# Patient Record
Sex: FEMALE | Race: WHITE | NOT HISPANIC OR LATINO | Employment: FULL TIME | URBAN - METROPOLITAN AREA
[De-identification: names, ages, dates, MRNs, and addresses within clinical notes are randomized per-mention and may not be internally consistent; named-entity substitution may affect disease eponyms.]

---

## 2017-01-12 ENCOUNTER — TELEPHONE (OUTPATIENT)
Dept: HEMATOLOGY/ONCOLOGY | Facility: CLINIC | Age: 67
End: 2017-01-12

## 2017-01-12 NOTE — TELEPHONE ENCOUNTER
----- Message from Juliette Song sent at 1/12/2017  9:43 AM CST -----  Contact: self  Pt is calling to let us know she will be having her colonoscopy done this month and would like Dr Hawkins to discuss her results with Dr. Rose when they are in.

## 2017-01-13 RX ORDER — LISINOPRIL AND HYDROCHLOROTHIAZIDE 12.5; 2 MG/1; MG/1
TABLET ORAL
Qty: 30 TABLET | Refills: 6 | Status: SHIPPED | OUTPATIENT
Start: 2017-01-13 | End: 2018-12-20

## 2017-01-13 NOTE — TELEPHONE ENCOUNTER
----- Message from MirandaHeather Troy sent at 1/13/2017  1:17 PM CST -----  Contact: Pt at [phone]  RX request - refill or new RX.  Is this a refill or new RX:  New rx  RX name and strength: lisinopril-hydrochlorothiazide (PRINZIDE,ZESTORETIC) 20-12.5 mg per tablet  Directions: TAKE 1 TABLET BY MOUTH EVERY DAY  Is this a 30 day or 90 day RX:  30 day  Pharmacy name and phone #: [facility]  [phone] (Phone)  [phone] (Fax)  Comments:  Pharmacy needs doctor's authorization to fill pt's script. Pt only has 3 left. Pt requesting this to be called in today.

## 2017-01-16 ENCOUNTER — ANESTHESIA (OUTPATIENT)
Dept: ENDOSCOPY | Facility: HOSPITAL | Age: 67
End: 2017-01-16
Payer: COMMERCIAL

## 2017-01-16 ENCOUNTER — ANESTHESIA EVENT (OUTPATIENT)
Dept: ENDOSCOPY | Facility: HOSPITAL | Age: 67
End: 2017-01-16
Payer: COMMERCIAL

## 2017-01-16 PROCEDURE — D9220A PRA ANESTHESIA: Mod: 33,CRNA,, | Performed by: NURSE ANESTHETIST, CERTIFIED REGISTERED

## 2017-01-16 PROCEDURE — 25000003 PHARM REV CODE 250: Performed by: NURSE ANESTHETIST, CERTIFIED REGISTERED

## 2017-01-16 PROCEDURE — D9220A PRA ANESTHESIA: Mod: 33,ANES,, | Performed by: ANESTHESIOLOGY

## 2017-01-16 PROCEDURE — 63600175 PHARM REV CODE 636 W HCPCS: Performed by: NURSE ANESTHETIST, CERTIFIED REGISTERED

## 2017-01-16 PROCEDURE — 25000003 PHARM REV CODE 250: Performed by: COLON & RECTAL SURGERY

## 2017-01-16 RX ORDER — PROPOFOL 10 MG/ML
VIAL (ML) INTRAVENOUS CONTINUOUS PRN
Status: DISCONTINUED | OUTPATIENT
Start: 2017-01-16 | End: 2017-01-16

## 2017-01-16 RX ORDER — GLYCOPYRROLATE 0.2 MG/ML
INJECTION INTRAMUSCULAR; INTRAVENOUS
Status: DISCONTINUED | OUTPATIENT
Start: 2017-01-16 | End: 2017-01-16

## 2017-01-16 RX ORDER — PHENYLEPHRINE HYDROCHLORIDE 10 MG/ML
INJECTION INTRAVENOUS
Status: DISCONTINUED | OUTPATIENT
Start: 2017-01-16 | End: 2017-01-16

## 2017-01-16 RX ORDER — LIDOCAINE HCL/PF 100 MG/5ML
SYRINGE (ML) INTRAVENOUS
Status: DISCONTINUED | OUTPATIENT
Start: 2017-01-16 | End: 2017-01-16

## 2017-01-16 RX ORDER — PROPOFOL 10 MG/ML
VIAL (ML) INTRAVENOUS
Status: DISCONTINUED | OUTPATIENT
Start: 2017-01-16 | End: 2017-01-16

## 2017-01-16 RX ADMIN — GLYCOPYRROLATE 0.2 MG: 0.2 INJECTION, SOLUTION INTRAMUSCULAR; INTRAVENOUS at 12:01

## 2017-01-16 RX ADMIN — PHENYLEPHRINE HYDROCHLORIDE 100 MCG: 10 INJECTION INTRAVENOUS at 12:01

## 2017-01-16 RX ADMIN — LIDOCAINE HYDROCHLORIDE 40 MG: 20 INJECTION, SOLUTION INTRAVENOUS at 12:01

## 2017-01-16 RX ADMIN — PROPOFOL 70 MG: 10 INJECTION, EMULSION INTRAVENOUS at 12:01

## 2017-01-16 RX ADMIN — DEXTROSE MONOHYDRATE AND SODIUM CHLORIDE: 5; .45 INJECTION, SOLUTION INTRAVENOUS at 12:01

## 2017-01-16 RX ADMIN — PROPOFOL 150 MCG/KG/MIN: 10 INJECTION, EMULSION INTRAVENOUS at 12:01

## 2017-01-16 NOTE — ANESTHESIA PREPROCEDURE EVALUATION
01/16/2017  Rachel Murray is a 66 y.o., female with anal cancer s/p chemo here for colonoscopy.    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     I have reviewed the Nursing Notes.      Review of Systems  Anesthesia Hx:  No problems with previous Anesthesia    Cardiovascular:   Exercise tolerance: good Hypertension, well controlled Denies CAD.     Denies Angina.    Pulmonary:   Denies Shortness of breath.    Hepatic/GI:   Anal cancer       Physical Exam  General:  Well nourished    Airway/Jaw/Neck:  Airway Findings: Mouth Opening: Normal Tongue: Normal  General Airway Assessment: Adult  Mallampati: II  Jaw/Neck Findings:  Neck ROM: Normal ROM      Dental:  Dental Findings: In tact    Chest/Lungs:  Chest/Lungs Findings: Clear to auscultation, Normal Respiratory Rate     Heart/Vascular:  Heart Findings: Rate: Normal  Rhythm: Regular Rhythm  Sounds: Normal        Mental Status:  Mental Status Findings:  Cooperative, Alert and Oriented         Anesthesia Plan  Type of Anesthesia, risks & benefits discussed:  Anesthesia Type:  MAC  Patient's Preference:   Intra-op Monitoring Plan:   Intra-op Monitoring Plan Comments:   Post Op Pain Control Plan:   Post Op Pain Control Plan Comments:   Induction:    Beta Blocker:         Informed Consent: Patient understands risks and agrees with Anesthesia plan.  Questions answered. Anesthesia consent signed with patient.  ASA Score: 2     Day of Surgery Review of History & Physical:    H&P update referred to the surgeon.         Ready For Surgery From Anesthesia Perspective.

## 2017-01-16 NOTE — TRANSFER OF CARE
"Anesthesia Transfer of Care Note    Patient: Rachel Murray    Procedure(s) Performed: Procedure(s) (LRB):  COLONOSCOPY (N/A)    Patient location: OPS    Anesthesia Type: general    Transport from OR: Transported from OR on room air with adequate spontaneous ventilation    Post pain: adequate analgesia    Post assessment: no apparent anesthetic complications    Post vital signs: stable    Level of consciousness: awake    Nausea/Vomiting: no nausea/vomiting    Complications: none          Last vitals:   Visit Vitals    BP (!) 83/50    Pulse 77    Temp 36.7 °C (98 °F)    Resp 16    Ht 5' 2" (1.575 m)    Wt 74.4 kg (164 lb)    SpO2 (!) 92%    Breastfeeding No    BMI 30 kg/m2     "

## 2017-01-17 NOTE — ANESTHESIA RELEASE NOTE
"Anesthesia Release from PACU Note    Patient: Rachel Murray    Procedure(s) Performed: Procedure(s) (LRB):  COLONOSCOPY (N/A)    Anesthesia type: general    Post pain: Adequate analgesia    Post assessment: no apparent anesthetic complications, tolerated procedure well and no evidence of recall    Last Vitals:   Visit Vitals    /64    Pulse 65    Temp 36.7 °C (98 °F)    Resp 16    Ht 5' 2" (1.575 m)    Wt 74.4 kg (164 lb)    SpO2 98%    Breastfeeding No    BMI 30 kg/m2       Post vital signs: stable    Level of consciousness: awake, alert  and oriented    Nausea/Vomiting: no nausea/no vomiting    Complications: none    Airway Patency: patent    Respiratory: unassisted, spontaneous ventilation    Cardiovascular: stable and blood pressure at baseline    Hydration: euvolemic  "

## 2017-01-17 NOTE — ANESTHESIA POSTPROCEDURE EVALUATION
"Anesthesia Post Evaluation    Patient: Rachel Murray    Procedure(s) Performed: Procedure(s) (LRB):  COLONOSCOPY (N/A)    Final Anesthesia Type: general  Patient location during evaluation: PACU  Patient participation: Yes- Able to Participate  Level of consciousness: awake and alert and awake  Post-procedure vital signs: reviewed and stable  Pain management: adequate  Airway patency: patent  PONV status at discharge: No PONV  Anesthetic complications: no      Cardiovascular status: blood pressure returned to baseline  Respiratory status: spontaneous ventilation and unassisted  Hydration status: euvolemic  Follow-up not needed.        Visit Vitals    /64    Pulse 65    Temp 36.7 °C (98 °F)    Resp 16    Ht 5' 2" (1.575 m)    Wt 74.4 kg (164 lb)    SpO2 98%    Breastfeeding No    BMI 30 kg/m2       Pain/Montez Score: Pain Assessment Performed: Yes (1/16/2017  1:10 PM)  Presence of Pain: denies (1/16/2017  1:10 PM)  Pain Rating Prior to Med Admin: 0 (1/16/2017  1:10 PM)  Pain Rating Post Med Admin: 0 (1/16/2017  1:10 PM)  Montez Score: 10 (1/16/2017  1:10 PM)      "

## 2017-01-23 ENCOUNTER — TELEPHONE (OUTPATIENT)
Dept: ENDOSCOPY | Facility: HOSPITAL | Age: 67
End: 2017-01-23

## 2017-02-07 DIAGNOSIS — C21.0 ANAL CANCER: Primary | ICD-10-CM

## 2017-03-31 ENCOUNTER — LAB VISIT (OUTPATIENT)
Dept: LAB | Facility: HOSPITAL | Age: 67
End: 2017-03-31
Attending: INTERNAL MEDICINE
Payer: COMMERCIAL

## 2017-03-31 ENCOUNTER — OFFICE VISIT (OUTPATIENT)
Dept: HEMATOLOGY/ONCOLOGY | Facility: CLINIC | Age: 67
End: 2017-03-31
Payer: COMMERCIAL

## 2017-03-31 VITALS
BODY MASS INDEX: 30.22 KG/M2 | RESPIRATION RATE: 17 BRPM | TEMPERATURE: 99 F | DIASTOLIC BLOOD PRESSURE: 68 MMHG | SYSTOLIC BLOOD PRESSURE: 129 MMHG | WEIGHT: 164.25 LBS | HEART RATE: 72 BPM | OXYGEN SATURATION: 96 % | HEIGHT: 62 IN

## 2017-03-31 DIAGNOSIS — R53.83 FATIGUE, UNSPECIFIED TYPE: ICD-10-CM

## 2017-03-31 DIAGNOSIS — C21.0 ANAL CANCER: ICD-10-CM

## 2017-03-31 DIAGNOSIS — Z85.048 HISTORY OF RECTAL OR ANAL CANCER: Primary | Chronic | ICD-10-CM

## 2017-03-31 DIAGNOSIS — I10 ESSENTIAL HYPERTENSION: ICD-10-CM

## 2017-03-31 LAB
ALBUMIN SERPL BCP-MCNC: 3.6 G/DL
ALP SERPL-CCNC: 122 U/L
ALT SERPL W/O P-5'-P-CCNC: 14 U/L
ANION GAP SERPL CALC-SCNC: 11 MMOL/L
AST SERPL-CCNC: 12 U/L
BILIRUB SERPL-MCNC: 0.7 MG/DL
BUN SERPL-MCNC: 19 MG/DL
CALCIUM SERPL-MCNC: 9.4 MG/DL
CHLORIDE SERPL-SCNC: 105 MMOL/L
CO2 SERPL-SCNC: 22 MMOL/L
CREAT SERPL-MCNC: 1 MG/DL
ERYTHROCYTE [DISTWIDTH] IN BLOOD BY AUTOMATED COUNT: 13.9 %
EST. GFR  (AFRICAN AMERICAN): >60 ML/MIN/1.73 M^2
EST. GFR  (NON AFRICAN AMERICAN): 58.8 ML/MIN/1.73 M^2
GLUCOSE SERPL-MCNC: 99 MG/DL
HCT VFR BLD AUTO: 44.5 %
HGB BLD-MCNC: 14.8 G/DL
MCH RBC QN AUTO: 29 PG
MCHC RBC AUTO-ENTMCNC: 33.3 %
MCV RBC AUTO: 87 FL
NEUTROPHILS # BLD AUTO: 7.2 K/UL
PLATELET # BLD AUTO: 259 K/UL
PMV BLD AUTO: 8.9 FL
POTASSIUM SERPL-SCNC: 3.9 MMOL/L
PROT SERPL-MCNC: 7.3 G/DL
RBC # BLD AUTO: 5.1 M/UL
SODIUM SERPL-SCNC: 138 MMOL/L
WBC # BLD AUTO: 10.06 K/UL

## 2017-03-31 PROCEDURE — 1126F AMNT PAIN NOTED NONE PRSNT: CPT | Mod: S$GLB,,, | Performed by: INTERNAL MEDICINE

## 2017-03-31 PROCEDURE — 3078F DIAST BP <80 MM HG: CPT | Mod: S$GLB,,, | Performed by: INTERNAL MEDICINE

## 2017-03-31 PROCEDURE — 85027 COMPLETE CBC AUTOMATED: CPT

## 2017-03-31 PROCEDURE — 1157F ADVNC CARE PLAN IN RCRD: CPT | Mod: S$GLB,,, | Performed by: INTERNAL MEDICINE

## 2017-03-31 PROCEDURE — 3074F SYST BP LT 130 MM HG: CPT | Mod: S$GLB,,, | Performed by: INTERNAL MEDICINE

## 2017-03-31 PROCEDURE — 36415 COLL VENOUS BLD VENIPUNCTURE: CPT

## 2017-03-31 PROCEDURE — 1159F MED LIST DOCD IN RCRD: CPT | Mod: S$GLB,,, | Performed by: INTERNAL MEDICINE

## 2017-03-31 PROCEDURE — 80053 COMPREHEN METABOLIC PANEL: CPT

## 2017-03-31 PROCEDURE — 99999 PR PBB SHADOW E&M-EST. PATIENT-LVL III: CPT | Mod: PBBFAC,,, | Performed by: INTERNAL MEDICINE

## 2017-03-31 PROCEDURE — 1160F RVW MEDS BY RX/DR IN RCRD: CPT | Mod: S$GLB,,, | Performed by: INTERNAL MEDICINE

## 2017-03-31 PROCEDURE — 99213 OFFICE O/P EST LOW 20 MIN: CPT | Mod: S$GLB,,, | Performed by: INTERNAL MEDICINE

## 2017-03-31 NOTE — PROGRESS NOTES
"PATIENT: Rachel Murray  MRN: [id number]  DATE: 3/31/2017    Subjective:     Chief complaint:  Chief Complaint   Patient presents with    History of rectal or anal cancer       Oncologic History:  65 year old female with anal cancer. Patient developed painful anal bleeding and a sensation of something protruding from the anus beginning late October 2012. She initially tried external hemorrhoid treatment with proctofoam without improvement in her symptoms. She was referred to colorectal surgery clinic on 11/28/12. Exam that day reported a minimal amount of what appeared to be prolapsing rectal mucosa at the anterior anal verge which was friable. Digital rectal exam described a hard ridge at the anterior midline. The anal canal was very tender. On 11/30/12 patient underwent examination under anesthesia which revealed a large fissured tag at the anterior anal verge and a cavitated fissured ridge extending the length of the anal canal. Biopsy reports a squamous cell carcinoma. The patient was treated with concurrent chemoradiation with 5FU/mitomycin and completed treatment on 2/8/13   Her last PET scan from 06/18/14 revealed no evidence of recurrence  She is on surveillance.     Colonoscopy done on 1/16/17 reveals "Impression: - Three 4 to 8 mm polyps in the sigmoid colon, in the descending colon and in the cecum, removed with a jumbo cold forceps. Resected and retrieved. - Diverticulosis in the entire examined colon. - The examination was otherwise normal. Recommendation:       - Discharge patient to home - Repeat colonoscopy in 3 years for surveillance." Pathology revealed " FINAL PATHOLOGIC DIAGNOSIS 1-3. TUBULAR ADENOMA"      Interval History: Ms. Murray returns for follow up and to review labs. She reports fatigue over the last couple of weeks. Her systolic BP's have been running in the low 100's. She is holding her BP med at this time to see if this helps with fatigue. She denies any nausea, vomiting, diarrhea, " "constipation, abdominal pain, weight loss or loss of appetite, chest pain, shortness of breath, leg swelling,  pain, headache, dizziness, or mood changes. She is alone.     ECOG Performance Status:   ECOG SCORE    0 - Fully active-able to carry on all pre-disease performance without restriction          PMFSH: all information reviewed and updated as relevant to today's visit    Review of Systems:   Review of Systems   Constitutional: Positive for fatigue. Negative for activity change, appetite change and fever.   HENT: Negative for mouth sores, nosebleeds and sore throat.    Eyes: Negative for visual disturbance.   Respiratory: Negative for cough and shortness of breath.    Cardiovascular: Negative for chest pain, palpitations and leg swelling.   Gastrointestinal: Negative for abdominal pain, constipation, diarrhea, nausea and vomiting.   Genitourinary: Negative for difficulty urinating and frequency.   Musculoskeletal: Negative for arthralgias and back pain.   Skin: Negative for rash.   Neurological: Negative for dizziness, numbness and headaches.   Hematological: Negative for adenopathy. Does not bruise/bleed easily.   Psychiatric/Behavioral: Negative for confusion and sleep disturbance. The patient is not nervous/anxious.    All other systems reviewed and are negative.        Objective:      Vitals:   Vitals:    03/31/17 0959   BP: 129/68   Pulse: 72   Resp: 17   Temp: 99.2 °F (37.3 °C)   TempSrc: Oral   SpO2: 96%   Weight: 74.5 kg (164 lb 3.9 oz)   Height: 5' 2" (1.575 m)     BMI: Body mass index is 30.04 kg/(m^2).      Physical Exam:   Physical Exam   Constitutional: She is oriented to person, place, and time. She appears well-developed and well-nourished. No distress.   HENT:   Head: Normocephalic.   Right Ear: External ear normal.   Left Ear: External ear normal.   Mouth/Throat: No oropharyngeal exudate.   No sinus tenderness.   Eyes: Conjunctivae and lids are normal. Pupils are equal, round, and reactive to " light. No scleral icterus.   Neck: Trachea normal and normal range of motion. Neck supple. No thyromegaly present.   Cardiovascular: Normal rate, regular rhythm and normal heart sounds.    Pulmonary/Chest: Effort normal and breath sounds normal.   Abdominal: Soft. Normal appearance and bowel sounds are normal. She exhibits no distension and no mass. There is no tenderness.   Musculoskeletal: Normal range of motion.   Lymphadenopathy:        Head (right side): No submental and no submandibular adenopathy present.        Head (left side): No submental and no submandibular adenopathy present.     She has no cervical adenopathy.   Neurological: She is alert and oriented to person, place, and time. She has normal strength and normal reflexes. No cranial nerve deficit. Gait normal.   Skin: Skin is warm, dry and intact. No bruising and no rash noted. No cyanosis. Nails show no clubbing.   Psychiatric: She has a normal mood and affect. Her speech is normal and behavior is normal.   Nursing note and vitals reviewed.        Laboratory Data:  WBC   Date Value Ref Range Status   03/31/2017 10.06 3.90 - 12.70 K/uL Final     Hemoglobin   Date Value Ref Range Status   03/31/2017 14.8 12.0 - 16.0 g/dL Final     Hematocrit   Date Value Ref Range Status   03/31/2017 44.5 37.0 - 48.5 % Final     Platelets   Date Value Ref Range Status   03/31/2017 259 150 - 350 K/uL Final     Gran #   Date Value Ref Range Status   03/31/2017 7.2 1.8 - 7.7 K/uL Final     Comment:     The ANC is based on a white cell differential from an   automated cell counter. It has not been microscopically   reviewed for the presence of abnormal cells. Clinical   correlation is required.         Chemistry        Component Value Date/Time     03/31/2017 0829    K 3.9 03/31/2017 0829     03/31/2017 0829    CO2 22 (L) 03/31/2017 0829    BUN 19 03/31/2017 0829    CREATININE 1.0 03/31/2017 0829    GLU 99 03/31/2017 0829        Component Value Date/Time     CALCIUM 9.4 03/31/2017 0829    ALKPHOS 122 03/31/2017 0829    AST 12 03/31/2017 0829    ALT 14 03/31/2017 0829    BILITOT 0.7 03/31/2017 0829              Assessment/Plan:     1. History of rectal or anal cancer    2. Fatigue, unspecified type    3. Essential hypertension        Plan:  1. She is doing well clinically and will return in 1 year with labs.  2,3. Fatigue likely attribute to low BP readings.  Discussed HTN and the importance of keeping it under control. Patient's BP's have been running in the low 100's/60-70's.  Encouraged to continue to hold BP medication but to check BP daily. If BP elevates to >130/70, I would suggest she resume BP medication and follow up with PCP. Patient verbalized understanding.       Med and Orders:  Orders Placed This Encounter    CBC Oncology    Comprehensive metabolic panel       Follow Up:  Return in about 1 year (around 3/31/2018).        More than 25 mins were spent during this encounter, greater than 50% was spent in direct counseling and/or coordination of care.   Patient was also seen and examined by Dr. Hawkins who agrees with above plan. Patient is in agreement with the proposed treatment plan. All questions were answered to the patient's satisfaction. Pt knows to call clinic if anything is needed before the next clinic visit.    JASWANT Chaparro  Hematology and Medical Oncology      STAFF NOTE:  I have reviewed the notes, assessments, and/or procedures performed by the nurse practitioner, as above.  I have personally interviewed the patient at the beside, and rounded with the nurse practitioner. I formulated the plan of care.  I concur with her documentation of Rachel Murray.

## 2017-08-01 ENCOUNTER — OFFICE VISIT (OUTPATIENT)
Dept: SURGERY | Facility: CLINIC | Age: 67
End: 2017-08-01
Payer: COMMERCIAL

## 2017-08-01 VITALS
SYSTOLIC BLOOD PRESSURE: 156 MMHG | BODY MASS INDEX: 28.61 KG/M2 | WEIGHT: 155.44 LBS | HEART RATE: 78 BPM | DIASTOLIC BLOOD PRESSURE: 78 MMHG | HEIGHT: 62 IN

## 2017-08-01 DIAGNOSIS — Z85.048 PERSONAL HISTORY OF MALIGNANT NEOPLASM OF RECTUM, RECTOSIGMOID JUNCTION, AND ANUS: Primary | ICD-10-CM

## 2017-08-01 PROCEDURE — 1126F AMNT PAIN NOTED NONE PRSNT: CPT | Mod: S$GLB,,, | Performed by: COLON & RECTAL SURGERY

## 2017-08-01 PROCEDURE — 99999 PR PBB SHADOW E&M-EST. PATIENT-LVL III: CPT | Mod: PBBFAC,,, | Performed by: COLON & RECTAL SURGERY

## 2017-08-01 PROCEDURE — 99214 OFFICE O/P EST MOD 30 MIN: CPT | Mod: 25,S$GLB,, | Performed by: COLON & RECTAL SURGERY

## 2017-08-01 PROCEDURE — 46600 DIAGNOSTIC ANOSCOPY SPX: CPT | Mod: S$GLB,,, | Performed by: COLON & RECTAL SURGERY

## 2017-08-01 PROCEDURE — 1159F MED LIST DOCD IN RCRD: CPT | Mod: S$GLB,,, | Performed by: COLON & RECTAL SURGERY

## 2017-08-01 NOTE — PROGRESS NOTES
CRS Office Visit    SUBJECTIVE:   CC: anal cancer     HPI 64 y.o. woman with a history of anal cancer diagnosed 2012 treated with modified Adalid (finished 2013)    Interval History  7Patient feeling well. No blood in stool or pain with bowel movements. Anal pap in  was negative for malignant cells and dysplasia. Denies any new masses or skin changes. 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 Rose    gyn surgery      ex laparotomy    HYSTERECTOMY       Family History   Problem Relation Age of Onset    Hypertension Mother     Heart disease Mother 40     CABG x 5    Seizures Mother     Cancer Father      prostate    Stroke Father     Cancer Brother      colon    Diabetes Maternal Aunt     Diabetes Maternal Uncle     Stroke Maternal Grandmother     Stroke Paternal Grandmother     Diabetes Cousin      Social History   Substance Use Topics    Smoking status: Current Every Day Smoker     Packs/day: 0.50     Years: 40.00     Types: Cigarettes    Smokeless tobacco: Never Used      Comment: occasionally    Alcohol use 0.0 oz/week      Comment: rare       OBJECTIVE:     Vital Signs (Most Recent)  Pulse: 78 (17 0832)  BP: (!) 156/78 (17 0832)    Physical Exam:  General: White female in NAD sitting in chair in clinic  Neuro: aaox4 maex4  perrl  Respiratory: resps even unlabored, CTAB  Cardiac: cap refill <2 sec, RRR  Abdomen: Abdomen soft, nontender. BS normal. No masses, organomegaly  Extremities: Warm dry and intact  Anorectal: Normal sphincter tone, no masses or stricture palpated. Anoscopy did not reveal internal abnormalities.    ASSESSMENT/PLAN:     63 yo female with h/o anal cancer s/p modified Adalid (finished 2/2013), no signs of recurrence.    Plan  RTC  6 months

## 2017-11-21 ENCOUNTER — TELEPHONE (OUTPATIENT)
Dept: HEMATOLOGY/ONCOLOGY | Facility: CLINIC | Age: 67
End: 2017-11-21

## 2017-11-21 NOTE — TELEPHONE ENCOUNTER
spoke with pt on today in regards to 1yr f/u appointment, pt was inform her next f/u will be 03/2018,  also inform pt appointments will be mailed out with date and times.

## 2017-12-18 ENCOUNTER — OFFICE VISIT (OUTPATIENT)
Dept: URGENT CARE | Facility: CLINIC | Age: 67
End: 2017-12-18
Payer: COMMERCIAL

## 2017-12-18 VITALS
HEIGHT: 62 IN | TEMPERATURE: 97 F | SYSTOLIC BLOOD PRESSURE: 145 MMHG | BODY MASS INDEX: 28.52 KG/M2 | OXYGEN SATURATION: 99 % | HEART RATE: 93 BPM | DIASTOLIC BLOOD PRESSURE: 88 MMHG | WEIGHT: 155 LBS | RESPIRATION RATE: 18 BRPM

## 2017-12-18 DIAGNOSIS — B88.9 DERMATOSIS DUE TO MITES: Primary | ICD-10-CM

## 2017-12-18 PROCEDURE — 99213 OFFICE O/P EST LOW 20 MIN: CPT | Mod: 25,S$GLB,, | Performed by: INTERNAL MEDICINE

## 2017-12-18 PROCEDURE — 90471 IMMUNIZATION ADMIN: CPT | Mod: 59,S$GLB,, | Performed by: INTERNAL MEDICINE

## 2017-12-18 PROCEDURE — 96372 THER/PROPH/DIAG INJ SC/IM: CPT | Mod: S$GLB,,, | Performed by: INTERNAL MEDICINE

## 2017-12-18 PROCEDURE — 90662 IIV NO PRSV INCREASED AG IM: CPT | Mod: S$GLB,,, | Performed by: INTERNAL MEDICINE

## 2017-12-18 RX ORDER — PERMETHRIN 50 MG/G
CREAM TOPICAL
Qty: 60 G | Refills: 1 | Status: SHIPPED | OUTPATIENT
Start: 2017-12-18 | End: 2018-01-16

## 2017-12-18 RX ORDER — BETAMETHASONE SODIUM PHOSPHATE AND BETAMETHASONE ACETATE 3; 3 MG/ML; MG/ML
9 INJECTION, SUSPENSION INTRA-ARTICULAR; INTRALESIONAL; INTRAMUSCULAR; SOFT TISSUE ONCE
Status: COMPLETED | OUTPATIENT
Start: 2017-12-18 | End: 2017-12-18

## 2017-12-18 RX ORDER — IBUPROFEN 800 MG/1
800 TABLET ORAL EVERY 6 HOURS PRN
COMMUNITY
End: 2018-03-20

## 2017-12-18 RX ORDER — HYDROCODONE BITARTRATE AND ACETAMINOPHEN 10; 325 MG/1; MG/1
TABLET ORAL
COMMUNITY
End: 2018-01-16

## 2017-12-18 RX ADMIN — BETAMETHASONE SODIUM PHOSPHATE AND BETAMETHASONE ACETATE 9 MG: 3; 3 INJECTION, SUSPENSION INTRA-ARTICULAR; INTRALESIONAL; INTRAMUSCULAR; SOFT TISSUE at 09:12

## 2017-12-18 NOTE — PROGRESS NOTES
"Subjective:       Patient ID: Rachel Murray is a 67 y.o. female.    Vitals:  height is 5' 2" (1.575 m) and weight is 70.3 kg (155 lb). Her oral temperature is 96.7 °F (35.9 °C). Her blood pressure is 145/88 (abnormal) and her pulse is 93. Her respiration is 18 and oxygen saturation is 99%.     Chief Complaint: Rash    Rash   The current episode started in the past 7 days. The problem has been gradually worsening since onset. The affected locations include the chest, left shoulder, groin and back. The rash is characterized by itchiness and redness. It is unknown if there was an exposure to a precipitant. Pertinent negatives include no diarrhea, fever, joint pain, shortness of breath, sore throat or vomiting. Past treatments include topical steroids. The treatment provided mild relief.     Review of Systems   Constitution: Negative for chills and fever.   HENT: Negative for sore throat.    Eyes: Negative for blurred vision.   Cardiovascular: Negative for chest pain.   Respiratory: Negative for shortness of breath.    Skin: Positive for color change, dry skin, itching and rash.   Musculoskeletal: Negative for back pain and joint pain.   Gastrointestinal: Negative for abdominal pain, diarrhea, nausea and vomiting.   Neurological: Negative for headaches.   Psychiatric/Behavioral: The patient is not nervous/anxious.        Objective:      Physical Exam   Constitutional: She appears well-developed and well-nourished.   HENT:   Head: Normocephalic and atraumatic.   Eyes: Conjunctivae and EOM are normal. Pupils are equal, round, and reactive to light.   Skin:   Excoriated papular lesions on neckline, under bra line,waist line upper thigh in leg underwear line   Vitals reviewed.      Assessment:       1. 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  -     permethrin (ELIMITE) 5 " % cream; Apply as directed; repeat in 10-14 days  Dispense: 60 g; Refill: 1

## 2017-12-27 ENCOUNTER — OFFICE VISIT (OUTPATIENT)
Dept: URGENT CARE | Facility: CLINIC | Age: 67
End: 2017-12-27
Payer: COMMERCIAL

## 2017-12-27 VITALS
WEIGHT: 155 LBS | OXYGEN SATURATION: 98 % | SYSTOLIC BLOOD PRESSURE: 145 MMHG | RESPIRATION RATE: 18 BRPM | BODY MASS INDEX: 28.52 KG/M2 | HEART RATE: 84 BPM | DIASTOLIC BLOOD PRESSURE: 65 MMHG | HEIGHT: 62 IN | TEMPERATURE: 97 F

## 2017-12-27 DIAGNOSIS — B88.9 DERMATOSIS DUE TO MITES: Primary | ICD-10-CM

## 2017-12-27 PROCEDURE — 99214 OFFICE O/P EST MOD 30 MIN: CPT | Mod: S$GLB,,, | Performed by: NURSE PRACTITIONER

## 2017-12-27 RX ORDER — PREDNISONE 20 MG/1
20 TABLET ORAL DAILY
Qty: 7 TABLET | Refills: 0 | Status: SHIPPED | OUTPATIENT
Start: 2017-12-27 | End: 2018-01-03

## 2017-12-27 RX ORDER — HYDROXYZINE HYDROCHLORIDE 25 MG/1
25 TABLET, FILM COATED ORAL 3 TIMES DAILY PRN
Qty: 21 TABLET | Refills: 0 | Status: SHIPPED | OUTPATIENT
Start: 2017-12-27 | End: 2018-01-03

## 2017-12-27 NOTE — PROGRESS NOTES
"Subjective:       Patient ID: Rachel Murray is a 67 y.o. female.    Vitals:  height is 5' 2" (1.575 m) and weight is 70.3 kg (155 lb). Her temperature is 97.1 °F (36.2 °C). Her blood pressure is 145/65 (abnormal) and her pulse is 84. Her respiration is 18 and oxygen saturation is 98%.     Chief Complaint: Rash    Rash   This is a recurrent problem. The current episode started 1 to 4 weeks ago. The problem is unchanged. The affected locations include the abdomen, groin, head, neck, chest, back, left shoulder and right shoulder. The rash is characterized by dryness, itchiness and redness. She was exposed to nothing. Pertinent negatives include no diarrhea, fever, joint pain, shortness of breath, sore throat or vomiting. Past treatments include antihistamine and anti-itch cream (steroid shot and cream). The treatment provided mild relief.     Review of Systems   Constitution: Negative for chills and fever.   HENT: Negative for sore throat.    Eyes: Negative for blurred vision.   Cardiovascular: Negative for chest pain.   Respiratory: Negative for shortness of breath.    Skin: Positive for itching, poor wound healing, rash and suspicious lesions.   Musculoskeletal: Negative for back pain and joint pain.   Gastrointestinal: Negative for abdominal pain, diarrhea, nausea and vomiting.   Neurological: Negative for headaches.   Psychiatric/Behavioral: The patient is not nervous/anxious.        Objective:      Physical Exam   Constitutional: She is oriented to person, place, and time. She appears well-developed and well-nourished.   HENT:   Head: Normocephalic and atraumatic. Head is without abrasion, without contusion and without laceration.   Right Ear: External ear normal.   Left Ear: External ear normal.   Nose: Nose normal.   Mouth/Throat: Oropharynx is clear and moist.   Eyes: Conjunctivae, EOM and lids are normal. Pupils are equal, round, and reactive to light.   Neck: Trachea normal, full passive range of motion " without pain and phonation normal. Neck supple.   Cardiovascular: Normal rate, regular rhythm and normal heart sounds.    Pulmonary/Chest: Effort normal and breath sounds normal. No stridor. No respiratory distress.   Musculoskeletal: Normal range of motion.   Neurological: She is alert and oriented to person, place, and time.   Skin: Skin is warm, dry and intact. Capillary refill takes less than 2 seconds. Rash (ABOUT NECKLINE, UNDER BREASTS, AROUND WAISTLINE, AND GROIN) noted. No abrasion, no bruising, no burn, no ecchymosis, no laceration and no lesion noted. Rash is papular and urticarial. No erythema.   Psychiatric: She has a normal mood and affect. Her speech is normal and behavior is normal. Judgment and thought content normal. Cognition and memory are normal.   Nursing note and vitals reviewed.      Assessment:       1. 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  Dispense: 21 tablet; Refill: 0  -     predniSONE (DELTASONE) 20 MG tablet; Take 1 tablet (20 mg total) by mouth once daily.  Dispense: 7 tablet; Refill: 0  -     Ambulatory referral to Dermatology      Patient Instructions   Please drink plenty of fluids.  Please get plenty of rest.  Please return here or go to the Emergency Department for any concerns or worsening of condition.  If you were prescribed a narcotic medication, do not drive or operate heavy equipment or machinery while taking these medications.  Please take over the counter Pepcid or Zantac as directed for the next 24-72hours as needed.  If you were given a steroid shot in the clinic and have also been given a prescription for a steroid such as Prednisone or a Medrol Dose Pack, please begin taking them tomorrow.  If you have a localized reaction it is ok to apply topical Benadryl and/or Cortaid as directed to the affected  area.  Please take over the counter Benadryl as directed for the next 24-72 hours as needed for itching unless it makes you sleepy, then you can take over the counter Allegra or Claritin or Zyrtec as directed during the daytime hours as these generally do not cause drowsiness.  Please follow up with your primary care doctor or specialist as needed.    If you  smoke, please stop smoking.  Nonspecific Dermatitis  Dermatitis is a skin rash caused by something that touches the skin and makes it irritated and inflamed.  Your skin may be red, swollen, dry, and may be cracked. Blisters may form and ooze. The rash will itch.  Dermatitis can form on the face and neck, backs of hands, forearms, genitals, and lower legs. Dermatitis is not passed from person to person.  Talk with your health care provider about what may have caused the rash. Common things that cause skin allergies are metal in jewelry, plants like poison ivy or poison oak, and certain skin care products. You will need to avoid the source of your rash in the future to prevent it from coming back. In some cases, the cause of the dermatitis may not be found.  Treatment is done to relieve itching and prevent the rash from coming back. The rash should go away in a few days to a few weeks.  Home care  The health care provider may prescribe medications to relieve swelling and itching. Follow all instructions when using these medications.  · Avoid anything that heats up your skin, such as hot showers or baths, or direct sunlight. This can make itching worse.  · Stay away from whatever you think caused the rash.  · Bathe in warm, not hot, water. Apply a moisturizing lotion after bathing to prevent dry skin.  · Avoid skin irritants such as wool or silk clothing, grease, oils, harsh soaps, and detergents.  · Apply cold compresses to soothe your sores to help relieve your symptoms. Do this for 30 minutes 3 to 4 times a day. You can make a cold compress by soaking a cloth  in cold water. Squeeze out excess water. You can add colloidal oatmeal to the water to help reduce itching. For severe itching in a small area, apply an ice pack wrapped in a thin towel. Do this for 20 minutes 3 to 4 times a day.  · You can also help relieve large areas of itching by taking a lukewarm bath with colloidal oatmeal added to the water.  · Use hydrocortisone cream for redness and irritation, unless another medicine was prescribed. You can also use benzocaine anesthetic cream or spray.  · Use oral diphenhydramine to help reduce itching. This is an antihistamine you can buy at drug and grocery stores. It can make you sleepy, so use lower doses during the daytime. Or you can use loratadine. This is an antihistamine that will not make you sleepy. Dont use diphenhydramine if you have glaucoma or have trouble urinating because of an enlarged prostate.  · Wash your hands or use an antibacterial gel often to prevent the spread of the rash.  Follow-up care  Follow up with your health care provider. Make an appointment with your health care provider if your symptoms do not get better in the next 1 to 2 weeks.  When to seek medical advice  Call your health care provider right away if any of these occur:  · Spreading of the rash to other parts of your body  · Severe swelling of your face, eyelids, mouth, throat or tongue  · Trouble urinating due to swelling in the genital area  · Fever of 100.4°F (38°C) or higher  · Redness or swelling that gets worse  · Pain that gets worse  · Foul-smelling fluid leaking from the skin  · Yellow-brown crusts on the open blisters  · Joint pain   Date Last Reviewed: 7/23/2014 © 2000-2017 [employer]. [address] [place] [address], [place] [address]. All rights reserved. This information is not intended as a substitute for professional medical care. Always follow your healthcare professional's instructions.

## 2017-12-28 NOTE — PATIENT INSTRUCTIONS
Please drink plenty of fluids.  Please get plenty of rest.  Please return here or go to the Emergency Department for any concerns or worsening of condition.  If you were prescribed a narcotic medication, do not drive or operate heavy equipment or machinery while taking these medications.  Please take over the counter Pepcid or Zantac as directed for the next 24-72hours as needed.  If you were given a steroid shot in the clinic and have also been given a prescription for a steroid such as Prednisone or a Medrol Dose Pack, please begin taking them tomorrow.  If you have a localized reaction it is ok to apply topical Benadryl and/or Cortaid as directed to the affected area.  Please take over the counter Benadryl as directed for the next 24-72 hours as needed for itching unless it makes you sleepy, then you can take over the counter Allegra or Claritin or Zyrtec as directed during the daytime hours as these generally do not cause drowsiness.  Please follow up with your primary care doctor or specialist as needed.    If you  smoke, please stop smoking.  Nonspecific Dermatitis  Dermatitis is a skin rash caused by something that touches the skin and makes it irritated and inflamed.  Your skin may be red, swollen, dry, and may be cracked. Blisters may form and ooze. The rash will itch.  Dermatitis can form on the face and neck, backs of hands, forearms, genitals, and lower legs. Dermatitis is not passed from person to person.  Talk with your health care provider about what may have caused the rash. Common things that cause skin allergies are metal in jewelry, plants like poison ivy or poison oak, and certain skin care products. You will need to avoid the source of your rash in the future to prevent it from coming back. In some cases, the cause of the dermatitis may not be found.  Treatment is done to relieve itching and prevent the rash from coming back. The rash should go away in a few days to a few weeks.  Home care  The  health care provider may prescribe medications to relieve swelling and itching. Follow all instructions when using these medications.  · Avoid anything that heats up your skin, such as hot showers or baths, or direct sunlight. This can make itching worse.  · Stay away from whatever you think caused the rash.  · Bathe in warm, not hot, water. Apply a moisturizing lotion after bathing to prevent dry skin.  · Avoid skin irritants such as wool or silk clothing, grease, oils, harsh soaps, and detergents.  · Apply cold compresses to soothe your sores to help relieve your symptoms. Do this for 30 minutes 3 to 4 times a day. You can make a cold compress by soaking a cloth in cold water. Squeeze out excess water. You can add colloidal oatmeal to the water to help reduce itching. For severe itching in a small area, apply an ice pack wrapped in a thin towel. Do this for 20 minutes 3 to 4 times a day.  · You can also help relieve large areas of itching by taking a lukewarm bath with colloidal oatmeal added to the water.  · Use hydrocortisone cream for redness and irritation, unless another medicine was prescribed. You can also use benzocaine anesthetic cream or spray.  · Use oral diphenhydramine to help reduce itching. This is an antihistamine you can buy at drug and grocery stores. It can make you sleepy, so use lower doses during the daytime. Or you can use loratadine. This is an antihistamine that will not make you sleepy. Dont use diphenhydramine if you have glaucoma or have trouble urinating because of an enlarged prostate.  · Wash your hands or use an antibacterial gel often to prevent the spread of the rash.  Follow-up care  Follow up with your health care provider. Make an appointment with your health care provider if your symptoms do not get better in the next 1 to 2 weeks.  When to seek medical advice  Call your health care provider right away if any of these occur:  · Spreading of the rash to other parts of your  body  · Severe swelling of your face, eyelids, mouth, throat or tongue  · Trouble urinating due to swelling in the genital area  · Fever of 100.4°F (38°C) or higher  · Redness or swelling that gets worse  · Pain that gets worse  · Foul-smelling fluid leaking from the skin  · Yellow-brown crusts on the open blisters  · Joint pain   Date Last Reviewed: 7/23/2014  © 3613-8710 [employer]. [address] [place] [address], [place] [address]. All rights reserved. This information is not intended as a substitute for professional medical care. Always follow your healthcare professional's instructions.

## 2018-01-16 ENCOUNTER — OFFICE VISIT (OUTPATIENT)
Dept: SURGERY | Facility: CLINIC | Age: 68
End: 2018-01-16
Payer: COMMERCIAL

## 2018-01-16 VITALS
BODY MASS INDEX: 29.49 KG/M2 | SYSTOLIC BLOOD PRESSURE: 188 MMHG | DIASTOLIC BLOOD PRESSURE: 78 MMHG | HEIGHT: 62 IN | HEART RATE: 74 BPM | WEIGHT: 160.25 LBS

## 2018-01-16 DIAGNOSIS — Z85.048 PERSONAL HISTORY OF MALIGNANT NEOPLASM OF RECTUM, RECTOSIGMOID JUNCTION, AND ANUS: Primary | ICD-10-CM

## 2018-01-16 PROCEDURE — 88112 CYTOPATH CELL ENHANCE TECH: CPT | Performed by: PATHOLOGY

## 2018-01-16 PROCEDURE — 99999 PR PBB SHADOW E&M-EST. PATIENT-LVL III: CPT | Mod: PBBFAC,,, | Performed by: COLON & RECTAL SURGERY

## 2018-01-16 PROCEDURE — 99213 OFFICE O/P EST LOW 20 MIN: CPT | Mod: 25,S$GLB,, | Performed by: COLON & RECTAL SURGERY

## 2018-01-16 PROCEDURE — 88112 CYTOPATH CELL ENHANCE TECH: CPT | Mod: 26,,, | Performed by: PATHOLOGY

## 2018-01-16 PROCEDURE — 46600 DIAGNOSTIC ANOSCOPY SPX: CPT | Mod: S$GLB,,, | Performed by: COLON & RECTAL SURGERY

## 2018-01-16 NOTE — PROGRESS NOTES
CRS Office Visit    SUBJECTIVE:   CC: anal cancer     HPI 64 y.o. woman with a history of anal cancer diagnosed 2012 treated with modified Adalid (finished 2013)    Interval History  7Patient feeling well. No blood in stool or pain with bowel movements. Anal pap in  was negative for malignant cells and dysplasia. Denies any new masses or skin changes. 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 Rose    gyn surgery      ex laparotomy    HYSTERECTOMY       Family History   Problem Relation Age of Onset    Hypertension Mother     Heart disease Mother 40     CABG x 5    Seizures Mother     Cancer Father      prostate    Stroke Father     Cancer Brother      colon    Diabetes Maternal Aunt     Diabetes Maternal Uncle     Stroke Maternal Grandmother     Stroke Paternal Grandmother     Diabetes Cousin      Social History   Substance Use Topics    Smoking status: Current Every Day Smoker     Packs/day: 0.50     Years: 40.00     Types: Cigarettes    Smokeless tobacco: Never Used      Comment: occasionally    Alcohol use 0.0 oz/week      Comment: rare       OBJECTIVE:     Vital Signs (Most Recent)  Pulse: 78 (17 0832)  BP: (!) 156/78 (17 0832)    Physical Exam:  General: White female in NAD sitting in chair in clinic  Neuro: aaox4 maex4  perrl  Respiratory: resps even unlabored, CTAB  Cardiac: cap refill <2 sec, RRR  Abdomen: Abdomen soft, nontender. BS normal. No masses, organomegaly  Extremities: Warm dry and intact  Anorectal: Normal sphincter tone, no masses or stricture palpated. Anoscopy did not reveal internal abnormalities.  After verbal consent anal Pap was performed    ASSESSMENT/PLAN:     63 yo female with h/o anal cancer s/p modified Adalid (finished 2/2013), no signs of recurrence.    Plan  RTC 1 year

## 2018-01-25 ENCOUNTER — TELEPHONE (OUTPATIENT)
Dept: SMOKING CESSATION | Facility: CLINIC | Age: 68
End: 2018-01-25

## 2018-01-25 NOTE — TELEPHONE ENCOUNTER
Called patient to offer services through the [facility]. Could not leave message not answering service at this time.

## 2018-02-06 ENCOUNTER — PATIENT MESSAGE (OUTPATIENT)
Dept: SURGERY | Facility: CLINIC | Age: 68
End: 2018-02-06

## 2018-03-05 ENCOUNTER — LAB VISIT (OUTPATIENT)
Dept: LAB | Facility: HOSPITAL | Age: 68
End: 2018-03-05
Payer: COMMERCIAL

## 2018-03-05 ENCOUNTER — OFFICE VISIT (OUTPATIENT)
Dept: HEMATOLOGY/ONCOLOGY | Facility: CLINIC | Age: 68
End: 2018-03-05
Payer: COMMERCIAL

## 2018-03-05 VITALS
HEIGHT: 62 IN | TEMPERATURE: 98 F | HEART RATE: 80 BPM | RESPIRATION RATE: 16 BRPM | SYSTOLIC BLOOD PRESSURE: 138 MMHG | BODY MASS INDEX: 28.8 KG/M2 | OXYGEN SATURATION: 98 % | DIASTOLIC BLOOD PRESSURE: 61 MMHG | WEIGHT: 156.5 LBS

## 2018-03-05 DIAGNOSIS — Z85.048 HISTORY OF RECTAL OR ANAL CANCER: Primary | Chronic | ICD-10-CM

## 2018-03-05 DIAGNOSIS — Z12.39 BREAST CANCER SCREENING: ICD-10-CM

## 2018-03-05 DIAGNOSIS — Z85.048 HISTORY OF RECTAL OR ANAL CANCER: Chronic | ICD-10-CM

## 2018-03-05 LAB
ALBUMIN SERPL BCP-MCNC: 3.8 G/DL
ALP SERPL-CCNC: 121 U/L
ALT SERPL W/O P-5'-P-CCNC: 11 U/L
ANION GAP SERPL CALC-SCNC: 10 MMOL/L
AST SERPL-CCNC: 12 U/L
BILIRUB SERPL-MCNC: 0.4 MG/DL
BUN SERPL-MCNC: 13 MG/DL
CALCIUM SERPL-MCNC: 10 MG/DL
CHLORIDE SERPL-SCNC: 105 MMOL/L
CO2 SERPL-SCNC: 25 MMOL/L
CREAT SERPL-MCNC: 0.8 MG/DL
ERYTHROCYTE [DISTWIDTH] IN BLOOD BY AUTOMATED COUNT: 13.4 %
EST. GFR  (AFRICAN AMERICAN): >60 ML/MIN/1.73 M^2
EST. GFR  (NON AFRICAN AMERICAN): >60 ML/MIN/1.73 M^2
GLUCOSE SERPL-MCNC: 96 MG/DL
HCT VFR BLD AUTO: 44.2 %
HGB BLD-MCNC: 15.1 G/DL
IMM GRANULOCYTES # BLD AUTO: 0.04 K/UL
MCH RBC QN AUTO: 29.4 PG
MCHC RBC AUTO-ENTMCNC: 34.2 G/DL
MCV RBC AUTO: 86 FL
NEUTROPHILS # BLD AUTO: 6.4 K/UL
PLATELET # BLD AUTO: 284 K/UL
PMV BLD AUTO: 9 FL
POTASSIUM SERPL-SCNC: 4.4 MMOL/L
PROT SERPL-MCNC: 7.2 G/DL
RBC # BLD AUTO: 5.13 M/UL
SODIUM SERPL-SCNC: 140 MMOL/L
WBC # BLD AUTO: 9.14 K/UL

## 2018-03-05 PROCEDURE — 99213 OFFICE O/P EST LOW 20 MIN: CPT | Mod: S$GLB,,, | Performed by: INTERNAL MEDICINE

## 2018-03-05 PROCEDURE — 3078F DIAST BP <80 MM HG: CPT | Mod: S$GLB,,, | Performed by: INTERNAL MEDICINE

## 2018-03-05 PROCEDURE — 3075F SYST BP GE 130 - 139MM HG: CPT | Mod: S$GLB,,, | Performed by: INTERNAL MEDICINE

## 2018-03-05 PROCEDURE — 36415 COLL VENOUS BLD VENIPUNCTURE: CPT

## 2018-03-05 PROCEDURE — 80053 COMPREHEN METABOLIC PANEL: CPT

## 2018-03-05 PROCEDURE — 85027 COMPLETE CBC AUTOMATED: CPT

## 2018-03-05 PROCEDURE — 99999 PR PBB SHADOW E&M-EST. PATIENT-LVL IV: CPT | Mod: PBBFAC,,, | Performed by: INTERNAL MEDICINE

## 2018-03-05 NOTE — PROGRESS NOTES
"Subjective:       Patient ID: Rachel Murray is a 67 y.o. female.    Chief Complaint: History of rectal or anal cancer  Oncologic History:  67 year old female with anal cancer. Patient developed painful anal bleeding and a sensation of something protruding from the anus beginning late October 2012. She initially tried external hemorrhoid treatment with proctofoam without improvement in her symptoms. She was referred to colorectal surgery clinic on 11/28/12. Exam that day reported a minimal amount of what appeared to be prolapsing rectal mucosa at the anterior anal verge which was friable. Digital rectal exam described a hard ridge at the anterior midline. The anal canal was very tender. On 11/30/12 patient underwent examination under anesthesia which revealed a large fissured tag at the anterior anal verge and a cavitated fissured ridge extending the length of the anal canal. Biopsy reports a squamous cell carcinoma. The patient was treated with concurrent chemoradiation with 5FU/mitomycin and completed treatment on 2/8/13   Her last PET scan from 06/18/14 revealed no evidence of recurrence  She is on surveillance.     Colonoscopy done on 1/16/17 reveals "Impression: - Three 4 to 8 mm polyps in the sigmoid colon, in the descending colon and in the cecum, removed with a jumbo cold forceps. Resected and retrieved. - Diverticulosis in the entire examined colon. - The examination was otherwise normal. Recommendation:       - Discharge patient to home - Repeat colonoscopy in 3 years for surveillance." Pathology revealed " FINAL PATHOLOGIC DIAGNOSIS 1-3. TUBULAR ADENOMA"        HPI  Ms. Murray returns for follow up and to review labs. She is doing well 5 years since diagnosis       Review of Systems   Constitutional: Negative for appetite change and unexpected weight change.   Eyes: Negative for visual disturbance.   Respiratory: Negative for cough and shortness of breath.    Cardiovascular: Negative for chest pain. "   Gastrointestinal: Negative for abdominal pain and diarrhea.   Genitourinary: Negative for frequency.   Musculoskeletal: Negative for back pain.   Skin: Positive for rash.   Neurological: Negative for headaches.   Hematological: Negative for adenopathy.   Psychiatric/Behavioral: The patient is not nervous/anxious.        Objective:      Physical Exam   Constitutional: She is oriented to person, place, and time. She appears well-developed and well-nourished.   HENT:   Mouth/Throat: No oropharyngeal exudate.   Cardiovascular: Normal rate and normal heart sounds.    Pulmonary/Chest: Effort normal and breath sounds normal. She has no wheezes.   Abdominal: Soft. Bowel sounds are normal. There is no tenderness.   Musculoskeletal: She exhibits no edema or tenderness.   Lymphadenopathy:     She has no cervical adenopathy.   Neurological: She is alert and oriented to person, place, and time. Coordination normal.   Skin: Skin is warm and dry. No rash noted.   Psychiatric: She has a normal mood and affect. Judgment and thought content normal.   Vitals reviewed.        LABS:\  WBC   Date Value Ref Range Status   03/05/2018 9.14 3.90 - 12.70 K/uL Final     Hemoglobin   Date Value Ref Range Status   03/05/2018 15.1 12.0 - 16.0 g/dL Final     Hematocrit   Date Value Ref Range Status   03/05/2018 44.2 37.0 - 48.5 % Final     Platelets   Date Value Ref Range Status   03/05/2018 284 150 - 350 K/uL Final     Gran # (ANC)   Date Value Ref Range Status   03/05/2018 6.4 1.8 - 7.7 K/uL Final     Comment:     The ANC is based on a white cell differential from an   automated cell counter. It has not been microscopically   reviewed for the presence of abnormal cells. Clinical   correlation is required.         Chemistry        Component Value Date/Time     03/05/2018 1005    K 4.4 03/05/2018 1005     03/05/2018 1005    CO2 25 03/05/2018 1005    BUN 13 03/05/2018 1005    CREATININE 0.8 03/05/2018 1005    GLU 96 03/05/2018 1005         Component Value Date/Time    CALCIUM 10.0 03/05/2018 1005    ALKPHOS 121 03/05/2018 1005    AST 12 03/05/2018 1005    ALT 11 03/05/2018 1005    BILITOT 0.4 03/05/2018 1005    ESTGFRAFRICA >60.0 03/05/2018 1005    EGFRNONAA >60.0 03/05/2018 1005          Assessment:       1. History of rectal or anal cancer    2. Breast cancer screening        Plan:        1. She is doing well and is 5 years post diagnosis so does not need regular follow-up with us. She will continue to CRS  2. Schedule mammogram as she is overdue. Also advised her to see PCP for annual physical/.    Above care plan was discussed with patient and all questions were addressed to her satisfaction

## 2018-03-20 ENCOUNTER — OFFICE VISIT (OUTPATIENT)
Dept: SURGERY | Facility: CLINIC | Age: 68
End: 2018-03-20
Payer: COMMERCIAL

## 2018-03-20 VITALS
DIASTOLIC BLOOD PRESSURE: 78 MMHG | HEART RATE: 82 BPM | BODY MASS INDEX: 28.97 KG/M2 | WEIGHT: 157.44 LBS | SYSTOLIC BLOOD PRESSURE: 134 MMHG | HEIGHT: 62 IN

## 2018-03-20 DIAGNOSIS — Z85.048 PERSONAL HISTORY OF MALIGNANT NEOPLASM OF RECTUM, RECTOSIGMOID JUNCTION, AND ANUS: Primary | ICD-10-CM

## 2018-03-20 PROCEDURE — 3078F DIAST BP <80 MM HG: CPT | Mod: CPTII,S$GLB,, | Performed by: COLON & RECTAL SURGERY

## 2018-03-20 PROCEDURE — 99213 OFFICE O/P EST LOW 20 MIN: CPT | Mod: 25,S$GLB,, | Performed by: COLON & RECTAL SURGERY

## 2018-03-20 PROCEDURE — 88112 CYTOPATH CELL ENHANCE TECH: CPT | Mod: 26,,, | Performed by: PATHOLOGY

## 2018-03-20 PROCEDURE — 3075F SYST BP GE 130 - 139MM HG: CPT | Mod: CPTII,S$GLB,, | Performed by: COLON & RECTAL SURGERY

## 2018-03-20 PROCEDURE — 99999 PR PBB SHADOW E&M-EST. PATIENT-LVL III: CPT | Mod: PBBFAC,,, | Performed by: COLON & RECTAL SURGERY

## 2018-03-20 PROCEDURE — 88112 CYTOPATH CELL ENHANCE TECH: CPT | Performed by: PATHOLOGY

## 2018-03-20 NOTE — PROGRESS NOTES
CRS Office Visit    SUBJECTIVE:   CC: anal cancer     HPI 64 y.o. woman with a history of anal cancer diagnosed 2012 treated with modified Adalid (finished 2013)Her Pap in January was inadequate she is here today for repeat Pap smear    Interval History   Patient feeling well. No blood in stool or pain with bowel movements. Anal pap in  was negative for malignant cells and dysplasia. Denies any new masses or skin changes. 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 Rose    gyn surgery      ex laparotomy    HYSTERECTOMY       Family History   Problem Relation Age of Onset    Hypertension Mother     Heart disease Mother 40     CABG x 5    Seizures Mother     Cancer Father      prostate    Stroke Father     Cancer Brother      colon    Diabetes Maternal Aunt     Diabetes Maternal Uncle     Stroke Maternal Grandmother     Stroke Paternal Grandmother     Diabetes Cousin      Social History   Substance Use Topics    Smoking status: Current Every Day Smoker     Packs/day: 0.50     Years: 40.00     Types: Cigarettes    Smokeless tobacco: Never Used      Comment: occasionally    Alcohol use 0.0 oz/week      Comment: rare       OBJECTIVE:     Vital Signs (Most Recent)  Pulse: 78 (17 0832)  BP: (!) 156/78 (17 0832)    Physical Exam:  General: White  female in NAD sitting in chair in clinic  Neuro: aaox4 maex4 perrl  Respiratory: resps even unlabored, CTAB  Cardiac: cap refill <2 sec, RRR  Abdomen: Abdomen soft, nontender. BS normal. No masses, organomegaly  Extremities: Warm dry and intact  Anorectal: Normal sphincter tone, no masses or stricture palpated. Anoscopy did not reveal internal abnormalities.  After verbal consent anal Pap was performed    ASSESSMENT/PLAN:     65 yo female with h/o anal cancer s/p modified Adalid (finished 2/2013), no signs of recurrence.    Plan  RTC 1 year

## 2018-03-29 ENCOUNTER — HOSPITAL ENCOUNTER (OUTPATIENT)
Dept: RADIOLOGY | Facility: HOSPITAL | Age: 68
Discharge: HOME | End: 2018-03-29
Attending: INTERNAL MEDICINE
Payer: COMMERCIAL

## 2018-03-29 DIAGNOSIS — Z12.39 BREAST CANCER SCREENING: ICD-10-CM

## 2018-03-29 PROCEDURE — 77063 BREAST TOMOSYNTHESIS BI: CPT | Mod: 26,,, | Performed by: RADIOLOGY

## 2018-03-29 PROCEDURE — 77067 SCR MAMMO BI INCL CAD: CPT | Mod: 26,,, | Performed by: RADIOLOGY

## 2018-03-29 PROCEDURE — 77067 SCR MAMMO BI INCL CAD: CPT | Mod: TC

## 2018-04-04 ENCOUNTER — TELEPHONE (OUTPATIENT)
Dept: HEMATOLOGY/ONCOLOGY | Facility: CLINIC | Age: 68
End: 2018-04-04

## 2018-04-04 NOTE — TELEPHONE ENCOUNTER
Spoke to patient let her know that her mammogram results were normal and she can call the office with any further needs.

## 2018-04-04 NOTE — TELEPHONE ENCOUNTER
----- Message from Jody Tay sent at 4/4/2018  3:25 PM CDT -----  Contact: Pt  Pt calling stating that she missed a call from the office         Pt call back number [phone]

## 2018-05-21 ENCOUNTER — PATIENT MESSAGE (OUTPATIENT)
Dept: SURGERY | Facility: CLINIC | Age: 68
End: 2018-05-21

## 2018-11-29 ENCOUNTER — PATIENT MESSAGE (OUTPATIENT)
Dept: INTERNAL MEDICINE | Facility: CLINIC | Age: 68
End: 2018-11-29

## 2018-12-20 ENCOUNTER — OFFICE VISIT (OUTPATIENT)
Dept: INTERNAL MEDICINE | Facility: CLINIC | Age: 68
End: 2018-12-20
Payer: COMMERCIAL

## 2018-12-20 VITALS
HEIGHT: 62 IN | HEART RATE: 76 BPM | WEIGHT: 161.38 LBS | DIASTOLIC BLOOD PRESSURE: 74 MMHG | BODY MASS INDEX: 29.7 KG/M2 | TEMPERATURE: 98 F | SYSTOLIC BLOOD PRESSURE: 154 MMHG

## 2018-12-20 DIAGNOSIS — Z23 NEED FOR VACCINATION WITH 13-POLYVALENT PNEUMOCOCCAL CONJUGATE VACCINE: ICD-10-CM

## 2018-12-20 DIAGNOSIS — I10 ESSENTIAL HYPERTENSION: Primary | ICD-10-CM

## 2018-12-20 DIAGNOSIS — Z78.0 POSTMENOPAUSAL STATUS (AGE-RELATED) (NATURAL): ICD-10-CM

## 2018-12-20 DIAGNOSIS — Z11.59 NEED FOR HEPATITIS C SCREENING TEST: ICD-10-CM

## 2018-12-20 PROCEDURE — 90662 IIV NO PRSV INCREASED AG IM: CPT | Mod: S$GLB,,, | Performed by: INTERNAL MEDICINE

## 2018-12-20 PROCEDURE — 90670 PCV13 VACCINE IM: CPT | Mod: S$GLB,,, | Performed by: INTERNAL MEDICINE

## 2018-12-20 PROCEDURE — 90472 IMMUNIZATION ADMIN EACH ADD: CPT | Mod: S$GLB,,, | Performed by: INTERNAL MEDICINE

## 2018-12-20 PROCEDURE — 99999 PR PBB SHADOW E&M-EST. PATIENT-LVL IV: CPT | Mod: PBBFAC,,, | Performed by: INTERNAL MEDICINE

## 2018-12-20 PROCEDURE — 1101F PT FALLS ASSESS-DOCD LE1/YR: CPT | Mod: CPTII,S$GLB,, | Performed by: INTERNAL MEDICINE

## 2018-12-20 PROCEDURE — 99214 OFFICE O/P EST MOD 30 MIN: CPT | Mod: 25,S$GLB,, | Performed by: INTERNAL MEDICINE

## 2018-12-20 PROCEDURE — 3078F DIAST BP <80 MM HG: CPT | Mod: CPTII,S$GLB,, | Performed by: INTERNAL MEDICINE

## 2018-12-20 PROCEDURE — 90471 IMMUNIZATION ADMIN: CPT | Mod: S$GLB,,, | Performed by: INTERNAL MEDICINE

## 2018-12-20 PROCEDURE — 3077F SYST BP >= 140 MM HG: CPT | Mod: CPTII,S$GLB,, | Performed by: INTERNAL MEDICINE

## 2018-12-20 RX ORDER — CANDESARTAN CILEXETIL AND HYDROCHLOROTHIAZIDE 16; 12.5 MG/1; MG/1
1 TABLET ORAL DAILY
Qty: 30 TABLET | Refills: 1 | Status: SHIPPED | OUTPATIENT
Start: 2018-12-20 | End: 2019-02-25 | Stop reason: SDUPTHER

## 2018-12-20 NOTE — PROGRESS NOTES
CC: followup of hypertension  HPI:  The patient is a 68 y.o. year old female who presents to the office for followup of hypertension.  The patient denies any chest pain, shortness of breath, headache, blurred vision, nausea or vomiting, but complains of fatigue due to difficulty sleeping.      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    Hypertension 9/10/2012       SURGICAL HISTORY:  Past Surgical History:   Procedure Laterality Date    ANOSCOPY N/A 2013    Performed by Gabriel Rose MD at [facility] [address] [place] [address]    APPENDECTOMY      BIOPSY, RECTUM  2012    Performed by Gabriel Rose MD at [facility] [address] [place] [address]    BREAST BIOPSY       SECTION, CLASSIC      COLONOSCOPY      COLONOSCOPY N/A 2017    Procedure: COLONOSCOPY;  Surgeon: Gabriel Rose MD;  Location: [facility] ([address] [place] [address]);  Service: Endoscopy;  Laterality: N/A;  Miralax prep per Dr. Rose    COLONOSCOPY N/A 2017    [facility]    COLONOSCOPY N/A 2013    [facility]    DENTAL SURGERY  2017    EXAM UNDER ANESTHESIA N/A 2013    Performed by [facility] at [facility] [address] [place] [address]    EXAM UNDER ANESTHESIA N/A 2012    Performed by Gabriel Rose MD at [facility] [address] [place] [address]    gyn surgery      ex laparotomy    HYSTERECTOMY      INSERTION, PORT-A-CATH Left 2012    Performed by Yobany Rosas II, MD at [facility] OR [address] [place] [address]       MEDS:  Medcard reviewed and updated    ALLERGIES: Allergy Card reviewed and updated    SOCIAL HISTORY:   The patient is a nonsmoker.    PE:   APPEARANCE: Well nourished, well developed, in no acute distress.    CHEST: Lungs clear to auscultation with unlabored respirations.  CARDIOVASCULAR: Normal S1, S2. No murmurs. No carotid bruits. No pedal edema.  ABDOMEN:  Bowel sounds normal. Not distended. Soft. No tenderness or masses.   PSYCHIATRIC: The patient is oriented to person, place, and time and has a pleasant affect.        ASSESSMENT/PLAN:  Diagnoses and all orders for this visit:    Essential hypertension  -     CBC auto differential; Future  -     Comprehensive metabolic panel; Future  -     Lipid panel; Future  -     TSH; Future  -     blood pressure is elevated, start atacand HCT    Need for hepatitis C screening test  -     Hepatitis C antibody; Future    Need for vaccination with 13-polyvalent pneumococcal conjugate vaccine  -     (In Office Administered) Pneumococcal Conjugate Vaccine (13 Valent) (IM)    Postmenopausal status (age-related) (natural)  -     DXA Bone Density Spine And Hip; Future    Other orders  -     candesartan-hydrochlorothiazide (ATACAND HCT) 16-12.5 mg per tablet; Take 1 tablet by mouth once daily.        Answers for HPI/ROS submitted by the patient on 12/19/2018   activity change: No  unexpected weight change: No  neck pain: No  hearing loss: No  rhinorrhea: No  trouble swallowing: No  eye discharge: No  visual disturbance: Yes  chest tightness: No  wheezing: No  chest pain: No  palpitations: No  blood in stool: No  constipation: No  vomiting: No  diarrhea: No  polydipsia: No  polyuria: No  difficulty urinating: No  hematuria: No  menstrual problem: No  dysuria: No  joint swelling: No  arthralgias: Yes  headaches: Yes  weakness: No  confusion: No  dysphoric mood: Yes

## 2018-12-26 ENCOUNTER — PATIENT MESSAGE (OUTPATIENT)
Dept: INTERNAL MEDICINE | Facility: CLINIC | Age: 68
End: 2018-12-26

## 2018-12-26 ENCOUNTER — PATIENT MESSAGE (OUTPATIENT)
Dept: SURGERY | Facility: CLINIC | Age: 68
End: 2018-12-26

## 2018-12-26 ENCOUNTER — TELEPHONE (OUTPATIENT)
Dept: INTERNAL MEDICINE | Facility: CLINIC | Age: 68
End: 2018-12-26

## 2018-12-26 ENCOUNTER — TELEPHONE (OUTPATIENT)
Dept: SURGERY | Facility: CLINIC | Age: 68
End: 2018-12-26

## 2018-12-26 NOTE — TELEPHONE ENCOUNTER
Spoke with patient. Requested FU with Dr. Gruber in 3/2019. Schedule not open yet. Will open schedule to facilitate.

## 2018-12-29 ENCOUNTER — LAB VISIT (OUTPATIENT)
Dept: LAB | Facility: HOSPITAL | Age: 68
End: 2018-12-29
Attending: INTERNAL MEDICINE
Payer: COMMERCIAL

## 2018-12-29 DIAGNOSIS — Z11.59 NEED FOR HEPATITIS C SCREENING TEST: ICD-10-CM

## 2018-12-29 DIAGNOSIS — I10 ESSENTIAL HYPERTENSION: ICD-10-CM

## 2018-12-29 LAB
ALBUMIN SERPL BCP-MCNC: 3.6 G/DL
ALP SERPL-CCNC: 133 U/L
ALT SERPL W/O P-5'-P-CCNC: 11 U/L
ANION GAP SERPL CALC-SCNC: 10 MMOL/L
AST SERPL-CCNC: 16 U/L
BASOPHILS # BLD AUTO: 0.07 K/UL
BASOPHILS NFR BLD: 0.7 %
BILIRUB SERPL-MCNC: 0.5 MG/DL
BUN SERPL-MCNC: 17 MG/DL
CALCIUM SERPL-MCNC: 10.2 MG/DL
CHLORIDE SERPL-SCNC: 104 MMOL/L
CHOLEST SERPL-MCNC: 300 MG/DL
CHOLEST/HDLC SERPL: 7.7 {RATIO}
CO2 SERPL-SCNC: 26 MMOL/L
CREAT SERPL-MCNC: 0.9 MG/DL
DIFFERENTIAL METHOD: ABNORMAL
EOSINOPHIL # BLD AUTO: 0.1 K/UL
EOSINOPHIL NFR BLD: 1.2 %
ERYTHROCYTE [DISTWIDTH] IN BLOOD BY AUTOMATED COUNT: 13.7 %
EST. GFR  (AFRICAN AMERICAN): >60 ML/MIN/1.73 M^2
EST. GFR  (NON AFRICAN AMERICAN): >60 ML/MIN/1.73 M^2
GLUCOSE SERPL-MCNC: 92 MG/DL
HCT VFR BLD AUTO: 48 %
HDLC SERPL-MCNC: 39 MG/DL
HDLC SERPL: 13 %
HGB BLD-MCNC: 15.2 G/DL
IMM GRANULOCYTES # BLD AUTO: 0.05 K/UL
IMM GRANULOCYTES NFR BLD AUTO: 0.5 %
LDLC SERPL CALC-MCNC: 217.4 MG/DL
LYMPHOCYTES # BLD AUTO: 2.5 K/UL
LYMPHOCYTES NFR BLD: 26.1 %
MCH RBC QN AUTO: 27.9 PG
MCHC RBC AUTO-ENTMCNC: 31.7 G/DL
MCV RBC AUTO: 88 FL
MONOCYTES # BLD AUTO: 0.7 K/UL
MONOCYTES NFR BLD: 7.3 %
NEUTROPHILS # BLD AUTO: 6.2 K/UL
NEUTROPHILS NFR BLD: 64.2 %
NONHDLC SERPL-MCNC: 261 MG/DL
NRBC BLD-RTO: 0 /100 WBC
PLATELET # BLD AUTO: 318 K/UL
PMV BLD AUTO: 9.5 FL
POTASSIUM SERPL-SCNC: 4.1 MMOL/L
PROT SERPL-MCNC: 7.7 G/DL
RBC # BLD AUTO: 5.45 M/UL
SODIUM SERPL-SCNC: 140 MMOL/L
TRIGL SERPL-MCNC: 218 MG/DL
TSH SERPL DL<=0.005 MIU/L-ACNC: 3.29 UIU/ML
WBC # BLD AUTO: 9.61 K/UL

## 2018-12-29 PROCEDURE — 80061 LIPID PANEL: CPT

## 2018-12-29 PROCEDURE — 84443 ASSAY THYROID STIM HORMONE: CPT

## 2018-12-29 PROCEDURE — 86803 HEPATITIS C AB TEST: CPT

## 2018-12-29 PROCEDURE — 36415 COLL VENOUS BLD VENIPUNCTURE: CPT | Mod: PO

## 2018-12-29 PROCEDURE — 85025 COMPLETE CBC W/AUTO DIFF WBC: CPT

## 2018-12-29 PROCEDURE — 80053 COMPREHEN METABOLIC PANEL: CPT

## 2018-12-31 LAB — HCV AB SERPL QL IA: NEGATIVE

## 2019-01-22 ENCOUNTER — APPOINTMENT (OUTPATIENT)
Dept: RADIOLOGY | Facility: CLINIC | Age: 69
End: 2019-01-22
Attending: INTERNAL MEDICINE
Payer: COMMERCIAL

## 2019-01-22 ENCOUNTER — CLINICAL SUPPORT (OUTPATIENT)
Dept: INTERNAL MEDICINE | Facility: CLINIC | Age: 69
End: 2019-01-22
Payer: COMMERCIAL

## 2019-01-22 VITALS — DIASTOLIC BLOOD PRESSURE: 70 MMHG | SYSTOLIC BLOOD PRESSURE: 119 MMHG

## 2019-01-22 DIAGNOSIS — Z78.0 POSTMENOPAUSAL STATUS (AGE-RELATED) (NATURAL): ICD-10-CM

## 2019-01-22 PROCEDURE — 77080 DXA BONE DENSITY AXIAL: CPT | Mod: 26,,, | Performed by: INTERNAL MEDICINE

## 2019-01-22 PROCEDURE — 99999 PR PBB SHADOW E&M-EST. PATIENT-LVL I: CPT | Mod: PBBFAC,,,

## 2019-01-22 PROCEDURE — 99999 PR PBB SHADOW E&M-EST. PATIENT-LVL I: ICD-10-PCS | Mod: PBBFAC,,,

## 2019-01-22 PROCEDURE — 77080 DXA BONE DENSITY AXIAL: CPT | Mod: TC,PO

## 2019-01-22 PROCEDURE — 77080 DEXA BONE DENSITY SPINE HIP: ICD-10-PCS | Mod: 26,,, | Performed by: INTERNAL MEDICINE

## 2019-02-25 RX ORDER — CANDESARTAN CILEXETIL AND HYDROCHLOROTHIAZIDE 16; 12.5 MG/1; MG/1
TABLET ORAL
Qty: 30 TABLET | Refills: 3 | Status: SHIPPED | OUTPATIENT
Start: 2019-02-25 | End: 2019-03-06 | Stop reason: SDUPTHER

## 2019-03-04 ENCOUNTER — PATIENT MESSAGE (OUTPATIENT)
Dept: INTERNAL MEDICINE | Facility: CLINIC | Age: 69
End: 2019-03-04

## 2019-03-04 RX ORDER — CANDESARTAN CILEXETIL AND HYDROCHLOROTHIAZIDE 16; 12.5 MG/1; MG/1
1 TABLET ORAL DAILY
Qty: 30 TABLET | Refills: 3 | Status: CANCELLED | OUTPATIENT
Start: 2019-03-04

## 2019-03-06 RX ORDER — CANDESARTAN CILEXETIL AND HYDROCHLOROTHIAZIDE 16; 12.5 MG/1; MG/1
1 TABLET ORAL DAILY
Qty: 30 TABLET | Refills: 3 | Status: SHIPPED | OUTPATIENT
Start: 2019-03-06 | End: 2020-07-06

## 2019-03-11 ENCOUNTER — OFFICE VISIT (OUTPATIENT)
Dept: SURGERY | Facility: CLINIC | Age: 69
End: 2019-03-11
Payer: COMMERCIAL

## 2019-03-11 ENCOUNTER — TELEPHONE (OUTPATIENT)
Dept: INTERNAL MEDICINE | Facility: CLINIC | Age: 69
End: 2019-03-11

## 2019-03-11 VITALS
HEART RATE: 82 BPM | SYSTOLIC BLOOD PRESSURE: 199 MMHG | DIASTOLIC BLOOD PRESSURE: 108 MMHG | BODY MASS INDEX: 29.86 KG/M2 | WEIGHT: 162.25 LBS | HEIGHT: 62 IN

## 2019-03-11 DIAGNOSIS — Z80.0 FAMILY HISTORY OF COLON CANCER: ICD-10-CM

## 2019-03-11 DIAGNOSIS — Z85.048 PERSONAL HISTORY OF MALIGNANT NEOPLASM OF RECTUM, RECTOSIGMOID JUNCTION, AND ANUS: Primary | ICD-10-CM

## 2019-03-11 DIAGNOSIS — Z86.010 HISTORY OF COLON POLYPS: ICD-10-CM

## 2019-03-11 PROCEDURE — 46600 DIAGNOSTIC ANOSCOPY SPX: CPT | Mod: S$GLB,,, | Performed by: COLON & RECTAL SURGERY

## 2019-03-11 PROCEDURE — 3080F DIAST BP >= 90 MM HG: CPT | Mod: CPTII,S$GLB,, | Performed by: COLON & RECTAL SURGERY

## 2019-03-11 PROCEDURE — 3080F PR MOST RECENT DIASTOLIC BLOOD PRESSURE >= 90 MM HG: ICD-10-PCS | Mod: CPTII,S$GLB,, | Performed by: COLON & RECTAL SURGERY

## 2019-03-11 PROCEDURE — 99999 PR PBB SHADOW E&M-EST. PATIENT-LVL III: CPT | Mod: PBBFAC,,, | Performed by: COLON & RECTAL SURGERY

## 2019-03-11 PROCEDURE — 3077F PR MOST RECENT SYSTOLIC BLOOD PRESSURE >= 140 MM HG: ICD-10-PCS | Mod: CPTII,S$GLB,, | Performed by: COLON & RECTAL SURGERY

## 2019-03-11 PROCEDURE — 1101F PR PT FALLS ASSESS DOC 0-1 FALLS W/OUT INJ PAST YR: ICD-10-PCS | Mod: CPTII,S$GLB,, | Performed by: COLON & RECTAL SURGERY

## 2019-03-11 PROCEDURE — 1101F PT FALLS ASSESS-DOCD LE1/YR: CPT | Mod: CPTII,S$GLB,, | Performed by: COLON & RECTAL SURGERY

## 2019-03-11 PROCEDURE — 3077F SYST BP >= 140 MM HG: CPT | Mod: CPTII,S$GLB,, | Performed by: COLON & RECTAL SURGERY

## 2019-03-11 PROCEDURE — 99999 PR PBB SHADOW E&M-EST. PATIENT-LVL III: ICD-10-PCS | Mod: PBBFAC,,, | Performed by: COLON & RECTAL SURGERY

## 2019-03-11 PROCEDURE — 99213 PR OFFICE/OUTPT VISIT, EST, LEVL III, 20-29 MIN: ICD-10-PCS | Mod: 25,S$GLB,, | Performed by: COLON & RECTAL SURGERY

## 2019-03-11 PROCEDURE — 99213 OFFICE O/P EST LOW 20 MIN: CPT | Mod: 25,S$GLB,, | Performed by: COLON & RECTAL SURGERY

## 2019-03-11 PROCEDURE — 46600 PR DIAG2STIC A2SCOPY: ICD-10-PCS | Mod: S$GLB,,, | Performed by: COLON & RECTAL SURGERY

## 2019-03-11 RX ORDER — CANDESARTAN CILEXETIL AND HYDROCHLOROTHIAZIDE 16; 12.5 MG/1; MG/1
TABLET ORAL
Qty: 30 TABLET | Refills: 3 | Status: SHIPPED | OUTPATIENT
Start: 2019-03-11 | End: 2020-07-06

## 2019-03-11 NOTE — LETTER
March 11, 2019      Anel Parikh MD  [address]  [place] [address]           [facility]  [address]  [facility] [address]  Phone: [phone]          Patient: Rachel Murray   MR Number: [id number]   YOB: 1950   Date of Visit: 3/11/2019       Dear Dr. Anel Parikh:    Thank you for referring Rachel Murray to me for evaluation. Attached you will find relevant portions of my assessment and plan of care.    If you have questions, please do not hesitate to call me. I look forward to following Rachel Murray along with you.    Sincerely,    John Paul Gruber MD    Enclosure  CC:  No Recipients    If you would like to receive this communication electronically, please contact [email or web address] or [phone] to request more information on [employer] Link access.    For providers and/or their staff who would like to refer a patient to [facility], please contact us through our one-stop-shop provider referral line, [facility] , at [phone].    If you feel you have received this communication in error or would no longer like to receive these types of communications, please e-mail [email or web address]

## 2019-03-11 NOTE — PROGRESS NOTES
Subjective:       Patient ID: Rachel Murray is a 68 y.o. female.    Chief Complaint: No chief complaint on file.    HPI  69 yo F with hx of anal SCCA treated with Adalid protocol- completed 2013.  She comes in today for annual follow-up.  No complaints.  No anal pain or bleeding.  No change in bowel habits.  No abdominal pain.    Last colonoscopy - 17 - diverticulosis, 3 small polyps removed (all adenomas on path) - rec 3 yr F/U.    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    Hypertension 9/10/2012       Past Surgical History:   Procedure Laterality Date    ANOSCOPY N/A 2013    Performed by [facility] MD at [facility] OR [address] [place] [address]    APPENDECTOMY      BIOPSY, RECTUM  2012    [facility]    BREAST BIOPSY       SECTION, CLASSIC      COLONOSCOPY      COLONOSCOPY N/A 2017    Performed by [facility] at [facility] (4TH FLR)    COLONOSCOPY N/A 2013    Performed by [facility] at [facility] (4TH FLR)    DENTAL SURGERY  2017    EXAM UNDER ANESTHESIA N/A 2013    Performed by [facility] at [facility] 2ND FLR    EXAM UNDER ANESTHESIA N/A 2012    Performed by Gabriel Rose MD at [facility] [address] [place] [address]    gyn surgery      ex laparotomy    HYSTERECTOMY      INSERTION, PORT-A-CATH Left 2012    Performed by Yobany Rosas II, MD at [facility] OR [address] [place] [address]       Current Outpatient Medications   Medication Sig Dispense Refill    candesartan-hydrochlorothiazide (ATACAND HCT) 16-12.5 mg per tablet Take 1 tablet by mouth once daily. 30 tablet 3     No current facility-administered medications for this visit.         Family History   Problem Relation Age of Onset    Hypertension Mother     Heart disease Mother 40        CABG x 5    Seizures Mother     Cancer Father         prostate    Stroke Father     Cancer Brother         colon    Diabetes Maternal Aunt     Breast cancer Maternal Aunt     Diabetes Maternal Uncle     Stroke Maternal Grandmother     Stroke Paternal Grandmother     Diabetes Cousin     Breast cancer Cousin        Social History     Socioeconomic History    Marital status: Single     Spouse name: None    Number of children: None    Years of education: None    Highest education level: None   Social Needs    Financial resource strain: None    Food insecurity - worry: None    Food insecurity - inability: None    Transportation needs - medical: None    Transportation needs - non-medical: None   Occupational History     Employer: [employer]   Tobacco Use    Smoking status: Current Every Day Smoker     Packs/day: 0.50     Years: 40.00     Pack years: 20.00     Types: Cigarettes    Smokeless tobacco: Never Used    Tobacco comment: occasionally   Substance and Sexual Activity    Alcohol use: Yes     Alcohol/week: 0.0 oz     Comment: rarely    Drug use: No    Sexual activity: No   Other Topics Concern    None   Social History Narrative    None       Review of Systems   Constitutional: Negative for chills and fever.   HENT: Negative for congestion and sore throat.    Eyes: Negative for visual disturbance.   Respiratory: Negative for cough and shortness of breath.    Cardiovascular: Negative for chest pain and palpitations.   Gastrointestinal: Negative for abdominal distention, abdominal pain, anal bleeding, blood in stool, constipation, diarrhea, nausea, rectal pain and vomiting.   Endocrine: Negative for cold intolerance and heat intolerance.   Genitourinary: Negative for dysuria and frequency.   Musculoskeletal: Negative for arthralgias, back pain and neck pain.   Skin: Negative  for rash.   Allergic/Immunologic: Negative for immunocompromised state.   Neurological: Negative for dizziness, light-headedness and headaches.   Hematological: Does not bruise/bleed easily.   Psychiatric/Behavioral: Negative for confusion. The patient is not nervous/anxious.        Objective:      Physical Exam   Constitutional: She is oriented to person, place, and time. She appears well-developed and well-nourished.   HENT:   Head: Normocephalic.   Pulmonary/Chest: Effort normal. No respiratory distress.   Abdominal: Soft. Bowel sounds are normal. She exhibits no distension and no mass. There is no tenderness. There is no rebound and no guarding.   Genitourinary:   Genitourinary Comments: Perineum - perianal skin with post-radiation changes, no mass, no fissure, + small circumferential external hemorrhoids - non-inflamed, non-thrombosed  THOMAS - good tone, no mass  Anoscopy - No masses, Grade 1 internal hemorrhoids without significant inflammation   Musculoskeletal: Normal range of motion.   Neurological: She is alert and oriented to person, place, and time.   Skin: Skin is warm and dry.   Psychiatric: She has a normal mood and affect.         Lab Results   Component Value Date    WBC 9.61 12/29/2018    HGB 15.2 12/29/2018    HCT 48.0 12/29/2018    MCV 88 12/29/2018     12/29/2018     BMP  Lab Results   Component Value Date     12/29/2018    K 4.1 12/29/2018     12/29/2018    CO2 26 12/29/2018    BUN 17 12/29/2018    CREATININE 0.9 12/29/2018    CALCIUM 10.2 12/29/2018    ANIONGAP 10 12/29/2018    ESTGFRAFRICA >60.0 12/29/2018    EGFRNONAA >60.0 12/29/2018     CMP  Sodium   Date Value Ref Range Status   12/29/2018 140 136 - 145 mmol/L Final     Potassium   Date Value Ref Range Status   12/29/2018 4.1 3.5 - 5.1 mmol/L Final     Chloride   Date Value Ref Range Status   12/29/2018 104 95 - 110 mmol/L Final     CO2   Date Value Ref Range Status   12/29/2018 26 23 - 29 mmol/L Final     Glucose   Date  Value Ref Range Status   12/29/2018 92 70 - 110 mg/dL Final     BUN, Bld   Date Value Ref Range Status   12/29/2018 17 8 - 23 mg/dL Final     Creatinine   Date Value Ref Range Status   12/29/2018 0.9 0.5 - 1.4 mg/dL Final     Calcium   Date Value Ref Range Status   12/29/2018 10.2 8.7 - 10.5 mg/dL Final     Total Protein   Date Value Ref Range Status   12/29/2018 7.7 6.0 - 8.4 g/dL Final     Albumin   Date Value Ref Range Status   12/29/2018 3.6 3.5 - 5.2 g/dL Final     Total Bilirubin   Date Value Ref Range Status   12/29/2018 0.5 0.1 - 1.0 mg/dL Final     Comment:     For infants and newborns, interpretation of results should be based  on gestational age, weight and in agreement with clinical  observations.  Premature Infant recommended reference ranges:  Up to 24 hours.............<8.0 mg/dL  Up to 48 hours............<12.0 mg/dL  3-5 days..................<15.0 mg/dL  6-29 days.................<15.0 mg/dL       Alkaline Phosphatase   Date Value Ref Range Status   12/29/2018 133 55 - 135 U/L Final     AST   Date Value Ref Range Status   12/29/2018 16 10 - 40 U/L Final     ALT   Date Value Ref Range Status   12/29/2018 11 10 - 44 U/L Final     Anion Gap   Date Value Ref Range Status   12/29/2018 10 8 - 16 mmol/L Final     eGFR if    Date Value Ref Range Status   12/29/2018 >60.0 >60 mL/min/1.73 m^2 Final     eGFR if non    Date Value Ref Range Status   12/29/2018 >60.0 >60 mL/min/1.73 m^2 Final     Comment:     Calculation used to obtain the estimated glomerular filtration  rate (eGFR) is the CKD-EPI equation.        No results found for: CEA        Assessment:       1. Personal history of malignant neoplasm of rectum, rectosigmoid junction, and anus    2. History of colon polyps    3. Family history of colon cancer        Plan:   Continue yearly F/U for surveillance exam  Next colonscopy Jan 2020.    John Paul Gruber MD, FACS, FASCRS  Senior Staff Surgeon  Department of Colon &  Rectal Surgery

## 2019-03-11 NOTE — TELEPHONE ENCOUNTER
----- Message from Karen Thomas sent at 3/11/2019  1:42 PM CDT -----  Contact: Angel/Roshan/[phone]  Pharmacist stated that Patient was told that a new rx was sent to the pharmacy but the are unable to find it. . Please call and advise. Thank [facility] [address] [place] [address] [facility]UR[employer]UNDERS AT [facility] OF [facility] [phone] (Phone) [phone] (Fax)

## 2019-04-10 RX ORDER — CANDESARTAN CILEXETIL AND HYDROCHLOROTHIAZIDE 16; 12.5 MG/1; MG/1
TABLET ORAL
Qty: 30 TABLET | Refills: 3 | Status: SHIPPED | OUTPATIENT
Start: 2019-04-10 | End: 2019-09-10 | Stop reason: SDUPTHER

## 2019-09-10 RX ORDER — CANDESARTAN CILEXETIL AND HYDROCHLOROTHIAZIDE 16; 12.5 MG/1; MG/1
TABLET ORAL
Qty: 30 TABLET | Refills: 3 | Status: SHIPPED | OUTPATIENT
Start: 2019-09-10 | End: 2020-02-03

## 2019-12-13 DIAGNOSIS — Z85.038 ENCOUNTER FOR COLONOSCOPY DUE TO HISTORY OF COLON CANCER: Primary | ICD-10-CM

## 2019-12-13 DIAGNOSIS — Z12.11 ENCOUNTER FOR COLONOSCOPY DUE TO HISTORY OF COLON CANCER: Primary | ICD-10-CM

## 2020-01-03 ENCOUNTER — TELEPHONE (OUTPATIENT)
Dept: INTERNAL MEDICINE | Facility: CLINIC | Age: 70
End: 2020-01-03

## 2020-01-03 ENCOUNTER — PATIENT MESSAGE (OUTPATIENT)
Dept: INTERNAL MEDICINE | Facility: CLINIC | Age: 70
End: 2020-01-03

## 2020-01-03 DIAGNOSIS — M54.30 SCIATICA, UNSPECIFIED LATERALITY: Primary | ICD-10-CM

## 2020-01-06 ENCOUNTER — TELEPHONE (OUTPATIENT)
Dept: PAIN MEDICINE | Facility: CLINIC | Age: 70
End: 2020-01-06

## 2020-01-06 ENCOUNTER — OFFICE VISIT (OUTPATIENT)
Dept: PAIN MEDICINE | Facility: CLINIC | Age: 70
End: 2020-01-06
Payer: COMMERCIAL

## 2020-01-06 ENCOUNTER — PATIENT OUTREACH (OUTPATIENT)
Dept: ADMINISTRATIVE | Facility: OTHER | Age: 70
End: 2020-01-06

## 2020-01-06 VITALS
HEART RATE: 85 BPM | SYSTOLIC BLOOD PRESSURE: 132 MMHG | BODY MASS INDEX: 29.96 KG/M2 | DIASTOLIC BLOOD PRESSURE: 84 MMHG | WEIGHT: 163.81 LBS

## 2020-01-06 DIAGNOSIS — M54.16 LUMBAR RADICULOPATHY: Primary | ICD-10-CM

## 2020-01-06 DIAGNOSIS — G89.29 CHRONIC BILATERAL LOW BACK PAIN WITH LEFT-SIDED SCIATICA: ICD-10-CM

## 2020-01-06 DIAGNOSIS — M54.42 CHRONIC BILATERAL LOW BACK PAIN WITH LEFT-SIDED SCIATICA: ICD-10-CM

## 2020-01-06 PROCEDURE — 99999 PR PBB SHADOW E&M-EST. PATIENT-LVL III: CPT | Mod: PBBFAC,,, | Performed by: PAIN MEDICINE

## 2020-01-06 PROCEDURE — 99999 PR PBB SHADOW E&M-EST. PATIENT-LVL III: ICD-10-PCS | Mod: PBBFAC,,, | Performed by: PAIN MEDICINE

## 2020-01-06 PROCEDURE — 99244 OFF/OP CNSLTJ NEW/EST MOD 40: CPT | Mod: S$GLB,,, | Performed by: PAIN MEDICINE

## 2020-01-06 PROCEDURE — 99244 PR OFFICE CONSULTATION,LEVEL IV: ICD-10-PCS | Mod: S$GLB,,, | Performed by: PAIN MEDICINE

## 2020-01-06 RX ORDER — ACETAMINOPHEN 500 MG
1000 TABLET ORAL 3 TIMES DAILY
Refills: 0 | COMMUNITY
Start: 2020-01-06 | End: 2021-03-25

## 2020-01-06 RX ORDER — TRAMADOL HYDROCHLORIDE 50 MG/1
50 TABLET ORAL EVERY 12 HOURS PRN
Qty: 14 TABLET | Refills: 0 | Status: SHIPPED | OUTPATIENT
Start: 2020-01-06 | End: 2020-01-13

## 2020-01-06 RX ORDER — GABAPENTIN 100 MG/1
100 CAPSULE ORAL 3 TIMES DAILY
Qty: 90 CAPSULE | Refills: 0 | Status: SHIPPED | OUTPATIENT
Start: 2020-01-06 | End: 2020-02-13 | Stop reason: SDUPTHER

## 2020-01-06 RX ORDER — DICLOFENAC SODIUM 75 MG/1
75 TABLET, DELAYED RELEASE ORAL 2 TIMES DAILY
Qty: 60 TABLET | Refills: 0 | Status: SHIPPED | OUTPATIENT
Start: 2020-01-06 | End: 2020-07-25 | Stop reason: CLARIF

## 2020-01-06 RX ORDER — TIZANIDINE 4 MG/1
4 TABLET ORAL NIGHTLY PRN
Qty: 20 TABLET | Refills: 0 | Status: SHIPPED | OUTPATIENT
Start: 2020-01-06 | End: 2020-01-26

## 2020-01-06 NOTE — LETTER
January 6, 2020    Rachel Murray  [address] [place] [address]         [facility]  [address] [place] [address] [address]  [place] [address]  Phone: [phone] January 6, 2020     Patient: Rachel Murray   YOB: 1950   Date of Visit: 1/6/2020       To Whom It May Concern:    It is my medical opinion that Rachel Murray should park as closely as possible to work due to her medical condition..    If you have any questions or concerns, please don't hesitate to call.    Sincerely,        Ely Syed Jr., MD

## 2020-01-06 NOTE — LETTER
January 6, 2020      Anel Parikh MD  [address] [facility] [address]           [facility]  [address] [place] [address] [address] [place] [address]  Phone: [phone]          Patient: Rachel Murray   MR Number: [id number]   YOB: 1950   Date of Visit: 1/6/2020       Dear Dr. Anel Parikh:    Thank you for referring Rachel Murray to me for evaluation. Attached you will find relevant portions of my assessment and plan of care.    If you have questions, please do not hesitate to call me. I look forward to following Rachel Murray along with you.    Sincerely,    Ely Syed Jr., MD    Enclosure  CC:  No Recipients    If you would like to receive this communication electronically, please contact [email or web address] or [phone] to request more information on [employer] Link access.    For providers and/or their staff who would like to refer a patient to [facility], please contact us through our one-stop-shop provider referral line, [facility], at [phone].    If you feel you have received this communication in error or would no longer like to receive these types of communications, please e-mail [email or web address]

## 2020-01-06 NOTE — PROGRESS NOTES
[facility] New Patient Evaluation    Referred by: Anel Parikh MD  Reason for referral: Sciatica, unspecified laterality    CC:   Chief Complaint   Patient presents with    Low-back Pain     Last 3 PDI Scores 1/6/2020   Pain Disability Index (PDI) 48       HPI:   Rachel Murray is a 69 y.o. female who complains of Low back pain with Sciatica.  She reports pain that starts in the left side of her low back and travels through the buttocks, posterolateral thigh, anterior shin, and into the top of her foot.  Alleviating and exacerbating factors are variable but prolonged sitting and walking for more than 5 min appear to drastically increase her pain. She also reports insomnia due to pain and has been using a recliner to sleep for approximately 90 min at a time. She reports dealing with low back pain for several years but noticed an acute increase in March 2019 which subsided somewhat.  In October 2019 her pain increased drastically and has not subsided since that time. Her history is positive for cervical disc herniation which was previously treated with oral medications and physical therapy.  She also notes associated numbness in the left lower extremity and a sensation of weakness in the left leg.  She denies falls or saddle anesthesia.    Location: Low back   Onset: 10/2019  Current Pain Score: 8/10  Daily Pain of Range: 5-8/10  Quality: Burning, Throbbing, Grabbing, Tingling, Numb, Sharp, Electric, Hot and Cold  Radiation: Radiates down left side to foot.  Worsened by: extension, lying down, sitting and walking for more than 5 minutes  Improved by: nothing    Previous Therapies:  PT/OT: Denies  HEP: Denies  Interventions: Denies  Surgery:Denies  Medications:   - NSAIDS:   - MSK Relaxants:   - TCAs:   - SNRIs:   - Topicals:   - Anticonvulsants:  - Opioids:     Current Pain Medications:  1. Aleve     Review of Systems:  Review of Systems   Constitutional: Negative for chills and fever.   HENT: Negative  for nosebleeds.    Eyes: Negative for blurred vision and pain.   Respiratory: Negative for hemoptysis.    Cardiovascular: Negative for chest pain and palpitations.   Gastrointestinal: Negative for heartburn, nausea and vomiting.   Genitourinary: Negative for dysuria and hematuria.   Musculoskeletal: Positive for back pain. Negative for myalgias.   Skin: Negative for rash.   Neurological: Positive for tingling, sensory change and focal weakness (LLE). Negative for seizures and loss of consciousness.   Endo/Heme/Allergies: Does not bruise/bleed easily.   Psychiatric/Behavioral: Negative for hallucinations. The patient has insomnia (2/2 pain).        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 Rose    DENTAL SURGERY  2017    gyn surgery      ex laparotomy    HYSTERECTOMY         Family History   Problem Relation Age of Onset    Hypertension Mother     Heart disease Mother 40        CABG x 5    Seizures Mother     Cancer Father         prostate    Stroke Father     Cancer Brother         colon    Diabetes Maternal Aunt      Breast cancer Maternal Aunt     Diabetes Maternal Uncle     Stroke Maternal Grandmother     Stroke Paternal Grandmother     Diabetes Cousin     Breast cancer Cousin        Social History     Socioeconomic History    Marital status: Single     Spouse name: Not on file    Number of children: Not on file    Years of education: Not on file    Highest education level: Not on file   Occupational History     Employer: [employer]   Social Needs    Financial resource strain: Not on file    Food insecurity:     Worry: Not on file     Inability: Not on file    Transportation needs:     Medical: Not on file     Non-medical: Not on file   Tobacco Use    Smoking status: Current Every Day Smoker     Packs/day: 0.50     Years: 40.00     Pack years: 20.00     Types: Cigarettes    Smokeless tobacco: Never Used    Tobacco comment: occasionally   Substance and Sexual Activity    Alcohol use: Yes     Alcohol/week: 0.0 standard drinks     Comment: rarely    Drug use: No    Sexual activity: Never   Lifestyle    Physical activity:     Days per week: Not on file     Minutes per session: Not on file    Stress: Not on file   Relationships    Social connections:     Talks on phone: Not on file     Gets together: Not on file     Attends Buddhist service: Not on file     Active member of club or organization: Not on file     Attends meetings of clubs or organizations: Not on file     Relationship status: Not on file   Other Topics Concern    Not on file   Social History Narrative    Not on file       Review of patient's allergies indicates:   Allergen Reactions    Adhesive Blisters    Ciprofloxacin Hives    Latex, natural rubber Blisters       Physical Exam:  Vitals:    01/06/20 0820   BP: 132/84   Pulse: 85   Weight: 74.3 kg (163 lb 12.8 oz)   PainSc:   8     General    Nursing note and vitals reviewed.  Constitutional: She is oriented to person, place, and time. She appears well-developed and well-nourished.  No distress.   HENT:   Head: Normocephalic and atraumatic.   Nose: Nose normal.   Eyes: Conjunctivae and EOM are normal. Pupils are equal, round, and reactive to light. Right eye exhibits no discharge. Left eye exhibits no discharge. No scleral icterus.   Neck: No JVD present.   Cardiovascular: Intact distal pulses.    Pulmonary/Chest: Effort normal. No respiratory distress.   Abdominal: She exhibits no distension.   Neurological: She is alert and oriented to person, place, and time. Coordination normal.   Psychiatric: She has a normal mood and affect. Her behavior is normal. Judgment and thought content normal.     General Musculoskeletal Exam   Gait: normal     Back (L-Spine & T-Spine) / Neck (C-Spine) Exam     Tenderness Right paramedian tenderness of the Lower L-Spine. Left paramedian tenderness of the Lower L-Spine.     Back (L-Spine & T-Spine) Range of Motion   Back extension: facet loading is positive and exacerabtes/reproduces the patient's typical low back pain    Back flexion: limited ROM but partial relief of low back pain noted.     Spinal Sensation   Right Side Sensation  L-Spine Level: normal  Left Side Sensation  L-Spine Level: normal    Other She has no scoliosis .    Comments:  Left Leg + SLR      Muscle Strength   Right Lower Extremity   Hip Flexion: 5/5   Hip Extensors: 5/5  Quadriceps:  5/5   Hamstrin/5   Gastrocsoleus:  5/5/5  Left Lower Extremity   Hip Flexion: 5/5   Hip Extensors: 5/5  Quadriceps:  5/5   Hamstrin/5   Gastrocsoleus:  5/5/5    Reflexes     Left Side  Quadriceps:  2+  Achilles:  2+    Right Side   Quadriceps:  2+  Achilles:  2+      Imaging:  None pertinent to review.    Labs:  BMP  Lab Results   Component Value Date     2018    K 4.1 2018     2018    CO2 26 2018    BUN 17 2018    CREATININE 0.9 2018    CALCIUM 10.2 2018    ANIONGAP 10 2018    ESTGFRAFRICA >60.0 2018    EGFRNONAA >60.0 2018     Lab  Results   Component Value Date    ALT 11 12/29/2018    AST 16 12/29/2018    ALKPHOS 133 12/29/2018    BILITOT 0.5 12/29/2018       Assessment:  Problem List Items Addressed This Visit     Lumbar radiculopathy - Primary    Relevant Medications    acetaminophen (TYLENOL) 500 MG tablet    diclofenac (VOLTAREN) 75 MG EC tablet    tiZANidine (ZANAFLEX) 4 MG tablet    gabapentin (NEURONTIN) 100 MG capsule    traMADol (ULTRAM) 50 mg tablet    Other Relevant Orders    MRI Lumbar Spine Without Contrast    Ambulatory Referral to Physical/Occupational Therapy    Chronic bilateral low back pain with left-sided sciatica    Relevant Medications    acetaminophen (TYLENOL) 500 MG tablet    diclofenac (VOLTAREN) 75 MG EC tablet    tiZANidine (ZANAFLEX) 4 MG tablet    gabapentin (NEURONTIN) 100 MG capsule    traMADol (ULTRAM) 50 mg tablet    Other Relevant Orders    MRI Lumbar Spine Without Contrast    Ambulatory Referral to Physical/Occupational Therapy          1/6/2020 - Rachel Murray is a 69 y.o. female with chronic low back pain radiating into the left lower extremity consistent with radiculopathy at L5.  I will order an MRI of her lumbar spine to confirm this diagnosis as she has been dealing with the symptoms for the better part of 2019.  I have also ordered several medications to help address her pain and symptoms in addition to physical therapy.  I have also proposed a lumbar epidural steroid injection to be scheduled after completion of the MRI.  I would anticipate performing this injection at L5-S1; however, this location may change based on the results of the MRI.  She was also provided a letter for work to park closer to the building.    : Reviewed and consistent with medication use as prescribed.    Treatment Plan:   Procedures:  Schedule for lumbar epidural steroid injection likely to be performed at L5-S1. Level to be determined after MRI.  PT/OT/HEP:  Refer to physical therapy ordered today for lumbar spine  rehabilitation.  Medications:  Patient was counseled on the following medications.  They were prescribed to her today.  1. Tylenol 1000 mg p.o. t.i.d.  2. Voltaren 75 mg p.o. b.i.d.  3. Tizanidine 4 mg p.o. q.h.s. p.r.n.  4. Gabapentin 100 mg p.o. t.i.d.  5. Tramadol 50 mg p.o. B.i.d. #14  Labs: reviewed and medications are appropriately dosed for current hepatorenal function.  Imaging:  MRI lumbar spine ordered today 01/06/2020    Follow Up: RTC 2-3 weeks    Ely Syed Jr, MD  Interventional Pain Medicine / Anesthesiology    Disclaimer: This note was partly generated using dictation software which may occasionally result in transcription errors.

## 2020-01-06 NOTE — TELEPHONE ENCOUNTER
Returned call to patient. Patient stated that she is starting PT on Monday 1/13/20. I informed patient that this is fine. Patient verbalized understanding.       ----- Message from Holly Doan sent at 1/6/2020  3:20 PM CST -----  Contact: self / [phone]  Needs to speak with you on her therapy. Please advise

## 2020-01-07 ENCOUNTER — TELEPHONE (OUTPATIENT)
Dept: PAIN MEDICINE | Facility: CLINIC | Age: 70
End: 2020-01-07

## 2020-01-07 DIAGNOSIS — M54.16 LUMBAR RADICULOPATHY: Primary | ICD-10-CM

## 2020-01-07 NOTE — TELEPHONE ENCOUNTER
Pt scheduled for 1/16/20 at 9:00am for Lumbar MIRNA. Pt aware to check in at registration desk on the first floor of the hospital for 8:00 am. Pt denied taking blood thinners and is not diabetic.

## 2020-01-10 ENCOUNTER — HOSPITAL ENCOUNTER (OUTPATIENT)
Dept: RADIOLOGY | Facility: HOSPITAL | Age: 70
Discharge: HOME | End: 2020-01-10
Attending: PAIN MEDICINE
Payer: COMMERCIAL

## 2020-01-10 DIAGNOSIS — G89.29 CHRONIC BILATERAL LOW BACK PAIN WITH LEFT-SIDED SCIATICA: ICD-10-CM

## 2020-01-10 DIAGNOSIS — M54.16 LUMBAR RADICULOPATHY: ICD-10-CM

## 2020-01-10 DIAGNOSIS — M54.42 CHRONIC BILATERAL LOW BACK PAIN WITH LEFT-SIDED SCIATICA: ICD-10-CM

## 2020-01-10 PROCEDURE — 72148 MRI LUMBAR SPINE WITHOUT CONTRAST: ICD-10-PCS | Mod: 26,,, | Performed by: RADIOLOGY

## 2020-01-10 PROCEDURE — 72148 MRI LUMBAR SPINE W/O DYE: CPT | Mod: TC

## 2020-01-10 PROCEDURE — 72148 MRI LUMBAR SPINE W/O DYE: CPT | Mod: 26,,, | Performed by: RADIOLOGY

## 2020-01-15 ENCOUNTER — CLINICAL SUPPORT (OUTPATIENT)
Dept: CARDIOLOGY | Facility: CLINIC | Age: 70
End: 2020-01-15
Attending: NURSE PRACTITIONER
Payer: COMMERCIAL

## 2020-01-15 ENCOUNTER — TELEPHONE (OUTPATIENT)
Dept: INTERNAL MEDICINE | Facility: CLINIC | Age: 70
End: 2020-01-15

## 2020-01-15 ENCOUNTER — OFFICE VISIT (OUTPATIENT)
Dept: INTERNAL MEDICINE | Facility: CLINIC | Age: 70
End: 2020-01-15
Payer: COMMERCIAL

## 2020-01-15 VITALS
HEIGHT: 62 IN | RESPIRATION RATE: 18 BRPM | HEART RATE: 62 BPM | DIASTOLIC BLOOD PRESSURE: 84 MMHG | WEIGHT: 164.88 LBS | TEMPERATURE: 98 F | BODY MASS INDEX: 30.34 KG/M2 | SYSTOLIC BLOOD PRESSURE: 118 MMHG

## 2020-01-15 DIAGNOSIS — M79.89 LOCALIZED SWELLING OF LOWER EXTREMITY: ICD-10-CM

## 2020-01-15 DIAGNOSIS — M79.662 PAIN AND SWELLING OF LEFT LOWER LEG: ICD-10-CM

## 2020-01-15 DIAGNOSIS — I73.9 CLAUDICATION OF LEFT LOWER EXTREMITY: ICD-10-CM

## 2020-01-15 DIAGNOSIS — M79.662 PAIN AND SWELLING OF LEFT LOWER LEG: Primary | ICD-10-CM

## 2020-01-15 DIAGNOSIS — I73.9 PAD (PERIPHERAL ARTERY DISEASE): Primary | ICD-10-CM

## 2020-01-15 DIAGNOSIS — M79.89 PAIN AND SWELLING OF LEFT LOWER LEG: ICD-10-CM

## 2020-01-15 DIAGNOSIS — M79.89 PAIN AND SWELLING OF LEFT LOWER LEG: Primary | ICD-10-CM

## 2020-01-15 PROCEDURE — 99999 PR PBB SHADOW E&M-EST. PATIENT-LVL I: CPT | Mod: PBBFAC,,,

## 2020-01-15 PROCEDURE — 99214 PR OFFICE/OUTPT VISIT, EST, LEVL IV, 30-39 MIN: ICD-10-PCS | Mod: S$GLB,,, | Performed by: NURSE PRACTITIONER

## 2020-01-15 PROCEDURE — 93971 CV US DOPPLER VENOUS LEG LEFT (CUPID ONLY): ICD-10-PCS | Mod: LT,S$GLB,, | Performed by: INTERNAL MEDICINE

## 2020-01-15 PROCEDURE — 99999 PR PBB SHADOW E&M-EST. PATIENT-LVL III: ICD-10-PCS | Mod: PBBFAC,,, | Performed by: NURSE PRACTITIONER

## 2020-01-15 PROCEDURE — 99999 PR PBB SHADOW E&M-EST. PATIENT-LVL III: CPT | Mod: PBBFAC,,, | Performed by: NURSE PRACTITIONER

## 2020-01-15 PROCEDURE — 93971 EXTREMITY STUDY: CPT | Mod: LT,S$GLB,, | Performed by: INTERNAL MEDICINE

## 2020-01-15 PROCEDURE — 99999 PR PBB SHADOW E&M-EST. PATIENT-LVL I: ICD-10-PCS | Mod: PBBFAC,,,

## 2020-01-15 PROCEDURE — 99214 OFFICE O/P EST MOD 30 MIN: CPT | Mod: S$GLB,,, | Performed by: NURSE PRACTITIONER

## 2020-01-15 NOTE — PROGRESS NOTES
[facility]    Chief Complaint      Chief Complaint   Patient presents with    Leg Swelling    Foot Swelling     History of Present Illness      Rachel Murray is a 69 y.o. female patient of Dr. Parikh's who is new to me and presents today for c/o worsening bilateral lower extremity swelling L>R over the past week, redness numbness and tingling to left leg from knee to toes, right leg swelling started x 2 days ago. Pt does have lumbar DJD, had MRI on  2020, is scheduled for injections tomorrow with Dr. Syed . Pt denies sob or cp, normal bowel and bladder.  Discussed case with Dr. Dupont, will order STAT venous US for today    Problem List Items Addressed This Visit     None      Visit Diagnoses     Pain and swelling of left lower leg    -  Primary    Relevant Orders    CV Ultrasound doppler venous DVT leg left (Completed)    CV Ultrasound doppler arterial legs bilat    Localized swelling of lower extremity        Relevant Orders    CV Ultrasound doppler venous DVT leg left (Completed)    CV Ultrasound doppler arterial legs bilat    Claudication of left lower extremity        Relevant Orders    CV Ultrasound doppler venous DVT leg left (Completed)    CV Ultrasound doppler arterial legs bilat          Health Maintenance   Topic Date Due    TETANUS VACCINE  1968    Pneumococcal Vaccine (65+ High/Highest Risk) (2 of 2 - PPSV23) 2019    Mammogram  2020    DEXA SCAN  2022    Lipid Panel  2023    Colonoscopy  2027    Hepatitis C Screening  Completed       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 Rose    DENTAL SURGERY  11/06/2017    gyn surgery      ex laparotomy    HYSTERECTOMY         family history includes Breast cancer in her cousin and maternal aunt; Cancer in her brother and father; Diabetes in her cousin, maternal aunt, and maternal uncle; Heart disease (age of onset: 40) in her mother; Hypertension in her mother; Seizures in her mother; Stroke in her father, maternal grandmother, and paternal grandmother.    Social History     Tobacco Use    Smoking status: Current Every Day Smoker     Packs/day: 0.50     Years: 40.00     Pack years: 20.00     Types: Cigarettes    Smokeless tobacco: Never Used    Tobacco comment: occasionally   Substance Use Topics    Alcohol use: Yes     Alcohol/week: 0.0 standard drinks     Comment: rarely    Drug use: No       Review of Systems   Constitutional: Negative for fever.   Respiratory: Negative for cough and shortness of breath.    Cardiovascular: Positive for claudication and leg swelling. Negative for chest pain and palpitations.   Gastrointestinal: Negative for nausea and vomiting.   Genitourinary: Negative for dysuria.   Skin: Negative for rash.   Neurological: Negative for weakness.        Outpatient Encounter Medications as of 1/15/2020   Medication Sig Dispense Refill    acetaminophen (TYLENOL) 500 MG tablet Take 2 tablets (1,000 mg total) by mouth 3 (three) times daily.  0    candesartan-hydrochlorothiazide (ATACAND HCT) 16-12.5 mg per tablet Take 1 tablet by mouth once daily. 30 tablet 3    candesartan-hydrochlorothiazide (ATACAND HCT) 16-12.5 mg per tablet TAKE 1 TABLET BY MOUTH EVERY DAY 30 tablet 3    candesartan-hydrochlorothiazide (ATACAND HCT) 16-12.5 mg per tablet TAKE 1 TABLET BY MOUTH EVERY DAY 30 tablet 3    diclofenac (VOLTAREN) 75 MG EC tablet Take 1 tablet (75 mg total) by mouth 2 (two) times daily. 60 tablet 0    gabapentin (NEURONTIN)  "100 MG capsule Take 1 capsule (100 mg total) by mouth 3 (three) times daily. 90 capsule 0    tiZANidine (ZANAFLEX) 4 MG tablet Take 1 tablet (4 mg total) by mouth nightly as needed (Muscle spasms). 20 tablet 0     No facility-administered encounter medications on file as of 1/15/2020.         Review of patient's allergies indicates:   Allergen Reactions    Adhesive Blisters    Ciprofloxacin Hives    Latex, natural rubber Blisters       Physical Exam      Vital Signs  Temp: 98.1 °F (36.7 °C)  Temp src: Oral  Pulse: 62  Resp: 18  BP: 118/84  BP Location: Left arm  Patient Position: Sitting  Pain Score:   6  Pain Loc: Leg  Height and Weight  Height: 5' 2" (157.5 cm)  Weight: 74.8 kg (164 lb 14.5 oz)  BSA (Calculated - sq m): 1.81 sq meters  BMI (Calculated): 30.2  Weight in (lb) to have BMI = 25: 136.4]    Physical Exam   Constitutional: She is oriented to person, place, and time. She appears well-developed and well-nourished.   HENT:   Head: Normocephalic and atraumatic.   Right Ear: External ear normal.   Left Ear: External ear normal.   Eyes: Pupils are equal, round, and reactive to light. Conjunctivae and EOM are normal.   Neck: Normal range of motion. Neck supple.   Cardiovascular: Normal rate, regular rhythm and normal heart sounds.   No murmur heard.  Left DP and PT pulses sluggish with dependent leg, redness, w/ >3+ cap refill. With left leg elevated, skin wnl, warm + pulses noted. 2-3+ swelling noted bilatearlly L>R   Pulmonary/Chest: Effort normal and breath sounds normal. She exhibits no tenderness.   Abdominal: Soft. There is no guarding.   Musculoskeletal: Normal range of motion.   Neurological: She is alert and oriented to person, place, and time.   Skin: Skin is warm and dry.   Psychiatric: She has a normal mood and affect. Her behavior is normal. Judgment and thought content normal.   Nursing note reviewed.       Laboratory:  CBC:  No results for input(s): WBC, RBC, HGB, HCT, PLT, MCV, MCH, MCHC in " the last 2160 hours.  CMP:  No results for input(s): GLU, CALCIUM, ALBUMIN, PROT, NA, K, CO2, CL, BUN, ALKPHOS, ALT, AST, BILITOT in the last 2160 hours.    Invalid input(s): CREATININ  URINALYSIS:  No results for input(s): COLORU, CLARITYU, SPECGRAV, PHUR, PROTEINUA, GLUCOSEU, BILIRUBINCON, BLOODU, WBCU, RBCU, BACTERIA, MUCUS, NITRITE, LEUKOCYTESUR, UROBILINOGEN, HYALINECASTS in the last 2160 hours.   LIPIDS:  No results for input(s): TSH, HDL, CHOL, TRIG, LDLCALC, CHOLHDL, NONHDLCHOL, TOTALCHOLEST in the last 2160 hours.  TSH:  No results for input(s): TSH in the last 2160 hours.  A1C:  No results for input(s): HGBA1C in the last 2160 hours.      Assessment/Plan     Rachel Murray is a 69 y.o.female with:    1. Pain and swelling of left lower leg  - CV Ultrasound doppler venous DVT leg left; Future  - CV Ultrasound doppler arterial legs bilat; Future    2. Localized swelling of lower extremity  - CV Ultrasound doppler venous DVT leg left; Future  - CV Ultrasound doppler arterial legs bilat; Future    3. Claudication of left lower extremity  - CV Ultrasound doppler venous DVT leg left; Future  - CV Ultrasound doppler arterial legs bilat; Future      -Continue current medications and maintain follow up with specialists.  Return to clinic as needed for any concerns  May need to send to ER for immediate attention      Erin Jiminez, NP-C [facility] [place]

## 2020-01-15 NOTE — TELEPHONE ENCOUNTER
No evidence of DVT, discussed with Dr. Dupont who feels it will be ok to still get injections. Thinks peripheral art disease so we will refer to vascular. But if her symptoms get any worse go to ER for immediate attention

## 2020-01-16 ENCOUNTER — HOSPITAL ENCOUNTER (OUTPATIENT)
Facility: HOSPITAL | Age: 70
Discharge: HOME | End: 2020-01-16
Attending: PAIN MEDICINE | Admitting: PAIN MEDICINE
Payer: COMMERCIAL

## 2020-01-16 VITALS
HEART RATE: 88 BPM | TEMPERATURE: 98 F | HEIGHT: 62 IN | DIASTOLIC BLOOD PRESSURE: 64 MMHG | OXYGEN SATURATION: 99 % | BODY MASS INDEX: 30 KG/M2 | WEIGHT: 163 LBS | RESPIRATION RATE: 16 BRPM | SYSTOLIC BLOOD PRESSURE: 138 MMHG

## 2020-01-16 DIAGNOSIS — M54.16 LUMBAR RADICULOPATHY: Primary | ICD-10-CM

## 2020-01-16 DIAGNOSIS — G89.29 CHRONIC PAIN: ICD-10-CM

## 2020-01-16 PROCEDURE — 99152 MOD SED SAME PHYS/QHP 5/>YRS: CPT | Performed by: PAIN MEDICINE

## 2020-01-16 PROCEDURE — 25500020 PHARM REV CODE 255: Performed by: PAIN MEDICINE

## 2020-01-16 PROCEDURE — 62323 NJX INTERLAMINAR LMBR/SAC: CPT | Mod: ,,, | Performed by: PAIN MEDICINE

## 2020-01-16 PROCEDURE — 63600175 PHARM REV CODE 636 W HCPCS: Performed by: PAIN MEDICINE

## 2020-01-16 PROCEDURE — 62323 NJX INTERLAMINAR LMBR/SAC: CPT | Performed by: PAIN MEDICINE

## 2020-01-16 PROCEDURE — 25000003 PHARM REV CODE 250: Performed by: PAIN MEDICINE

## 2020-01-16 PROCEDURE — 62323 PR INJ LUMBAR/SACRAL, W/IMAGING GUIDANCE: ICD-10-PCS | Mod: ,,, | Performed by: PAIN MEDICINE

## 2020-01-16 RX ORDER — LIDOCAINE HYDROCHLORIDE 10 MG/ML
INJECTION, SOLUTION EPIDURAL; INFILTRATION; INTRACAUDAL; PERINEURAL
Status: DISCONTINUED | OUTPATIENT
Start: 2020-01-16 | End: 2020-01-16 | Stop reason: HOSPADM

## 2020-01-16 RX ORDER — BUPIVACAINE HYDROCHLORIDE 2.5 MG/ML
INJECTION, SOLUTION EPIDURAL; INFILTRATION; INTRACAUDAL
Status: DISCONTINUED | OUTPATIENT
Start: 2020-01-16 | End: 2020-01-16 | Stop reason: HOSPADM

## 2020-01-16 RX ORDER — INDOMETHACIN 25 MG/1
CAPSULE ORAL
Status: DISCONTINUED | OUTPATIENT
Start: 2020-01-16 | End: 2020-01-16 | Stop reason: HOSPADM

## 2020-01-16 RX ORDER — METHYLPREDNISOLONE ACETATE 40 MG/ML
INJECTION, SUSPENSION INTRA-ARTICULAR; INTRALESIONAL; INTRAMUSCULAR; SOFT TISSUE
Status: DISCONTINUED | OUTPATIENT
Start: 2020-01-16 | End: 2020-01-16 | Stop reason: HOSPADM

## 2020-01-16 NOTE — OP NOTE
"Procedure Note    Pre-operative Diagnosis: Lumbar Radiculopathy  Post-operative Diagnosis: Lumbar Radiculopathy  Procedure Date: 01/16/2020  Procedure:  (1) Lumbar Epidural Steroid Injection at 5-S1    (2) Intraoperative fluoroscopy          Anesthesia: Local    Indications: To alleviate pain and suffering, and reduce functional impairment.    Procedure in Detail:   The patients history and physical exam were reviewed. The risks, benefits and alternatives to the procedure were discussed, and all questions were answered to the patients satisfaction. The patient agreed to proceed, and written informed consent was verified.    The patient was brought into the procedure room and placed in the prone position on the fluoroscopy table. The area of the lumbar spine was prepped with Chloraprep and draped in a sterile manner. The L5-S1 interspace was identified and marked under AP fluoroscopy. The skin and subcutaneous tissues overlying the targeted interspace were anesthetized with 3-5 mL of 1% lidocaine using a 25G, 1.5" needle. A 20G, 4.5" Tuohy epidural needle was directed toward the interspace under fluoroscopic guidance until the ligamentum flavum was engaged. From this point, a loss of resistance technique with a glass syringe and saline was used to identify entrance of the needle into the epidural space. Once loss of resistance was observed, up to 1 mL of contrast solution was injected. An appropriate epidurogram was noted.    A 5 mL mixture consisting of PF saline (2 mL), MPF Bupivacaine 0.25% (2 mL), and Depomedrol 40 mg (1 mL) was injected slowly and without resistance.  The needle was removed and a bandage applied to puncture site.    Blood Loss: nil    Disposition: The patient tolerated the procedure well, and there were no apparent complications. Vital signs remained stable throughout the procedure. The patient was taken to the recovery area where written discharge instructions for the procedure were given. "     Follow-up: RTC as scheduled      Ely Syed Jr, MD  Interventional Pain Medicine / Anesthesiology

## 2020-01-16 NOTE — PROGRESS NOTES
Discharge instructions reviewed. Pt voiced good understanding. To car via w/c per staff. Pt stable, no distress

## 2020-01-16 NOTE — DISCHARGE SUMMARY
[facility]  Discharge Note  Short Stay     Admit Date: 1/16/2020    Discharge Date: 1/16/2020     Attending Physician: Ely Syed Jr, MD    Diagnoses:  Active Hospital Problems    Diagnosis  POA    Chronic pain [G89.29]  Yes      Resolved Hospital Problems   No resolved problems to display.     Discharged Condition: Good     Hospital Course: Patient was admitted for an outpatient interventional pain management procedure and tolerated the procedure well with no complications.     Final Diagnoses: Same as principal problem.     Disposition: Home or Self Care     Follow up/Patient Instructions:    Follow-up Information     Ely Syed Jr, MD. Go in 2 weeks.    Specialty:  Pain Medicine  Why:  Post-procedural Follow Up As Scheduled  Contact information:  [address]  [address]  [place] [id number] [phone]                   Reconciled Medications:     Medication List      CONTINUE taking these medications    acetaminophen 500 MG tablet  Commonly known as:  TYLENOL  Take 2 tablets (1,000 mg total) by mouth 3 (three) times daily.     * candesartan-hydrochlorothiazide 16-12.5 mg per tablet  Commonly known as:  ATACAND HCT  Take 1 tablet by mouth once daily.     * candesartan-hydrochlorothiazide 16-12.5 mg per tablet  Commonly known as:  ATACAND HCT  TAKE 1 TABLET BY MOUTH EVERY DAY     * candesartan-hydrochlorothiazide 16-12.5 mg per tablet  Commonly known as:  ATACAND HCT  TAKE 1 TABLET BY MOUTH EVERY DAY     diclofenac 75 MG EC tablet  Commonly known as:  VOLTAREN  Take 1 tablet (75 mg total) by mouth 2 (two) times daily.     gabapentin 100 MG capsule  Commonly known as:  NEURONTIN  Take 1 capsule (100 mg total) by mouth 3 (three) times daily.     tiZANidine 4 MG tablet  Commonly known as:  ZANAFLEX  Take 1 tablet (4 mg total) by mouth nightly as needed (Muscle spasms).         * This list has 3 medication(s) that are the same as other medications prescribed for you. Read the directions  carefully, and ask your doctor or other care provider to review them with you.               Discharge Procedure Orders (must include Diet, Follow-up, Activity)   Call MD for:  temperature >100.4     Call MD for:  severe uncontrolled pain     Call MD for:  redness, tenderness, or signs of infection (pain, swelling, redness, odor or green/yellow discharge around incision site)     Call MD for:  difficulty breathing or increased cough     Call MD for:  severe persistent headache     Call MD for:  worsening rash     Remove dressing in 24 hours       Ely Syed Jr, MD  Interventional Pain Medicine / Anesthesiology

## 2020-01-16 NOTE — DISCHARGE INSTRUCTIONS
Home Care Instructions Pain Management:    1.  DIET:    You may resume your normal diet today.    2.  BATHING:    You may shower with luke warm water.    3.  DRESSING:    You may remove your bandage today.    4.  ACTIVITY LEVEL:      You may resume your normal activities 24 hours after your procedure.    5.  MEDICATIONS:    You may resume your normal medications today.    6.  SPECIAL INSTRUCTIONS:    No heat to the injection site for 24 hours including bath or shower, heating pad, moist heat or hot tubs.    Use an ice pack to the injection site for any pain or discomfort.  Apply ice packs for 20 minute intervals as needed.    If you have received any sedatives by mouth today, you can not drive for 12 hours.    If you have received sedation through an IV, you can not drive for 24 hours.    PLEASE CALL YOUR DOCTOR FOR THE FOLLOWIN.  Redness or swelling around the injection site.  2.  Fever of 101 degrees.  3.  Drainage (pus) from the injection site.  4.  For any continuous bleeding (some dried blood over the incision is normal.)    FOR EMERGENCIES:    If any unusual problems or difficulties occur during clinic hours, call [phone] or dial [phone].    Follow up with with your physician in 2-3 weeks.

## 2020-01-17 ENCOUNTER — PATIENT MESSAGE (OUTPATIENT)
Dept: PAIN MEDICINE | Facility: CLINIC | Age: 70
End: 2020-01-17

## 2020-01-17 ENCOUNTER — PATIENT MESSAGE (OUTPATIENT)
Dept: INTERNAL MEDICINE | Facility: CLINIC | Age: 70
End: 2020-01-17

## 2020-01-23 ENCOUNTER — TELEPHONE (OUTPATIENT)
Dept: INTERNAL MEDICINE | Facility: CLINIC | Age: 70
End: 2020-01-23

## 2020-01-23 DIAGNOSIS — I73.9 PAD (PERIPHERAL ARTERY DISEASE): Primary | ICD-10-CM

## 2020-01-23 NOTE — TELEPHONE ENCOUNTER
----- Message from Susan Cresop sent at 1/23/2020  3:02 PM CST -----  Contact: [phone]  Patient needs copy of her leg ultrasound 01-.   Fax to [facility] physical Therapy [phone].      Patient stated she needs this done today to show that she has no blood clots.  Patient will be having a procedure done tomorrow morning.    Please advise, thankyou.

## 2020-01-23 NOTE — TELEPHONE ENCOUNTER
----- Message from Leigh Gaona MA sent at 1/23/2020  3:23 PM CST -----  Contact: [phone]      ----- Message -----  From: Susan Crespo  Sent: 1/23/2020   3:02 PM CST  To: [facility] Staff    Patient needs copy of her leg ultrasound 01-.   Fax to [facility] physical Therapy [phone].      Patient stated she needs this done today to show that she has no blood clots.  Patient will be having a procedure done tomorrow morning.    Please advise, thankyou.

## 2020-01-27 ENCOUNTER — PATIENT OUTREACH (OUTPATIENT)
Dept: ADMINISTRATIVE | Facility: OTHER | Age: 70
End: 2020-01-27

## 2020-01-28 ENCOUNTER — TELEPHONE (OUTPATIENT)
Dept: INTERNAL MEDICINE | Facility: CLINIC | Age: 70
End: 2020-01-28

## 2020-01-28 ENCOUNTER — PATIENT MESSAGE (OUTPATIENT)
Dept: PAIN MEDICINE | Facility: CLINIC | Age: 70
End: 2020-01-28

## 2020-01-28 ENCOUNTER — PATIENT MESSAGE (OUTPATIENT)
Dept: INTERNAL MEDICINE | Facility: CLINIC | Age: 70
End: 2020-01-28

## 2020-01-31 ENCOUNTER — HOSPITAL ENCOUNTER (OUTPATIENT)
Dept: VASCULAR SURGERY | Facility: CLINIC | Age: 70
Discharge: HOME | End: 2020-01-31
Attending: SURGERY
Payer: COMMERCIAL

## 2020-01-31 ENCOUNTER — OFFICE VISIT (OUTPATIENT)
Dept: VASCULAR SURGERY | Facility: CLINIC | Age: 70
End: 2020-01-31
Attending: SURGERY
Payer: COMMERCIAL

## 2020-01-31 ENCOUNTER — TELEPHONE (OUTPATIENT)
Dept: VASCULAR SURGERY | Facility: CLINIC | Age: 70
End: 2020-01-31

## 2020-01-31 VITALS
DIASTOLIC BLOOD PRESSURE: 65 MMHG | BODY MASS INDEX: 32.2 KG/M2 | HEART RATE: 74 BPM | WEIGHT: 164 LBS | TEMPERATURE: 98 F | HEIGHT: 60 IN | SYSTOLIC BLOOD PRESSURE: 141 MMHG

## 2020-01-31 DIAGNOSIS — I70.222 ATHEROSCLEROSIS OF NATIVE ARTERY OF LEFT LOWER EXTREMITY WITH REST PAIN: ICD-10-CM

## 2020-01-31 DIAGNOSIS — I73.9 PAD (PERIPHERAL ARTERY DISEASE): ICD-10-CM

## 2020-01-31 DIAGNOSIS — I70.202 FEMORAL ARTERY OCCLUSION, LEFT: Primary | ICD-10-CM

## 2020-01-31 PROCEDURE — 1125F AMNT PAIN NOTED PAIN PRSNT: CPT | Mod: S$GLB,,, | Performed by: SURGERY

## 2020-01-31 PROCEDURE — 1125F PR PAIN SEVERITY QUANTIFIED, PAIN PRESENT: ICD-10-PCS | Mod: S$GLB,,, | Performed by: SURGERY

## 2020-01-31 PROCEDURE — 3078F DIAST BP <80 MM HG: CPT | Mod: CPTII,S$GLB,, | Performed by: SURGERY

## 2020-01-31 PROCEDURE — 3078F PR MOST RECENT DIASTOLIC BLOOD PRESSURE < 80 MM HG: ICD-10-PCS | Mod: CPTII,S$GLB,, | Performed by: SURGERY

## 2020-01-31 PROCEDURE — 99204 OFFICE O/P NEW MOD 45 MIN: CPT | Mod: S$GLB,,, | Performed by: SURGERY

## 2020-01-31 PROCEDURE — 1101F PT FALLS ASSESS-DOCD LE1/YR: CPT | Mod: CPTII,S$GLB,, | Performed by: SURGERY

## 2020-01-31 PROCEDURE — 93923 PR NON-INVASIVE PHYSIOLOGIC STUDY EXTREMITY 3 LEVELS: ICD-10-PCS | Mod: S$GLB,,, | Performed by: SURGERY

## 2020-01-31 PROCEDURE — 1159F MED LIST DOCD IN RCRD: CPT | Mod: S$GLB,,, | Performed by: SURGERY

## 2020-01-31 PROCEDURE — 93923 UPR/LXTR ART STDY 3+ LVLS: CPT | Mod: S$GLB,,, | Performed by: SURGERY

## 2020-01-31 PROCEDURE — 99999 PR PBB SHADOW E&M-EST. PATIENT-LVL III: CPT | Mod: PBBFAC,,, | Performed by: SURGERY

## 2020-01-31 PROCEDURE — 99204 PR OFFICE/OUTPT VISIT, NEW, LEVL IV, 45-59 MIN: ICD-10-PCS | Mod: S$GLB,,, | Performed by: SURGERY

## 2020-01-31 PROCEDURE — 1101F PR PT FALLS ASSESS DOC 0-1 FALLS W/OUT INJ PAST YR: ICD-10-PCS | Mod: CPTII,S$GLB,, | Performed by: SURGERY

## 2020-01-31 PROCEDURE — 99999 PR PBB SHADOW E&M-EST. PATIENT-LVL III: ICD-10-PCS | Mod: PBBFAC,,, | Performed by: SURGERY

## 2020-01-31 PROCEDURE — 3077F SYST BP >= 140 MM HG: CPT | Mod: CPTII,S$GLB,, | Performed by: SURGERY

## 2020-01-31 PROCEDURE — 3077F PR MOST RECENT SYSTOLIC BLOOD PRESSURE >= 140 MM HG: ICD-10-PCS | Mod: CPTII,S$GLB,, | Performed by: SURGERY

## 2020-01-31 PROCEDURE — 1159F PR MEDICATION LIST DOCUMENTED IN MEDICAL RECORD: ICD-10-PCS | Mod: S$GLB,,, | Performed by: SURGERY

## 2020-01-31 NOTE — LETTER
January 31, 2020      Samantha Carolina NP  [address]  [place] [address]           [facility] - Vascular Surgery  [address]  [place] [address]  Phone: [phone]  Fax: [phone]          Patient: Rachel Murray   MR Number: [id number]   YOB: 1950   Date of Visit: 1/31/2020       Dear Samantha Carolina:    Thank you for referring Rachel Murray to me for evaluation. Attached you will find relevant portions of my assessment and plan of care.    If you have questions, please do not hesitate to call me. I look forward to following Rachel Murray along with you.    Sincerely,    Javier Wiggins MD    Enclosure  CC:  No Recipients    If you would like to receive this communication electronically, please contact [email or web address] or [phone] to request more information on [email or web address] access.    For providers and/or their staff who would like to refer a patient to [facility], please contact us through our one-stop-shop provider referral line, [facility], at [phone].    If you feel you have received this communication in error or would no longer like to receive these types of communications, please e-mail [email or web address]

## 2020-01-31 NOTE — PROGRESS NOTES
Rachel JO Trevor  2020    HPI:  Patient is a 69 y.o. female with a h/o HTN, tobacco abuse (25 pk/yr), and sciatica who noticed her sciatica pain worsening in October. She then noticed in late November/December that she was developing a swollen, erythematous, painful left foot. She underwent sciatic nerve block about a month ago and her upper leg pain improved, but her lower leg pain persisted. At that time she also notice a pain erythematous stripe in her left medial thigh tracking down her leg. She denies any history of vascular disease. She denies any fevers or chills. She reports some decreased sensation in her left toes, as well as subjective weakness in her left leg. She Used to be very active with no previous issues with calf pain.    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 Rose    DENTAL SURGERY  2017    EPIDURAL STEROID INJECTION INTO LUMBAR SPINE N/A 2020    Procedure: Injection-steroid-epidural-lumbar--Review MRI- L5-S1 after MRI review;  Surgeon: Ely Syed Jr., MD;  Location: [facility];  Service: Pain Management;  Laterality: N/A;    gyn surgery      ex laparotomy    HYSTERECTOMY       Family History   Problem Relation Age of Onset    Hypertension Mother     Heart disease Mother 40        CABG x 5    Seizures Mother     Cancer Father         prostate    Stroke Father     Cancer Brother         colon    Diabetes Maternal Aunt     Breast cancer Maternal Aunt     Diabetes  Maternal Uncle     Stroke Maternal Grandmother     Stroke Paternal Grandmother     Diabetes Cousin     Breast cancer Cousin      Social History     Socioeconomic History    Marital status: Single     Spouse name: Not on file    Number of children: Not on file    Years of education: Not on file    Highest education level: Not on file   Occupational History     Employer: [employer]   Social Needs    Financial resource strain: Not on file    Food insecurity:     Worry: Not on file     Inability: Not on file    Transportation needs:     Medical: Not on file     Non-medical: Not on file   Tobacco Use    Smoking status: Current Every Day Smoker     Packs/day: 0.50     Years: 40.00     Pack years: 20.00     Types: Cigarettes    Smokeless tobacco: Never Used    Tobacco comment: occasionally   Substance and Sexual Activity    Alcohol use: Yes     Alcohol/week: 0.0 standard drinks     Comment: rarely    Drug use: No    Sexual activity: Never   Lifestyle    Physical activity:     Days per week: Not on file     Minutes per session: Not on file    Stress: Not on file   Relationships    Social connections:     Talks on phone: Not on file     Gets together: Not on file     Attends Mandaeism service: Not on file     Active member of club or organization: Not on file     Attends meetings of clubs or organizations: Not on file     Relationship status: Not on file   Other Topics Concern    Not on file   Social History Narrative    Not on file       Current Outpatient Medications:     acetaminophen (TYLENOL) 500 MG tablet, Take 2 tablets (1,000 mg total) by mouth 3 (three) times daily., Disp: , Rfl: 0    candesartan-hydrochlorothiazide (ATACAND HCT) 16-12.5 mg per tablet, Take 1 tablet by mouth once daily., Disp: 30 tablet, Rfl: 3    candesartan-hydrochlorothiazide (ATACAND HCT) 16-12.5 mg per tablet, TAKE 1 TABLET BY MOUTH EVERY DAY, Disp: 30 tablet, Rfl: 3    candesartan-hydrochlorothiazide (ATACAND  HCT) 16-12.5 mg per tablet, TAKE 1 TABLET BY MOUTH EVERY DAY, Disp: 30 tablet, Rfl: 3    diclofenac (VOLTAREN) 75 MG EC tablet, Take 1 tablet (75 mg total) by mouth 2 (two) times daily., Disp: 60 tablet, Rfl: 0    gabapentin (NEURONTIN) 100 MG capsule, Take 1 capsule (100 mg total) by mouth 3 (three) times daily., Disp: 90 capsule, Rfl: 0    REVIEW OF SYSTEMS:  General: negative; ENT: negative; Allergy and Immunology: negative; Hematological and Lymphatic: Negative; Endocrine: negative; Respiratory: no cough, shortness of breath, or wheezing; Cardiovascular: no chest pain or dyspnea on exertion; Gastrointestinal: no abdominal pain/back, change in bowel habits, or bloody stools; Genito-Urinary: no dysuria, trouble voiding, or hematuria; Musculoskeletal: negative aside from above  Neurological: no TIA or stroke symptoms; Psychiatric: no nervousness, anxiety or depression.    PHYSICAL EXAM:   Right Arm BP - Sittin/65 (20 1532)  Left Arm BP - Sittin/57 (20 1532)  Pulse: 74  Temp: 98.3 °F (36.8 °C)      General appearance:  Alert, well-appearing, and in no distress.  Oriented to person, place, and time   Neurological: Normal speech, no focal findings noted; CN II - XII grossly intact           Musculoskeletal: Digits/nail without cyanosis/clubbing.  Normal muscle strength/tone.                 Neck: Supple, no significant adenopathy; thyroid is not enlarged                  No carotid bruit can be auscultated                Chest:  Clear to auscultation, no wheezes, rales or rhonchi, symmetric air entry     No use of accessory muscles             Cardiac: Normal rate and regular rhythm, S1 and S2 normal; PMI non-displaced          Abdomen: Soft, nontender, nondistended, no masses or organomegaly     No rebound tenderness noted; bowel sounds normal     No groin adenopathy      Extremities:   1+ femoral pulses on right, biphasic left femoral signal     No left pedal signals found, right DP and PT  biphasic     Left foot erythematous, with normal motor and strength     Left medial thigh with tender, warm erythematous patch tracking down her leg     No ulcerations    LAB RESULTS:  Lab Results   Component Value Date    K 4.1 12/29/2018    K 4.4 03/05/2018    K 3.9 03/31/2017    CREATININE 0.9 12/29/2018    CREATININE 0.8 03/05/2018    CREATININE 1.0 03/31/2017     Lab Results   Component Value Date    WBC 9.61 12/29/2018    WBC 9.14 03/05/2018    WBC 10.06 03/31/2017    HCT 48.0 12/29/2018    HCT 44.2 03/05/2018    HCT 44.5 03/31/2017     12/29/2018     03/05/2018     03/31/2017     No results found for: HGBA1C  IMAGING:  Venous US negative for LLE DVT  WALT: R 0.63 - moderate PAD waveforms  L No WALT obtainable, no waveforms obtained distal to thigh    IMP/PLAN:  69 y.o. 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

## 2020-01-31 NOTE — LETTER
01/31/2020                 [facility] - V[facility]  [address]  [facility] [address]  Phone: [phone]  Fax: [phone]   01/31/2020    Patient: Rachel Murray   YOB: 1950   Date of Visit: 1/31/2020       To Whom it May Concern:    Rachel Murray was seen in my clinic on 1/31/2020. She may return to work on 2/17/20.    If you have any questions or concerns, please don't hesitate to call.    Sincerely,         Javier Wiggins MD

## 2020-02-02 DIAGNOSIS — I10 ESSENTIAL HYPERTENSION: Primary | ICD-10-CM

## 2020-02-03 ENCOUNTER — TELEPHONE (OUTPATIENT)
Dept: VASCULAR SURGERY | Facility: CLINIC | Age: 70
End: 2020-02-03

## 2020-02-03 ENCOUNTER — PATIENT MESSAGE (OUTPATIENT)
Dept: INTERNAL MEDICINE | Facility: CLINIC | Age: 70
End: 2020-02-03

## 2020-02-03 DIAGNOSIS — I73.9 PAD (PERIPHERAL ARTERY DISEASE): Primary | ICD-10-CM

## 2020-02-03 RX ORDER — CANDESARTAN CILEXETIL AND HYDROCHLOROTHIAZIDE 16; 12.5 MG/1; MG/1
TABLET ORAL
Qty: 30 TABLET | Refills: 3 | Status: SHIPPED | OUTPATIENT
Start: 2020-02-03 | End: 2020-07-06

## 2020-02-03 NOTE — TELEPHONE ENCOUNTER
----- Message from Safia Goodwin sent at 2/3/2020  8:40 AM CST -----  Contact: self [phone]  Patient called to follow up on the FMLA forms she needs faxed to Dr. Arteaga's office. Please call and advise.

## 2020-02-03 NOTE — TELEPHONE ENCOUNTER
----- Message from Hamzah Hillman sent at 2/3/2020 11:17 AM CST -----  Contact: Pt  Patient called requesting to speak w/ someone regarding the CT she was supposed to be having preformed on 2/3 can only be contacted by phone     Callback:[phone] (home) [phone] (work)

## 2020-02-04 ENCOUNTER — PATIENT MESSAGE (OUTPATIENT)
Dept: VASCULAR SURGERY | Facility: CLINIC | Age: 70
End: 2020-02-04

## 2020-02-04 ENCOUNTER — TELEPHONE (OUTPATIENT)
Dept: VASCULAR SURGERY | Facility: CLINIC | Age: 70
End: 2020-02-04

## 2020-02-04 NOTE — TELEPHONE ENCOUNTER
----- Message from India Causey sent at 2/4/2020  2:53 PM CST -----  Contact: pt called   Pt would like a return call to discuss test results.  [phone]

## 2020-02-05 ENCOUNTER — OFFICE VISIT (OUTPATIENT)
Dept: VASCULAR SURGERY | Facility: CLINIC | Age: 70
End: 2020-02-05
Attending: SURGERY
Payer: COMMERCIAL

## 2020-02-05 ENCOUNTER — TELEPHONE (OUTPATIENT)
Dept: PAIN MEDICINE | Facility: CLINIC | Age: 70
End: 2020-02-05

## 2020-02-05 ENCOUNTER — PATIENT OUTREACH (OUTPATIENT)
Dept: ADMINISTRATIVE | Facility: OTHER | Age: 70
End: 2020-02-05

## 2020-02-05 ENCOUNTER — TELEPHONE (OUTPATIENT)
Dept: VASCULAR SURGERY | Facility: CLINIC | Age: 70
End: 2020-02-05

## 2020-02-05 VITALS
SYSTOLIC BLOOD PRESSURE: 140 MMHG | HEART RATE: 82 BPM | BODY MASS INDEX: 30 KG/M2 | WEIGHT: 163 LBS | TEMPERATURE: 99 F | HEIGHT: 62 IN | DIASTOLIC BLOOD PRESSURE: 67 MMHG

## 2020-02-05 DIAGNOSIS — Z01.818 OTHER SPECIFIED PRE-OPERATIVE EXAMINATION: Primary | ICD-10-CM

## 2020-02-05 DIAGNOSIS — G89.29 CHRONIC BILATERAL LOW BACK PAIN WITH LEFT-SIDED SCIATICA: ICD-10-CM

## 2020-02-05 DIAGNOSIS — R09.89 POOR PERIPHERAL CIRCULATION: Primary | ICD-10-CM

## 2020-02-05 DIAGNOSIS — M54.16 LUMBAR RADICULOPATHY: ICD-10-CM

## 2020-02-05 DIAGNOSIS — M54.42 CHRONIC BILATERAL LOW BACK PAIN WITH LEFT-SIDED SCIATICA: ICD-10-CM

## 2020-02-05 DIAGNOSIS — I70.202 FEMORAL ARTERY OCCLUSION, LEFT: Primary | ICD-10-CM

## 2020-02-05 DIAGNOSIS — Z01.818 PREOP EXAMINATION: Primary | ICD-10-CM

## 2020-02-05 DIAGNOSIS — I70.222 ATHEROSCLEROSIS OF NATIVE ARTERY OF LEFT LOWER EXTREMITY WITH REST PAIN: ICD-10-CM

## 2020-02-05 PROCEDURE — 99214 PR OFFICE/OUTPT VISIT, EST, LEVL IV, 30-39 MIN: ICD-10-PCS | Mod: S$GLB,,, | Performed by: SURGERY

## 2020-02-05 PROCEDURE — 1159F PR MEDICATION LIST DOCUMENTED IN MEDICAL RECORD: ICD-10-PCS | Mod: S$GLB,,, | Performed by: SURGERY

## 2020-02-05 PROCEDURE — 1101F PR PT FALLS ASSESS DOC 0-1 FALLS W/OUT INJ PAST YR: ICD-10-PCS | Mod: CPTII,S$GLB,, | Performed by: SURGERY

## 2020-02-05 PROCEDURE — 3078F DIAST BP <80 MM HG: CPT | Mod: CPTII,S$GLB,, | Performed by: SURGERY

## 2020-02-05 PROCEDURE — 1159F MED LIST DOCD IN RCRD: CPT | Mod: S$GLB,,, | Performed by: SURGERY

## 2020-02-05 PROCEDURE — 99214 OFFICE O/P EST MOD 30 MIN: CPT | Mod: S$GLB,,, | Performed by: SURGERY

## 2020-02-05 PROCEDURE — 99999 PR PBB SHADOW E&M-EST. PATIENT-LVL III: CPT | Mod: PBBFAC,,, | Performed by: SURGERY

## 2020-02-05 PROCEDURE — 1125F AMNT PAIN NOTED PAIN PRSNT: CPT | Mod: S$GLB,,, | Performed by: SURGERY

## 2020-02-05 PROCEDURE — 3078F PR MOST RECENT DIASTOLIC BLOOD PRESSURE < 80 MM HG: ICD-10-PCS | Mod: CPTII,S$GLB,, | Performed by: SURGERY

## 2020-02-05 PROCEDURE — 99999 PR PBB SHADOW E&M-EST. PATIENT-LVL III: ICD-10-PCS | Mod: PBBFAC,,, | Performed by: SURGERY

## 2020-02-05 PROCEDURE — 1101F PT FALLS ASSESS-DOCD LE1/YR: CPT | Mod: CPTII,S$GLB,, | Performed by: SURGERY

## 2020-02-05 PROCEDURE — 3077F PR MOST RECENT SYSTOLIC BLOOD PRESSURE >= 140 MM HG: ICD-10-PCS | Mod: CPTII,S$GLB,, | Performed by: SURGERY

## 2020-02-05 PROCEDURE — 1125F PR PAIN SEVERITY QUANTIFIED, PAIN PRESENT: ICD-10-PCS | Mod: S$GLB,,, | Performed by: SURGERY

## 2020-02-05 PROCEDURE — 3077F SYST BP >= 140 MM HG: CPT | Mod: CPTII,S$GLB,, | Performed by: SURGERY

## 2020-02-05 RX ORDER — HYDROCODONE BITARTRATE AND ACETAMINOPHEN 5; 325 MG/1; MG/1
1 TABLET ORAL EVERY 6 HOURS PRN
Qty: 40 TABLET | Refills: 0 | Status: ON HOLD | OUTPATIENT
Start: 2020-02-05 | End: 2020-02-24 | Stop reason: HOSPADM

## 2020-02-05 RX ORDER — DICLOFENAC SODIUM 75 MG/1
75 TABLET, DELAYED RELEASE ORAL 2 TIMES DAILY
Qty: 60 TABLET | Refills: 0 | Status: CANCELLED | OUTPATIENT
Start: 2020-02-05

## 2020-02-05 NOTE — TELEPHONE ENCOUNTER
----- Message from India Causey sent at 2/5/2020  8:42 AM CST -----  Contact: pt called   Pt called stating that she saw on the portal that Dr Harris wanted to see her yesterday.  Pt has no access to Portal must be contacted by phone.  Pt contact is [phone]

## 2020-02-06 ENCOUNTER — TELEPHONE (OUTPATIENT)
Dept: INTERNAL MEDICINE | Facility: CLINIC | Age: 70
End: 2020-02-06

## 2020-02-06 ENCOUNTER — OFFICE VISIT (OUTPATIENT)
Dept: PAIN MEDICINE | Facility: CLINIC | Age: 70
End: 2020-02-06
Payer: COMMERCIAL

## 2020-02-06 VITALS
BODY MASS INDEX: 29.81 KG/M2 | WEIGHT: 163 LBS | SYSTOLIC BLOOD PRESSURE: 155 MMHG | DIASTOLIC BLOOD PRESSURE: 76 MMHG | HEART RATE: 71 BPM

## 2020-02-06 DIAGNOSIS — M54.16 LUMBAR RADICULOPATHY: ICD-10-CM

## 2020-02-06 DIAGNOSIS — G89.29 CHRONIC BILATERAL LOW BACK PAIN WITH LEFT-SIDED SCIATICA: Primary | ICD-10-CM

## 2020-02-06 DIAGNOSIS — M47.819 ARTHROPATHY OF FACET JOINTS AT MULTIPLE LEVELS: ICD-10-CM

## 2020-02-06 DIAGNOSIS — G89.4 CHRONIC PAIN SYNDROME: ICD-10-CM

## 2020-02-06 DIAGNOSIS — Z01.818 PRE-OPERATIVE EXAM: Primary | ICD-10-CM

## 2020-02-06 DIAGNOSIS — M43.10 SPONDYLOLISTHESIS, UNSPECIFIED SPINAL REGION: ICD-10-CM

## 2020-02-06 DIAGNOSIS — M54.42 CHRONIC BILATERAL LOW BACK PAIN WITH LEFT-SIDED SCIATICA: Primary | ICD-10-CM

## 2020-02-06 DIAGNOSIS — E78.5 HYPERLIPIDEMIA, UNSPECIFIED HYPERLIPIDEMIA TYPE: Primary | ICD-10-CM

## 2020-02-06 DIAGNOSIS — M48.062 SPINAL STENOSIS OF LUMBAR REGION WITH NEUROGENIC CLAUDICATION: ICD-10-CM

## 2020-02-06 PROCEDURE — 1159F PR MEDICATION LIST DOCUMENTED IN MEDICAL RECORD: ICD-10-PCS | Mod: S$GLB,,, | Performed by: NURSE PRACTITIONER

## 2020-02-06 PROCEDURE — 99999 PR PBB SHADOW E&M-EST. PATIENT-LVL III: CPT | Mod: PBBFAC,,, | Performed by: NURSE PRACTITIONER

## 2020-02-06 PROCEDURE — 1159F MED LIST DOCD IN RCRD: CPT | Mod: S$GLB,,, | Performed by: NURSE PRACTITIONER

## 2020-02-06 PROCEDURE — 1101F PT FALLS ASSESS-DOCD LE1/YR: CPT | Mod: CPTII,S$GLB,, | Performed by: NURSE PRACTITIONER

## 2020-02-06 PROCEDURE — 1101F PR PT FALLS ASSESS DOC 0-1 FALLS W/OUT INJ PAST YR: ICD-10-PCS | Mod: CPTII,S$GLB,, | Performed by: NURSE PRACTITIONER

## 2020-02-06 PROCEDURE — 3077F SYST BP >= 140 MM HG: CPT | Mod: CPTII,S$GLB,, | Performed by: NURSE PRACTITIONER

## 2020-02-06 PROCEDURE — 99213 OFFICE O/P EST LOW 20 MIN: CPT | Mod: S$GLB,,, | Performed by: NURSE PRACTITIONER

## 2020-02-06 PROCEDURE — 1125F PR PAIN SEVERITY QUANTIFIED, PAIN PRESENT: ICD-10-PCS | Mod: S$GLB,,, | Performed by: NURSE PRACTITIONER

## 2020-02-06 PROCEDURE — 3078F PR MOST RECENT DIASTOLIC BLOOD PRESSURE < 80 MM HG: ICD-10-PCS | Mod: CPTII,S$GLB,, | Performed by: NURSE PRACTITIONER

## 2020-02-06 PROCEDURE — 3077F PR MOST RECENT SYSTOLIC BLOOD PRESSURE >= 140 MM HG: ICD-10-PCS | Mod: CPTII,S$GLB,, | Performed by: NURSE PRACTITIONER

## 2020-02-06 PROCEDURE — 99999 PR PBB SHADOW E&M-EST. PATIENT-LVL III: ICD-10-PCS | Mod: PBBFAC,,, | Performed by: NURSE PRACTITIONER

## 2020-02-06 PROCEDURE — 99213 PR OFFICE/OUTPT VISIT, EST, LEVL III, 20-29 MIN: ICD-10-PCS | Mod: S$GLB,,, | Performed by: NURSE PRACTITIONER

## 2020-02-06 PROCEDURE — 1125F AMNT PAIN NOTED PAIN PRSNT: CPT | Mod: S$GLB,,, | Performed by: NURSE PRACTITIONER

## 2020-02-06 PROCEDURE — 3078F DIAST BP <80 MM HG: CPT | Mod: CPTII,S$GLB,, | Performed by: NURSE PRACTITIONER

## 2020-02-06 NOTE — PROGRESS NOTES
[facility] New Patient Evaluation    Referred by: Anel Parikh MD  Reason for referral: Sciatica, unspecified laterality    CC:   Chief Complaint   Patient presents with    Low-back Pain     Last 3 PDI Scores 2/6/2020 1/6/2020   Pain Disability Index (PDI) 53 48     Interval Update:  02/06/2020 - Mrs. Murray returns to clinic for follow up visit reporting improved back pain.  Patient is s/p Lumbar MIRNA on 1/16/20 with 95% continued relief. She reports beginning PT and having to stop due to  developing a swollen, erythematous, painful left foot.  she has recently seen Cardiology and has  been diagnosed with severe PAD (L>R). CTA with segmental occlusion of the left common and proximal SFA and occlusion of the left tibioperoneal trunk as well as occlusion of the left anterior tibial artery in its proximal 3rd. Moderate degree of calcific and hypodense plaque in the abdominal aorta and iliac arteries bilaterally.  She is now scheduled for a LLE iliac to femoral bypass creation with bilateral iliac stents on 2/17/2020   Pain intensity is currently 2/10.        HPI:   Rachel Murray is a 69 y.o. female who complains of Low back pain with Sciatica.  She reports pain that starts in the left side of her low back and travels through the buttocks, posterolateral thigh, anterior shin, and into the top of her foot.  Alleviating and exacerbating factors are variable but prolonged sitting and walking for more than 5 min appear to drastically increase her pain. She also reports insomnia due to pain and has been using a recliner to sleep for approximately 90 min at a time. She reports dealing with low back pain for several years but noticed an acute increase in March 2019 which subsided somewhat.  In October 2019 her pain increased drastically and has not subsided since that time. Her history is positive for cervical disc herniation which was previously treated with oral medications and physical therapy.  She also notes  associated numbness in the left lower extremity and a sensation of weakness in the left leg.  She denies falls or saddle anesthesia.    Location: Low back   Onset: 10/2019  Current Pain Score: 8/10  Daily Pain of Range: 5-8/10  Quality: Burning, Throbbing, Grabbing, Tingling, Numb, Sharp, Electric, Hot and Cold  Radiation: Radiates down left side to foot.  Worsened by: extension, lying down, sitting and walking for more than 5 minutes  Improved by: nothing    Previous Therapies:  PT/OT: Denies  HEP: Denies  Interventions: Denies  Surgery:Denies  Medications:   - NSAIDS:   - MSK Relaxants:   - TCAs:   - SNRIs:   - Topicals:   - Anticonvulsants:  - Opioids:     Current Pain Medications:  1. Aleve     Review of Systems:  Review of Systems   Constitutional: Negative for chills and fever.   HENT: Negative for nosebleeds.    Eyes: Negative for blurred vision and pain.   Respiratory: Negative for hemoptysis.    Cardiovascular: Negative for chest pain and palpitations.   Gastrointestinal: Negative for heartburn, nausea and vomiting.   Genitourinary: Negative for dysuria and hematuria.   Musculoskeletal: Positive for back pain. Negative for myalgias.   Skin: Negative for rash.   Neurological: Positive for tingling, sensory change and focal weakness (LLE). Negative for seizures and loss of consciousness.   Endo/Heme/Allergies: Does not bruise/bleed easily.   Psychiatric/Behavioral: Negative for hallucinations. The patient has insomnia (2/2 pain).        History:    Current Outpatient Medications:     acetaminophen (TYLENOL) 500 MG tablet, Take 2 tablets (1,000 mg total) by mouth 3 (three) times daily., Disp: , Rfl: 0    candesartan-hydrochlorothiazide (ATACAND HCT) 16-12.5 mg per tablet, Take 1 tablet by mouth once daily., Disp: 30 tablet, Rfl: 3    candesartan-hydrochlorothiazide (ATACAND HCT) 16-12.5 mg per tablet, TAKE 1 TABLET BY MOUTH EVERY DAY, Disp: 30 tablet, Rfl: 3    candesartan-hydrochlorothiazide (ATACAND HCT)  16-12.5 mg per tablet, TAKE 1 TABLET BY MOUTH EVERY DAY, Disp: 30 tablet, Rfl: 3    diclofenac (VOLTAREN) 75 MG EC tablet, Take 1 tablet (75 mg total) by mouth 2 (two) times daily., Disp: 60 tablet, Rfl: 0    gabapentin (NEURONTIN) 100 MG capsule, Take 1 capsule (100 mg total) by mouth 3 (three) times daily., Disp: 90 capsule, Rfl: 0    HYDROcodone-acetaminophen (NORCO) 5-325 mg per tablet, Take 1 tablet by mouth every 6 (six) hours as needed for Pain (every 4 - 6 hours prn moderate to severe pain)., Disp: 40 tablet, Rfl: 0    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 Rose    DENTAL SURGERY  2017    EPIDURAL STEROID INJECTION INTO LUMBAR SPINE N/A 2020    Procedure: Injection-steroid-epidural-lumbar--Review MRI- L5-S1 after MRI review;  Surgeon: Ely Syed Jr., MD;  Location: [facility];  Service: Pain Management;  Laterality: N/A;    gyn surgery      ex laparotomy    HYSTERECTOMY         Family History   Problem Relation Age of Onset    Hypertension Mother     Heart disease Mother 40        CABG x 5    Seizures Mother     Cancer Father         prostate    Stroke Father     Cancer Brother         colon    Diabetes Maternal Aunt     Breast cancer Maternal Aunt     Diabetes Maternal Uncle     Stroke Maternal Grandmother     Stroke Paternal Grandmother     Diabetes Cousin     Breast cancer Cousin        Social History     Socioeconomic History    Marital status: Single     Spouse name: Not on file    Number of children: Not on  file    Years of education: Not on file    Highest education level: Not on file   Occupational History     Employer: [employer]   Social Needs    Financial resource strain: Not on file    Food insecurity:     Worry: Not on file     Inability: Not on file    Transportation needs:     Medical: Not on file     Non-medical: Not on file   Tobacco Use    Smoking status: Current Every Day Smoker     Packs/day: 0.50     Years: 40.00     Pack years: 20.00     Types: Cigarettes    Smokeless tobacco: Never Used    Tobacco comment: occasionally   Substance and Sexual Activity    Alcohol use: Yes     Alcohol/week: 0.0 standard drinks     Comment: rarely    Drug use: No    Sexual activity: Never   Lifestyle    Physical activity:     Days per week: Not on file     Minutes per session: Not on file    Stress: Not on file   Relationships    Social connections:     Talks on phone: Not on file     Gets together: Not on file     Attends Quaker service: Not on file     Active member of club or organization: Not on file     Attends meetings of clubs or organizations: Not on file     Relationship status: Not on file   Other Topics Concern    Not on file   Social History Narrative    Not on file       Review of patient's allergies indicates:   Allergen Reactions    Adhesive Blisters    Ciprofloxacin Hives    Latex, natural rubber Blisters       Physical Exam:  Vitals:    02/06/20 0813   BP: (!) 155/76   Pulse: 71   Weight: 73.9 kg (163 lb)   PainSc:   2     General    Nursing note and vitals reviewed.  Constitutional: She is oriented to person, place, and time. She appears well-developed and well-nourished. No distress.   HENT:   Head: Normocephalic and atraumatic.   Nose: Nose normal.   Eyes: Conjunctivae and EOM are normal. Pupils are equal, round, and reactive to light. Right eye exhibits no discharge. Left eye exhibits no discharge. No scleral icterus.   Neck: No JVD present.   Cardiovascular: Intact distal  pulses.    Pulmonary/Chest: Effort normal. No respiratory distress.   Abdominal: She exhibits no distension.   Neurological: She is alert and oriented to person, place, and time. Coordination normal.   Psychiatric: She has a normal mood and affect. Her behavior is normal. Judgment and thought content normal.     General Musculoskeletal Exam   Gait: normal     Back (L-Spine & T-Spine) / Neck (C-Spine) Exam     Tenderness Right paramedian tenderness of the Lower L-Spine. Left paramedian tenderness of the Lower L-Spine.     Back (L-Spine & T-Spine) Range of Motion   Back extension: facet loading is positive and exacerabtes/reproduces the patient's typical low back pain    Back flexion: limited ROM but partial relief of low back pain noted.     Spinal Sensation   Right Side Sensation  L-Spine Level: normal  Left Side Sensation  L-Spine Level: normal    Other She has no scoliosis .    Comments:  Left Leg + SLR      Muscle Strength   Right Lower Extremity   Hip Flexion: 5/5   Hip Extensors: 5/5  Quadriceps:  5/5   Hamstrin/5   Gastrocsoleus:  5/5/5  Left Lower Extremity   Hip Flexion: 5/5   Hip Extensors: 5/5  Quadriceps:  5/5   Hamstrin/5   Gastrocsoleus:  5/5/5    Reflexes     Left Side  Quadriceps:  2+  Achilles:  2+    Right Side   Quadriceps:  2+  Achilles:  2+      Imaging:  None pertinent to review.    Labs:  BMP  Lab Results   Component Value Date     2018    K 4.1 2018     2018    CO2 26 2018    BUN 17 2018    CREATININE 0.9 2020    CALCIUM 10.2 2018    ANIONGAP 10 2018    ESTGFRAFRICA >60.0 2020    EGFRNONAA >60.0 2020     Lab Results   Component Value Date    ALT 11 2018    AST 16 2018    ALKPHOS 133 2018    BILITOT 0.5 2018       Assessment:  Problem List Items Addressed This Visit        Neuro    Lumbar radiculopathy    Chronic pain       Orthopedic    Chronic bilateral low back pain with left-sided  sciatica - Primary      Other Visit Diagnoses     Arthropathy of facet joints at multiple levels        Spinal stenosis of lumbar region with neurogenic claudication        Spondylolisthesis, unspecified spinal region              1/6/2020 - Rachel Murray is a 69 y.o. female with chronic low back pain radiating into the left lower extremity consistent with radiculopathy at L5.  I will order an MRI of her lumbar spine to confirm this diagnosis as she has been dealing with the symptoms for the better part of 2019.  I have also ordered several medications to help address her pain and symptoms in addition to physical therapy.  I have also proposed a lumbar epidural steroid injection to be scheduled after completion of the MRI.  I would anticipate performing this injection at L5-S1; however, this location may change based on the results of the MRI.  She was also provided a letter for work to park closer to the building.    02/06/20202519-00-fris-old female presents for a follow-up status post and L5-S1 lumbar MIRNA with reported 95% relief her pain score today is 2/10 patient reports feeling much better following the injection patient states she started physical therapy however 2 days into PT she began developing a swollen, erythematous, painful left foot.  She  has recently seen Cardiology and has  been diagnosed with severe PAD (L>R). CTA with segmental occlusion of the left common and proximal SFA and occlusion of the left tibioperoneal trunk as well as occlusion of the left anterior tibial artery in its proximal 3rd. Moderate degree of calcific and hypodense plaque in the abdominal aorta and iliac arteries bilaterally.  She is now scheduled for a LLE iliac to femoral bypass creation with bilateral iliac stents on 2/17/2020  With Dr Harris/cardiology.  She endorses feeling much better following her pain procedure and that the pain she originally came in for is much better, discussed that we could repeat as  needed.      : Reviewed and consistent with medication use as prescribed.    Treatment Plan:   Procedures:  None at this time  PT/OT/HEP:  Defer to cardiology following procedure.   Medications:  Continue all medications as they were prescribed.  1. Tylenol 1000 mg p.o. t.i.d.  2. Voltaren 75 mg p.o. b.i.d.  3. Tizanidine 4 mg p.o. q.h.s. p.r.n.  4. Gabapentin 100 mg p.o. t.i.d.  5. Tramadol 50 mg p.o. B.i.d. #14  Labs: reviewed and medications are appropriately dosed for current hepatorenal function.  Imaging:  Previous imaging reviewed    Follow Up: RTC PRN   .  Reuben Diaz NP-C  [facility]    Disclaimer: This note was partly generated using dictation software which may occasionally result in transcription errors.

## 2020-02-06 NOTE — H&P (VIEW-ONLY)
Rachel JO Trevor  2020    HPI:  Patient is a 69 y.o. female with PMHx of HTN, tobacco abuse (25 pk/yr), sciatica who is here for follow up for LLE PAD and to review CTA A/P with runoff. Patient reports developing a swollen, erythematous, painful left foot last November which has progressively worsened. A month ago she developed a painful erythematous stripe to the medial aspect of her left thigh. She reports some decreased sensation in her left toes, as well as subjective weakness in her left leg. Endorses to cramping pain in her left calf after walking approx 3 blocks which is slightly improved with rest.       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 Rose    DENTAL SURGERY  2017    EPIDURAL STEROID INJECTION INTO LUMBAR SPINE N/A 2020    Procedure: Injection-steroid-epidural-lumbar--Review MRI- L5-S1 after MRI review;  Surgeon: Ely Syed Jr., MD;  Location: [facility];  Service: Pain Management;  Laterality: N/A;    gyn surgery      ex laparotomy    HYSTERECTOMY       Family History   Problem Relation Age of Onset    Hypertension Mother     Heart disease Mother 40        CABG x 5    Seizures Mother     Cancer Father         prostate    Stroke Father     Cancer Brother         colon    Diabetes Maternal Aunt     Breast cancer Maternal Aunt     Diabetes Maternal Uncle     Stroke Maternal Grandmother     Stroke Paternal Grandmother     Diabetes Cousin     Breast  cancer Cousin      Social History     Socioeconomic History    Marital status: Single     Spouse name: Not on file    Number of children: Not on file    Years of education: Not on file    Highest education level: Not on file   Occupational History     Employer: [employer]   Social Needs    Financial resource strain: Not on file    Food insecurity:     Worry: Not on file     Inability: Not on file    Transportation needs:     Medical: Not on file     Non-medical: Not on file   Tobacco Use    Smoking status: Current Every Day Smoker     Packs/day: 0.50     Years: 40.00     Pack years: 20.00     Types: Cigarettes    Smokeless tobacco: Never Used    Tobacco comment: occasionally   Substance and Sexual Activity    Alcohol use: Yes     Alcohol/week: 0.0 standard drinks     Comment: rarely    Drug use: No    Sexual activity: Never   Lifestyle    Physical activity:     Days per week: Not on file     Minutes per session: Not on file    Stress: Not on file   Relationships    Social connections:     Talks on phone: Not on file     Gets together: Not on file     Attends Anabaptism service: Not on file     Active member of club or organization: Not on file     Attends meetings of clubs or organizations: Not on file     Relationship status: Not on file   Other Topics Concern    Not on file   Social History Narrative    Not on file       Current Outpatient Medications:     acetaminophen (TYLENOL) 500 MG tablet, Take 2 tablets (1,000 mg total) by mouth 3 (three) times daily., Disp: , Rfl: 0    candesartan-hydrochlorothiazide (ATACAND HCT) 16-12.5 mg per tablet, Take 1 tablet by mouth once daily., Disp: 30 tablet, Rfl: 3    candesartan-hydrochlorothiazide (ATACAND HCT) 16-12.5 mg per tablet, TAKE 1 TABLET BY MOUTH EVERY DAY, Disp: 30 tablet, Rfl: 3    candesartan-hydrochlorothiazide (ATACAND HCT) 16-12.5 mg per tablet, TAKE 1 TABLET BY MOUTH EVERY DAY, Disp: 30 tablet, Rfl: 3    diclofenac (VOLTAREN) 75  MG EC tablet, Take 1 tablet (75 mg total) by mouth 2 (two) times daily., Disp: 60 tablet, Rfl: 0    gabapentin (NEURONTIN) 100 MG capsule, Take 1 capsule (100 mg total) by mouth 3 (three) times daily., Disp: 90 capsule, Rfl: 0    HYDROcodone-acetaminophen (NORCO) 5-325 mg per tablet, Take 1 tablet by mouth every 6 (six) hours as needed for Pain (every 4 - 6 hours prn moderate to severe pain)., Disp: 40 tablet, Rfl: 0    REVIEW OF SYSTEMS:  General: negative; ENT: negative; Allergy and Immunology: negative; Hematological and Lymphatic: Negative; Endocrine: negative; Respiratory: no cough, shortness of breath, or wheezing; Cardiovascular: no chest pain or dyspnea on exertion; Gastrointestinal: no abdominal pain/back, change in bowel habits, or bloody stools; Genito-Urinary: no dysuria, trouble voiding, or hematuria; Musculoskeletal: left lower extremity pain  Neurological: no TIA or stroke symptoms; Psychiatric: no nervousness, anxiety or depression.    PHYSICAL EXAM:   Right Arm BP - Sittin/74 (20 1324)  Left Arm BP - Sittin/67 (20 1324)  Pulse: 82  Temp: 98.5 °F (36.9 °C)      General appearance:  Alert, well-appearing, and in no distress.  Oriented to person, place, and time   Neurological: Normal speech, no focal findings noted; CN II - XII grossly intact           Musculoskeletal: Digits/nail without cyanosis/clubbing.  Normal muscle strength/tone.                 Neck: Supple, no significant adenopathy; thyroid is not enlarged                  No carotid bruit can be auscultated                Chest:  Clear to auscultation, no wheezes, rales or rhonchi, symmetric air entry     No use of accessory muscles             Cardiac: Normal rate and regular rhythm, S1 and S2 normal; PMI non-displaced          Abdomen: Soft, nontender, nondistended, no masses or organomegaly     No rebound tenderness noted; bowel sounds normal     Pulsatile aortic mass is not palpable.     No groin adenopathy       Extremities:   1+ femoral pulses on right, biphasic left femoral signal                                      No left pedal signals found, right DP and PT biphasic                                      Left foot erythematous, with normal motor and strength. Gross sensation intact.                                      Left medial thigh with tender, localized warm erythematous patch.                                      No ulcerations    LAB RESULTS:  Lab Results   Component Value Date    K 4.1 12/29/2018    K 4.4 03/05/2018    K 3.9 03/31/2017    CREATININE 0.9 02/04/2020    CREATININE 0.9 12/29/2018    CREATININE 0.8 03/05/2018     Lab Results   Component Value Date    WBC 9.61 12/29/2018    WBC 9.14 03/05/2018    WBC 10.06 03/31/2017    HCT 48.0 12/29/2018    HCT 44.2 03/05/2018    HCT 44.5 03/31/2017     12/29/2018     03/05/2018     03/31/2017     No results found for: HGBA1C  IMAGING:  VAS US Ankle Brachial Indices Resting   1/31/2019  L WALT: 0.0    R WALT: 0.63  Impression  =========  Right Leg: Segmental pressures and PVR waveforms suggest moderate peripheral arterial occlusive disease.  Left Leg: Segmental pressures and PVR waveforms suggest severe ischemic peripheral arterial occlusive disease. Unable to obtain reliable arterial doppler signals.    CTA Runoff ABD Pel Bilat Lower EXT   2/4/2020  Impression       Segmental occlusion of the left common and proximal SFA and occlusion of the left tibioperoneal trunk just distal to its origin as well as occlusion of the left anterior tibial artery in its proximal 3rd.    Three-vessel runoff on the right.    Moderate degree of calcific and hypodense plaque in the abdominal aorta and iliac arteries bilaterally.             IMP/PLAN:  69 y.o. female with HTN, active smoker (1/2 ppd since teenager)  who presents with severe PAD (L>R). CTA with segmental occlusion of the left common and proximal SFA and occlusion of the left tibioperoneal trunk as  well as occlusion of the left anterior tibial artery in its proximal 3rd. Moderate degree of calcific and hypodense plaque in the abdominal aorta and iliac arteries bilaterally.  She has been experiencing left foot rest pain, numbness and tingling for several months.  We discussed smoking cessation for greater than 10 minutes as well as the impact that continued smoking can have on the progression of Vascular disease. This discussion included the use of medications, patches, nicotine gum, and lifestyle modifications.    Scheduled for LLE iliac to femoral bypass creation with bilateral iliac stents on 2/17/2020 - risk and benefits discussed in detail, she wishes to proceed as planned.   Consents obtained  Emphasized the importance of tobacco cessation  Daily asa, statin    Olayinka Harris MD  Vascular/Endovascular Surgery

## 2020-02-06 NOTE — TELEPHONE ENCOUNTER
Spoke to pt. Pt advised that the pt both Dr. Parikh and Leigh are out of the office today.   She stated that needs her FMLA forms completed.    Pt stated that she initially she was seen by Dr. parikh for sciatica. She then saw Dr. Moran as well.  Pt she has since been diagnosed with blockages that were causing the pain. Pt stated that she wanted to get her FMLA in order before her surgery.

## 2020-02-06 NOTE — PROGRESS NOTES
Rachel JO Trevor  2020    HPI:  Patient is a 69 y.o. female with PMHx of HTN, tobacco abuse (25 pk/yr), sciatica who is here for follow up for LLE PAD and to review CTA A/P with runoff. Patient reports developing a swollen, erythematous, painful left foot last November which has progressively worsened. A month ago she developed a painful erythematous stripe to the medial aspect of her left thigh. She reports some decreased sensation in her left toes, as well as subjective weakness in her left leg. Endorses to cramping pain in her left calf after walking approx 3 blocks which is slightly improved with rest.       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 Rose    DENTAL SURGERY  2017    EPIDURAL STEROID INJECTION INTO LUMBAR SPINE N/A 2020    Procedure: Injection-steroid-epidural-lumbar--Review MRI- L5-S1 after MRI review;  Surgeon: Ely Syed Jr., MD;  Location: [facility];  Service: Pain Management;  Laterality: N/A;    gyn surgery      ex laparotomy    HYSTERECTOMY       Family History   Problem Relation Age of Onset    Hypertension Mother     Heart disease Mother 40        CABG x 5    Seizures Mother     Cancer Father         prostate    Stroke Father     Cancer Brother         colon    Diabetes Maternal Aunt     Breast cancer Maternal Aunt     Diabetes Maternal Uncle     Stroke Maternal Grandmother     Stroke Paternal Grandmother     Diabetes Cousin     Breast  cancer Cousin      Social History     Socioeconomic History    Marital status: Single     Spouse name: Not on file    Number of children: Not on file    Years of education: Not on file    Highest education level: Not on file   Occupational History     Employer: [employer]   Social Needs    Financial resource strain: Not on file    Food insecurity:     Worry: Not on file     Inability: Not on file    Transportation needs:     Medical: Not on file     Non-medical: Not on file   Tobacco Use    Smoking status: Current Every Day Smoker     Packs/day: 0.50     Years: 40.00     Pack years: 20.00     Types: Cigarettes    Smokeless tobacco: Never Used    Tobacco comment: occasionally   Substance and Sexual Activity    Alcohol use: Yes     Alcohol/week: 0.0 standard drinks     Comment: rarely    Drug use: No    Sexual activity: Never   Lifestyle    Physical activity:     Days per week: Not on file     Minutes per session: Not on file    Stress: Not on file   Relationships    Social connections:     Talks on phone: Not on file     Gets together: Not on file     Attends Jew service: Not on file     Active member of club or organization: Not on file     Attends meetings of clubs or organizations: Not on file     Relationship status: Not on file   Other Topics Concern    Not on file   Social History Narrative    Not on file       Current Outpatient Medications:     acetaminophen (TYLENOL) 500 MG tablet, Take 2 tablets (1,000 mg total) by mouth 3 (three) times daily., Disp: , Rfl: 0    candesartan-hydrochlorothiazide (ATACAND HCT) 16-12.5 mg per tablet, Take 1 tablet by mouth once daily., Disp: 30 tablet, Rfl: 3    candesartan-hydrochlorothiazide (ATACAND HCT) 16-12.5 mg per tablet, TAKE 1 TABLET BY MOUTH EVERY DAY, Disp: 30 tablet, Rfl: 3    candesartan-hydrochlorothiazide (ATACAND HCT) 16-12.5 mg per tablet, TAKE 1 TABLET BY MOUTH EVERY DAY, Disp: 30 tablet, Rfl: 3    diclofenac (VOLTAREN) 75  MG EC tablet, Take 1 tablet (75 mg total) by mouth 2 (two) times daily., Disp: 60 tablet, Rfl: 0    gabapentin (NEURONTIN) 100 MG capsule, Take 1 capsule (100 mg total) by mouth 3 (three) times daily., Disp: 90 capsule, Rfl: 0    HYDROcodone-acetaminophen (NORCO) 5-325 mg per tablet, Take 1 tablet by mouth every 6 (six) hours as needed for Pain (every 4 - 6 hours prn moderate to severe pain)., Disp: 40 tablet, Rfl: 0    REVIEW OF SYSTEMS:  General: negative; ENT: negative; Allergy and Immunology: negative; Hematological and Lymphatic: Negative; Endocrine: negative; Respiratory: no cough, shortness of breath, or wheezing; Cardiovascular: no chest pain or dyspnea on exertion; Gastrointestinal: no abdominal pain/back, change in bowel habits, or bloody stools; Genito-Urinary: no dysuria, trouble voiding, or hematuria; Musculoskeletal: left lower extremity pain  Neurological: no TIA or stroke symptoms; Psychiatric: no nervousness, anxiety or depression.    PHYSICAL EXAM:   Right Arm BP - Sittin/74 (20 1324)  Left Arm BP - Sittin/67 (20 1324)  Pulse: 82  Temp: 98.5 °F (36.9 °C)      General appearance:  Alert, well-appearing, and in no distress.  Oriented to person, place, and time   Neurological: Normal speech, no focal findings noted; CN II - XII grossly intact           Musculoskeletal: Digits/nail without cyanosis/clubbing.  Normal muscle strength/tone.                 Neck: Supple, no significant adenopathy; thyroid is not enlarged                  No carotid bruit can be auscultated                Chest:  Clear to auscultation, no wheezes, rales or rhonchi, symmetric air entry     No use of accessory muscles             Cardiac: Normal rate and regular rhythm, S1 and S2 normal; PMI non-displaced          Abdomen: Soft, nontender, nondistended, no masses or organomegaly     No rebound tenderness noted; bowel sounds normal     Pulsatile aortic mass is not palpable.     No groin adenopathy       Extremities:   1+ femoral pulses on right, biphasic left femoral signal                                      No left pedal signals found, right DP and PT biphasic                                      Left foot erythematous, with normal motor and strength. Gross sensation intact.                                      Left medial thigh with tender, localized warm erythematous patch.                                      No ulcerations    LAB RESULTS:  Lab Results   Component Value Date    K 4.1 12/29/2018    K 4.4 03/05/2018    K 3.9 03/31/2017    CREATININE 0.9 02/04/2020    CREATININE 0.9 12/29/2018    CREATININE 0.8 03/05/2018     Lab Results   Component Value Date    WBC 9.61 12/29/2018    WBC 9.14 03/05/2018    WBC 10.06 03/31/2017    HCT 48.0 12/29/2018    HCT 44.2 03/05/2018    HCT 44.5 03/31/2017     12/29/2018     03/05/2018     03/31/2017     No results found for: HGBA1C  IMAGING:  VAS US Ankle Brachial Indices Resting   1/31/2019  L WALT: 0.0    R WALT: 0.63  Impression  =========  Right Leg: Segmental pressures and PVR waveforms suggest moderate peripheral arterial occlusive disease.  Left Leg: Segmental pressures and PVR waveforms suggest severe ischemic peripheral arterial occlusive disease. Unable to obtain reliable arterial doppler signals.    CTA Runoff ABD Pel Bilat Lower EXT   2/4/2020  Impression       Segmental occlusion of the left common and proximal SFA and occlusion of the left tibioperoneal trunk just distal to its origin as well as occlusion of the left anterior tibial artery in its proximal 3rd.    Three-vessel runoff on the right.    Moderate degree of calcific and hypodense plaque in the abdominal aorta and iliac arteries bilaterally.             IMP/PLAN:  69 y.o. female with HTN, active smoker (1/2 ppd since teenager)  who presents with severe PAD (L>R). CTA with segmental occlusion of the left common and proximal SFA and occlusion of the left tibioperoneal trunk as  well as occlusion of the left anterior tibial artery in its proximal 3rd. Moderate degree of calcific and hypodense plaque in the abdominal aorta and iliac arteries bilaterally.  She has been experiencing left foot rest pain, numbness and tingling for several months.  We discussed smoking cessation for greater than 10 minutes as well as the impact that continued smoking can have on the progression of Vascular disease. This discussion included the use of medications, patches, nicotine gum, and lifestyle modifications.    Scheduled for LLE iliac to femoral bypass creation with bilateral iliac stents on 2/17/2020 - risk and benefits discussed in detail, she wishes to proceed as planned.   Consents obtained  Emphasized the importance of tobacco cessation  Daily asa, statin    Olayinka Harris MD  Vascular/Endovascular Surgery

## 2020-02-06 NOTE — TELEPHONE ENCOUNTER
----- Message from Miriam Walters sent at 2/6/2020 11:39 AM CST -----  Contact: Pt self Mobile/Home [phone]   Patient is having a surgery on 02/17/2020 and she would like to know if you have completed her FMLA form so that she can get her short term disability? Patient would like a call back right away please.

## 2020-02-13 DIAGNOSIS — G89.29 CHRONIC BILATERAL LOW BACK PAIN WITH LEFT-SIDED SCIATICA: ICD-10-CM

## 2020-02-13 DIAGNOSIS — M54.42 CHRONIC BILATERAL LOW BACK PAIN WITH LEFT-SIDED SCIATICA: ICD-10-CM

## 2020-02-13 DIAGNOSIS — M54.16 LUMBAR RADICULOPATHY: ICD-10-CM

## 2020-02-13 RX ORDER — GABAPENTIN 100 MG/1
100 CAPSULE ORAL 3 TIMES DAILY
Qty: 90 CAPSULE | Refills: 1 | Status: ON HOLD | OUTPATIENT
Start: 2020-02-13 | End: 2020-02-24 | Stop reason: HOSPADM

## 2020-02-13 NOTE — TELEPHONE ENCOUNTER
----- Message from Sharonda Brown sent at 2/13/2020  8:11 AM CST -----  Pt is requesting a call back from [facility], Pt states the Cialis is not working and she would like to speak with someone.    Pt states she is in a lot of pain.    Please call and advise          Thank you

## 2020-02-13 NOTE — TELEPHONE ENCOUNTER
Spoke with pt regarding a refill fr gabapentin. I informed pt I will send a med refill to [facility]. Pt verbalized understanding.

## 2020-02-14 RX ORDER — ASPIRIN 325 MG
325 TABLET ORAL DAILY
Status: ON HOLD | COMMUNITY
End: 2020-02-24 | Stop reason: HOSPADM

## 2020-02-14 NOTE — PRE-PROCEDURE INSTRUCTIONS
Preop instructions:     NPO instructions:     For Peripheral Vascular Surgery patients - you may only have sips of water with your AM medications one (1) hour prior to your arrival to the hospital.    If you have received specific instructions from your Surgeon/Surgeon's staff, please follow their instructions.     Showering instructions, directions, leave all valuables at home, medication instructions for PM prior & am of procedure explained. Patient stated an understanding.      Patient denies any side effects or issues with anesthesia or sedation.                                                                                                                                    ARRIVAL TO [place] [address]  PT'S SON - ILANA WILL BE PROVIDING TRANSPORTATION HOME UPON DISCHARGE.

## 2020-02-16 ENCOUNTER — ANESTHESIA EVENT (OUTPATIENT)
Dept: SURGERY | Facility: HOSPITAL | Age: 70
DRG: 254 | End: 2020-02-16
Payer: COMMERCIAL

## 2020-02-16 NOTE — ANESTHESIA PREPROCEDURE EVALUATION
[facility]  Anesthesia Pre-Operative Evaluation         Patient Name: Rachel Murray  YOB: 1950  MRN: [id number]    SUBJECTIVE:     Pre-operative evaluation for Procedure(s) (LRB):  CREATION, BYPASS, ARTERIAL, ILIAC TO FEMORAL; Bilateral Iliac Stents (Left)     02/16/2020    Rachel Murray is a 69 y.o. female w/ a significant PMHx of HTN, PAD and 25 pack year smoking history. CTA showed L common and proximal SFA, L tibioperoneal trunk and L anterior tibial artery occlusion. Patient also with bilateral iliac artery plaques.    Patient now presents for the above procedure(s).      Prev airway:   Direct laryngoscopy; CRNA; Other (rapid sequence); Postinduction; Dale #2; 7.0; Cuffed; Minimal occlusive pressure; Auscultation; Grade I; None; Positive EtCO2, Bilateral breath sounds, Atraumatic/Condition of teeth unchanged; 20 cm; Lips; None; 1      Patient Active Problem List   Diagnosis    Hypertension    History of rectal or anal cancer    Special screening for malignant neoplasms, colon    Lumbar radiculopathy    Chronic bilateral low back pain with left-sided sciatica    Chronic pain    PAD (peripheral artery disease)       Review of patient's allergies indicates:   Allergen Reactions    Adhesive Blisters    Ciprofloxacin Hives    Latex, natural rubber Blisters       Current Outpatient Medications:  No current facility-administered medications for this encounter.     Current Outpatient Medications:     aspirin 325 MG tablet, Take 325 mg by mouth once daily., Disp: , Rfl:     candesartan-hydrochlorothiazide (ATACAND HCT) 16-12.5 mg per tablet, Take 1 tablet by mouth once daily., Disp: 30 tablet, Rfl: 3    gabapentin (NEURONTIN) 100 MG capsule, Take 1 capsule (100 mg total) by mouth 3 (three) times daily., Disp: 90 capsule, Rfl: 1    acetaminophen (TYLENOL) 500 MG tablet, Take 2 tablets (1,000 mg total) by mouth 3 (three) times daily., Disp: , Rfl: 0     candesartan-hydrochlorothiazide (ATACAND HCT) 16-12.5 mg per tablet, TAKE 1 TABLET BY MOUTH EVERY DAY, Disp: 30 tablet, Rfl: 3    candesartan-hydrochlorothiazide (ATACAND HCT) 16-12.5 mg per tablet, TAKE 1 TABLET BY MOUTH EVERY DAY, Disp: 30 tablet, Rfl: 3    diclofenac (VOLTAREN) 75 MG EC tablet, Take 1 tablet (75 mg total) by mouth 2 (two) times daily., Disp: 60 tablet, Rfl: 0    HYDROcodone-acetaminophen (NORCO) 5-325 mg per tablet, Take 1 tablet by mouth every 6 (six) hours as needed for Pain (every 4 - 6 hours prn moderate to severe pain)., Disp: 40 tablet, Rfl: 0    Past Surgical History:   Procedure Laterality Date    APPENDECTOMY      BREAST BIOPSY       SECTION, CLASSIC      COLONOSCOPY      COLONOSCOPY N/A 2017    Procedure: COLONOSCOPY;  Surgeon: Gabriel Rose MD;  Location: [address];  Service: Endoscopy;  Laterality: N/A;  Miralax prep per Dr. Rose    DENTAL SURGERY  2017    EPIDURAL STEROID INJECTION INTO LUMBAR SPINE N/A 2020    Procedure: Injection-steroid-epidural-lumbar--Review MRI- L5-S1 after MRI review;  Surgeon: Ely Syed Jr., MD;  Location: [facility];  Service: Pain Management;  Laterality: N/A;    gyn surgery      ex laparotomy    HYSTERECTOMY         Social History     Socioeconomic History    Marital status: Single     Spouse name: Not on file    Number of children: Not on file    Years of education: Not on file    Highest education level: Not on file   Occupational History     Employer: [employer]   Social Needs    Financial resource strain: Not on file    Food insecurity:     Worry: Not on file     Inability: Not on file    Transportation needs:     Medical: Not on file     Non-medical: Not on file   Tobacco Use    Smoking status: Current Every Day Smoker     Packs/day: 0.50     Years: 40.00     Pack years: 20.00     Types: Cigarettes    Smokeless tobacco: Never Used    Tobacco comment: occasionally    Substance and Sexual Activity    Alcohol use: Yes     Alcohol/week: 0.0 standard drinks     Comment: rarely    Drug use: No    Sexual activity: Never   Lifestyle    Physical activity:     Days per week: Not on file     Minutes per session: Not on file    Stress: Not on file   Relationships    Social connections:     Talks on phone: Not on file     Gets together: Not on file     Attends Mandaeism service: Not on file     Active member of club or organization: Not on file     Attends meetings of clubs or organizations: Not on file     Relationship status: Not on file   Other Topics Concern    Not on file   Social History Narrative    Not on file       OBJECTIVE:     Vital Signs Range (Last 24H):         Significant Labs:  Lab Results   Component Value Date    WBC 8.77 02/14/2020    HGB 13.0 02/14/2020    HCT 41.0 02/14/2020     02/14/2020    CHOL 280 (H) 02/14/2020    TRIG 247 (H) 02/14/2020    HDL 42 02/14/2020    ALT 19 02/14/2020    AST 16 02/14/2020     02/14/2020    K 3.8 02/14/2020     02/14/2020    CREATININE 0.9 02/14/2020    BUN 27 (H) 02/14/2020    CO2 25 02/14/2020    TSH 3.288 12/29/2018       Diagnostic Studies: No relevant studies.    EKG:   Results for orders placed or performed during the hospital encounter of 02/10/20   SCHEDULED EKG 12-LEAD (to Muse)    Collection Time: 02/10/20  7:56 AM    Narrative    Test Reason : Z01.818,    Vent. Rate : 083 BPM     Atrial Rate : 083 BPM     P-R Int : 154 ms          QRS Dur : 082 ms      QT Int : 384 ms       P-R-T Axes : 043 030 065 degrees     QTc Int : 451 ms    Normal sinus rhythm  Left atrial abnormality/enlargement  When compared with ECG of 27-OCT-2011 10:33,  No significant change was found  Confirmed by BENTON ROCHE MD ([id number]) on 2/10/2020 8:54:53 AM    Referred By: BELINDA ANTOINE           Confirmed By:BENTON ROCHE MD       2D ECHO:  TTE:  No results found for this or any previous visit.    DOUGLAS:  No results found for this or any  previous visit.    ASSESSMENT/PLAN:       Anesthesia Evaluation    I have reviewed the Patient Summary Reports.    I have reviewed the Nursing Notes.      Review of Systems  Anesthesia Hx:  No problems with previous Anesthesia  History of prior surgery of interest to airway management or planning:  Denies Personal Hx of Anesthesia complications.   Cardiovascular:   Exercise tolerance: good Hypertension, well controlled Denies CAD.     Denies Angina.    Pulmonary:  Pulmonary Normal    Hepatic/GI:  Hepatic/GI Normal Anal cancer   Endocrine:  Endocrine Normal        Physical Exam  General:  Well nourished    Airway/Jaw/Neck:  Airway Findings: Mouth Opening: Normal Tongue: Normal  General Airway Assessment: Adult  Mallampati: II  Jaw/Neck Findings:  Neck ROM: Normal ROM      Dental:  Dental Findings: In tact    Chest/Lungs:  Chest/Lungs Findings: Clear to auscultation, Normal Respiratory Rate     Heart/Vascular:  Heart Findings: Rate: Normal  Rhythm: Regular Rhythm  Sounds: Normal  Vascular Findings: (BP for RUE>LUE)        Mental Status:  Mental Status Findings:  Cooperative, Alert and Oriented         Anesthesia Plan  Type of Anesthesia, risks & benefits discussed:  Anesthesia Type:  general  Patient's Preference:   Intra-op Monitoring Plan: arterial line and standard ASA monitors  Intra-op Monitoring Plan Comments:   Post Op Pain Control Plan: per primary service following discharge from PACU  Post Op Pain Control Plan Comments:   Induction:   IV  Beta Blocker:  Patient is not currently on a Beta-Blocker (No further documentation required).       Informed Consent: Patient understands risks and agrees with Anesthesia plan.  Questions answered. Anesthesia consent signed with patient.  ASA Score: 3     Day of Surgery Review of History & Physical: I have interviewed and examined the patient. I have reviewed the patient's H&P dated: 2/17/2020. There are no significant changes.  H&P update referred to the surgeon.          Ready For Surgery From Anesthesia Perspective.

## 2020-02-17 ENCOUNTER — HOSPITAL ENCOUNTER (INPATIENT)
Facility: HOSPITAL | Age: 70
LOS: 8 days | Discharge: HOME | DRG: 254 | End: 2020-02-25
Attending: SURGERY | Admitting: SURGERY
Payer: COMMERCIAL

## 2020-02-17 ENCOUNTER — ANESTHESIA (OUTPATIENT)
Dept: SURGERY | Facility: HOSPITAL | Age: 70
DRG: 254 | End: 2020-02-17
Payer: COMMERCIAL

## 2020-02-17 DIAGNOSIS — I73.9 PAD (PERIPHERAL ARTERY DISEASE): ICD-10-CM

## 2020-02-17 LAB
POC ACTIVATED CLOTTING TIME K: 114 SEC (ref 74–137)
POC ACTIVATED CLOTTING TIME K: 131 SEC (ref 74–137)
POC ACTIVATED CLOTTING TIME K: 213 SEC (ref 74–137)
POC ACTIVATED CLOTTING TIME K: 224 SEC (ref 74–137)
POC ACTIVATED CLOTTING TIME K: 241 SEC (ref 74–137)
POC ACTIVATED CLOTTING TIME K: 246 SEC (ref 74–137)
POC ACTIVATED CLOTTING TIME K: 252 SEC (ref 74–137)
POC ACTIVATED CLOTTING TIME K: 252 SEC (ref 74–137)
SAMPLE: ABNORMAL
SAMPLE: NORMAL
SAMPLE: NORMAL

## 2020-02-17 PROCEDURE — 25000003 PHARM REV CODE 250: Performed by: STUDENT IN AN ORGANIZED HEALTH CARE EDUCATION/TRAINING PROGRAM

## 2020-02-17 PROCEDURE — 37000009 HC ANESTHESIA EA ADD 15 MINS: Performed by: SURGERY

## 2020-02-17 PROCEDURE — D9220A PRA ANESTHESIA: ICD-10-PCS | Mod: ,,, | Performed by: ANESTHESIOLOGY

## 2020-02-17 PROCEDURE — C1760 CLOSURE DEV, VASC: HCPCS | Performed by: SURGERY

## 2020-02-17 PROCEDURE — 36000707: Performed by: SURGERY

## 2020-02-17 PROCEDURE — 35371 RECHANNELING OF ARTERY: CPT | Mod: LT,,, | Performed by: SURGERY

## 2020-02-17 PROCEDURE — 37221 PR REVASCULARIZE ILIAC ARTERY,ANGIOPLASTY/STENT, INITIAL VESSEL: ICD-10-PCS | Mod: 51,LT,, | Performed by: SURGERY

## 2020-02-17 PROCEDURE — 25500020 PHARM REV CODE 255: Performed by: SURGERY

## 2020-02-17 PROCEDURE — 36620 INSERTION CATHETER ARTERY: CPT | Mod: ,,, | Performed by: ANESTHESIOLOGY

## 2020-02-17 PROCEDURE — 36000706: Performed by: SURGERY

## 2020-02-17 PROCEDURE — 63600175 PHARM REV CODE 636 W HCPCS: Performed by: STUDENT IN AN ORGANIZED HEALTH CARE EDUCATION/TRAINING PROGRAM

## 2020-02-17 PROCEDURE — 36620 ARTERIAL: ICD-10-PCS | Mod: ,,, | Performed by: ANESTHESIOLOGY

## 2020-02-17 PROCEDURE — C1773 RET DEV, INSERTABLE: HCPCS | Performed by: SURGERY

## 2020-02-17 PROCEDURE — 71000033 HC RECOVERY, INTIAL HOUR: Performed by: SURGERY

## 2020-02-17 PROCEDURE — C1887 CATHETER, GUIDING: HCPCS | Performed by: SURGERY

## 2020-02-17 PROCEDURE — C1768 GRAFT, VASCULAR: HCPCS | Performed by: SURGERY

## 2020-02-17 PROCEDURE — D9220A PRA ANESTHESIA: Mod: ,,, | Performed by: ANESTHESIOLOGY

## 2020-02-17 PROCEDURE — 27201037 HC PRESSURE MONITORING SET UP

## 2020-02-17 PROCEDURE — C1769 GUIDE WIRE: HCPCS | Performed by: SURGERY

## 2020-02-17 PROCEDURE — 35371 PR THROMBOENDARTECTMY FEMORAL COMMON: ICD-10-PCS | Mod: LT,,, | Performed by: SURGERY

## 2020-02-17 PROCEDURE — 20600001 HC STEP DOWN PRIVATE ROOM

## 2020-02-17 PROCEDURE — 71000039 HC RECOVERY, EACH ADD'L HOUR: Performed by: SURGERY

## 2020-02-17 PROCEDURE — 25000003 PHARM REV CODE 250

## 2020-02-17 PROCEDURE — 63600175 PHARM REV CODE 636 W HCPCS: Performed by: SURGERY

## 2020-02-17 PROCEDURE — 37221 PR REVASCULARIZE ILIAC ARTERY,ANGIOPLASTY/STENT, INITIAL VESSEL: CPT | Mod: 51,LT,, | Performed by: SURGERY

## 2020-02-17 PROCEDURE — 37000008 HC ANESTHESIA 1ST 15 MINUTES: Performed by: SURGERY

## 2020-02-17 PROCEDURE — C1725 CATH, TRANSLUMIN NON-LASER: HCPCS | Performed by: SURGERY

## 2020-02-17 PROCEDURE — 27201423 OPTIME MED/SURG SUP & DEVICES STERILE SUPPLY: Performed by: SURGERY

## 2020-02-17 PROCEDURE — C1894 INTRO/SHEATH, NON-LASER: HCPCS | Performed by: SURGERY

## 2020-02-17 PROCEDURE — 94761 N-INVAS EAR/PLS OXIMETRY MLT: CPT

## 2020-02-17 DEVICE — GRAFT VAS GUARD 0.8X8CM BOVINE: Type: IMPLANTABLE DEVICE | Site: ARTERIAL | Status: FUNCTIONAL

## 2020-02-17 RX ORDER — HYDROCODONE BITARTRATE AND ACETAMINOPHEN 5; 325 MG/1; MG/1
1 TABLET ORAL EVERY 4 HOURS PRN
Status: DISCONTINUED | OUTPATIENT
Start: 2020-02-17 | End: 2020-02-25 | Stop reason: HOSPADM

## 2020-02-17 RX ORDER — PROPOFOL 10 MG/ML
VIAL (ML) INTRAVENOUS
Status: DISCONTINUED | OUTPATIENT
Start: 2020-02-17 | End: 2020-02-17

## 2020-02-17 RX ORDER — METHOCARBAMOL 500 MG/1
500 TABLET, FILM COATED ORAL 4 TIMES DAILY
Status: DISCONTINUED | OUTPATIENT
Start: 2020-02-17 | End: 2020-02-25 | Stop reason: HOSPADM

## 2020-02-17 RX ORDER — SODIUM CHLORIDE 9 MG/ML
INJECTION, SOLUTION INTRAVENOUS CONTINUOUS
Status: DISCONTINUED | OUTPATIENT
Start: 2020-02-17 | End: 2020-02-17

## 2020-02-17 RX ORDER — EPHEDRINE SULFATE 50 MG/ML
INJECTION, SOLUTION INTRAVENOUS
Status: DISCONTINUED | OUTPATIENT
Start: 2020-02-17 | End: 2020-02-17

## 2020-02-17 RX ORDER — PHENYLEPHRINE HYDROCHLORIDE 10 MG/ML
INJECTION INTRAVENOUS
Status: DISCONTINUED | OUTPATIENT
Start: 2020-02-17 | End: 2020-02-17

## 2020-02-17 RX ORDER — HEPARIN SODIUM 1000 [USP'U]/ML
INJECTION, SOLUTION INTRAVENOUS; SUBCUTANEOUS
Status: DISCONTINUED | OUTPATIENT
Start: 2020-02-17 | End: 2020-02-17

## 2020-02-17 RX ORDER — ONDANSETRON 2 MG/ML
4 INJECTION INTRAMUSCULAR; INTRAVENOUS EVERY 12 HOURS PRN
Status: DISCONTINUED | OUTPATIENT
Start: 2020-02-17 | End: 2020-02-25 | Stop reason: HOSPADM

## 2020-02-17 RX ORDER — MUPIROCIN 20 MG/G
OINTMENT TOPICAL 2 TIMES DAILY
Status: COMPLETED | OUTPATIENT
Start: 2020-02-17 | End: 2020-02-22

## 2020-02-17 RX ORDER — ACETAMINOPHEN 10 MG/ML
INJECTION, SOLUTION INTRAVENOUS
Status: DISCONTINUED | OUTPATIENT
Start: 2020-02-17 | End: 2020-02-17

## 2020-02-17 RX ORDER — NEOSTIGMINE METHYLSULFATE 1 MG/ML
INJECTION, SOLUTION INTRAVENOUS
Status: DISCONTINUED | OUTPATIENT
Start: 2020-02-17 | End: 2020-02-17

## 2020-02-17 RX ORDER — DEXMEDETOMIDINE HYDROCHLORIDE 100 UG/ML
INJECTION, SOLUTION INTRAVENOUS
Status: DISCONTINUED | OUTPATIENT
Start: 2020-02-17 | End: 2020-02-17

## 2020-02-17 RX ORDER — MUPIROCIN 20 MG/G
OINTMENT TOPICAL
Status: DISCONTINUED | OUTPATIENT
Start: 2020-02-17 | End: 2020-02-17

## 2020-02-17 RX ORDER — CEFAZOLIN SODIUM 1 G/3ML
2 INJECTION, POWDER, FOR SOLUTION INTRAMUSCULAR; INTRAVENOUS
Status: COMPLETED | OUTPATIENT
Start: 2020-02-17 | End: 2020-02-17

## 2020-02-17 RX ORDER — HYDROCODONE BITARTRATE AND ACETAMINOPHEN 5; 325 MG/1; MG/1
TABLET ORAL
Status: COMPLETED
Start: 2020-02-17 | End: 2020-02-17

## 2020-02-17 RX ORDER — PROTAMINE SULFATE 10 MG/ML
INJECTION, SOLUTION INTRAVENOUS
Status: DISCONTINUED | OUTPATIENT
Start: 2020-02-17 | End: 2020-02-17

## 2020-02-17 RX ORDER — ROCURONIUM BROMIDE 10 MG/ML
INJECTION, SOLUTION INTRAVENOUS
Status: DISCONTINUED | OUTPATIENT
Start: 2020-02-17 | End: 2020-02-17

## 2020-02-17 RX ORDER — SODIUM CHLORIDE 0.9 % (FLUSH) 0.9 %
10 SYRINGE (ML) INJECTION
Status: DISCONTINUED | OUTPATIENT
Start: 2020-02-17 | End: 2020-02-25 | Stop reason: HOSPADM

## 2020-02-17 RX ORDER — ASPIRIN 81 MG/1
81 TABLET ORAL DAILY
Status: DISCONTINUED | OUTPATIENT
Start: 2020-02-17 | End: 2020-02-25 | Stop reason: HOSPADM

## 2020-02-17 RX ORDER — IODIXANOL 320 MG/ML
INJECTION, SOLUTION INTRAVASCULAR
Status: DISCONTINUED | OUTPATIENT
Start: 2020-02-17 | End: 2020-02-17 | Stop reason: HOSPADM

## 2020-02-17 RX ORDER — LOSARTAN POTASSIUM AND HYDROCHLOROTHIAZIDE 12.5; 5 MG/1; MG/1
1 TABLET ORAL DAILY
Status: DISCONTINUED | OUTPATIENT
Start: 2020-02-17 | End: 2020-02-25 | Stop reason: HOSPADM

## 2020-02-17 RX ORDER — KETAMINE HCL IN 0.9 % NACL 50 MG/5 ML
SYRINGE (ML) INTRAVENOUS
Status: DISCONTINUED | OUTPATIENT
Start: 2020-02-17 | End: 2020-02-17

## 2020-02-17 RX ORDER — GLYCOPYRROLATE 0.2 MG/ML
INJECTION INTRAMUSCULAR; INTRAVENOUS
Status: DISCONTINUED | OUTPATIENT
Start: 2020-02-17 | End: 2020-02-17

## 2020-02-17 RX ORDER — HEPARIN SODIUM 1000 [USP'U]/ML
INJECTION, SOLUTION INTRAVENOUS; SUBCUTANEOUS
Status: DISCONTINUED | OUTPATIENT
Start: 2020-02-17 | End: 2020-02-17 | Stop reason: HOSPADM

## 2020-02-17 RX ORDER — FENTANYL CITRATE 50 UG/ML
INJECTION, SOLUTION INTRAMUSCULAR; INTRAVENOUS
Status: DISCONTINUED | OUTPATIENT
Start: 2020-02-17 | End: 2020-02-17

## 2020-02-17 RX ORDER — ONDANSETRON 2 MG/ML
INJECTION INTRAMUSCULAR; INTRAVENOUS
Status: DISCONTINUED | OUTPATIENT
Start: 2020-02-17 | End: 2020-02-17

## 2020-02-17 RX ORDER — FENTANYL CITRATE 50 UG/ML
25 INJECTION, SOLUTION INTRAMUSCULAR; INTRAVENOUS EVERY 5 MIN PRN
Status: COMPLETED | OUTPATIENT
Start: 2020-02-17 | End: 2020-02-17

## 2020-02-17 RX ORDER — LIDOCAINE HCL/PF 100 MG/5ML
SYRINGE (ML) INTRAVENOUS
Status: DISCONTINUED | OUTPATIENT
Start: 2020-02-17 | End: 2020-02-17

## 2020-02-17 RX ORDER — SODIUM CHLORIDE 9 MG/ML
INJECTION, SOLUTION INTRAVENOUS CONTINUOUS PRN
Status: DISCONTINUED | OUTPATIENT
Start: 2020-02-17 | End: 2020-02-17

## 2020-02-17 RX ORDER — GABAPENTIN 100 MG/1
200 CAPSULE ORAL ONCE
Status: COMPLETED | OUTPATIENT
Start: 2020-02-17 | End: 2020-02-17

## 2020-02-17 RX ORDER — LIDOCAINE HYDROCHLORIDE 10 MG/ML
1 INJECTION, SOLUTION EPIDURAL; INFILTRATION; INTRACAUDAL; PERINEURAL ONCE
Status: COMPLETED | OUTPATIENT
Start: 2020-02-17 | End: 2020-02-17

## 2020-02-17 RX ORDER — GABAPENTIN 100 MG/1
100 CAPSULE ORAL 3 TIMES DAILY
Status: DISCONTINUED | OUTPATIENT
Start: 2020-02-17 | End: 2020-02-22

## 2020-02-17 RX ORDER — HYDROMORPHONE HYDROCHLORIDE 1 MG/ML
1 INJECTION, SOLUTION INTRAMUSCULAR; INTRAVENOUS; SUBCUTANEOUS EVERY 4 HOURS PRN
Status: DISCONTINUED | OUTPATIENT
Start: 2020-02-17 | End: 2020-02-25 | Stop reason: HOSPADM

## 2020-02-17 RX ADMIN — PHENYLEPHRINE HYDROCHLORIDE 50 MCG: 10 INJECTION INTRAVENOUS at 08:02

## 2020-02-17 RX ADMIN — PHENYLEPHRINE HYDROCHLORIDE 150 MCG: 10 INJECTION INTRAVENOUS at 07:02

## 2020-02-17 RX ADMIN — PROTAMINE SULFATE 20 MG: 10 INJECTION, SOLUTION INTRAVENOUS at 11:02

## 2020-02-17 RX ADMIN — CEFAZOLIN 2 G: 1 INJECTION, POWDER, FOR SOLUTION INTRAMUSCULAR; INTRAVENOUS at 11:02

## 2020-02-17 RX ADMIN — HYDROCODONE BITARTRATE AND ACETAMINOPHEN 1 TABLET: 5; 325 TABLET ORAL at 02:02

## 2020-02-17 RX ADMIN — HEPARIN SODIUM 1000 UNITS: 1000 INJECTION, SOLUTION INTRAVENOUS; SUBCUTANEOUS at 10:02

## 2020-02-17 RX ADMIN — PHENYLEPHRINE HYDROCHLORIDE 50 MCG: 10 INJECTION INTRAVENOUS at 09:02

## 2020-02-17 RX ADMIN — FENTANYL CITRATE 25 MCG: 50 INJECTION INTRAMUSCULAR; INTRAVENOUS at 12:02

## 2020-02-17 RX ADMIN — SODIUM CHLORIDE, SODIUM GLUCONATE, SODIUM ACETATE, POTASSIUM CHLORIDE, MAGNESIUM CHLORIDE, SODIUM PHOSPHATE, DIBASIC, AND POTASSIUM PHOSPHATE: .53; .5; .37; .037; .03; .012; .00082 INJECTION, SOLUTION INTRAVENOUS at 09:02

## 2020-02-17 RX ADMIN — GABAPENTIN 200 MG: 100 CAPSULE ORAL at 09:02

## 2020-02-17 RX ADMIN — SODIUM CHLORIDE: 0.9 INJECTION, SOLUTION INTRAVENOUS at 06:02

## 2020-02-17 RX ADMIN — HYDROMORPHONE HYDROCHLORIDE 1 MG: 1 INJECTION, SOLUTION INTRAMUSCULAR; INTRAVENOUS; SUBCUTANEOUS at 06:02

## 2020-02-17 RX ADMIN — ROCURONIUM BROMIDE 10 MG: 10 INJECTION, SOLUTION INTRAVENOUS at 09:02

## 2020-02-17 RX ADMIN — PROTAMINE SULFATE 60 MG: 10 INJECTION, SOLUTION INTRAVENOUS at 11:02

## 2020-02-17 RX ADMIN — PHENYLEPHRINE HYDROCHLORIDE 100 MCG: 10 INJECTION INTRAVENOUS at 12:02

## 2020-02-17 RX ADMIN — HYDROMORPHONE HYDROCHLORIDE 1 MG: 1 INJECTION, SOLUTION INTRAMUSCULAR; INTRAVENOUS; SUBCUTANEOUS at 11:02

## 2020-02-17 RX ADMIN — FENTANYL CITRATE 50 MCG: 50 INJECTION, SOLUTION INTRAMUSCULAR; INTRAVENOUS at 08:02

## 2020-02-17 RX ADMIN — PROPOFOL 150 MG: 10 INJECTION, EMULSION INTRAVENOUS at 07:02

## 2020-02-17 RX ADMIN — FENTANYL CITRATE 100 MCG: 50 INJECTION, SOLUTION INTRAMUSCULAR; INTRAVENOUS at 07:02

## 2020-02-17 RX ADMIN — ONDANSETRON 4 MG: 2 INJECTION INTRAMUSCULAR; INTRAVENOUS at 11:02

## 2020-02-17 RX ADMIN — Medication 10 MG: at 08:02

## 2020-02-17 RX ADMIN — DEXMEDETOMIDINE HYDROCHLORIDE 4 MCG: 100 INJECTION, SOLUTION INTRAVENOUS at 11:02

## 2020-02-17 RX ADMIN — DEXMEDETOMIDINE HYDROCHLORIDE 8 MCG: 100 INJECTION, SOLUTION INTRAVENOUS at 09:02

## 2020-02-17 RX ADMIN — LIDOCAINE HYDROCHLORIDE 1 MG: 10 INJECTION, SOLUTION EPIDURAL; INFILTRATION; INTRACAUDAL; PERINEURAL at 06:02

## 2020-02-17 RX ADMIN — CEFAZOLIN 2 G: 330 INJECTION, POWDER, FOR SOLUTION INTRAMUSCULAR; INTRAVENOUS at 07:02

## 2020-02-17 RX ADMIN — HYDROCODONE BITARTRATE AND ACETAMINOPHEN 1 TABLET: 5; 325 TABLET ORAL at 10:02

## 2020-02-17 RX ADMIN — Medication 20 MG: at 07:02

## 2020-02-17 RX ADMIN — ROCURONIUM BROMIDE 20 MG: 10 INJECTION, SOLUTION INTRAVENOUS at 08:02

## 2020-02-17 RX ADMIN — ROCURONIUM BROMIDE 50 MG: 10 INJECTION, SOLUTION INTRAVENOUS at 07:02

## 2020-02-17 RX ADMIN — FENTANYL CITRATE 25 MCG: 50 INJECTION INTRAMUSCULAR; INTRAVENOUS at 01:02

## 2020-02-17 RX ADMIN — PHENYLEPHRINE HYDROCHLORIDE 100 MCG: 10 INJECTION INTRAVENOUS at 07:02

## 2020-02-17 RX ADMIN — HEPARIN SODIUM 2000 UNITS: 1000 INJECTION, SOLUTION INTRAVENOUS; SUBCUTANEOUS at 09:02

## 2020-02-17 RX ADMIN — DEXMEDETOMIDINE HYDROCHLORIDE 4 MCG: 100 INJECTION, SOLUTION INTRAVENOUS at 09:02

## 2020-02-17 RX ADMIN — FENTANYL CITRATE 50 MCG: 50 INJECTION, SOLUTION INTRAMUSCULAR; INTRAVENOUS at 09:02

## 2020-02-17 RX ADMIN — METHOCARBAMOL TABLETS 500 MG: 500 TABLET, COATED ORAL at 04:02

## 2020-02-17 RX ADMIN — HEPARIN SODIUM 8000 UNITS: 1000 INJECTION, SOLUTION INTRAVENOUS; SUBCUTANEOUS at 08:02

## 2020-02-17 RX ADMIN — ASPIRIN 81 MG: 81 TABLET, COATED ORAL at 02:02

## 2020-02-17 RX ADMIN — GABAPENTIN 100 MG: 100 CAPSULE ORAL at 08:02

## 2020-02-17 RX ADMIN — CEFAZOLIN 2 G: 1 INJECTION, POWDER, FOR SOLUTION INTRAMUSCULAR; INTRAVENOUS at 03:02

## 2020-02-17 RX ADMIN — ROCURONIUM BROMIDE 10 MG: 10 INJECTION, SOLUTION INTRAVENOUS at 07:02

## 2020-02-17 RX ADMIN — Medication 10 MG: at 09:02

## 2020-02-17 RX ADMIN — ROCURONIUM BROMIDE 10 MG: 10 INJECTION, SOLUTION INTRAVENOUS at 08:02

## 2020-02-17 RX ADMIN — GABAPENTIN 100 MG: 100 CAPSULE ORAL at 02:02

## 2020-02-17 RX ADMIN — GLYCOPYRROLATE 0.6 MG: 0.2 INJECTION, SOLUTION INTRAMUSCULAR; INTRAVENOUS at 11:02

## 2020-02-17 RX ADMIN — ACETAMINOPHEN 1000 MG: 10 INJECTION, SOLUTION INTRAVENOUS at 08:02

## 2020-02-17 RX ADMIN — HYDROCODONE BITARTRATE AND ACETAMINOPHEN 1 TABLET: 5; 325 TABLET ORAL at 06:02

## 2020-02-17 RX ADMIN — LIDOCAINE HYDROCHLORIDE 100 MG: 20 INJECTION, SOLUTION INTRAVENOUS at 07:02

## 2020-02-17 RX ADMIN — SODIUM CHLORIDE 0.25 MCG/KG/MIN: 9 INJECTION, SOLUTION INTRAVENOUS at 08:02

## 2020-02-17 RX ADMIN — PHENYLEPHRINE HYDROCHLORIDE 100 MCG: 10 INJECTION INTRAVENOUS at 11:02

## 2020-02-17 RX ADMIN — MUPIROCIN: 20 OINTMENT TOPICAL at 08:02

## 2020-02-17 RX ADMIN — PHENYLEPHRINE HYDROCHLORIDE 100 MCG: 10 INJECTION INTRAVENOUS at 08:02

## 2020-02-17 RX ADMIN — ROCURONIUM BROMIDE 20 MG: 10 INJECTION, SOLUTION INTRAVENOUS at 10:02

## 2020-02-17 RX ADMIN — METHOCARBAMOL TABLETS 500 MG: 500 TABLET, COATED ORAL at 08:02

## 2020-02-17 RX ADMIN — FENTANYL CITRATE 25 MCG: 50 INJECTION, SOLUTION INTRAMUSCULAR; INTRAVENOUS at 12:02

## 2020-02-17 RX ADMIN — NEOSTIGMINE METHYLSULFATE 4 MG: 1 INJECTION INTRAVENOUS at 11:02

## 2020-02-17 RX ADMIN — MUPIROCIN: 20 OINTMENT TOPICAL at 06:02

## 2020-02-17 RX ADMIN — SODIUM CHLORIDE, SODIUM GLUCONATE, SODIUM ACETATE, POTASSIUM CHLORIDE, MAGNESIUM CHLORIDE, SODIUM PHOSPHATE, DIBASIC, AND POTASSIUM PHOSPHATE: .53; .5; .37; .037; .03; .012; .00082 INJECTION, SOLUTION INTRAVENOUS at 07:02

## 2020-02-17 RX ADMIN — EPHEDRINE SULFATE 10 MG: 50 INJECTION INTRAVENOUS at 07:02

## 2020-02-17 NOTE — ANESTHESIA POSTPROCEDURE EVALUATION
Anesthesia Post Evaluation    Patient: Rachel Murray    Procedure(s) Performed: Procedure(s) (LRB):  ENDARTERECTOMY, FEMORAL (Left)  PTA, PROFUNDA FEMORAL (Left)  AORTOGRAM (N/A)  ANGIOGRAM, EXTREMITY, UNILATERAL (Right)    Final Anesthesia Type: general    Patient location during evaluation: PACU  Patient participation: Yes- Able to Participate  Level of consciousness: awake and alert and oriented  Post-procedure vital signs: reviewed and stable  Pain management: adequate  Airway patency: patent  GRANT mitigation strategies: Intraoperative administration of CPAP, nasopharyngeal airway, or oral appliance during sedation, Multimodal analgesia, Extubation while patient is awake, Verification of full reversal of neuromuscular block and Extubation and recovery carried out in lateral, semiupright, or other nonsupine position  PONV status at discharge: No PONV  Anesthetic complications: no      Cardiovascular status: hemodynamically stable  Respiratory status: unassisted  Hydration status: euvolemic  Follow-up not needed.          Vitals Value Taken Time   /58 2/17/2020  3:46 PM   Temp 36.3 °C (97.3 °F) 2/17/2020  3:00 PM   Pulse 80 2/17/2020  3:58 PM   Resp 14 2/17/2020  3:58 PM   SpO2 96 % 2/17/2020  3:58 PM   Vitals shown include unvalidated device data.      No case tracking events are documented in the log.      Pain/Montez Score: Pain Rating Prior to Med Admin: 5 (2/17/2020  2:10 PM)  Pain Rating Post Med Admin: 0 (2/17/2020  2:30 PM)  Montez Score: 8 (2/17/2020  2:30 PM)

## 2020-02-17 NOTE — TRANSFER OF CARE
"Anesthesia Transfer of Care Note    Patient: Rachel Murray    Procedure(s) Performed: Procedure(s) (LRB):  ENDARTERECTOMY, FEMORAL (Left)  PTA, PROFUNDA FEMORAL (Left)  AORTOGRAM (N/A)  ANGIOGRAM, EXTREMITY, UNILATERAL (Right)    Patient location: PACU    Anesthesia Type: general    Transport from OR: Transported from OR on 6-10 L/min O2 by face mask with adequate spontaneous ventilation    Post pain: adequate analgesia    Post assessment: no apparent anesthetic complications    Post vital signs: stable    Level of consciousness: sedated and responds to stimulation    Nausea/Vomiting: no nausea/vomiting    Complications: none    Transfer of care protocol was followed      Last vitals:   Visit Vitals  BP (!) 133/55   Pulse 87   Temp 36.4 °C (97.5 °F) (Axillary)   Resp 19   Ht 5' 2" (1.575 m)   Wt 73.9 kg (163 lb)   SpO2 (!) 93%   Breastfeeding? No   BMI 29.81 kg/m²     "

## 2020-02-17 NOTE — ANESTHESIA PROCEDURE NOTES
Arterial    Diagnosis: PAD    Patient location during procedure: done in OR  Procedure start time: 2/17/2020 7:18 AM  Procedure end time: 2/17/2020 7:20 AM    Staffing  Authorizing Provider: Hamzah Mead MD  Performing Provider: Dinah Wyman MD    Anesthesiologist was present at the time of the procedure.    Preanesthetic Checklist  Completed: patient identified, site marked, surgical consent, pre-op evaluation, timeout performed, IV checked, risks and benefits discussed, monitors and equipment checked and anesthesia consent givenArterial  Skin Prep: chlorhexidine gluconate  Local Infiltration: none  Orientation: left  Location: radial  Catheter Size: 20 G  Catheter placement by Ultrasound guidance. Heme positive aspiration all ports.  Vessel Caliber: small, patent  Needle advanced into vessel with real time Ultrasound guidance.Insertion Attempts: 1  Assessment  Dressing: secured with tape and tegaderm  Patient: Tolerated well

## 2020-02-17 NOTE — ANESTHESIA PROCEDURE NOTES
Intubation  Performed by: Dinah Wyman MD  Authorized by: Dinah Wyman MD     Intubation:     Induction:  Intravenous    Intubated:  Postinduction    Mask Ventilation:  Easy mask    Attempts:  1    Attempted By:  Resident anesthesiologist    Method of Intubation:  Direct    Blade:  Dale 2    Laryngeal View Grade: Grade I - full view of chords      Difficult Airway Encountered?: No      Complications:  None    Airway Device:  Oral endotracheal tube    Airway Device Size:  7.5    Style/Cuff Inflation:  Cuffed    Inflation Amount (mL):  10    Tube secured:  22    Secured at:  The lips    Placement Verified By:  Capnometry    Complicating Factors:  None    Findings Post-Intubation:  BS equal bilateral

## 2020-02-18 LAB
ANION GAP SERPL CALC-SCNC: 11 MMOL/L (ref 8–16)
BASOPHILS # BLD AUTO: 0.03 K/UL (ref 0–0.2)
BASOPHILS NFR BLD: 0.3 % (ref 0–1.9)
BUN SERPL-MCNC: 12 MG/DL (ref 8–23)
CALCIUM SERPL-MCNC: 6.2 MG/DL (ref 8.7–10.5)
CHLORIDE SERPL-SCNC: 116 MMOL/L (ref 95–110)
CO2 SERPL-SCNC: 12 MMOL/L (ref 23–29)
CREAT SERPL-MCNC: 0.6 MG/DL (ref 0.5–1.4)
DIFFERENTIAL METHOD: ABNORMAL
EOSINOPHIL # BLD AUTO: 0 K/UL (ref 0–0.5)
EOSINOPHIL NFR BLD: 0.1 % (ref 0–8)
ERYTHROCYTE [DISTWIDTH] IN BLOOD BY AUTOMATED COUNT: 14.8 % (ref 11.5–14.5)
EST. GFR  (AFRICAN AMERICAN): >60 ML/MIN/1.73 M^2
EST. GFR  (NON AFRICAN AMERICAN): >60 ML/MIN/1.73 M^2
GLUCOSE SERPL-MCNC: 78 MG/DL (ref 70–110)
HCT VFR BLD AUTO: 29.1 % (ref 37–48.5)
HGB BLD-MCNC: 9.4 G/DL (ref 12–16)
IMM GRANULOCYTES # BLD AUTO: 0.1 K/UL (ref 0–0.04)
IMM GRANULOCYTES NFR BLD AUTO: 0.9 % (ref 0–0.5)
LYMPHOCYTES # BLD AUTO: 1.5 K/UL (ref 1–4.8)
LYMPHOCYTES NFR BLD: 14.3 % (ref 18–48)
MCH RBC QN AUTO: 29.1 PG (ref 27–31)
MCHC RBC AUTO-ENTMCNC: 32.3 G/DL (ref 32–36)
MCV RBC AUTO: 90 FL (ref 82–98)
MONOCYTES # BLD AUTO: 0.7 K/UL (ref 0.3–1)
MONOCYTES NFR BLD: 6.5 % (ref 4–15)
NEUTROPHILS # BLD AUTO: 8.3 K/UL (ref 1.8–7.7)
NEUTROPHILS NFR BLD: 77.9 % (ref 38–73)
NRBC BLD-RTO: 0 /100 WBC
PLATELET # BLD AUTO: 200 K/UL (ref 150–350)
PMV BLD AUTO: 9.1 FL (ref 9.2–12.9)
POC ACTIVATED CLOTTING TIME K: 175 SEC (ref 74–137)
POTASSIUM SERPL-SCNC: 3.4 MMOL/L (ref 3.5–5.1)
RBC # BLD AUTO: 3.23 M/UL (ref 4–5.4)
SAMPLE: ABNORMAL
SODIUM SERPL-SCNC: 139 MMOL/L (ref 136–145)
WBC # BLD AUTO: 10.68 K/UL (ref 3.9–12.7)

## 2020-02-18 PROCEDURE — 99900035 HC TECH TIME PER 15 MIN (STAT)

## 2020-02-18 PROCEDURE — 20600001 HC STEP DOWN PRIVATE ROOM

## 2020-02-18 PROCEDURE — 97162 PT EVAL MOD COMPLEX 30 MIN: CPT

## 2020-02-18 PROCEDURE — 94640 AIRWAY INHALATION TREATMENT: CPT

## 2020-02-18 PROCEDURE — 94761 N-INVAS EAR/PLS OXIMETRY MLT: CPT

## 2020-02-18 PROCEDURE — 97530 THERAPEUTIC ACTIVITIES: CPT

## 2020-02-18 PROCEDURE — 85025 COMPLETE CBC W/AUTO DIFF WBC: CPT

## 2020-02-18 PROCEDURE — 25000003 PHARM REV CODE 250: Performed by: STUDENT IN AN ORGANIZED HEALTH CARE EDUCATION/TRAINING PROGRAM

## 2020-02-18 PROCEDURE — 80048 BASIC METABOLIC PNL TOTAL CA: CPT

## 2020-02-18 PROCEDURE — 25000242 PHARM REV CODE 250 ALT 637 W/ HCPCS

## 2020-02-18 PROCEDURE — 63600175 PHARM REV CODE 636 W HCPCS: Performed by: STUDENT IN AN ORGANIZED HEALTH CARE EDUCATION/TRAINING PROGRAM

## 2020-02-18 PROCEDURE — 27000221 HC OXYGEN, UP TO 24 HOURS

## 2020-02-18 RX ORDER — CALCIUM CARBONATE 500(1250)
500 TABLET ORAL ONCE
Status: COMPLETED | OUTPATIENT
Start: 2020-02-18 | End: 2020-02-18

## 2020-02-18 RX ORDER — IPRATROPIUM BROMIDE AND ALBUTEROL SULFATE 2.5; .5 MG/3ML; MG/3ML
3 SOLUTION RESPIRATORY (INHALATION)
Status: DISCONTINUED | OUTPATIENT
Start: 2020-02-18 | End: 2020-02-18

## 2020-02-18 RX ORDER — IPRATROPIUM BROMIDE AND ALBUTEROL SULFATE 2.5; .5 MG/3ML; MG/3ML
3 SOLUTION RESPIRATORY (INHALATION)
Status: DISCONTINUED | OUTPATIENT
Start: 2020-02-18 | End: 2020-02-25 | Stop reason: HOSPADM

## 2020-02-18 RX ORDER — IPRATROPIUM BROMIDE AND ALBUTEROL SULFATE 2.5; .5 MG/3ML; MG/3ML
SOLUTION RESPIRATORY (INHALATION)
Status: COMPLETED
Start: 2020-02-18 | End: 2020-02-18

## 2020-02-18 RX ADMIN — HYDROMORPHONE HYDROCHLORIDE 1 MG: 1 INJECTION, SOLUTION INTRAMUSCULAR; INTRAVENOUS; SUBCUTANEOUS at 02:02

## 2020-02-18 RX ADMIN — GABAPENTIN 100 MG: 100 CAPSULE ORAL at 09:02

## 2020-02-18 RX ADMIN — HYDROCODONE BITARTRATE AND ACETAMINOPHEN 1 TABLET: 5; 325 TABLET ORAL at 09:02

## 2020-02-18 RX ADMIN — GABAPENTIN 100 MG: 100 CAPSULE ORAL at 10:02

## 2020-02-18 RX ADMIN — CALCIUM 500 MG: 500 TABLET ORAL at 10:02

## 2020-02-18 RX ADMIN — IPRATROPIUM BROMIDE AND ALBUTEROL SULFATE 3 ML: .5; 3 SOLUTION RESPIRATORY (INHALATION) at 04:02

## 2020-02-18 RX ADMIN — HYDROCODONE BITARTRATE AND ACETAMINOPHEN 1 TABLET: 5; 325 TABLET ORAL at 04:02

## 2020-02-18 RX ADMIN — MUPIROCIN: 20 OINTMENT TOPICAL at 08:02

## 2020-02-18 RX ADMIN — HYDROMORPHONE HYDROCHLORIDE 1 MG: 1 INJECTION, SOLUTION INTRAMUSCULAR; INTRAVENOUS; SUBCUTANEOUS at 06:02

## 2020-02-18 RX ADMIN — HYDROMORPHONE HYDROCHLORIDE 1 MG: 1 INJECTION, SOLUTION INTRAMUSCULAR; INTRAVENOUS; SUBCUTANEOUS at 10:02

## 2020-02-18 RX ADMIN — HYDROCODONE BITARTRATE AND ACETAMINOPHEN 1 TABLET: 5; 325 TABLET ORAL at 12:02

## 2020-02-18 RX ADMIN — METHOCARBAMOL TABLETS 500 MG: 500 TABLET, COATED ORAL at 12:02

## 2020-02-18 RX ADMIN — HYDROCODONE BITARTRATE AND ACETAMINOPHEN 1 TABLET: 5; 325 TABLET ORAL at 02:02

## 2020-02-18 RX ADMIN — HYDROMORPHONE HYDROCHLORIDE 1 MG: 1 INJECTION, SOLUTION INTRAMUSCULAR; INTRAVENOUS; SUBCUTANEOUS at 04:02

## 2020-02-18 RX ADMIN — METHOCARBAMOL TABLETS 500 MG: 500 TABLET, COATED ORAL at 04:02

## 2020-02-18 RX ADMIN — METHOCARBAMOL TABLETS 500 MG: 500 TABLET, COATED ORAL at 09:02

## 2020-02-18 RX ADMIN — ASPIRIN 81 MG: 81 TABLET, COATED ORAL at 08:02

## 2020-02-18 RX ADMIN — LOSARTAN POTASSIUM AND HYDROCHLOROTHIAZIDE 1 TABLET: 50; 12.5 TABLET, FILM COATED ORAL at 08:02

## 2020-02-18 RX ADMIN — GABAPENTIN 100 MG: 100 CAPSULE ORAL at 02:02

## 2020-02-18 RX ADMIN — HYDROCODONE BITARTRATE AND ACETAMINOPHEN 1 TABLET: 5; 325 TABLET ORAL at 07:02

## 2020-02-18 RX ADMIN — METHOCARBAMOL TABLETS 500 MG: 500 TABLET, COATED ORAL at 08:02

## 2020-02-18 RX ADMIN — MUPIROCIN: 20 OINTMENT TOPICAL at 09:02

## 2020-02-18 RX ADMIN — HYDROMORPHONE HYDROCHLORIDE 1 MG: 1 INJECTION, SOLUTION INTRAMUSCULAR; INTRAVENOUS; SUBCUTANEOUS at 11:02

## 2020-02-18 NOTE — PT/OT/SLP EVAL
"Physical Therapy Evaluation    Patient Name:  Rachel Murray   MRN:  [id number]    Recommendations:     Discharge Recommendations:  home with home health   Discharge Equipment Recommendations: none   Barriers to discharge: decreased functional mobility    Assessment:     Rachel Murray is a 69 y.o. female admitted with a medical diagnosis of <principal problem not specified>.  She presents with the following impairments/functional limitations:  weakness, impaired functional mobilty, gait instability, impaired endurance, impaired balance, impaired self care skills, decreased lower extremity function, pain, impaired skin, edema.  Tolerated session c c/o LLE pain.  Performed mobility c CGA-SBA.  Pt able to amb few steps along EOB and demo decreased stance on LLE, flat foot contact, mild unsteadiness and c/o increased LLE pain.  Pt safe to transfer to/from bedside chair c assistance of 1x person. Pt would benefit from continued skilled acute PT 4x/wk to improve functional mobility.  Recommending pt receive PT services in  setting following d/c from hospital once medically cleared.      Rehab Prognosis: Good; patient would benefit from acute skilled PT services to address these deficits and reach maximum level of function.    Recent Surgery: Procedure(s) (LRB):  ENDARTERECTOMY, FEMORAL (Left)  PTA, PROFUNDA FEMORAL (Left)  AORTOGRAM (N/A)  ANGIOGRAM, EXTREMITY, UNILATERAL (Right) 1 Day Post-Op    Plan:     During this hospitalization, patient to be seen 4 x/week to address the identified rehab impairments via gait training, therapeutic activities, therapeutic exercises and progress toward the following goals:    · Plan of Care Expires:  03/14/20    Subjective     Chief Complaint: LLE pain  Patient/Family Comments/goals: "I have sciatic pain that goes down my entire leg."  Pain/Comfort:  Pain Rating 1: (reports LLE pain)    Patients cultural, spiritual, Mormonism conflicts given the current situation: no    Living " Environment:  Pt lives alone in [facility] c [place].  PTA driving, working (insurance), enjoys spending time c friends and going to the casino and reports no falls.  Prior to admission, patients level of function was independent c ADLs and using SPC as needed.  Equipment used at home: cane, straight, bedside commode, walker, rolling, crutches.  DME owned (not currently used): none.  Upon discharge, patient will have assistance from family/friend.    Objective:     Communicated with RN prior to session.  Patient found HOB elevated with peripheral IV, telemetry, pulse ox (continuous), wound vac, PureWick  upon PT entry to room.    General Precautions: Standard, fall   Orthopedic Precautions:N/A   Braces: N/A     Exams:  · Cognitive Exam:  Patient is oriented to Person, Place, Time and Situation  · RLE ROM: WFL  · RLE Strength: WFL  · LLE ROM: WFL  · LLE Strength: not formally tested d/t increased pain (at least 3/5)    Functional Mobility:  · Bed Mobility:     · Rolling Left:  stand by assistance  · Scooting: stand by assistance  · Supine to Sit: contact guard assistance  · Sit to Supine: contact guard assistance  · Transfers:     · Sit to Stand:  contact guard assistance with hand-held assist  · Gait: 5 steps L/R x2 c HHAx1 CGA   · demo decreased stance on LLE, flat foot contact, mild unsteadiness and c/o increased LLE pain  · Balance: sitting (S); standing (CGA)      Therapeutic Activities and Exercises:  Pt educated on: PT role/POC; safety c mobility; benefits of OOB activities; performing therex; d/c recs - v/u  -sat EOB x4mins  -sit<>stand from bed 5x  -therex (LAQ, hip flex, AP, QS)  -repositioned in bed for comfort (refused [facility] stating MD ordered pt not to sit up for extended time d/t incision and wound vac)    AM-PAC 6 CLICK MOBILITY  Total Score:17     Patient left HOB elevated with all lines intact, call button in reach and RN notified.    GOALS:   Multidisciplinary Problems     Physical Therapy Goals        Problem:  Physical Therapy Goal    Goal Priority Disciplines Outcome Goal Variances Interventions   Physical Therapy Goal     PT, PT/OT Ongoing, Progressing     Description:  Goals to be met by: 3/7/2020     Patient will increase functional independence with mobility by performin. Supine to sit with Modified [place]  2. Sit to supine with Modified [place]  3. Sit to stand transfer with Supervision  4. Bed to chair transfer with Supervision c LRAD  5. Gait  x 150 feet with Supervision c LRAD  6. 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 Rose    DENTAL SURGERY  2017    ENDARTERECTOMY OF FEMORAL ARTERY Left 2020    Procedure: ENDARTERECTOMY, FEMORAL;  Surgeon: Olayinka Harris MD;  Location: [facility] OR [address] [place] [address];  Service: Peripheral Vascular;  Laterality: Left;  SFA endarterectomy  fluoro time: 9.2 min mGy: 2336.86    EPIDURAL STEROID INJECTION INTO LUMBAR SPINE N/A 2020    Procedure: Injection-steroid-epidural-lumbar--Review MRI- L5-S1 after MRI review;  Surgeon: Ely Syed Jr., MD;  Location: [facility] PAIN MGT;  Service: Pain Management;  Laterality: N/A;    gyn surgery      ex laparotomy    HYSTERECTOMY         Time Tracking:     PT Received On: 20  PT Start Time: 1119     PT Stop  Time: 1150  PT Total Time (min): 31 min     Billable Minutes: Evaluation 15 min and Therapeutic Activity 10 min      Tevin Delgadillo, PT  02/18/2020

## 2020-02-18 NOTE — PROGRESS NOTES
[facility]  Vascular Surgery  Progress Note    Patient Name: Rachel Murray  MRN: [id number]  Admission Date: 2/17/2020  Primary Care Provider: Anel Parikh MD    Subjective:     Interval History: No events overnight. Explained operative findings to patient. Pain well controlled. No N/V.     Post-Op Info:  Procedure(s) (LRB):  ENDARTERECTOMY, FEMORAL (Left)  PTA, PROFUNDA FEMORAL (Left)  AORTOGRAM (N/A)  ANGIOGRAM, EXTREMITY, UNILATERAL (Right)   1 Day Post-Op       Medications:  Continuous Infusions:  Scheduled Meds:   aspirin  81 mg Oral Daily    gabapentin  100 mg Oral TID    losartan-hydrochlorothiazide 50-12.5 mg  1 tablet Oral Daily    methocarbamol  500 mg Oral QID    mupirocin   Nasal BID     PRN Meds:HYDROcodone-acetaminophen, HYDROmorphone, ondansetron, sodium chloride 0.9%     Objective:     Vital Signs (Most Recent):  Temp: 98.7 °F (37.1 °C) (02/18/20 0436)  Pulse: 89 (02/18/20 0436)  Resp: 13 (02/18/20 0436)  BP: 95/62 (02/18/20 0436)  SpO2: 96 % (02/18/20 0436) Vital Signs (24h Range):  Temp:  [97.3 °F (36.3 °C)-99 °F (37.2 °C)] 98.7 °F (37.1 °C)  Pulse:  [] 89  Resp:  [12-20] 13  SpO2:  [92 %-100 %] 96 %  BP: ()/(52-72) 95/62         Physical Exam   Cardiovascular: Normal rate and regular rhythm.   Pulmonary/Chest: Effort normal.   Abdominal: Soft.   Vascular: L groin Prevena   R groin access site soft   LLE DP/PT biphasic   RLE DP/PT biphasic    Significant Labs:  CBC / BMP pending    Significant Diagnostics:  I have reviewed all pertinent imaging results/findings within the past 24 hours.    Assessment/Plan:     PAD (peripheral artery disease)  69 y.o. female with critical limb ischemia with rest pain    S/p L EIA, L CFA endarterectomy, L profundaplasty, JESU balloon angioplasty (6x60mm ceci) 2/18/20    PRN pain control  Diet as tolerated  Voiding trial  Out of bed, ambulate, PT, OT        Luis Enrique Glover MD  Vascular Surgery  [facility]  [place]

## 2020-02-18 NOTE — PLAN OF CARE
Problem: Physical Therapy Goal  Goal: Physical Therapy Goal  Description  Goals to be met by: 3/7/2020     Patient will increase functional independence with mobility by performin. Supine to sit with Modified [place]  2. Sit to supine with Modified [place]  3. Sit to stand transfer with Supervision  4. Bed to chair transfer with Supervision  5. Gait  x 150 feet with Supervision  6. Ascend/Descend 4 inch curb step with Supervision     Outcome: Ongoing, Progressing  Eval completed and POC established    Tevin Delgadillo PT,DPT  2020

## 2020-02-18 NOTE — PLAN OF CARE
Plan of care reviewed with patient. Patient verbalized understanding. Patient is oriented x4. Patient remained on 2LNC. Patient is SL. Patient A-line remained for blood draws. Patient bilateral groin surgery cuts remained intact the LT groin remained to [place]. Patient tolerated regular diet. Patient received prn pain medication per mar. Patient saenz remained intact throughout shift. Patient remained on tele NSR-- ST. Patient NV checks were done q2 w/o any issues. Patient remained free of any falls or acute events. VSS, WCTM

## 2020-02-18 NOTE — SUBJECTIVE & OBJECTIVE
Medications:  Continuous Infusions:  Scheduled Meds:   aspirin  81 mg Oral Daily    gabapentin  100 mg Oral TID    losartan-hydrochlorothiazide 50-12.5 mg  1 tablet Oral Daily    methocarbamol  500 mg Oral QID    mupirocin   Nasal BID     PRN Meds:HYDROcodone-acetaminophen, HYDROmorphone, ondansetron, sodium chloride 0.9%     Objective:     Vital Signs (Most Recent):  Temp: 98.7 °F (37.1 °C) (02/18/20 0436)  Pulse: 89 (02/18/20 0436)  Resp: 13 (02/18/20 0436)  BP: 95/62 (02/18/20 0436)  SpO2: 96 % (02/18/20 0436) Vital Signs (24h Range):  Temp:  [97.3 °F (36.3 °C)-99 °F (37.2 °C)] 98.7 °F (37.1 °C)  Pulse:  [] 89  Resp:  [12-20] 13  SpO2:  [92 %-100 %] 96 %  BP: ()/(52-72) 95/62         Physical Exam   Cardiovascular: Normal rate and regular rhythm.   Pulmonary/Chest: Effort normal.   Abdominal: Soft.   Vascular: L groin Prevena   R groin access site soft   LLE DP/PT biphasic   RLE DP/PT biphasic    Significant Labs:  CBC / BMP pending    Significant Diagnostics:  I have reviewed all pertinent imaging results/findings within the past 24 hours.

## 2020-02-18 NOTE — PROGRESS NOTES
0846 Dr. Cleary notified of Ca 6.2 and K 3.4, read back results. RN received call from Scottie in lab at 2032

## 2020-02-18 NOTE — BRIEF OP NOTE
[facility]  Brief Operative Note    SUMMARY     Surgery Date: 2/17/2020     Surgeon(s) and Role:     * Olayinka Harris MD - Primary     * Angella Connolly MD - Fellow    Assisting Surgeon: None    Pre-op Diagnosis:  Poor peripheral circulation [R09.89]    Post-op Diagnosis:  Post-Op Diagnosis Codes:     * Poor peripheral circulation [R09.89]    Procedure  1. L CFA endarterectomy  2. L SFA endarterectomy  3. L PFA angioplasty  4. Aortogram  5. L EIA angioplasty (6x60 [place])    Anesthesia: General/MAC    Description of Procedure: Good back bleeding from profunda. Mild backbleeding from SFA. Strong pulse in L CFA after endarterectomy    Description of the findings of the procedure: as above    Estimated Blood Loss: 50         Specimens:   Specimen (12h ago, onward)    None

## 2020-02-18 NOTE — ASSESSMENT & PLAN NOTE
69 y.o. female with critical limb ischemia with rest pain    S/p L EIA, L CFA endarterectomy, L profundaplasty, JESU balloon angioplasty (6x60mm ceci) 2/18/20    PRN pain control  Diet as tolerated  Voiding trial  Out of bed, ambulate, PT, OT

## 2020-02-18 NOTE — CARE UPDATE
Rapid Response Nurse AI Alert     AI alert received, chart check completed abnormal VS noted. Bedside RNHannah contacted, no concerns verbalized at this time, instructed to call [phone] for further concerns or assistance.

## 2020-02-18 NOTE — NURSING TRANSFER
Nursing Transfer Note      2/17/2020     Transfer To: 1023A    Transfer via bed    Transfer with 2 L NC to O2, cardiac monitoring    Transported by RN and PCT    Medicines sent: Yes    Chart send with patient: Yes    Notified: son    Patient reassessed at: 8771 2/17/20

## 2020-02-18 NOTE — PROGRESS NOTES
Dr. Mcguire notified of  and burning pain to left leg, Pain 9/10. Orders received to give another 200 mg gabapentin po.

## 2020-02-18 NOTE — PLAN OF CARE
Patient is a 69 year old female admitted from home 2/17/2020 and underwent Left CFA & SFA Endarterectomy, Left PFA Angioplasty, AOgram, Left External IA Angioplasty.  Patient is expected to discharge home 2/21/2020 with no needs.    Patient lives alone in a one story home.  Patient stated her friend, Angy, will drive her home and obtain anything she needs along with her sons.  [facility] given to patient with understanding verbalized.  CM name and number written on white board in patient's room with request to call for any questions and concerns.  Will continue to follow for needs.    PCP  Anel Parikh MD  [address] / [place] [address]  [phone]      [facility] #[id number] - [place] - [address] [facility] [address]  [address] [place] [address]  Phone: [phone] Fax: [phone]      Extended Emergency Contact Information  Primary Emergency Contact: John Paul Oneil  Address: [address] [place] [address] [place] [employer] [place]  Mobile Phone: [phone]  Relation: Son  Secondary Emergency Contact: Pacheco Murray   [place]  Mobile Phone: [phone]  Relation: Son       02/18/20 1545   Discharge Assessment   Assessment Type Discharge Planning Assessment   Confirmed/corrected address and phone number on facesheet? Yes   Assessment information obtained from? Patient   Expected Length of Stay (days) 4   Communicated expected length of stay with patient/caregiver yes   Prior to hospitilization cognitive status: Alert/Oriented   Prior to hospitalization functional status: Independent   Current cognitive status: Alert/Oriented   Current Functional Status: Assistive Equipment;Independent;Needs Assistance   Lives With alone   Able to Return to Prior Arrangements yes   Is patient able to care for self after discharge? Yes   Who are your caregiver(s) and their phone number(s)? friend   Patient's  perception of discharge disposition home or selfcare   Equipment Currently Used at Home cane, straight;bedside commode;walker, rolling   Do you have any problems affording any of your prescribed medications? No   Is the patient taking medications as prescribed? yes   Does the patient have transportation home? Yes   Transportation Anticipated family or friend will provide   Discharge Plan A Home   Discharge Plan B Home;Home Health   DME Needed Upon Discharge  none   Patient/Family in Agreement with Plan yes

## 2020-02-18 NOTE — PLAN OF CARE
[facility]    HOME HEALTH ORDERS  FACE TO FACE ENCOUNTER    Patient Name: Rachel Murray  YOB: 1950    PCP: Anel Parikh MD   PCP Address: [address] [place] [address]  PCP Phone Number: [phone]  PCP Fax: [phone]    Encounter Date: 02/18/2020    Admit to Home Health    Diagnoses:  Active Hospital Problems    Diagnosis  POA    PAD (peripheral artery disease) [I73.9]  Yes      Resolved Hospital Problems   No resolved problems to display.       No future appointments.        I have seen and examined this patient face to face today. My clinical findings that support the need for the home health skilled services and home bound status are the following:  Weakness/numbness causing balance and gait disturbance due to Surgery making it taxing to leave home.    Allergies:  Review of patient's allergies indicates:   Allergen Reactions    Adhesive Blisters    Ciprofloxacin Hives    Latex, natural rubber Blisters       Diet: regular diet    Activities: activity as tolerated    Nursing:   SN to complete comprehensive assessment including routine vital signs. Instruct on disease process and s/s of complications to report to MD. Review/verify medication list sent home with the patient at time of discharge  and instruct patient/caregiver as needed. Frequency may be adjusted depending on start of care date. If patient has enteral feeding tube (NG, PEG, J-tube, G-tube), flush tube before and after feeding and/or medication administration with 20-30 mL of water.    Notify MD if SBP > 160 or < 90; DBP > 90 or < 50; HR > 120 or < 50; Temp > 101;        CONSULTS:    Physical Therapy to evaluate and treat. Evaluate for home safety and equipment needs; Establish/upgrade home exercise program. Perform / instruct on therapeutic exercises, gait training, transfer training, and Range of Motion.  Occupational Therapy to evaluate and treat. Evaluate home environment for  safety and equipment needs. Perform/Instruct on transfers, ADL training, ROM, and therapeutic exercises.    MISCELLANEOUS CARE:  N/A    WOUND CARE ORDERS  no      Medications: Review discharge medications with patient and family and provide education.      Current Discharge Medication List      CONTINUE these medications which have NOT CHANGED    Details   acetaminophen (TYLENOL) 500 MG tablet Take 2 tablets (1,000 mg total) by mouth 3 (three) times daily.  Refills: 0    Associated Diagnoses: Lumbar radiculopathy; Chronic bilateral low back pain with left-sided sciatica      aspirin 325 MG tablet Take 325 mg by mouth once daily.      !! candesartan-hydrochlorothiazide (ATACAND HCT) 16-12.5 mg per tablet Take 1 tablet by mouth once daily.  Qty: 30 tablet, Refills: 3      gabapentin (NEURONTIN) 100 MG capsule Take 1 capsule (100 mg total) by mouth 3 (three) times daily.  Qty: 90 capsule, Refills: 1    Associated Diagnoses: Lumbar radiculopathy; Chronic bilateral low back pain with left-sided sciatica      HYDROcodone-acetaminophen (NORCO) 5-325 mg per tablet Take 1 tablet by mouth every 6 (six) hours as needed for Pain (every 4 - 6 hours prn moderate to severe pain).  Qty: 40 tablet, Refills: 0    Comments: Quantity prescribed more than 7 day supply? Yes, quantity medically necessary  yes      !! candesartan-hydrochlorothiazide (ATACAND HCT) 16-12.5 mg per tablet TAKE 1 TABLET BY MOUTH EVERY DAY  Qty: 30 tablet, Refills: 3      !! candesartan-hydrochlorothiazide (ATACAND HCT) 16-12.5 mg per tablet TAKE 1 TABLET BY MOUTH EVERY DAY  Qty: 30 tablet, Refills: 3    Associated Diagnoses: Essential hypertension      diclofenac (VOLTAREN) 75 MG EC tablet Take 1 tablet (75 mg total) by mouth 2 (two) times daily.  Qty: 60 tablet, Refills: 0    Associated Diagnoses: Lumbar radiculopathy; Chronic bilateral low back pain with left-sided sciatica       !! - Potential duplicate medications found. Please discuss with provider.           I certify that this patient is confined to her home and needs physical therapy and occupational therapy.    Poppy Cleary MD  General Surgery, PGY-I  Pager: [phone]  2/18/2020 4:10 PM

## 2020-02-19 LAB
ANION GAP SERPL CALC-SCNC: 7 MMOL/L (ref 8–16)
BASOPHILS # BLD AUTO: 0.05 K/UL (ref 0–0.2)
BASOPHILS NFR BLD: 0.4 % (ref 0–1.9)
BUN SERPL-MCNC: 14 MG/DL (ref 8–23)
CALCIUM SERPL-MCNC: 8.8 MG/DL (ref 8.7–10.5)
CHLORIDE SERPL-SCNC: 101 MMOL/L (ref 95–110)
CO2 SERPL-SCNC: 27 MMOL/L (ref 23–29)
CREAT SERPL-MCNC: 0.8 MG/DL (ref 0.5–1.4)
DIFFERENTIAL METHOD: ABNORMAL
EOSINOPHIL # BLD AUTO: 0.1 K/UL (ref 0–0.5)
EOSINOPHIL NFR BLD: 0.4 % (ref 0–8)
ERYTHROCYTE [DISTWIDTH] IN BLOOD BY AUTOMATED COUNT: 14.9 % (ref 11.5–14.5)
EST. GFR  (AFRICAN AMERICAN): >60 ML/MIN/1.73 M^2
EST. GFR  (NON AFRICAN AMERICAN): >60 ML/MIN/1.73 M^2
GLUCOSE SERPL-MCNC: 125 MG/DL (ref 70–110)
HCT VFR BLD AUTO: 32.2 % (ref 37–48.5)
HGB BLD-MCNC: 10.3 G/DL (ref 12–16)
IMM GRANULOCYTES # BLD AUTO: 0.07 K/UL (ref 0–0.04)
IMM GRANULOCYTES NFR BLD AUTO: 0.5 % (ref 0–0.5)
LYMPHOCYTES # BLD AUTO: 1.8 K/UL (ref 1–4.8)
LYMPHOCYTES NFR BLD: 14.3 % (ref 18–48)
MCH RBC QN AUTO: 29 PG (ref 27–31)
MCHC RBC AUTO-ENTMCNC: 32 G/DL (ref 32–36)
MCV RBC AUTO: 91 FL (ref 82–98)
MONOCYTES # BLD AUTO: 1 K/UL (ref 0.3–1)
MONOCYTES NFR BLD: 7.4 % (ref 4–15)
NEUTROPHILS # BLD AUTO: 9.9 K/UL (ref 1.8–7.7)
NEUTROPHILS NFR BLD: 77 % (ref 38–73)
NRBC BLD-RTO: 0 /100 WBC
PLATELET # BLD AUTO: 240 K/UL (ref 150–350)
PMV BLD AUTO: 9.2 FL (ref 9.2–12.9)
POTASSIUM SERPL-SCNC: 3.9 MMOL/L (ref 3.5–5.1)
RBC # BLD AUTO: 3.55 M/UL (ref 4–5.4)
SODIUM SERPL-SCNC: 135 MMOL/L (ref 136–145)
WBC # BLD AUTO: 12.9 K/UL (ref 3.9–12.7)

## 2020-02-19 PROCEDURE — 94640 AIRWAY INHALATION TREATMENT: CPT

## 2020-02-19 PROCEDURE — 27000221 HC OXYGEN, UP TO 24 HOURS

## 2020-02-19 PROCEDURE — 85025 COMPLETE CBC W/AUTO DIFF WBC: CPT

## 2020-02-19 PROCEDURE — 94761 N-INVAS EAR/PLS OXIMETRY MLT: CPT

## 2020-02-19 PROCEDURE — 36415 COLL VENOUS BLD VENIPUNCTURE: CPT

## 2020-02-19 PROCEDURE — 63600175 PHARM REV CODE 636 W HCPCS: Performed by: STUDENT IN AN ORGANIZED HEALTH CARE EDUCATION/TRAINING PROGRAM

## 2020-02-19 PROCEDURE — 25000003 PHARM REV CODE 250: Performed by: STUDENT IN AN ORGANIZED HEALTH CARE EDUCATION/TRAINING PROGRAM

## 2020-02-19 PROCEDURE — 25000242 PHARM REV CODE 250 ALT 637 W/ HCPCS: Performed by: SURGERY

## 2020-02-19 PROCEDURE — 97116 GAIT TRAINING THERAPY: CPT

## 2020-02-19 PROCEDURE — 20600001 HC STEP DOWN PRIVATE ROOM

## 2020-02-19 PROCEDURE — 97530 THERAPEUTIC ACTIVITIES: CPT

## 2020-02-19 PROCEDURE — 80048 BASIC METABOLIC PNL TOTAL CA: CPT

## 2020-02-19 RX ADMIN — MUPIROCIN: 20 OINTMENT TOPICAL at 09:02

## 2020-02-19 RX ADMIN — METHOCARBAMOL TABLETS 500 MG: 500 TABLET, COATED ORAL at 09:02

## 2020-02-19 RX ADMIN — HYDROCODONE BITARTRATE AND ACETAMINOPHEN 1 TABLET: 5; 325 TABLET ORAL at 09:02

## 2020-02-19 RX ADMIN — GABAPENTIN 100 MG: 100 CAPSULE ORAL at 09:02

## 2020-02-19 RX ADMIN — HYDROCODONE BITARTRATE AND ACETAMINOPHEN 1 TABLET: 5; 325 TABLET ORAL at 07:02

## 2020-02-19 RX ADMIN — HYDROMORPHONE HYDROCHLORIDE 1 MG: 1 INJECTION, SOLUTION INTRAMUSCULAR; INTRAVENOUS; SUBCUTANEOUS at 09:02

## 2020-02-19 RX ADMIN — IPRATROPIUM BROMIDE AND ALBUTEROL SULFATE 3 ML: .5; 3 SOLUTION RESPIRATORY (INHALATION) at 01:02

## 2020-02-19 RX ADMIN — ASPIRIN 81 MG: 81 TABLET, COATED ORAL at 09:02

## 2020-02-19 RX ADMIN — IPRATROPIUM BROMIDE AND ALBUTEROL SULFATE 3 ML: .5; 3 SOLUTION RESPIRATORY (INHALATION) at 07:02

## 2020-02-19 RX ADMIN — IPRATROPIUM BROMIDE AND ALBUTEROL SULFATE 3 ML: .5; 3 SOLUTION RESPIRATORY (INHALATION) at 08:02

## 2020-02-19 RX ADMIN — METHOCARBAMOL TABLETS 500 MG: 500 TABLET, COATED ORAL at 01:02

## 2020-02-19 RX ADMIN — HYDROMORPHONE HYDROCHLORIDE 1 MG: 1 INJECTION, SOLUTION INTRAMUSCULAR; INTRAVENOUS; SUBCUTANEOUS at 01:02

## 2020-02-19 RX ADMIN — HYDROCODONE BITARTRATE AND ACETAMINOPHEN 1 TABLET: 5; 325 TABLET ORAL at 05:02

## 2020-02-19 RX ADMIN — HYDROCODONE BITARTRATE AND ACETAMINOPHEN 1 TABLET: 5; 325 TABLET ORAL at 12:02

## 2020-02-19 RX ADMIN — HYDROCODONE BITARTRATE AND ACETAMINOPHEN 1 TABLET: 5; 325 TABLET ORAL at 01:02

## 2020-02-19 RX ADMIN — HYDROMORPHONE HYDROCHLORIDE 1 MG: 1 INJECTION, SOLUTION INTRAMUSCULAR; INTRAVENOUS; SUBCUTANEOUS at 07:02

## 2020-02-19 RX ADMIN — HYDROMORPHONE HYDROCHLORIDE 1 MG: 1 INJECTION, SOLUTION INTRAMUSCULAR; INTRAVENOUS; SUBCUTANEOUS at 03:02

## 2020-02-19 RX ADMIN — METHOCARBAMOL TABLETS 500 MG: 500 TABLET, COATED ORAL at 05:02

## 2020-02-19 RX ADMIN — LOSARTAN POTASSIUM AND HYDROCHLOROTHIAZIDE 1 TABLET: 50; 12.5 TABLET, FILM COATED ORAL at 09:02

## 2020-02-19 NOTE — PT/OT/SLP PROGRESS
Physical Therapy Treatment    Patient Name:  Rachel Murray   MRN:  [id number]    Recommendations:     Discharge Recommendations:  home health PT   Discharge Equipment Recommendations: none       Assessment:     Rachel Murray is a 69 y.o. female admitted with a medical diagnosis of PAD.  She presents with the following impairments/functional limitations:  impaired endurance, impaired functional mobilty, pain, gait instability. Pt with good tolerance to session. Pt able to ambulate a farther distance on this date using a RW and step to gait pattern. Pt with good tolerance to ROM of L LE.     Rehab Prognosis: Good; patient would benefit from acute skilled PT services to address these deficits and reach maximum level of function.    Recent Surgery: Procedure(s) (LRB):  ENDARTERECTOMY, FEMORAL (Left)  PTA, PROFUNDA FEMORAL (Left)  AORTOGRAM (N/A)  ANGIOGRAM, EXTREMITY, UNILATERAL (Right) 2 Days Post-Op    Plan:     During this hospitalization, patient to be seen 3 x/week to address the identified rehab impairments via gait training, therapeutic activities, therapeutic exercises, neuromuscular re-education and progress toward the following goals:    · Plan of Care Expires:  03/18/20    Subjective     Chief Complaint: pain   Patient/Family Comments/goals: to get better and return home   Pain/Comfort:  · Pain Rating 1: (L LE pain; unrated)  · Pain Addressed 1: Reposition, Distraction      Objective:     Communicated with RN prior to session.  Patient found HOB elevated with telemetry, pulse ox (continuous), blood pressure cuff, oxygen, peripheral IV, PureWick upon PT entry to room.     General Precautions: Standard, fall   Orthopedic Precautions:N/A   Braces: N/A     Functional Mobility:  · Bed Mobility:     · Scooting: contact guard assistance  · Supine to Sit: moderate assistance  · Sit to Supine: minimum assistance  · Transfers:     · Sit to Stand:  contact guard assistance with rolling walker from EOB   · Gait: 20ft with  CGA progressing to SBA using a RW with step to gait pattern  · No LOB  · No SOB  · Pain present in L LE  · Cuing to increase R LE step length to create step to pattern      AM-PAC 6 CLICK MOBILITY  Turning over in bed (including adjusting bedclothes, sheets and blankets)?: 3  Sitting down on and standing up from a chair with arms (e.g., wheelchair, bedside commode, etc.): 3  Moving from lying on back to sitting on the side of the bed?: 3  Moving to and from a bed to a chair (including a wheelchair)?: 3  Need to walk in hospital room?: 3  Climbing 3-5 steps with a railing?: 2  Basic Mobility Total Score: 17       Therapeutic Activities and Exercises:  Educated pt on PT role/POC  Educated pt on importance of OOB activity and daily ambulation   Pt verbalized understanding     Gentle ROM performed on L ankle and L knee x 10 reps to prevent stiffness and guarding     Patient left HOB elevated with all lines intact, call button in reach and RN notified..    GOALS:   Multidisciplinary Problems     Physical Therapy Goals        Problem: Physical Therapy Goal    Goal Priority Disciplines Outcome Goal Variances Interventions   Physical Therapy Goal     PT, PT/OT Ongoing, Progressing     Description:  Goals to be met by: 3/7/2020     Patient will increase functional independence with mobility by performin. Supine to sit with Modified [place]  2. Sit to supine with Modified [place]  3. Sit to stand transfer with Supervision  4. Bed to chair transfer with Supervision c LRAD  5. Gait  x 150 feet with Supervision c LRAD  6. Ascend/Descend 4 inch curb step with Supervision c LRAD                       Time Tracking:     PT Received On: 20  PT Start Time: 1330     PT Stop Time: 1400  PT Total Time (min): 30 min     Billable Minutes: Gait Training 15 and Therapeutic Activity 8    Treatment Type: Treatment  PT/PTA: PT           Bernice Graham, PT, DPT  2020  [phone]

## 2020-02-19 NOTE — ASSESSMENT & PLAN NOTE
69 y.o. female with critical limb ischemia with rest pain    S/p L EIA, L CFA endarterectomy, L profundaplasty, JESU balloon angioplasty (6x60mm ceci) 2/18/20    PRN pain control  Diet as tolerated  Out of bed, ambulate, PT, OT  Wean oxygen as tolerated, IS, pulmonary toilet    Dispo: will need home health PT and OT, orders placed

## 2020-02-19 NOTE — NURSING
VSS on 2 liters nasal cannula. Telemetry box in place, NST per monitor. Neurovascular checks to pedal pulses done every 2 hours per Doppler. LLE edematous with redness noted. LLE mobility impaired at baseline with significant pain reported. Prevena to left groin incision c/d/i/ Right incisional groin c/d/i to [place] chen. Mobility at baseline is 2 person assistance with walker. She can assist with turning in the bed with wedge every 2 hours. Tolerating Regular diet well. Medicated frequently for pain. Purewick in place with CYU noted and measured. Patient moved to room 1012 with all personal belongings. Report given to KANDIS Kaiser who will continue to monitor.

## 2020-02-19 NOTE — SUBJECTIVE & OBJECTIVE
Medications:  Continuous Infusions:  Scheduled Meds:   albuterol-ipratropium  3 mL Nebulization Q6H WAKE    aspirin  81 mg Oral Daily    gabapentin  100 mg Oral TID    losartan-hydrochlorothiazide 50-12.5 mg  1 tablet Oral Daily    methocarbamol  500 mg Oral QID    mupirocin   Nasal BID     PRN Meds:HYDROcodone-acetaminophen, HYDROmorphone, influenza, ondansetron, sodium chloride 0.9%     Objective:     Vital Signs (Most Recent):  Temp: 98.9 °F (37.2 °C) (02/19/20 1100)  Pulse: (!) 111 (02/19/20 1322)  Resp: 14 (02/19/20 1322)  BP: (!) 152/68 (02/19/20 1100)  SpO2: (!) 91 % (02/19/20 1322) Vital Signs (24h Range):  Temp:  [97.6 °F (36.4 °C)-99.2 °F (37.3 °C)] 98.9 °F (37.2 °C)  Pulse:  [] 111  Resp:  [13-25] 14  SpO2:  [91 %-92 %] 91 %  BP: (105-152)/(58-80) 152/68     Date 02/19/20 0700 - 02/20/20 0659   Shift 4597-8813 3517-1527 7684-9560 24 Hour Total   INTAKE   P.O. 840   840   Shift Total(mL/kg) 840(11.2)   840(11.2)   OUTPUT   Urine(mL/kg/hr) 850   850   Other 0   0   Shift Total(mL/kg) 850(11.4)   850(11.4)   Weight (kg) 74.8 74.8 74.8 74.8       Physical Exam   Constitutional: She is oriented to person, place, and time. She appears well-developed and well-nourished.   HENT:   Head: Normocephalic and atraumatic.   Eyes: EOM are normal.   Cardiovascular: Normal rate and regular rhythm.   LLE DP/PT biphasic  RLE DP/PT biphasic   Pulmonary/Chest: Effort normal. No respiratory distress.   Abdominal: Soft. She exhibits no distension.   Musculoskeletal: Normal range of motion.   L groin Prevena  R groin access site soft   Neurological: She is alert and oriented to person, place, and time.   Skin: Skin is warm and dry.   Vitals reviewed.      Significant Labs:  CBC:   Recent Labs   Lab 02/19/20  0521   WBC 12.90*   RBC 3.55*   HGB 10.3*   HCT 32.2*      MCV 91   MCH 29.0   MCHC 32.0     CMP:   Recent Labs   Lab 02/19/20  0521   *   CALCIUM 8.8   *   K 3.9   CO2 27      BUN 14    CREATININE 0.8       Significant Diagnostics:  I have reviewed all pertinent imaging results/findings within the past 24 hours.

## 2020-02-19 NOTE — PLAN OF CARE
POC reviewed with pt. AAOx4. VSS on 2L NC. Sinus rhythm - sinus tach  bpm, no ectopy noted on tele. Pt weaned to 1L earlier, at 1800 RN noticied pt with NC on but no oxygen flow, O2 85%; pt denied SOB. Per pt, MD from team that rounded turned O2 off. Dr. Venegas-on call, notified. Pt placed on 2L sating 91%. Encouraging IS use. Voiding post saenz cath with purewick due to frequency and urgency. + doppler pulses BLE, continues to have numbness/tingling to LLE (same as before surgery). Tolerating diet. LBM 2/16/20. Turning well in bed. OOB with walker to bedside commode. Pain meds Q2H with po and IV. Using call light appropriately. Heel protector boots on.    Wounds:  R groin with gauze and tape, no drainage/swelling/redness  L groin with mini wound vac, unable to access drainage  L inner thigh redness, blanchable, blistered, painful  Bruising BUE

## 2020-02-19 NOTE — PROGRESS NOTES
[facility]  Vascular Surgery  Progress Note    Patient Name: Rachel Murray  MRN: [id number]  Admission Date: 2/17/2020  Primary Care Provider: Anel Parikh MD    Subjective:     Interval History: NAEON. Tolerating a diet without N/V. Attempted some ambulation with PT yesterday. Pain well controlled. No complaints.     Post-Op Info:  Procedure(s) (LRB):  ENDARTERECTOMY, FEMORAL (Left)  PTA, PROFUNDA FEMORAL (Left)  AORTOGRAM (N/A)  ANGIOGRAM, EXTREMITY, UNILATERAL (Right)   2 Days Post-Op       Medications:  Continuous Infusions:  Scheduled Meds:   albuterol-ipratropium  3 mL Nebulization Q6H WAKE    aspirin  81 mg Oral Daily    gabapentin  100 mg Oral TID    losartan-hydrochlorothiazide 50-12.5 mg  1 tablet Oral Daily    methocarbamol  500 mg Oral QID    mupirocin   Nasal BID     PRN Meds:HYDROcodone-acetaminophen, HYDROmorphone, influenza, ondansetron, sodium chloride 0.9%     Objective:     Vital Signs (Most Recent):  Temp: 98.9 °F (37.2 °C) (02/19/20 1100)  Pulse: (!) 111 (02/19/20 1322)  Resp: 14 (02/19/20 1322)  BP: (!) 152/68 (02/19/20 1100)  SpO2: (!) 91 % (02/19/20 1322) Vital Signs (24h Range):  Temp:  [97.6 °F (36.4 °C)-99.2 °F (37.3 °C)] 98.9 °F (37.2 °C)  Pulse:  [] 111  Resp:  [13-25] 14  SpO2:  [91 %-92 %] 91 %  BP: (105-152)/(58-80) 152/68     Date 02/19/20 0700 - 02/20/20 0659   Shift 7003-2571 1156-9884 0884-7140 24 Hour Total   INTAKE   P.O. 840   840   Shift Total(mL/kg) 840(11.2)   840(11.2)   OUTPUT   Urine(mL/kg/hr) 850   850   Other 0   0   Shift Total(mL/kg) 850(11.4)   850(11.4)   Weight (kg) 74.8 74.8 74.8 74.8       Physical Exam   Constitutional: She is oriented to person, place, and time. She appears well-developed and well-nourished.   HENT:   Head: Normocephalic and atraumatic.   Eyes: EOM are normal.   Cardiovascular: Normal rate and regular rhythm.   LLE DP/PT biphasic  RLE DP/PT biphasic   Pulmonary/Chest: Effort normal. No respiratory distress.    Abdominal: Soft. She exhibits no distension.   Musculoskeletal: Normal range of motion.   L groin Prevena  R groin access site soft   Neurological: She is alert and oriented to person, place, and time.   Skin: Skin is warm and dry.   Vitals reviewed.      Significant Labs:  CBC:   Recent Labs   Lab 02/19/20  0521   WBC 12.90*   RBC 3.55*   HGB 10.3*   HCT 32.2*      MCV 91   MCH 29.0   MCHC 32.0     CMP:   Recent Labs   Lab 02/19/20  0521   *   CALCIUM 8.8   *   K 3.9   CO2 27      BUN 14   CREATININE 0.8       Significant Diagnostics:  I have reviewed all pertinent imaging results/findings within the past 24 hours.    Assessment/Plan:     PAD (peripheral artery disease)  69 y.o. female with critical limb ischemia with rest pain    S/p L EIA, L CFA endarterectomy, L profundaplasty, JESU balloon angioplasty (6x60mm mustang) 2/18/20    PRN pain control  Diet as tolerated  Out of bed, ambulate, PT, OT  Wean oxygen as tolerated, IS, pulmonary toilet    Dispo: will need home health PT and OT, orders placed        Poppy Cleary MD  Vascular Surgery  [facility]

## 2020-02-19 NOTE — PLAN OF CARE
Plan of care reviewed with patient. Patient verbalized understanding. Patient is oriented x4. Patient bilateral groin surgical punctures remained intact with dressings. Patient NV checks were done q2 per doppler. Patient tolerated regular diet. Patient was placed on puriwic for frequent urgency for urine output. Patient remained on tele with NSR-STach. Patient remained on 2LNC for adequate oxygenation. Patient remained free of any falls or acute events. VSS, WCTM

## 2020-02-20 LAB
ANION GAP SERPL CALC-SCNC: 7 MMOL/L (ref 8–16)
BASOPHILS # BLD AUTO: 0.06 K/UL (ref 0–0.2)
BASOPHILS NFR BLD: 0.5 % (ref 0–1.9)
BUN SERPL-MCNC: 15 MG/DL (ref 8–23)
CALCIUM SERPL-MCNC: 9.2 MG/DL (ref 8.7–10.5)
CHLORIDE SERPL-SCNC: 99 MMOL/L (ref 95–110)
CO2 SERPL-SCNC: 28 MMOL/L (ref 23–29)
CREAT SERPL-MCNC: 0.8 MG/DL (ref 0.5–1.4)
DIFFERENTIAL METHOD: ABNORMAL
EOSINOPHIL # BLD AUTO: 0.1 K/UL (ref 0–0.5)
EOSINOPHIL NFR BLD: 0.7 % (ref 0–8)
ERYTHROCYTE [DISTWIDTH] IN BLOOD BY AUTOMATED COUNT: 14.9 % (ref 11.5–14.5)
EST. GFR  (AFRICAN AMERICAN): >60 ML/MIN/1.73 M^2
EST. GFR  (NON AFRICAN AMERICAN): >60 ML/MIN/1.73 M^2
GLUCOSE SERPL-MCNC: 125 MG/DL (ref 70–110)
HCT VFR BLD AUTO: 32.9 % (ref 37–48.5)
HGB BLD-MCNC: 10.4 G/DL (ref 12–16)
IMM GRANULOCYTES # BLD AUTO: 0.11 K/UL (ref 0–0.04)
IMM GRANULOCYTES NFR BLD AUTO: 0.9 % (ref 0–0.5)
LYMPHOCYTES # BLD AUTO: 1.9 K/UL (ref 1–4.8)
LYMPHOCYTES NFR BLD: 14.7 % (ref 18–48)
MCH RBC QN AUTO: 28.7 PG (ref 27–31)
MCHC RBC AUTO-ENTMCNC: 31.6 G/DL (ref 32–36)
MCV RBC AUTO: 91 FL (ref 82–98)
MONOCYTES # BLD AUTO: 0.8 K/UL (ref 0.3–1)
MONOCYTES NFR BLD: 6.4 % (ref 4–15)
NEUTROPHILS # BLD AUTO: 9.6 K/UL (ref 1.8–7.7)
NEUTROPHILS NFR BLD: 76.8 % (ref 38–73)
NRBC BLD-RTO: 0 /100 WBC
PLATELET # BLD AUTO: 251 K/UL (ref 150–350)
PMV BLD AUTO: 9 FL (ref 9.2–12.9)
POTASSIUM SERPL-SCNC: 3.9 MMOL/L (ref 3.5–5.1)
RBC # BLD AUTO: 3.62 M/UL (ref 4–5.4)
SODIUM SERPL-SCNC: 134 MMOL/L (ref 136–145)
WBC # BLD AUTO: 12.56 K/UL (ref 3.9–12.7)

## 2020-02-20 PROCEDURE — 25000242 PHARM REV CODE 250 ALT 637 W/ HCPCS: Performed by: SURGERY

## 2020-02-20 PROCEDURE — 94799 UNLISTED PULMONARY SVC/PX: CPT

## 2020-02-20 PROCEDURE — 27000221 HC OXYGEN, UP TO 24 HOURS

## 2020-02-20 PROCEDURE — 63600175 PHARM REV CODE 636 W HCPCS: Performed by: STUDENT IN AN ORGANIZED HEALTH CARE EDUCATION/TRAINING PROGRAM

## 2020-02-20 PROCEDURE — 94761 N-INVAS EAR/PLS OXIMETRY MLT: CPT

## 2020-02-20 PROCEDURE — 99900035 HC TECH TIME PER 15 MIN (STAT)

## 2020-02-20 PROCEDURE — 20600001 HC STEP DOWN PRIVATE ROOM

## 2020-02-20 PROCEDURE — 25000003 PHARM REV CODE 250: Performed by: STUDENT IN AN ORGANIZED HEALTH CARE EDUCATION/TRAINING PROGRAM

## 2020-02-20 PROCEDURE — 97530 THERAPEUTIC ACTIVITIES: CPT

## 2020-02-20 PROCEDURE — 85025 COMPLETE CBC W/AUTO DIFF WBC: CPT

## 2020-02-20 PROCEDURE — 36415 COLL VENOUS BLD VENIPUNCTURE: CPT

## 2020-02-20 PROCEDURE — 80048 BASIC METABOLIC PNL TOTAL CA: CPT

## 2020-02-20 PROCEDURE — 94640 AIRWAY INHALATION TREATMENT: CPT

## 2020-02-20 PROCEDURE — 97165 OT EVAL LOW COMPLEX 30 MIN: CPT

## 2020-02-20 RX ORDER — POLYETHYLENE GLYCOL 3350 17 G/17G
17 POWDER, FOR SOLUTION ORAL 2 TIMES DAILY
Status: DISCONTINUED | OUTPATIENT
Start: 2020-02-20 | End: 2020-02-25 | Stop reason: HOSPADM

## 2020-02-20 RX ORDER — ATORVASTATIN CALCIUM 20 MG/1
40 TABLET, FILM COATED ORAL NIGHTLY
Status: DISCONTINUED | OUTPATIENT
Start: 2020-02-20 | End: 2020-02-25 | Stop reason: HOSPADM

## 2020-02-20 RX ADMIN — HYDROCODONE BITARTRATE AND ACETAMINOPHEN 1 TABLET: 5; 325 TABLET ORAL at 01:02

## 2020-02-20 RX ADMIN — ASPIRIN 81 MG: 81 TABLET, COATED ORAL at 10:02

## 2020-02-20 RX ADMIN — GABAPENTIN 100 MG: 100 CAPSULE ORAL at 10:02

## 2020-02-20 RX ADMIN — METHOCARBAMOL TABLETS 500 MG: 500 TABLET, COATED ORAL at 04:02

## 2020-02-20 RX ADMIN — METHOCARBAMOL TABLETS 500 MG: 500 TABLET, COATED ORAL at 02:02

## 2020-02-20 RX ADMIN — ATORVASTATIN CALCIUM 40 MG: 20 TABLET, FILM COATED ORAL at 09:02

## 2020-02-20 RX ADMIN — HYDROCODONE BITARTRATE AND ACETAMINOPHEN 1 TABLET: 5; 325 TABLET ORAL at 06:02

## 2020-02-20 RX ADMIN — MUPIROCIN: 20 OINTMENT TOPICAL at 10:02

## 2020-02-20 RX ADMIN — HYDROMORPHONE HYDROCHLORIDE 1 MG: 1 INJECTION, SOLUTION INTRAMUSCULAR; INTRAVENOUS; SUBCUTANEOUS at 04:02

## 2020-02-20 RX ADMIN — POLYETHYLENE GLYCOL 3350 17 G: 17 POWDER, FOR SOLUTION ORAL at 09:02

## 2020-02-20 RX ADMIN — LOSARTAN POTASSIUM AND HYDROCHLOROTHIAZIDE 1 TABLET: 50; 12.5 TABLET, FILM COATED ORAL at 10:02

## 2020-02-20 RX ADMIN — POLYETHYLENE GLYCOL 3350 17 G: 17 POWDER, FOR SOLUTION ORAL at 10:02

## 2020-02-20 RX ADMIN — HYDROMORPHONE HYDROCHLORIDE 1 MG: 1 INJECTION, SOLUTION INTRAMUSCULAR; INTRAVENOUS; SUBCUTANEOUS at 01:02

## 2020-02-20 RX ADMIN — IPRATROPIUM BROMIDE AND ALBUTEROL SULFATE 3 ML: .5; 3 SOLUTION RESPIRATORY (INHALATION) at 07:02

## 2020-02-20 RX ADMIN — GABAPENTIN 100 MG: 100 CAPSULE ORAL at 09:02

## 2020-02-20 RX ADMIN — METHOCARBAMOL TABLETS 500 MG: 500 TABLET, COATED ORAL at 09:02

## 2020-02-20 RX ADMIN — HYDROCODONE BITARTRATE AND ACETAMINOPHEN 1 TABLET: 5; 325 TABLET ORAL at 03:02

## 2020-02-20 RX ADMIN — GABAPENTIN 100 MG: 100 CAPSULE ORAL at 02:02

## 2020-02-20 RX ADMIN — DOCUSATE SODIUM 50 MG: 50 CAPSULE, LIQUID FILLED ORAL at 10:02

## 2020-02-20 RX ADMIN — METHOCARBAMOL TABLETS 500 MG: 500 TABLET, COATED ORAL at 10:02

## 2020-02-20 RX ADMIN — HYDROCODONE BITARTRATE AND ACETAMINOPHEN 1 TABLET: 5; 325 TABLET ORAL at 09:02

## 2020-02-20 RX ADMIN — HYDROCODONE BITARTRATE AND ACETAMINOPHEN 1 TABLET: 5; 325 TABLET ORAL at 10:02

## 2020-02-20 RX ADMIN — IPRATROPIUM BROMIDE AND ALBUTEROL SULFATE 3 ML: .5; 3 SOLUTION RESPIRATORY (INHALATION) at 08:02

## 2020-02-20 RX ADMIN — IPRATROPIUM BROMIDE AND ALBUTEROL SULFATE 3 ML: .5; 3 SOLUTION RESPIRATORY (INHALATION) at 01:02

## 2020-02-20 RX ADMIN — MUPIROCIN: 20 OINTMENT TOPICAL at 09:02

## 2020-02-20 NOTE — PT/OT/SLP EVAL
"Occupational Therapy   Evaluation    Name: Rachel Murray  MRN: [id number]  Admitting Diagnosis:  <principal problem not specified> 3 Days Post-Op    Recommendations:     Discharge Recommendations: nursing facility, skilled  Discharge Equipment Recommendations:  none  Barriers to discharge:  Decreased caregiver support    Assessment:     Rachel Murray is a 69 y.o. female with a medical diagnosis of PAD.  She presents with pain in LLE, but demonstrated good motivation. Pt lives alone and expressed concern over discharging home alone despite assist from friend. Performance deficits affecting function: impaired self care skills, impaired endurance, impaired cardiopulmonary response to activity, impaired functional mobilty, pain, gait instability.  Pt would benefit from skilled OT services in order to maximize independence with ADLs and facilitate safe discharge. Pt would benefit from SNF upon discharge to increase independence and decrease burden of care.    Rehab Prognosis: Good; patient would benefit from acute skilled OT services to address these deficits and reach maximum level of function.       Plan:     Patient to be seen 3 x/week to address the above listed problems via self-care/home management, therapeutic activities, therapeutic exercises  · Plan of Care Expires: 03/21/20  · Plan of Care Reviewed with: patient    Subjective     Chief Complaint: pt c/o of LLE pain  Patient/Family Comments/goals: "I can't sit up in the chair the doctor told me not too" "I keep having to urinate"    Occupational Profile:  Living Environment: Pt lives alone in a H with threshold to enter  Previous level of function: PTA, pt was independent in ADLs and as of 2 weeks ago began to use a SPC to "feel safe." Pt drives and reports no recent falls  Roles and Routines: Pt works selling insurance and enjoys spending time with friends and going to the [employer]  Equipment Used at Home:  cane, straight, bedside commode, walker, " rolling  Assistance upon Discharge: Upon discharge, pt will have assist from family/friend    Pain/Comfort:  · Pain Rating 1: 5/10(LLE pain)  · Pain Addressed 1: Reposition, Distraction    Patients cultural, spiritual, Pentec[employer]alism conflicts given the current situation: no    Objective:     Communicated with: RN prior to session.  Patient found HOB elevated with telemetry, oxygen, peripheral IV, PureWick, wound vac upon OT entry to room.    General Precautions: Standard, fall   Orthopedic Precautions:N/A   Braces: N/A     Occupational Performance:    Bed Mobility:    · Patient completed Scooting EOBwith contact guard assistance  · Patient completed Supine to Sit with minimum assistance  · Patient completed Sit to Supine with moderate assistance BLE management    Functional Mobility/Transfers:  · Patient completed Sit <> Stand Transfer with contact guard assistance  with  rolling walker from EOB  · Functional Mobility: Pt ambulated 20 ft with CGA, RW, and O2. Pt demo'd SOB during functional mobility and reported fatigue once seated. No LOB or c/o of dizziness. Pt c/o of LLE pain.     Activities of Daily Living:  · Toileting: dependence purewick    Cognitive/Visual Perceptual:  Cognitive/Psychosocial Skills:     -       Oriented to: Person, Place, Time and Situation   -       Follows Commands/attention:Follows multistep  commands  -       Communication: clear/fluent  -       Memory: No Deficits noted  -       Safety awareness/insight to disability: intact   -       Mood/Affect/Coping skills/emotional control: Appropriate to situation    Physical Exam:  Upper Extremity Range of Motion:     -       Right Upper Extremity: WFL  -       Left Upper Extremity: WFL  Upper Extremity Strength:    -       Right Upper Extremity: WFL  -       Left Upper Extremity: WFL   Strength:    -       Right Upper Extremity: WFL  -       Left Upper Extremity: WFL  Fine Motor Coordination:    -       Intact    AMPAC 6 Click ADL:  AMPAC Total  Score: 17    Treatment & Education:  -Therapist provided facilitation and instruction of proper body mechanics, energy conservation, and fall prevention strategies during tasks listed above.  -Pt educated on role of OT, POC and goals for therapy  -Discussion of discharge recommendations SNF vs HH- therapeutic listening provided as pt expressed concern over discharging home alone  -Pt educated on importance of OOB activities with staff member assistance and sitting OOB majority of the day.   -Pt verbalized understanding. Pt expressed no further concerns/questions  -Whiteboard updated    Patient left HOB elevated with all lines intact, call button in reach and RN notified    GOALS:   Multidisciplinary Problems     Occupational Therapy Goals        Problem: Occupational Therapy Goal    Goal Priority Disciplines Outcome Interventions   Occupational Therapy Goal     OT, PT/OT Ongoing, Progressing    Description:  Goals to be met by: 3/1/20     Patient will increase functional independence with ADLs by performing:    LE Dressing with Stand-by Assistance and Assistive Devices as needed.  Grooming while standing at sink with Supervision.  Toileting from toilet with Supervision for hygiene and clothing management.   Toilet transfer to toilet with Supervision.                      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 Rose    DENTAL SURGERY  11/06/2017    ENDARTERECTOMY OF FEMORAL ARTERY Left 2/17/2020    Procedure: ENDARTERECTOMY, FEMORAL;  Surgeon: Olayinka Harris MD;  Location: [address];  Service: Peripheral Vascular;  Laterality: Left;  SFA endarterectomy  fluoro time: 9.2 min mGy: 2336.86    EPIDURAL STEROID INJECTION INTO LUMBAR SPINE N/A 1/16/2020    Procedure: Injection-steroid-epidural-lumbar--Review MRI- L5-S1 after MRI review;  Surgeon: Ely Syed Jr., MD;  Location: [facility] PAIN [facility];  Service: Pain Management;  Laterality: N/A;    gyn surgery      ex laparotomy    HYSTERECTOMY         Time Tracking:     OT Date of Treatment: 02/20/20  OT Start Time: 1002  OT Stop Time: 1028  OT Total Time (min): 26 min    Billable Minutes:Evaluation 16  Therapeutic Activity 10    Carmelina Gregorio OT  2/20/2020

## 2020-02-20 NOTE — CARE UPDATE
Rapid Response Nurse Chart Check     Chart check completed, abnormal VS noted. Bedside RN Marcie contacted, no concerns verbalized at this time, instructed to call [phone] for further concerns or assistance.

## 2020-02-20 NOTE — PLAN OF CARE
Problem: Occupational Therapy Goal  Goal: Occupational Therapy Goal  Description  Goals to be met by: 3/1/20     Patient will increase functional independence with ADLs by performing:    LE Dressing with Stand-by Assistance and Assistive Devices as needed.  Grooming while standing at sink with Supervision.  Toileting from toilet with Supervision for hygiene and clothing management.   Toilet transfer to toilet with Supervision.     Outcome: Ongoing, Progressing     Evaluation complete-see note for details. Goals and POC established    DESTINI Henry  2/20/2020   Pager #: [phone]

## 2020-02-20 NOTE — ASSESSMENT & PLAN NOTE
69 y.o. female with critical limb ischemia with rest pain    S/p L EIA, L CFA endarterectomy, L profundaplasty, JESU balloon angioplasty (6x60mm ceci) 2/18/20    PRN pain control  Diet as tolerated  Out of bed, ambulate, PT, OT  Wean oxygen as tolerated, IS, aggressive pulmonary toilet  Bowel regimen  ASA, statin    Dispo: will need home health PT and OT, orders placed. Wean off of oxygen prior to discharge.

## 2020-02-20 NOTE — PLAN OF CARE
VS stable. Telemetry= ST 100s, NSR 90s. Left groin wound vac in place; LLE appears swollen +2, pinkish in color.  Perwick in place, voiding adequate amount of clear, yellow, urine. Encouraged OOB with assist. C/o LLE pain, relieved with PO Norco. WCTM

## 2020-02-20 NOTE — SUBJECTIVE & OBJECTIVE
Medications:  Continuous Infusions:  Scheduled Meds:   albuterol-ipratropium  3 mL Nebulization Q6H WAKE    aspirin  81 mg Oral Daily    docusate sodium  50 mg Oral Daily    gabapentin  100 mg Oral TID    losartan-hydrochlorothiazide 50-12.5 mg  1 tablet Oral Daily    methocarbamol  500 mg Oral QID    mupirocin   Nasal BID    polyethylene glycol  17 g Oral BID     PRN Meds:HYDROcodone-acetaminophen, HYDROmorphone, influenza, ondansetron, sodium chloride 0.9%     Objective:     Vital Signs (Most Recent):  Temp: 98.9 °F (37.2 °C) (02/20/20 0525)  Pulse: 82 (02/20/20 0721)  Resp: 18 (02/20/20 0525)  BP: (!) 118/56 (02/20/20 0525)  SpO2: (!) 93 % (02/20/20 0525) Vital Signs (24h Range):  Temp:  [98.7 °F (37.1 °C)-99.1 °F (37.3 °C)] 98.9 °F (37.2 °C)  Pulse:  [] 82  Resp:  [14-22] 18  SpO2:  [89 %-93 %] 93 %  BP: (118-152)/(56-68) 118/56         Physical Exam   Constitutional: She is oriented to person, place, and time. She appears well-developed and well-nourished.   HENT:   Head: Normocephalic and atraumatic.   Eyes: EOM are normal.   Cardiovascular: Normal rate and regular rhythm.   LLE DP/PT biphasic  RLE DP/PT biphasic   Pulmonary/Chest: Effort normal. No respiratory distress.   Abdominal: Soft. She exhibits no distension.   Musculoskeletal: Normal range of motion.   L groin Prevena  R groin access site soft   Neurological: She is alert and oriented to person, place, and time.   Skin: Skin is warm and dry.   Vitals reviewed.      Significant Labs:  CBC:   Recent Labs   Lab 02/20/20  0437   WBC 12.56   RBC 3.62*   HGB 10.4*   HCT 32.9*      MCV 91   MCH 28.7   MCHC 31.6*     CMP:   Recent Labs   Lab 02/20/20  0436   *   CALCIUM 9.2   *   K 3.9   CO2 28   CL 99   BUN 15   CREATININE 0.8       Significant Diagnostics:  I have reviewed all pertinent imaging results/findings within the past 24 hours.

## 2020-02-20 NOTE — PROGRESS NOTES
[facility]  Vascular Surgery  Progress Note    Patient Name: Rachel Murray  MRN: [id number]  Admission Date: 2/17/2020  Primary Care Provider: Anel Parikh MD    Subjective:     Interval History: NAEON. Pain well controlled. Some abdominal discomfort, has not had bowel movement. Tolerating diet. Working with PT/OT.     Post-Op Info:  Procedure(s) (LRB):  ENDARTERECTOMY, FEMORAL (Left)  PTA, PROFUNDA FEMORAL (Left)  AORTOGRAM (N/A)  ANGIOGRAM, EXTREMITY, UNILATERAL (Right)   3 Days Post-Op       Medications:  Continuous Infusions:  Scheduled Meds:   albuterol-ipratropium  3 mL Nebulization Q6H WAKE    aspirin  81 mg Oral Daily    docusate sodium  50 mg Oral Daily    gabapentin  100 mg Oral TID    losartan-hydrochlorothiazide 50-12.5 mg  1 tablet Oral Daily    methocarbamol  500 mg Oral QID    mupirocin   Nasal BID    polyethylene glycol  17 g Oral BID     PRN Meds:HYDROcodone-acetaminophen, HYDROmorphone, influenza, ondansetron, sodium chloride 0.9%     Objective:     Vital Signs (Most Recent):  Temp: 98.9 °F (37.2 °C) (02/20/20 0525)  Pulse: 82 (02/20/20 0721)  Resp: 18 (02/20/20 0525)  BP: (!) 118/56 (02/20/20 0525)  SpO2: (!) 93 % (02/20/20 0525) Vital Signs (24h Range):  Temp:  [98.7 °F (37.1 °C)-99.1 °F (37.3 °C)] 98.9 °F (37.2 °C)  Pulse:  [] 82  Resp:  [14-22] 18  SpO2:  [89 %-93 %] 93 %  BP: (118-152)/(56-68) 118/56         Physical Exam   Constitutional: She is oriented to person, place, and time. She appears well-developed and well-nourished.   HENT:   Head: Normocephalic and atraumatic.   Eyes: EOM are normal.   Cardiovascular: Normal rate and regular rhythm.   LLE DP/PT biphasic  RLE DP/PT biphasic   Pulmonary/Chest: Effort normal. No respiratory distress.   Abdominal: Soft. She exhibits no distension.   Musculoskeletal: Normal range of motion.   L groin Prevena  R groin access site soft   Neurological: She is alert and oriented to person, place, and time.   Skin: Skin  is warm and dry.   Vitals reviewed.      Significant Labs:  CBC:   Recent Labs   Lab 02/20/20  0437   WBC 12.56   RBC 3.62*   HGB 10.4*   HCT 32.9*      MCV 91   MCH 28.7   MCHC 31.6*     CMP:   Recent Labs   Lab 02/20/20  0436   *   CALCIUM 9.2   *   K 3.9   CO2 28   CL 99   BUN 15   CREATININE 0.8       Significant Diagnostics:  I have reviewed all pertinent imaging results/findings within the past 24 hours.    Assessment/Plan:     PAD (peripheral artery disease)  69 y.o. female with critical limb ischemia with rest pain    S/p L EIA, L CFA endarterectomy, L profundaplasty, JESU balloon angioplasty (6x60mm mustang) 2/18/20    PRN pain control  Diet as tolerated  Out of bed, ambulate, PT, OT  Wean oxygen as tolerated, IS, aggressive pulmonary toilet  Bowel regimen  ASA, statin    Dispo: will need home health PT and OT, orders placed. Wean off of oxygen prior to discharge.        Poppy Cleary MD  Vascular Surgery  [facility]

## 2020-02-21 LAB
ANION GAP SERPL CALC-SCNC: 7 MMOL/L (ref 8–16)
BASOPHILS # BLD AUTO: 0.04 K/UL (ref 0–0.2)
BASOPHILS NFR BLD: 0.4 % (ref 0–1.9)
BUN SERPL-MCNC: 18 MG/DL (ref 8–23)
CALCIUM SERPL-MCNC: 9.2 MG/DL (ref 8.7–10.5)
CHLORIDE SERPL-SCNC: 97 MMOL/L (ref 95–110)
CO2 SERPL-SCNC: 28 MMOL/L (ref 23–29)
CREAT SERPL-MCNC: 0.8 MG/DL (ref 0.5–1.4)
DIFFERENTIAL METHOD: ABNORMAL
EOSINOPHIL # BLD AUTO: 0.1 K/UL (ref 0–0.5)
EOSINOPHIL NFR BLD: 1.3 % (ref 0–8)
ERYTHROCYTE [DISTWIDTH] IN BLOOD BY AUTOMATED COUNT: 15.1 % (ref 11.5–14.5)
EST. GFR  (AFRICAN AMERICAN): >60 ML/MIN/1.73 M^2
EST. GFR  (NON AFRICAN AMERICAN): >60 ML/MIN/1.73 M^2
GLUCOSE SERPL-MCNC: 126 MG/DL (ref 70–110)
HCT VFR BLD AUTO: 33.1 % (ref 37–48.5)
HGB BLD-MCNC: 10.3 G/DL (ref 12–16)
IMM GRANULOCYTES # BLD AUTO: 0.07 K/UL (ref 0–0.04)
IMM GRANULOCYTES NFR BLD AUTO: 0.7 % (ref 0–0.5)
LYMPHOCYTES # BLD AUTO: 1.3 K/UL (ref 1–4.8)
LYMPHOCYTES NFR BLD: 12.9 % (ref 18–48)
MCH RBC QN AUTO: 28.5 PG (ref 27–31)
MCHC RBC AUTO-ENTMCNC: 31.1 G/DL (ref 32–36)
MCV RBC AUTO: 92 FL (ref 82–98)
MONOCYTES # BLD AUTO: 1 K/UL (ref 0.3–1)
MONOCYTES NFR BLD: 9.4 % (ref 4–15)
NEUTROPHILS # BLD AUTO: 7.6 K/UL (ref 1.8–7.7)
NEUTROPHILS NFR BLD: 75.3 % (ref 38–73)
NRBC BLD-RTO: 0 /100 WBC
PLATELET # BLD AUTO: 268 K/UL (ref 150–350)
PMV BLD AUTO: 9 FL (ref 9.2–12.9)
POTASSIUM SERPL-SCNC: 3.7 MMOL/L (ref 3.5–5.1)
RBC # BLD AUTO: 3.61 M/UL (ref 4–5.4)
SODIUM SERPL-SCNC: 132 MMOL/L (ref 136–145)
WBC # BLD AUTO: 10.09 K/UL (ref 3.9–12.7)

## 2020-02-21 PROCEDURE — 97530 THERAPEUTIC ACTIVITIES: CPT | Mod: CQ

## 2020-02-21 PROCEDURE — 20600001 HC STEP DOWN PRIVATE ROOM

## 2020-02-21 PROCEDURE — 63600175 PHARM REV CODE 636 W HCPCS: Performed by: STUDENT IN AN ORGANIZED HEALTH CARE EDUCATION/TRAINING PROGRAM

## 2020-02-21 PROCEDURE — 25000242 PHARM REV CODE 250 ALT 637 W/ HCPCS: Performed by: SURGERY

## 2020-02-21 PROCEDURE — 97110 THERAPEUTIC EXERCISES: CPT

## 2020-02-21 PROCEDURE — 25000003 PHARM REV CODE 250: Performed by: STUDENT IN AN ORGANIZED HEALTH CARE EDUCATION/TRAINING PROGRAM

## 2020-02-21 PROCEDURE — 97116 GAIT TRAINING THERAPY: CPT | Mod: CQ

## 2020-02-21 PROCEDURE — 99900035 HC TECH TIME PER 15 MIN (STAT)

## 2020-02-21 PROCEDURE — 80048 BASIC METABOLIC PNL TOTAL CA: CPT

## 2020-02-21 PROCEDURE — 36415 COLL VENOUS BLD VENIPUNCTURE: CPT

## 2020-02-21 PROCEDURE — 94761 N-INVAS EAR/PLS OXIMETRY MLT: CPT

## 2020-02-21 PROCEDURE — 27000221 HC OXYGEN, UP TO 24 HOURS

## 2020-02-21 PROCEDURE — 94640 AIRWAY INHALATION TREATMENT: CPT

## 2020-02-21 PROCEDURE — 85025 COMPLETE CBC W/AUTO DIFF WBC: CPT

## 2020-02-21 RX ORDER — FUROSEMIDE 10 MG/ML
40 INJECTION INTRAMUSCULAR; INTRAVENOUS ONCE
Status: COMPLETED | OUTPATIENT
Start: 2020-02-21 | End: 2020-02-21

## 2020-02-21 RX ADMIN — GABAPENTIN 100 MG: 100 CAPSULE ORAL at 03:02

## 2020-02-21 RX ADMIN — HYDROMORPHONE HYDROCHLORIDE 1 MG: 1 INJECTION, SOLUTION INTRAMUSCULAR; INTRAVENOUS; SUBCUTANEOUS at 12:02

## 2020-02-21 RX ADMIN — IPRATROPIUM BROMIDE AND ALBUTEROL SULFATE 3 ML: .5; 3 SOLUTION RESPIRATORY (INHALATION) at 08:02

## 2020-02-21 RX ADMIN — HYDROMORPHONE HYDROCHLORIDE 1 MG: 1 INJECTION, SOLUTION INTRAMUSCULAR; INTRAVENOUS; SUBCUTANEOUS at 05:02

## 2020-02-21 RX ADMIN — FUROSEMIDE 40 MG: 10 INJECTION, SOLUTION INTRAMUSCULAR; INTRAVENOUS at 01:02

## 2020-02-21 RX ADMIN — ASPIRIN 81 MG: 81 TABLET, COATED ORAL at 09:02

## 2020-02-21 RX ADMIN — IPRATROPIUM BROMIDE AND ALBUTEROL SULFATE 3 ML: .5; 3 SOLUTION RESPIRATORY (INHALATION) at 07:02

## 2020-02-21 RX ADMIN — HYDROCODONE BITARTRATE AND ACETAMINOPHEN 1 TABLET: 5; 325 TABLET ORAL at 04:02

## 2020-02-21 RX ADMIN — HYDROCODONE BITARTRATE AND ACETAMINOPHEN 1 TABLET: 5; 325 TABLET ORAL at 09:02

## 2020-02-21 RX ADMIN — GABAPENTIN 100 MG: 100 CAPSULE ORAL at 09:02

## 2020-02-21 RX ADMIN — METHOCARBAMOL TABLETS 500 MG: 500 TABLET, COATED ORAL at 09:02

## 2020-02-21 RX ADMIN — METHOCARBAMOL TABLETS 500 MG: 500 TABLET, COATED ORAL at 06:02

## 2020-02-21 RX ADMIN — HYDROCODONE BITARTRATE AND ACETAMINOPHEN 1 TABLET: 5; 325 TABLET ORAL at 06:02

## 2020-02-21 RX ADMIN — ATORVASTATIN CALCIUM 40 MG: 20 TABLET, FILM COATED ORAL at 09:02

## 2020-02-21 RX ADMIN — LOSARTAN POTASSIUM AND HYDROCHLOROTHIAZIDE 1 TABLET: 50; 12.5 TABLET, FILM COATED ORAL at 09:02

## 2020-02-21 RX ADMIN — POLYETHYLENE GLYCOL 3350 17 G: 17 POWDER, FOR SOLUTION ORAL at 09:02

## 2020-02-21 RX ADMIN — IPRATROPIUM BROMIDE AND ALBUTEROL SULFATE 3 ML: .5; 3 SOLUTION RESPIRATORY (INHALATION) at 12:02

## 2020-02-21 RX ADMIN — MUPIROCIN: 20 OINTMENT TOPICAL at 09:02

## 2020-02-21 RX ADMIN — METHOCARBAMOL TABLETS 500 MG: 500 TABLET, COATED ORAL at 12:02

## 2020-02-21 RX ADMIN — HYDROMORPHONE HYDROCHLORIDE 1 MG: 1 INJECTION, SOLUTION INTRAMUSCULAR; INTRAVENOUS; SUBCUTANEOUS at 09:02

## 2020-02-21 RX ADMIN — DOCUSATE SODIUM 50 MG: 50 CAPSULE, LIQUID FILLED ORAL at 09:02

## 2020-02-21 RX ADMIN — HYDROCODONE BITARTRATE AND ACETAMINOPHEN 1 TABLET: 5; 325 TABLET ORAL at 01:02

## 2020-02-21 NOTE — PROGRESS NOTES
Chief Complaint   Patient presents with    Sore Throat     x 2 weeks that comes and goes    Rash     x 3 weeks itching rash on bottom and between legs, benadryl cream and hydrocortisone applied with some relief     \"REVIEWED RECORD IN PREPARATION FOR VISIT AND HAVE OBTAINED THE NECESSARY DOCUMENTATION\" [facility]  Vascular Surgery  Progress Note    Patient Name: Rachel Murray  MRN: [id number]  Admission Date: 2/17/2020  Primary Care Provider: Anel Parikh MD    Subjective:     Interval History: NAEON, pain well controlled, HDS, afebrile, still unable to wean from O2    Post-Op Info:  Procedure(s) (LRB):  ENDARTERECTOMY, FEMORAL (Left)  PTA, PROFUNDA FEMORAL (Left)  AORTOGRAM (N/A)  ANGIOGRAM, EXTREMITY, UNILATERAL (Right)   4 Days Post-Op       Medications:  Continuous Infusions:  Scheduled Meds:   albuterol-ipratropium  3 mL Nebulization Q6H WAKE    aspirin  81 mg Oral Daily    atorvastatin  40 mg Oral QHS    docusate sodium  50 mg Oral Daily    gabapentin  100 mg Oral TID    losartan-hydrochlorothiazide 50-12.5 mg  1 tablet Oral Daily    methocarbamol  500 mg Oral QID    mupirocin   Nasal BID    polyethylene glycol  17 g Oral BID     PRN Meds:HYDROcodone-acetaminophen, HYDROmorphone, influenza, ondansetron, sodium chloride 0.9%     Objective:     Vital Signs (Most Recent):  Temp: 97.9 °F (36.6 °C) (02/21/20 0741)  Pulse: 79 (02/21/20 0747)  Resp: 17 (02/21/20 0721)  BP: 139/64 (02/21/20 0741)  SpO2: (!) 93 % (02/21/20 0741) Vital Signs (24h Range):  Temp:  [97.8 °F (36.6 °C)-99.6 °F (37.6 °C)] 97.9 °F (36.6 °C)  Pulse:  [] 79  Resp:  [17-20] 17  SpO2:  [89 %-93 %] 93 %  BP: (100-139)/(56-64) 139/64     Date 02/21/20 0700 - 02/22/20 0659   Shift 0246-1642 3644-4014 5240-4511 24 Hour Total   INTAKE   Shift Total(mL/kg)       OUTPUT   Urine(mL/kg/hr) 200   200   Shift Total(mL/kg) 200(2.7)   200(2.7)   Weight (kg) 74.8 74.8 74.8 74.8       Physical Exam   Constitutional: She is oriented to person, place, and time. She appears well-developed and well-nourished.   HENT:   Head: Normocephalic and atraumatic.   Eyes: EOM are normal.   Cardiovascular: Normal rate and regular rhythm.   LLE DP/PT biphasic  RLE DP/PT biphasic   Pulmonary/Chest: Effort normal. No respiratory distress.    Abdominal: Soft. She exhibits no distension.   Musculoskeletal: Normal range of motion.   L groin Prevena  R groin access site soft   Neurological: She is alert and oriented to person, place, and time.   Skin: Skin is warm and dry.   Vitals reviewed.      Significant Labs:  CBC:   Recent Labs   Lab 02/21/20  0515   WBC 10.09   RBC 3.61*   HGB 10.3*   HCT 33.1*      MCV 92   MCH 28.5   MCHC 31.1*     CMP:   Recent Labs   Lab 02/21/20  0515   *   CALCIUM 9.2   *   K 3.7   CO2 28   CL 97   BUN 18   CREATININE 0.8       Significant Diagnostics:  I have reviewed all pertinent imaging results/findings within the past 24 hours.    Assessment/Plan:     * PAD (peripheral artery disease)  69 y.o. female with critical limb ischemia with rest pain    S/p L EIA, L CFA endarterectomy, L profundaplasty, JESU balloon angioplasty (6x60mm mustang) 2/18/20    PRN pain control  Diet as tolerated  Out of bed, ambulate, PT, OT  Wean oxygen as tolerated, IS, aggressive pulmonary toilet  Bowel regimen  ASA, statin  Will obtain 6 minute walk test today    Dispo: will need home health PT and OT, orders placed. Wean off of oxygen prior to discharge.        Javier Wiggins MD  Vascular Surgery  [facility]

## 2020-02-21 NOTE — PLAN OF CARE
Goals remain appropriate.     Problem: Physical Therapy Goal  Goal: Physical Therapy Goal  Description  Goals to be met by: 3/7/2020     Patient will increase functional independence with mobility by performin. Supine to sit with Modified [place]  2. Sit to supine with Modified [place]  3. Sit to stand transfer with Supervision  4. Bed to chair transfer with Supervision c LRAD  5. Gait  x 150 feet with Supervision c LRAD  6. Ascend/Descend 4 inch curb step with Supervision c LRAD      Outcome: Ongoing, Progressing

## 2020-02-21 NOTE — PT/OT/SLP PROGRESS
Occupational Therapy   Treatment    Name: Rachel Murray  MRN: [id number]  Admitting Diagnosis:  PAD (peripheral artery disease)  4 Days Post-Op    Recommendations:     Discharge Recommendations: home health OT  Discharge Equipment Recommendations:  none  Barriers to discharge:  Decreased caregiver support    Assessment:     Rachel Murray is a 69 y.o. female with a medical diagnosis of PAD (peripheral artery disease).  She presents fatigue following gait training in AM with PT. Discussed d/c recommendations, changed from SNF to HHOT, as pt is feeling more comfortable going home. Problem solved with patient regarding bathroom set-up at home and educated pt to use BSC as needed. Pt performed BUE AROM exercises seated in bedside chair as pt limited from pain and fatigue from AM PT session. Performance deficits affecting function are weakness, impaired endurance, impaired self care skills, impaired functional mobilty, gait instability, impaired balance, pain, impaired cardiopulmonary response to activity. Pt would benefit from skilled OT services in order to maximize independence with ADLs and facilitate safe discharge. Pt would benefit from home health OT once medically stable for discharge.    Rehab Prognosis:  Good; patient would benefit from acute skilled OT services to address these deficits and reach maximum level of function.       Plan:     Patient to be seen 3 x/week to address the above listed problems via self-care/home management, therapeutic activities, therapeutic exercises  · Plan of Care Expires: 03/21/20  · Plan of Care Reviewed with: patient    Subjective     Pain/Comfort:  · Pain Rating 1: (Pt c/o of L leg pain and R shoulder pain)  · Pain Addressed 2: Nurse notified, Cessation of Activity, Distraction(hot pack provided)    Objective:     Communicated with: RN prior to session.  Patient found up in chair with telemetry, peripheral IV, oxygen upon OT entry to room.    General Precautions: Standard, fall    Orthopedic Precautions:N/A   Braces: N/A     Occupational Performance:     Activities of Daily Living:  Pt stated she completed ADLs prior to OT arrival    Therapeutic Exercises:  Pt completed 1 sets of 10 reps of B UE AROM exercise program sitting in bedside chair in order to work towards increasing UB strength/endurance and to maximize safety and (I) with mobility and self-care skills.  Pt completed the following exercises with initial demonstration from therapist: shld flexion/extension, elbow flexion/extension, chest press. Pt instructed to perform B UEs daily in order to improve B UE function and maintain joint integrity.      [facility] 6 Click ADL: 17    Treatment & Education:  -Therapist provided facilitation and instruction of proper body mechanics, energy conservation, and fall prevention strategies during tasks listed above.  -Pt educated on role of OT, POC and goals for therapy  -Pt educated on importance of OOB activities with staff member assistance and sitting OOB majority of the day.   -Pt verbalized understanding. Pt expressed no further concerns/questions  -Whiteboard updated    Patient left up in chair with all lines intact, call button in reach and friend presentEducation:      GOALS:   Multidisciplinary Problems     Occupational Therapy Goals        Problem: Occupational Therapy Goal    Goal Priority Disciplines Outcome Interventions   Occupational Therapy Goal     OT, PT/OT Ongoing, Progressing    Description:  Goals to be met by: 3/1/20     Patient will increase functional independence with ADLs by performing:    LE Dressing with Stand-by Assistance and Assistive Devices as needed.  Grooming while standing at sink with Supervision.  Toileting from toilet with Supervision for hygiene and clothing management.   Toilet transfer to toilet with Supervision.                      Time Tracking:     OT Date of Treatment: 02/21/20  OT Start Time: 1434  OT Stop Time: 1447  OT Total Time (min): 13  min    Billable Minutes:Therapeutic Exercise 13    Carmelina Gregorio OT  2/21/2020

## 2020-02-21 NOTE — NURSING
6 minute oxygen walk test attempted. Patient on RA spo2 87%. Could not complete walk test R/T spo2 below 88%. Notified MD Cleary. Will attempt to wean oxygen per MD.

## 2020-02-21 NOTE — SUBJECTIVE & OBJECTIVE
Medications:  Continuous Infusions:  Scheduled Meds:   albuterol-ipratropium  3 mL Nebulization Q6H WAKE    aspirin  81 mg Oral Daily    atorvastatin  40 mg Oral QHS    docusate sodium  50 mg Oral Daily    gabapentin  100 mg Oral TID    losartan-hydrochlorothiazide 50-12.5 mg  1 tablet Oral Daily    methocarbamol  500 mg Oral QID    mupirocin   Nasal BID    polyethylene glycol  17 g Oral BID     PRN Meds:HYDROcodone-acetaminophen, HYDROmorphone, influenza, ondansetron, sodium chloride 0.9%     Objective:     Vital Signs (Most Recent):  Temp: 97.9 °F (36.6 °C) (02/21/20 0741)  Pulse: 79 (02/21/20 0747)  Resp: 17 (02/21/20 0721)  BP: 139/64 (02/21/20 0741)  SpO2: (!) 93 % (02/21/20 0741) Vital Signs (24h Range):  Temp:  [97.8 °F (36.6 °C)-99.6 °F (37.6 °C)] 97.9 °F (36.6 °C)  Pulse:  [] 79  Resp:  [17-20] 17  SpO2:  [89 %-93 %] 93 %  BP: (100-139)/(56-64) 139/64     Date 02/21/20 0700 - 02/22/20 0659   Shift 5024-8532 0320-7131 5951-5445 24 Hour Total   INTAKE   Shift Total(mL/kg)       OUTPUT   Urine(mL/kg/hr) 200   200   Shift Total(mL/kg) 200(2.7)   200(2.7)   Weight (kg) 74.8 74.8 74.8 74.8       Physical Exam   Constitutional: She is oriented to person, place, and time. She appears well-developed and well-nourished.   HENT:   Head: Normocephalic and atraumatic.   Eyes: EOM are normal.   Cardiovascular: Normal rate and regular rhythm.   LLE DP/PT biphasic  RLE DP/PT biphasic   Pulmonary/Chest: Effort normal. No respiratory distress.   Abdominal: Soft. She exhibits no distension.   Musculoskeletal: Normal range of motion.   L groin Prevena  R groin access site soft   Neurological: She is alert and oriented to person, place, and time.   Skin: Skin is warm and dry.   Vitals reviewed.      Significant Labs:  CBC:   Recent Labs   Lab 02/21/20  0515   WBC 10.09   RBC 3.61*   HGB 10.3*   HCT 33.1*      MCV 92   MCH 28.5   MCHC 31.1*     CMP:   Recent Labs   Lab 02/21/20  0515   *   CALCIUM  9.2   *   K 3.7   CO2 28   CL 97   BUN 18   CREATININE 0.8       Significant Diagnostics:  I have reviewed all pertinent imaging results/findings within the past 24 hours.

## 2020-02-21 NOTE — ASSESSMENT & PLAN NOTE
69 y.o. female with critical limb ischemia with rest pain    S/p L EIA, L CFA endarterectomy, L profundaplasty, JESU balloon angioplasty (6x60mm ceci) 2/18/20    PRN pain control  Diet as tolerated  Out of bed, ambulate, PT, OT  Wean oxygen as tolerated, IS, aggressive pulmonary toilet  Bowel regimen  ASA, statin  Will obtain 6 minute walk test today    Dispo: will need home health PT and OT, orders placed. Wean off of oxygen prior to discharge.

## 2020-02-21 NOTE — PLAN OF CARE
Problem: Occupational Therapy Goal  Goal: Occupational Therapy Goal  Description  Goals to be met by: 3/1/20     Patient will increase functional independence with ADLs by performing:    LE Dressing with Stand-by Assistance and Assistive Devices as needed.  Grooming while standing at sink with Supervision.  Toileting from toilet with Supervision for hygiene and clothing management.   Toilet transfer to toilet with Supervision.     Outcome: Ongoing, Progressing     Goals reviewed and remain appropriate, continue POC    ERYN Henry/SALIMA  2/21/2020   Pager #: [phone]

## 2020-02-21 NOTE — PLAN OF CARE
POC reviewed with patient. AAOX4. 5L NC in place, spo2 93%.  L. Groin negative pressure wound vac in place. R. Groin puncture site healing. L. Leg swelling, redness present to L. Thigh noted. Pain reported as high as a level of a 7 on a 1-10 scale in BLE. Prn meds given for pain relief. Turned patient frequently and placed wedge under left hip to help with pain relief. Pure wick in place with adequate output. Passing gas, no BM. Patient did work with OT. Nurse encouraged patient to ambulate, turn in bed. Pulse checks completed using doppler for BLE Q4. Tele monitor in place, -110. Tolerated regular diet well. Call light remained in place.

## 2020-02-21 NOTE — PT/OT/SLP PROGRESS
Physical Therapy Treatment    Patient Name:  Rachel Murray   MRN:  [id number]    Recommendations:     Discharge Recommendations:  HHPT   Discharge Equipment Recommendations: none   Barriers to discharge: Decreased caregiver support    Assessment:     Rachel Murray is a 69 y.o. female admitted with a medical diagnosis of PAD (peripheral artery disease).  She presents with the following impairments/functional limitations:  weakness, impaired endurance, impaired self care skills, impaired functional mobilty, gait instability, impaired balance, decreased lower extremity function, pain, impaired cardiopulmonary response to activity. Pt tolerates session fairly with focus on bed mobility, transfers, and gait training. Pt progressing well and follows all cues to maintain as minimal an angle of hip flexion as possible to sit pt up and then stand from EOB. Pt agreeable and left UIC with chair reclined d/t vascular ROM restrictions. Pt will continue to benefit from therapy services to address impairments listed above.     Rehab Prognosis: Good; patient would benefit from acute skilled PT services to address these deficits and reach maximum level of function.    Recent Surgery: Procedure(s) (LRB):  ENDARTERECTOMY, FEMORAL (Left)  PTA, PROFUNDA FEMORAL (Left)  AORTOGRAM (N/A)  ANGIOGRAM, EXTREMITY, UNILATERAL (Right) 4 Days Post-Op    Plan:     During this hospitalization, patient to be seen 3 x/week to address the identified rehab impairments via gait training, therapeutic activities, therapeutic exercises, neuromuscular re-education and progress toward the following goals:    · Plan of Care Expires:  03/18/20    Subjective     Chief Complaint: pain  Pain/Comfort:  · Pain Rating 1: 4/10  · Location - Side 1: Left  · Location - Orientation 1: generalized  · Location 1: leg  · Pain Addressed 1: Pre-medicate for activity, Reposition, Distraction  · Pain Rating Post-Intervention 1: 4/10      Objective:     Communicated with NSG prior  to session.  Patient found supine with telemetry, oxygen, peripheral IV, PureWick upon PTA entry to room.     General Precautions: Standard, fall   Orthopedic Precautions:N/A   Braces: N/A     Functional Mobility:  · Bed Mobility:     · Rolling Left:  stand by assistance  · Rolling Right: stand by assistance  · Scooting: stand by assistance  · Supine to Sit: contact guard assistance  · Transfers:     · Sit to Stand:  contact guard assistance with rolling walker  · Bed to Chair: contact guard assistance with  rolling walker  using  Step Transfer  · Gait: Pt ambulates ~54 ft with RW and CGA. 6L supplemental O2. Pt limited by pain in LLE and general fatigue. Pt with increased stance time on RLE and antalgic gait d/t LLE pain with weight bearing. Close chair follow. Seated rest and pt immediately reclined.       AM-PAC 6 CLICK MOBILITY  Turning over in bed (including adjusting bedclothes, sheets and blankets)?: 3  Sitting down on and standing up from a chair with arms (e.g., wheelchair, bedside commode, etc.): 3  Moving from lying on back to sitting on the side of the bed?: 3  Moving to and from a bed to a chair (including a wheelchair)?: 3  Need to walk in hospital room?: 3  Climbing 3-5 steps with a railing?: 2  Basic Mobility Total Score: 17       Therapeutic Activities and Exercises:  Pt assisted with functional mobility as noted above.   Reviewed pts ROM restrictions with pt verbalizing and demonstrating understanding.   Increased time for decreased physical assistance.   Pt educated on PT POC and goals of care.       Patient left up in chair reclined with all lines intact and call button in reach.    GOALS:   Multidisciplinary Problems     Physical Therapy Goals        Problem: Physical Therapy Goal    Goal Priority Disciplines Outcome Goal Variances Interventions   Physical Therapy Goal     PT, PT/OT Ongoing, Progressing     Description:  Goals to be met by: 3/7/2020     Patient will increase functional  independence with mobility by performin. Supine to sit with Modified [place]  2. Sit to supine with Modified [place]  3. Sit to stand transfer with Supervision  4. Bed to chair transfer with Supervision c LRAD  5. Gait  x 150 feet with Supervision c LRAD  6. Ascend/Descend 4 inch curb step with Supervision c LRAD                       Time Tracking:     PT Received On: 20  PT Start Time: 922     PT Stop Time: 1001  PT Total Time (min): 39 min     Billable Minutes: Gait Training 15 and Therapeutic Activity 24    Treatment Type: Treatment  PT/PTA: PTA     PTA Visit Number: 1     Duarte Rock, PTA  2020

## 2020-02-21 NOTE — PLAN OF CARE
POC reviewed with patient and patient verbalizing understanding. AAOX4. VSS with 5L NC.  L. Groin negative pressure wound vac in place. R. Groin puncture site healing. L. Leg swelling, redness present to L. Thigh noted. C/o pain resolved with PRN meds. No c/o nausea. Pure wick in place with adequate output. Passing gas, no BM. Neurovascular checks completed using doppler for BLE Q4. Tele monitor in place, -110. Tolerated regular diet well. Bed low and locked. No falls or acute events. Call light within reach. WCTM.

## 2020-02-22 LAB
ANION GAP SERPL CALC-SCNC: 9 MMOL/L (ref 8–16)
BASOPHILS # BLD AUTO: 0.06 K/UL (ref 0–0.2)
BASOPHILS NFR BLD: 0.6 % (ref 0–1.9)
BUN SERPL-MCNC: 24 MG/DL (ref 8–23)
CALCIUM SERPL-MCNC: 9.6 MG/DL (ref 8.7–10.5)
CHLORIDE SERPL-SCNC: 96 MMOL/L (ref 95–110)
CO2 SERPL-SCNC: 28 MMOL/L (ref 23–29)
CREAT SERPL-MCNC: 0.8 MG/DL (ref 0.5–1.4)
DIFFERENTIAL METHOD: ABNORMAL
EOSINOPHIL # BLD AUTO: 0.2 K/UL (ref 0–0.5)
EOSINOPHIL NFR BLD: 2.2 % (ref 0–8)
ERYTHROCYTE [DISTWIDTH] IN BLOOD BY AUTOMATED COUNT: 15.1 % (ref 11.5–14.5)
EST. GFR  (AFRICAN AMERICAN): >60 ML/MIN/1.73 M^2
EST. GFR  (NON AFRICAN AMERICAN): >60 ML/MIN/1.73 M^2
GLUCOSE SERPL-MCNC: 100 MG/DL (ref 70–110)
HCT VFR BLD AUTO: 34.1 % (ref 37–48.5)
HGB BLD-MCNC: 10.7 G/DL (ref 12–16)
IMM GRANULOCYTES # BLD AUTO: 0.1 K/UL (ref 0–0.04)
IMM GRANULOCYTES NFR BLD AUTO: 1.1 % (ref 0–0.5)
LYMPHOCYTES # BLD AUTO: 1.7 K/UL (ref 1–4.8)
LYMPHOCYTES NFR BLD: 18 % (ref 18–48)
MCH RBC QN AUTO: 28.5 PG (ref 27–31)
MCHC RBC AUTO-ENTMCNC: 31.4 G/DL (ref 32–36)
MCV RBC AUTO: 91 FL (ref 82–98)
MONOCYTES # BLD AUTO: 1.2 K/UL (ref 0.3–1)
MONOCYTES NFR BLD: 12.3 % (ref 4–15)
NEUTROPHILS # BLD AUTO: 6.2 K/UL (ref 1.8–7.7)
NEUTROPHILS NFR BLD: 65.8 % (ref 38–73)
NRBC BLD-RTO: 0 /100 WBC
PLATELET # BLD AUTO: 309 K/UL (ref 150–350)
PMV BLD AUTO: 9 FL (ref 9.2–12.9)
POTASSIUM SERPL-SCNC: 4 MMOL/L (ref 3.5–5.1)
RBC # BLD AUTO: 3.76 M/UL (ref 4–5.4)
SODIUM SERPL-SCNC: 133 MMOL/L (ref 136–145)
WBC # BLD AUTO: 9.49 K/UL (ref 3.9–12.7)

## 2020-02-22 PROCEDURE — 94640 AIRWAY INHALATION TREATMENT: CPT

## 2020-02-22 PROCEDURE — 80048 BASIC METABOLIC PNL TOTAL CA: CPT

## 2020-02-22 PROCEDURE — 25000003 PHARM REV CODE 250: Performed by: STUDENT IN AN ORGANIZED HEALTH CARE EDUCATION/TRAINING PROGRAM

## 2020-02-22 PROCEDURE — 63600175 PHARM REV CODE 636 W HCPCS: Performed by: STUDENT IN AN ORGANIZED HEALTH CARE EDUCATION/TRAINING PROGRAM

## 2020-02-22 PROCEDURE — 94761 N-INVAS EAR/PLS OXIMETRY MLT: CPT

## 2020-02-22 PROCEDURE — 25000242 PHARM REV CODE 250 ALT 637 W/ HCPCS: Performed by: SURGERY

## 2020-02-22 PROCEDURE — 27000221 HC OXYGEN, UP TO 24 HOURS

## 2020-02-22 PROCEDURE — 94799 UNLISTED PULMONARY SVC/PX: CPT

## 2020-02-22 PROCEDURE — 99900035 HC TECH TIME PER 15 MIN (STAT)

## 2020-02-22 PROCEDURE — 85025 COMPLETE CBC W/AUTO DIFF WBC: CPT

## 2020-02-22 PROCEDURE — 20600001 HC STEP DOWN PRIVATE ROOM

## 2020-02-22 PROCEDURE — 36415 COLL VENOUS BLD VENIPUNCTURE: CPT

## 2020-02-22 RX ORDER — IBUPROFEN 400 MG/1
400 TABLET ORAL EVERY 6 HOURS PRN
Status: DISCONTINUED | OUTPATIENT
Start: 2020-02-22 | End: 2020-02-25 | Stop reason: HOSPADM

## 2020-02-22 RX ORDER — GABAPENTIN 100 MG/1
200 CAPSULE ORAL 3 TIMES DAILY
Status: DISCONTINUED | OUTPATIENT
Start: 2020-02-22 | End: 2020-02-25 | Stop reason: HOSPADM

## 2020-02-22 RX ADMIN — IPRATROPIUM BROMIDE AND ALBUTEROL SULFATE 3 ML: .5; 3 SOLUTION RESPIRATORY (INHALATION) at 12:02

## 2020-02-22 RX ADMIN — GABAPENTIN 200 MG: 100 CAPSULE ORAL at 09:02

## 2020-02-22 RX ADMIN — ASPIRIN 81 MG: 81 TABLET, COATED ORAL at 09:02

## 2020-02-22 RX ADMIN — HYDROCODONE BITARTRATE AND ACETAMINOPHEN 1 TABLET: 5; 325 TABLET ORAL at 09:02

## 2020-02-22 RX ADMIN — HYDROCODONE BITARTRATE AND ACETAMINOPHEN 1 TABLET: 5; 325 TABLET ORAL at 12:02

## 2020-02-22 RX ADMIN — MUPIROCIN: 20 OINTMENT TOPICAL at 09:02

## 2020-02-22 RX ADMIN — POLYETHYLENE GLYCOL 3350 17 G: 17 POWDER, FOR SOLUTION ORAL at 09:02

## 2020-02-22 RX ADMIN — HYDROCODONE BITARTRATE AND ACETAMINOPHEN 1 TABLET: 5; 325 TABLET ORAL at 07:02

## 2020-02-22 RX ADMIN — METHOCARBAMOL TABLETS 500 MG: 500 TABLET, COATED ORAL at 09:02

## 2020-02-22 RX ADMIN — LOSARTAN POTASSIUM AND HYDROCHLOROTHIAZIDE 1 TABLET: 50; 12.5 TABLET, FILM COATED ORAL at 09:02

## 2020-02-22 RX ADMIN — DOCUSATE SODIUM 50 MG: 50 CAPSULE, LIQUID FILLED ORAL at 09:02

## 2020-02-22 RX ADMIN — HYDROMORPHONE HYDROCHLORIDE 1 MG: 1 INJECTION, SOLUTION INTRAMUSCULAR; INTRAVENOUS; SUBCUTANEOUS at 09:02

## 2020-02-22 RX ADMIN — HYDROMORPHONE HYDROCHLORIDE 1 MG: 1 INJECTION, SOLUTION INTRAMUSCULAR; INTRAVENOUS; SUBCUTANEOUS at 04:02

## 2020-02-22 RX ADMIN — HYDROMORPHONE HYDROCHLORIDE 1 MG: 1 INJECTION, SOLUTION INTRAMUSCULAR; INTRAVENOUS; SUBCUTANEOUS at 08:02

## 2020-02-22 RX ADMIN — HYDROMORPHONE HYDROCHLORIDE 1 MG: 1 INJECTION, SOLUTION INTRAMUSCULAR; INTRAVENOUS; SUBCUTANEOUS at 02:02

## 2020-02-22 RX ADMIN — GABAPENTIN 100 MG: 100 CAPSULE ORAL at 02:02

## 2020-02-22 RX ADMIN — IBUPROFEN 400 MG: 400 TABLET, FILM COATED ORAL at 05:02

## 2020-02-22 RX ADMIN — GABAPENTIN 100 MG: 100 CAPSULE ORAL at 09:02

## 2020-02-22 RX ADMIN — IPRATROPIUM BROMIDE AND ALBUTEROL SULFATE 3 ML: .5; 3 SOLUTION RESPIRATORY (INHALATION) at 07:02

## 2020-02-22 RX ADMIN — ATORVASTATIN CALCIUM 40 MG: 20 TABLET, FILM COATED ORAL at 09:02

## 2020-02-22 RX ADMIN — METHOCARBAMOL TABLETS 500 MG: 500 TABLET, COATED ORAL at 12:02

## 2020-02-22 RX ADMIN — HYDROCODONE BITARTRATE AND ACETAMINOPHEN 1 TABLET: 5; 325 TABLET ORAL at 05:02

## 2020-02-22 RX ADMIN — IPRATROPIUM BROMIDE AND ALBUTEROL SULFATE 3 ML: .5; 3 SOLUTION RESPIRATORY (INHALATION) at 08:02

## 2020-02-22 RX ADMIN — METHOCARBAMOL TABLETS 500 MG: 500 TABLET, COATED ORAL at 05:02

## 2020-02-22 NOTE — PLAN OF CARE
Plan of care reviewed with pt: AAOx4, weaned from 3L to 1 L of O2 via NC with SpO2 stayed at 89-92%. IS encouraged to use every hour. Groin incision CDI to portable Woundvac with no output. Neurovascular checked q4h WDL, pedal pulses strong, skin warm, complained of numbness on her feet but this is her baseline. Complained of pain on her left lower back to her left buttock all the time, pt stated that it's sciatic nerve pain. The pain radiated to her left leg and foot, pain under control with pain meds given around the clock. Pt stated that she had to get an epidural shot for this nerve pain in the past which helped with the pain, and pt asking if she can have another epidural shot, MD on-call, Dr. Garcia notified. Got up to the chair with 1 person assist and sit up for several hours. Voided by Purewick with adequate amount of ervin color urine. Passed flatus but no bowel movement. Tolerated her diet well. Call light in reach. WCTM.

## 2020-02-22 NOTE — ASSESSMENT & PLAN NOTE
69 y.o. female with critical limb ischemia with rest pain    S/p L EIA, L CFA endarterectomy, L profundaplasty, JESU balloon angioplasty (6x60mm mustang) 2/18/20    PRN pain control  Diet as tolerated  Out of bed, ambulate, PT, OT  Wean oxygen as tolerated, IS, aggressive pulmonary toilet  Bowel regimen  ASA, statin  No diagnosis that would allow patient to have home oxygen, will continue to work on aggressive pulmonary toilet. Consider pulm consult if unable to wean to eval for possible COPD given her smoking history.   Continue prevena wound vac to groin, remove 2/24.    Dispo: PT/OT now recommending SNF, working on placement.

## 2020-02-22 NOTE — SUBJECTIVE & OBJECTIVE
Medications:  Continuous Infusions:  Scheduled Meds:   albuterol-ipratropium  3 mL Nebulization Q6H WAKE    aspirin  81 mg Oral Daily    atorvastatin  40 mg Oral QHS    docusate sodium  50 mg Oral Daily    gabapentin  100 mg Oral TID    losartan-hydrochlorothiazide 50-12.5 mg  1 tablet Oral Daily    methocarbamol  500 mg Oral QID    polyethylene glycol  17 g Oral BID     PRN Meds:HYDROcodone-acetaminophen, HYDROmorphone, influenza, ondansetron, sodium chloride 0.9%     Objective:     Vital Signs (Most Recent):  Temp: 97.4 °F (36.3 °C) (02/22/20 0743)  Pulse: 73 (02/22/20 1100)  Resp: 18 (02/22/20 0803)  BP: (!) 141/58 (02/22/20 0743)  SpO2: (!) 91 % (02/22/20 0803) Vital Signs (24h Range):  Temp:  [96.7 °F (35.9 °C)-100 °F (37.8 °C)] 97.4 °F (36.3 °C)  Pulse:  [] 73  Resp:  [16-19] 18  SpO2:  [89 %-95 %] 91 %  BP: ()/(52-59) 141/58     Date 02/22/20 0700 - 02/23/20 0659   Shift 6347-8952 2483-9273 0952-1134 24 Hour Total   INTAKE   P.O. 600   600   Shift Total(mL/kg) 600(8)   600(8)   OUTPUT   Urine(mL/kg/hr) 200   200   Other 0   0   Shift Total(mL/kg) 200(2.7)   200(2.7)   Weight (kg) 74.8 74.8 74.8 74.8       Physical Exam   Constitutional: She is oriented to person, place, and time. She appears well-developed and well-nourished.   HENT:   Head: Normocephalic and atraumatic.   Eyes: EOM are normal.   Cardiovascular: Normal rate and regular rhythm.   LLE DP/PT biphasic  RLE DP/PT biphasic   Pulmonary/Chest: Effort normal. No respiratory distress.   Abdominal: Soft. She exhibits no distension.   Musculoskeletal: Normal range of motion.   L groin Prevena  R groin access site soft   Neurological: She is alert and oriented to person, place, and time.   Skin: Skin is warm and dry.   Vitals reviewed.      Significant Labs:  CBC:   Recent Labs   Lab 02/22/20  0344   WBC 9.49   RBC 3.76*   HGB 10.7*   HCT 34.1*      MCV 91   MCH 28.5   MCHC 31.4*     CMP:   Recent Labs   Lab 02/22/20  0344       CALCIUM 9.6   *   K 4.0   CO2 28   CL 96   BUN 24*   CREATININE 0.8       Significant Diagnostics:  I have reviewed all pertinent imaging results/findings within the past 24 hours.

## 2020-02-22 NOTE — PLAN OF CARE
POC reviewed with patient and patient verbalizing understanding. AAOX4. VSS with 3L NC.  L. Groin negative pressure wound vac in place. R. Groin puncture site healing. L. Leg swelling, redness present to L. Thigh noted. C/o pain resolved with PRN meds. No c/o nausea. Pure wick in place with adequate output. Passing gas, no BM. Neurovascular checks completed using doppler for BLE Q4. Tele monitor in place, -110. Tolerated regular diet well. Bed low and locked. No falls or acute events. Call light within reach. WCTM

## 2020-02-22 NOTE — PROGRESS NOTES
[facility]  Vascular Surgery  Progress Note    Patient Name: Rachel Murray  MRN: [id number]  Admission Date: 2/17/2020  Primary Care Provider: Anel Parikh MD    Subjective:     Interval History: NAEON. Pain well controlled. Working with PT and OT, now recommending SNF. Patient continues to require supplemental oxygen, states that she has been using her IS consistently throughout the day. No complaints. No change in NV exam.     Post-Op Info:  Procedure(s) (LRB):  ENDARTERECTOMY, FEMORAL (Left)  PTA, PROFUNDA FEMORAL (Left)  AORTOGRAM (N/A)  ANGIOGRAM, EXTREMITY, UNILATERAL (Right)   5 Days Post-Op       Medications:  Continuous Infusions:  Scheduled Meds:   albuterol-ipratropium  3 mL Nebulization Q6H WAKE    aspirin  81 mg Oral Daily    atorvastatin  40 mg Oral QHS    docusate sodium  50 mg Oral Daily    gabapentin  100 mg Oral TID    losartan-hydrochlorothiazide 50-12.5 mg  1 tablet Oral Daily    methocarbamol  500 mg Oral QID    polyethylene glycol  17 g Oral BID     PRN Meds:HYDROcodone-acetaminophen, HYDROmorphone, influenza, ondansetron, sodium chloride 0.9%     Objective:     Vital Signs (Most Recent):  Temp: 97.4 °F (36.3 °C) (02/22/20 0743)  Pulse: 73 (02/22/20 1100)  Resp: 18 (02/22/20 0803)  BP: (!) 141/58 (02/22/20 0743)  SpO2: (!) 91 % (02/22/20 0803) Vital Signs (24h Range):  Temp:  [96.7 °F (35.9 °C)-100 °F (37.8 °C)] 97.4 °F (36.3 °C)  Pulse:  [] 73  Resp:  [16-19] 18  SpO2:  [89 %-95 %] 91 %  BP: ()/(52-59) 141/58     Date 02/22/20 0700 - 02/23/20 0659   Shift 9624-0604 2715-4535 4419-6475 24 Hour Total   INTAKE   P.O. 600   600   Shift Total(mL/kg) 600(8)   600(8)   OUTPUT   Urine(mL/kg/hr) 200   200   Other 0   0   Shift Total(mL/kg) 200(2.7)   200(2.7)   Weight (kg) 74.8 74.8 74.8 74.8       Physical Exam   Constitutional: She is oriented to person, place, and time. She appears well-developed and well-nourished.   HENT:   Head: Normocephalic and  atraumatic.   Eyes: EOM are normal.   Cardiovascular: Normal rate and regular rhythm.   LLE DP/PT biphasic  RLE DP/PT biphasic   Pulmonary/Chest: Effort normal. No respiratory distress.   Abdominal: Soft. She exhibits no distension.   Musculoskeletal: Normal range of motion.   L groin Prevena  R groin access site soft   Neurological: She is alert and oriented to person, place, and time.   Skin: Skin is warm and dry.   Vitals reviewed.      Significant Labs:  CBC:   Recent Labs   Lab 02/22/20  0344   WBC 9.49   RBC 3.76*   HGB 10.7*   HCT 34.1*      MCV 91   MCH 28.5   MCHC 31.4*     CMP:   Recent Labs   Lab 02/22/20  0344      CALCIUM 9.6   *   K 4.0   CO2 28   CL 96   BUN 24*   CREATININE 0.8       Significant Diagnostics:  I have reviewed all pertinent imaging results/findings within the past 24 hours.    Assessment/Plan:     * PAD (peripheral artery disease)  69 y.o. female with critical limb ischemia with rest pain    S/p L EIA, L CFA endarterectomy, L profundaplasty, JESU balloon angioplasty (6x60mm mustang) 2/18/20    PRN pain control  Diet as tolerated  Out of bed, ambulate, PT, OT  Wean oxygen as tolerated, IS, aggressive pulmonary toilet  Bowel regimen  ASA, statin  No diagnosis that would allow patient to have home oxygen, will continue to work on aggressive pulmonary toilet. Consider pulm consult if unable to wean to eval for possible COPD given her smoking history.   Continue prevena wound vac to groin, remove 2/24.    Dispo: PT/OT now recommending SNF, working on placement.        Poppy Cleary MD  Vascular Surgery  [facility]

## 2020-02-22 NOTE — PLAN OF CARE
POC reviewed with patient. AAOX4. Weaned oxygen to 1LNC, spo2 90%. Groin negative pressure wound vac in place. R. Groin puncture site healing. L. Leg swelling, redness present to L. Thigh noted. Pain reported as high as a level of a  on a 1-10 scale in BLE. Prn meds given for pain relief. Turned patient frequently and placed wedge under left hip to help with pain relief. Pure wick in place with adequate output. 1 time dose of IV lasix given, pt. Voided 1000ml after giving medication. Passing gas, no BM. Patient did work with PT, ambulated in halls. Up in chair for 6 hours.  Pulse checks completed using doppler for BLE Q4. R. Foot pedal pulse weaker than left foot, able to hear pulse on doppler. Tele monitor in place, -110. Tolerated regular diet well. Call light remained in place with frequent monitoring completed per nurse.

## 2020-02-23 LAB
ANION GAP SERPL CALC-SCNC: 7 MMOL/L (ref 8–16)
BASOPHILS # BLD AUTO: 0.06 K/UL (ref 0–0.2)
BASOPHILS NFR BLD: 0.7 % (ref 0–1.9)
BUN SERPL-MCNC: 31 MG/DL (ref 8–23)
CALCIUM SERPL-MCNC: 9.1 MG/DL (ref 8.7–10.5)
CHLORIDE SERPL-SCNC: 99 MMOL/L (ref 95–110)
CO2 SERPL-SCNC: 28 MMOL/L (ref 23–29)
CREAT SERPL-MCNC: 0.9 MG/DL (ref 0.5–1.4)
DIFFERENTIAL METHOD: ABNORMAL
EOSINOPHIL # BLD AUTO: 0.3 K/UL (ref 0–0.5)
EOSINOPHIL NFR BLD: 3.3 % (ref 0–8)
ERYTHROCYTE [DISTWIDTH] IN BLOOD BY AUTOMATED COUNT: 14.9 % (ref 11.5–14.5)
EST. GFR  (AFRICAN AMERICAN): >60 ML/MIN/1.73 M^2
EST. GFR  (NON AFRICAN AMERICAN): >60 ML/MIN/1.73 M^2
GLUCOSE SERPL-MCNC: 129 MG/DL (ref 70–110)
HCT VFR BLD AUTO: 33 % (ref 37–48.5)
HGB BLD-MCNC: 10.2 G/DL (ref 12–16)
IMM GRANULOCYTES # BLD AUTO: 0.1 K/UL (ref 0–0.04)
IMM GRANULOCYTES NFR BLD AUTO: 1.2 % (ref 0–0.5)
LYMPHOCYTES # BLD AUTO: 1.8 K/UL (ref 1–4.8)
LYMPHOCYTES NFR BLD: 21.7 % (ref 18–48)
MCH RBC QN AUTO: 28.5 PG (ref 27–31)
MCHC RBC AUTO-ENTMCNC: 30.9 G/DL (ref 32–36)
MCV RBC AUTO: 92 FL (ref 82–98)
MONOCYTES # BLD AUTO: 1.1 K/UL (ref 0.3–1)
MONOCYTES NFR BLD: 12.9 % (ref 4–15)
NEUTROPHILS # BLD AUTO: 5 K/UL (ref 1.8–7.7)
NEUTROPHILS NFR BLD: 60.2 % (ref 38–73)
NRBC BLD-RTO: 0 /100 WBC
PLATELET # BLD AUTO: 315 K/UL (ref 150–350)
PMV BLD AUTO: 9.1 FL (ref 9.2–12.9)
POTASSIUM SERPL-SCNC: 4.4 MMOL/L (ref 3.5–5.1)
RBC # BLD AUTO: 3.58 M/UL (ref 4–5.4)
SODIUM SERPL-SCNC: 134 MMOL/L (ref 136–145)
WBC # BLD AUTO: 8.28 K/UL (ref 3.9–12.7)

## 2020-02-23 PROCEDURE — 36415 COLL VENOUS BLD VENIPUNCTURE: CPT

## 2020-02-23 PROCEDURE — 80048 BASIC METABOLIC PNL TOTAL CA: CPT

## 2020-02-23 PROCEDURE — 99900035 HC TECH TIME PER 15 MIN (STAT)

## 2020-02-23 PROCEDURE — 94640 AIRWAY INHALATION TREATMENT: CPT

## 2020-02-23 PROCEDURE — 90471 IMMUNIZATION ADMIN: CPT | Performed by: SURGERY

## 2020-02-23 PROCEDURE — 85025 COMPLETE CBC W/AUTO DIFF WBC: CPT

## 2020-02-23 PROCEDURE — 90662 IIV NO PRSV INCREASED AG IM: CPT | Performed by: SURGERY

## 2020-02-23 PROCEDURE — 25000003 PHARM REV CODE 250: Performed by: STUDENT IN AN ORGANIZED HEALTH CARE EDUCATION/TRAINING PROGRAM

## 2020-02-23 PROCEDURE — 94761 N-INVAS EAR/PLS OXIMETRY MLT: CPT

## 2020-02-23 PROCEDURE — 63600175 PHARM REV CODE 636 W HCPCS: Performed by: SURGERY

## 2020-02-23 PROCEDURE — 27000221 HC OXYGEN, UP TO 24 HOURS

## 2020-02-23 PROCEDURE — 20600001 HC STEP DOWN PRIVATE ROOM

## 2020-02-23 PROCEDURE — 25000242 PHARM REV CODE 250 ALT 637 W/ HCPCS: Performed by: SURGERY

## 2020-02-23 PROCEDURE — 63600175 PHARM REV CODE 636 W HCPCS: Performed by: STUDENT IN AN ORGANIZED HEALTH CARE EDUCATION/TRAINING PROGRAM

## 2020-02-23 RX ORDER — BISACODYL 10 MG
10 SUPPOSITORY, RECTAL RECTAL DAILY PRN
Status: DISCONTINUED | OUTPATIENT
Start: 2020-02-23 | End: 2020-02-25 | Stop reason: HOSPADM

## 2020-02-23 RX ADMIN — HYDROCODONE BITARTRATE AND ACETAMINOPHEN 1 TABLET: 5; 325 TABLET ORAL at 01:02

## 2020-02-23 RX ADMIN — GABAPENTIN 200 MG: 100 CAPSULE ORAL at 08:02

## 2020-02-23 RX ADMIN — IPRATROPIUM BROMIDE AND ALBUTEROL SULFATE 3 ML: .5; 3 SOLUTION RESPIRATORY (INHALATION) at 08:02

## 2020-02-23 RX ADMIN — IPRATROPIUM BROMIDE AND ALBUTEROL SULFATE 3 ML: .5; 3 SOLUTION RESPIRATORY (INHALATION) at 02:02

## 2020-02-23 RX ADMIN — METHOCARBAMOL TABLETS 500 MG: 500 TABLET, COATED ORAL at 01:02

## 2020-02-23 RX ADMIN — HYDROMORPHONE HYDROCHLORIDE 1 MG: 1 INJECTION, SOLUTION INTRAMUSCULAR; INTRAVENOUS; SUBCUTANEOUS at 10:02

## 2020-02-23 RX ADMIN — HYDROMORPHONE HYDROCHLORIDE 1 MG: 1 INJECTION, SOLUTION INTRAMUSCULAR; INTRAVENOUS; SUBCUTANEOUS at 06:02

## 2020-02-23 RX ADMIN — POLYETHYLENE GLYCOL 3350 17 G: 17 POWDER, FOR SOLUTION ORAL at 08:02

## 2020-02-23 RX ADMIN — METHOCARBAMOL TABLETS 500 MG: 500 TABLET, COATED ORAL at 08:02

## 2020-02-23 RX ADMIN — HYDROCODONE BITARTRATE AND ACETAMINOPHEN 1 TABLET: 5; 325 TABLET ORAL at 07:02

## 2020-02-23 RX ADMIN — IPRATROPIUM BROMIDE AND ALBUTEROL SULFATE 3 ML: .5; 3 SOLUTION RESPIRATORY (INHALATION) at 07:02

## 2020-02-23 RX ADMIN — HYDROMORPHONE HYDROCHLORIDE 1 MG: 1 INJECTION, SOLUTION INTRAMUSCULAR; INTRAVENOUS; SUBCUTANEOUS at 04:02

## 2020-02-23 RX ADMIN — INFLUENZA A VIRUS A/MICHIGAN/45/2015 X-275 (H1N1) ANTIGEN (FORMALDEHYDE INACTIVATED), INFLUENZA A VIRUS A/SINGAPORE/INFIMH-16-0019/2016 IVR-186 (H3N2) ANTIGEN (FORMALDEHYDE INACTIVATED), AND INFLUENZA B VIRUS B/MARYLAND/15/2016 BX-69A (A B/COLORADO/6/2017-LIKE VIRUS) ANTIGEN (FORMALDEHYDE INACTIVATED) 0.5 ML: 60; 60; 60 INJECTION, SUSPENSION INTRAMUSCULAR at 04:02

## 2020-02-23 RX ADMIN — HYDROMORPHONE HYDROCHLORIDE 1 MG: 1 INJECTION, SOLUTION INTRAMUSCULAR; INTRAVENOUS; SUBCUTANEOUS at 12:02

## 2020-02-23 RX ADMIN — LOSARTAN POTASSIUM AND HYDROCHLOROTHIAZIDE 1 TABLET: 50; 12.5 TABLET, FILM COATED ORAL at 08:02

## 2020-02-23 RX ADMIN — GABAPENTIN 200 MG: 100 CAPSULE ORAL at 03:02

## 2020-02-23 RX ADMIN — HYDROMORPHONE HYDROCHLORIDE 1 MG: 1 INJECTION, SOLUTION INTRAMUSCULAR; INTRAVENOUS; SUBCUTANEOUS at 08:02

## 2020-02-23 RX ADMIN — METHOCARBAMOL TABLETS 500 MG: 500 TABLET, COATED ORAL at 04:02

## 2020-02-23 RX ADMIN — ATORVASTATIN CALCIUM 40 MG: 20 TABLET, FILM COATED ORAL at 08:02

## 2020-02-23 RX ADMIN — ASPIRIN 81 MG: 81 TABLET, COATED ORAL at 08:02

## 2020-02-23 RX ADMIN — HYDROCODONE BITARTRATE AND ACETAMINOPHEN 1 TABLET: 5; 325 TABLET ORAL at 02:02

## 2020-02-23 RX ADMIN — DOCUSATE SODIUM 50 MG: 50 CAPSULE, LIQUID FILLED ORAL at 08:02

## 2020-02-23 NOTE — PROGRESS NOTES
[facility]  Vascular Surgery  Progress Note    Patient Name: Rachel Murray  MRN: [id number]  Admission Date: 2/17/2020  Primary Care Provider: Anel Parikh MD    Subjective:     Interval History: NAEON. Pain well controlled. Oxygen requirements continue to improve. Has been out of bed with PT and walked yesterday. No complaints. Stable NV exam.    Post-Op Info:  Procedure(s) (LRB):  ENDARTERECTOMY, FEMORAL (Left)  PTA, PROFUNDA FEMORAL (Left)  AORTOGRAM (N/A)  ANGIOGRAM, EXTREMITY, UNILATERAL (Right)   6 Days Post-Op       Medications:  Continuous Infusions:  Scheduled Meds:   albuterol-ipratropium  3 mL Nebulization Q6H WAKE    aspirin  81 mg Oral Daily    atorvastatin  40 mg Oral QHS    docusate sodium  50 mg Oral Daily    gabapentin  200 mg Oral TID    losartan-hydrochlorothiazide 50-12.5 mg  1 tablet Oral Daily    methocarbamol  500 mg Oral QID    polyethylene glycol  17 g Oral BID     PRN Meds:HYDROcodone-acetaminophen, HYDROmorphone, ibuprofen, influenza, ondansetron, sodium chloride 0.9%     Objective:     Vital Signs (Most Recent):  Temp: 97.8 °F (36.6 °C) (02/23/20 0016)  Pulse: 75 (02/23/20 0810)  Resp: 18 (02/23/20 0810)  BP: (!) 147/65 (02/23/20 0016)  SpO2: (!) 87 % (02/23/20 0810) Vital Signs (24h Range):  Temp:  [97.8 °F (36.6 °C)-98.3 °F (36.8 °C)] 97.8 °F (36.6 °C)  Pulse:  [73-91] 75  Resp:  [16-18] 18  SpO2:  [87 %-92 %] 87 %  BP: (116-147)/(53-65) 147/65         Physical Exam   Constitutional: She is oriented to person, place, and time. She appears well-developed and well-nourished.   HENT:   Head: Normocephalic and atraumatic.   Eyes: EOM are normal.   Cardiovascular: Normal rate and regular rhythm.   LLE DP/PT biphasic  RLE DP/PT biphasic   Pulmonary/Chest: Effort normal. No respiratory distress.   Abdominal: Soft. She exhibits no distension.   Musculoskeletal: Normal range of motion.   L groin Prevena  R groin access site soft   Neurological: She is alert and  oriented to person, place, and time.   Skin: Skin is warm and dry.   Vitals reviewed.      Significant Labs:  CBC:   Recent Labs   Lab 02/23/20  0450   WBC 8.28   RBC 3.58*   HGB 10.2*   HCT 33.0*      MCV 92   MCH 28.5   MCHC 30.9*     CMP:   Recent Labs   Lab 02/23/20  0450   *   CALCIUM 9.1   *   K 4.4   CO2 28   CL 99   BUN 31*   CREATININE 0.9       Significant Diagnostics:  I have reviewed all pertinent imaging results/findings within the past 24 hours.    Assessment/Plan:     * PAD (peripheral artery disease)  69 y.o. female with critical limb ischemia with rest pain    S/p L EIA, L CFA endarterectomy, L profundaplasty, JESU balloon angioplasty (6x60mm mustang) 2/18/20    PRN pain control  Diet as tolerated  Out of bed, ambulate, PT, OT  Wean oxygen as tolerated, IS, aggressive pulmonary toilet  Bowel regimen  ASA, statin  No diagnosis that would allow patient to have home oxygen, will continue to work on aggressive pulmonary toilet. Consider pulm consult if unable to wean to eval for possible COPD given her smoking history.   Continue prevena wound vac to groin, remove 2/24.    Dispo: PT/OT now recommending SNF, working on placement. If able to go to SNF may continue to wean oxygen there.         Poppy Cleary MD  Vascular Surgery  [facility]

## 2020-02-23 NOTE — SUBJECTIVE & OBJECTIVE
Medications:  Continuous Infusions:  Scheduled Meds:   albuterol-ipratropium  3 mL Nebulization Q6H WAKE    aspirin  81 mg Oral Daily    atorvastatin  40 mg Oral QHS    docusate sodium  50 mg Oral Daily    gabapentin  200 mg Oral TID    losartan-hydrochlorothiazide 50-12.5 mg  1 tablet Oral Daily    methocarbamol  500 mg Oral QID    polyethylene glycol  17 g Oral BID     PRN Meds:HYDROcodone-acetaminophen, HYDROmorphone, ibuprofen, influenza, ondansetron, sodium chloride 0.9%     Objective:     Vital Signs (Most Recent):  Temp: 97.8 °F (36.6 °C) (02/23/20 0016)  Pulse: 75 (02/23/20 0810)  Resp: 18 (02/23/20 0810)  BP: (!) 147/65 (02/23/20 0016)  SpO2: (!) 87 % (02/23/20 0810) Vital Signs (24h Range):  Temp:  [97.8 °F (36.6 °C)-98.3 °F (36.8 °C)] 97.8 °F (36.6 °C)  Pulse:  [73-91] 75  Resp:  [16-18] 18  SpO2:  [87 %-92 %] 87 %  BP: (116-147)/(53-65) 147/65         Physical Exam   Constitutional: She is oriented to person, place, and time. She appears well-developed and well-nourished.   HENT:   Head: Normocephalic and atraumatic.   Eyes: EOM are normal.   Cardiovascular: Normal rate and regular rhythm.   LLE DP/PT biphasic  RLE DP/PT biphasic   Pulmonary/Chest: Effort normal. No respiratory distress.   Abdominal: Soft. She exhibits no distension.   Musculoskeletal: Normal range of motion.   L groin Prevena  R groin access site soft   Neurological: She is alert and oriented to person, place, and time.   Skin: Skin is warm and dry.   Vitals reviewed.      Significant Labs:  CBC:   Recent Labs   Lab 02/23/20  0450   WBC 8.28   RBC 3.58*   HGB 10.2*   HCT 33.0*      MCV 92   MCH 28.5   MCHC 30.9*     CMP:   Recent Labs   Lab 02/23/20  0450   *   CALCIUM 9.1   *   K 4.4   CO2 28   CL 99   BUN 31*   CREATININE 0.9       Significant Diagnostics:  I have reviewed all pertinent imaging results/findings within the past 24 hours.

## 2020-02-23 NOTE — PLAN OF CARE
Plan of care reviewed with patient who verbalized understanding. AAOx4. VSS on 1 L of O2. Patient tolerating regular diet. No complaints of nausea. PRN Dilaudid and Norco controlling pain. Left wound vac to groin site. No output recorded. NV checks performed. Patient pulses 2+ with doppler. Call light within reach. Bed in the lowest position. Patient mostly slept in between care. No acute events this shift. WCTM.

## 2020-02-23 NOTE — ASSESSMENT & PLAN NOTE
69 y.o. female with critical limb ischemia with rest pain    S/p L EIA, L CFA endarterectomy, L profundaplasty, JESU balloon angioplasty (6x60mm mustang) 2/18/20    PRN pain control  Diet as tolerated  Out of bed, ambulate, PT, OT  Wean oxygen as tolerated, IS, aggressive pulmonary toilet  Bowel regimen  ASA, statin  No diagnosis that would allow patient to have home oxygen, will continue to work on aggressive pulmonary toilet. Consider pulm consult if unable to wean to eval for possible COPD given her smoking history.   Continue prevena wound vac to groin, remove 2/24.    Dispo: PT/OT now recommending SNF, working on placement. If able to go to SNF may continue to wean oxygen there.

## 2020-02-23 NOTE — PLAN OF CARE
Plan of care reviewed with pt: AAOx4, weaned from 1L to RA  with SpO2 stayed at 88-90%. IS encouraged to use every hour. Groin incision CDI to portable Woundvac with no output. Neurovascular checked q4h WDL, pedal pulses strong, skin warm, complained of numbness on her feet but this is her baseline. Complained of pain on her left lower back to her left buttock all the time, pt stated that it's sciatic nerve pain. The pain radiated to her left leg and foot, pain under control with pain meds given around the clock.Got up to the chair with 1 person assist and sit up for several hours. Voided by Purewick with adequate amount of ervin color urine. Had one large bowel movement with form stool. Tolerated her diet well. Call light in reach. WCTM.   Update: pt is put back on 1 L O2 via NC again due to SpO2 dropped to 84% on RA. SpO2 now stayed at 89%.  WCTM

## 2020-02-24 LAB
ANION GAP SERPL CALC-SCNC: 10 MMOL/L (ref 8–16)
BASOPHILS # BLD AUTO: 0.08 K/UL (ref 0–0.2)
BASOPHILS NFR BLD: 1 % (ref 0–1.9)
BUN SERPL-MCNC: 24 MG/DL (ref 8–23)
CALCIUM SERPL-MCNC: 8.9 MG/DL (ref 8.7–10.5)
CHLORIDE SERPL-SCNC: 101 MMOL/L (ref 95–110)
CO2 SERPL-SCNC: 24 MMOL/L (ref 23–29)
CREAT SERPL-MCNC: 0.8 MG/DL (ref 0.5–1.4)
DIFFERENTIAL METHOD: ABNORMAL
EOSINOPHIL # BLD AUTO: 0.3 K/UL (ref 0–0.5)
EOSINOPHIL NFR BLD: 3.9 % (ref 0–8)
ERYTHROCYTE [DISTWIDTH] IN BLOOD BY AUTOMATED COUNT: 15 % (ref 11.5–14.5)
EST. GFR  (AFRICAN AMERICAN): >60 ML/MIN/1.73 M^2
EST. GFR  (NON AFRICAN AMERICAN): >60 ML/MIN/1.73 M^2
GLUCOSE SERPL-MCNC: 112 MG/DL (ref 70–110)
HCT VFR BLD AUTO: 33.3 % (ref 37–48.5)
HGB BLD-MCNC: 10.3 G/DL (ref 12–16)
IMM GRANULOCYTES # BLD AUTO: 0.09 K/UL (ref 0–0.04)
IMM GRANULOCYTES NFR BLD AUTO: 1.1 % (ref 0–0.5)
LYMPHOCYTES # BLD AUTO: 1.8 K/UL (ref 1–4.8)
LYMPHOCYTES NFR BLD: 21.7 % (ref 18–48)
MCH RBC QN AUTO: 28.6 PG (ref 27–31)
MCHC RBC AUTO-ENTMCNC: 30.9 G/DL (ref 32–36)
MCV RBC AUTO: 93 FL (ref 82–98)
MONOCYTES # BLD AUTO: 0.9 K/UL (ref 0.3–1)
MONOCYTES NFR BLD: 10.3 % (ref 4–15)
NEUTROPHILS # BLD AUTO: 5.2 K/UL (ref 1.8–7.7)
NEUTROPHILS NFR BLD: 62 % (ref 38–73)
NRBC BLD-RTO: 0 /100 WBC
PLATELET # BLD AUTO: 339 K/UL (ref 150–350)
PMV BLD AUTO: 9.1 FL (ref 9.2–12.9)
POTASSIUM SERPL-SCNC: 4.2 MMOL/L (ref 3.5–5.1)
RBC # BLD AUTO: 3.6 M/UL (ref 4–5.4)
SODIUM SERPL-SCNC: 135 MMOL/L (ref 136–145)
WBC # BLD AUTO: 8.42 K/UL (ref 3.9–12.7)

## 2020-02-24 PROCEDURE — 97116 GAIT TRAINING THERAPY: CPT | Mod: CQ

## 2020-02-24 PROCEDURE — 36415 COLL VENOUS BLD VENIPUNCTURE: CPT

## 2020-02-24 PROCEDURE — 20600001 HC STEP DOWN PRIVATE ROOM

## 2020-02-24 PROCEDURE — 25000003 PHARM REV CODE 250: Performed by: STUDENT IN AN ORGANIZED HEALTH CARE EDUCATION/TRAINING PROGRAM

## 2020-02-24 PROCEDURE — 97530 THERAPEUTIC ACTIVITIES: CPT

## 2020-02-24 PROCEDURE — 94761 N-INVAS EAR/PLS OXIMETRY MLT: CPT

## 2020-02-24 PROCEDURE — 97530 THERAPEUTIC ACTIVITIES: CPT | Mod: CQ

## 2020-02-24 PROCEDURE — 80048 BASIC METABOLIC PNL TOTAL CA: CPT

## 2020-02-24 PROCEDURE — 63600175 PHARM REV CODE 636 W HCPCS: Performed by: STUDENT IN AN ORGANIZED HEALTH CARE EDUCATION/TRAINING PROGRAM

## 2020-02-24 PROCEDURE — 85025 COMPLETE CBC W/AUTO DIFF WBC: CPT

## 2020-02-24 PROCEDURE — 94640 AIRWAY INHALATION TREATMENT: CPT

## 2020-02-24 PROCEDURE — 97535 SELF CARE MNGMENT TRAINING: CPT

## 2020-02-24 PROCEDURE — 25000242 PHARM REV CODE 250 ALT 637 W/ HCPCS: Performed by: SURGERY

## 2020-02-24 RX ORDER — ASPIRIN 81 MG/1
81 TABLET ORAL DAILY
Qty: 360 TABLET | Refills: 0 | Status: SHIPPED | OUTPATIENT
Start: 2020-02-25 | End: 2021-03-02

## 2020-02-24 RX ORDER — GABAPENTIN 100 MG/1
200 CAPSULE ORAL 3 TIMES DAILY
Qty: 180 CAPSULE | Refills: 11 | Status: SHIPPED | OUTPATIENT
Start: 2020-02-24 | End: 2020-03-30

## 2020-02-24 RX ORDER — HYDROCODONE BITARTRATE AND ACETAMINOPHEN 5; 325 MG/1; MG/1
1 TABLET ORAL EVERY 6 HOURS PRN
Qty: 20 TABLET | Refills: 0 | Status: SHIPPED | OUTPATIENT
Start: 2020-02-24 | End: 2020-07-20 | Stop reason: CLARIF

## 2020-02-24 RX ORDER — HYDROCODONE BITARTRATE AND ACETAMINOPHEN 5; 325 MG/1; MG/1
1 TABLET ORAL EVERY 6 HOURS PRN
Qty: 20 TABLET | Refills: 0 | Status: SHIPPED | OUTPATIENT
Start: 2020-02-24 | End: 2020-03-20 | Stop reason: SDUPTHER

## 2020-02-24 RX ADMIN — HYDROMORPHONE HYDROCHLORIDE 1 MG: 1 INJECTION, SOLUTION INTRAMUSCULAR; INTRAVENOUS; SUBCUTANEOUS at 09:02

## 2020-02-24 RX ADMIN — LOSARTAN POTASSIUM AND HYDROCHLOROTHIAZIDE 1 TABLET: 50; 12.5 TABLET, FILM COATED ORAL at 08:02

## 2020-02-24 RX ADMIN — METHOCARBAMOL TABLETS 500 MG: 500 TABLET, COATED ORAL at 04:02

## 2020-02-24 RX ADMIN — HYDROCODONE BITARTRATE AND ACETAMINOPHEN 1 TABLET: 5; 325 TABLET ORAL at 11:02

## 2020-02-24 RX ADMIN — IPRATROPIUM BROMIDE AND ALBUTEROL SULFATE 3 ML: .5; 3 SOLUTION RESPIRATORY (INHALATION) at 08:02

## 2020-02-24 RX ADMIN — METHOCARBAMOL TABLETS 500 MG: 500 TABLET, COATED ORAL at 09:02

## 2020-02-24 RX ADMIN — HYDROCODONE BITARTRATE AND ACETAMINOPHEN 1 TABLET: 5; 325 TABLET ORAL at 07:02

## 2020-02-24 RX ADMIN — HYDROMORPHONE HYDROCHLORIDE 1 MG: 1 INJECTION, SOLUTION INTRAMUSCULAR; INTRAVENOUS; SUBCUTANEOUS at 01:02

## 2020-02-24 RX ADMIN — METHOCARBAMOL TABLETS 500 MG: 500 TABLET, COATED ORAL at 01:02

## 2020-02-24 RX ADMIN — GABAPENTIN 200 MG: 100 CAPSULE ORAL at 09:02

## 2020-02-24 RX ADMIN — HYDROCODONE BITARTRATE AND ACETAMINOPHEN 1 TABLET: 5; 325 TABLET ORAL at 12:02

## 2020-02-24 RX ADMIN — GABAPENTIN 200 MG: 100 CAPSULE ORAL at 08:02

## 2020-02-24 RX ADMIN — ATORVASTATIN CALCIUM 40 MG: 20 TABLET, FILM COATED ORAL at 09:02

## 2020-02-24 RX ADMIN — GABAPENTIN 200 MG: 100 CAPSULE ORAL at 04:02

## 2020-02-24 RX ADMIN — METHOCARBAMOL TABLETS 500 MG: 500 TABLET, COATED ORAL at 08:02

## 2020-02-24 RX ADMIN — ASPIRIN 81 MG: 81 TABLET, COATED ORAL at 08:02

## 2020-02-24 RX ADMIN — HYDROCODONE BITARTRATE AND ACETAMINOPHEN 1 TABLET: 5; 325 TABLET ORAL at 08:02

## 2020-02-24 RX ADMIN — IPRATROPIUM BROMIDE AND ALBUTEROL SULFATE 3 ML: .5; 3 SOLUTION RESPIRATORY (INHALATION) at 01:02

## 2020-02-24 RX ADMIN — HYDROMORPHONE HYDROCHLORIDE 1 MG: 1 INJECTION, SOLUTION INTRAMUSCULAR; INTRAVENOUS; SUBCUTANEOUS at 05:02

## 2020-02-24 NOTE — PLAN OF CARE
Problem: Occupational Therapy Goal  Goal: Occupational Therapy Goal  Description  Goals to be met by: 3/1/20     Patient will increase functional independence with ADLs by performing:    LE Dressing with Stand-by Assistance and Assistive Devices as needed.  Grooming while standing at sink with Supervision.  Toileting from toilet with Supervision for hygiene and clothing management. -MET 2/24  Toilet transfer to toilet with Supervision. -MET 2/24      Outcome: Ongoing, Progressing     Goals reviewed and remain appropriate. Pt has met toileting goals. Continue POC.    ERYN Henry/L  2/24/2020   Pager #: [phone]

## 2020-02-24 NOTE — PLAN OF CARE
[facility]    HOME HEALTH ORDERS  FACE TO FACE ENCOUNTER    Patient Name: Rachel Murray  YOB: 1950    PCP: Anel Parikh MD   PCP Address: [address] / [place] [address]  PCP Phone Number: [phone]  PCP Fax: [phone]    Encounter Date: 02/24/2020    Admit to Home Health    Diagnoses:  Active Hospital Problems    Diagnosis  POA    *PAD (peripheral artery disease) [I73.9]  Yes      Resolved Hospital Problems   No resolved problems to display.       No future appointments.        I have seen and examined this patient face to face today. My clinical findings that support the need for the home health skilled services and home bound status are the following:  Weakness/numbness causing balance and gait disturbance due to Surgery making it taxing to leave home.  Requiring assistive device to leave home due to unsteady gait caused by Surgery.    Allergies:  Review of patient's allergies indicates:   Allergen Reactions    Adhesive Blisters    Ciprofloxacin Hives    Latex, natural rubber Blisters       Diet: regular diet      Nursing:   SN to complete comprehensive assessment including routine vital signs. Instruct on disease process and s/s of complications to report to MD. Review/verify medication list sent home with the patient at time of discharge  and instruct patient/caregiver as needed. Frequency may be adjusted depending on start of care date. If patient has enteral feeding tube (NG, PEG, J-tube, G-tube), flush tube before and after feeding and/or medication administration with 20-30 mL of water.    Notify MD if SBP > 160 or < 90; DBP > 90 or < 50; HR > 120 or < 50; Temp > 101; Other:          CONSULTS:    Physical Therapy to evaluate and treat. Evaluate for home safety and equipment needs; Establish/upgrade home exercise program. Perform / instruct on therapeutic exercises, gait training, transfer training, and Range of Motion.  Occupational Therapy  to evaluate and treat. Evaluate home environment for safety and equipment needs. Perform/Instruct on transfers, ADL training, ROM, and therapeutic exercises.          Medications: Review discharge medications with patient and family and provide education.      Current Discharge Medication List      CONTINUE these medications which have NOT CHANGED    Details   acetaminophen (TYLENOL) 500 MG tablet Take 2 tablets (1,000 mg total) by mouth 3 (three) times daily.  Refills: 0    Associated Diagnoses: Lumbar radiculopathy; Chronic bilateral low back pain with left-sided sciatica      aspirin 325 MG tablet Take 325 mg by mouth once daily.      !! candesartan-hydrochlorothiazide (ATACAND HCT) 16-12.5 mg per tablet Take 1 tablet by mouth once daily.  Qty: 30 tablet, Refills: 3      gabapentin (NEURONTIN) 100 MG capsule Take 1 capsule (100 mg total) by mouth 3 (three) times daily.  Qty: 90 capsule, Refills: 1    Associated Diagnoses: Lumbar radiculopathy; Chronic bilateral low back pain with left-sided sciatica      HYDROcodone-acetaminophen (NORCO) 5-325 mg per tablet Take 1 tablet by mouth every 6 (six) hours as needed for Pain (every 4 - 6 hours prn moderate to severe pain).  Qty: 40 tablet, Refills: 0    Comments: Quantity prescribed more than 7 day supply? Yes, quantity medically necessary  yes      !! candesartan-hydrochlorothiazide (ATACAND HCT) 16-12.5 mg per tablet TAKE 1 TABLET BY MOUTH EVERY DAY  Qty: 30 tablet, Refills: 3      !! candesartan-hydrochlorothiazide (ATACAND HCT) 16-12.5 mg per tablet TAKE 1 TABLET BY MOUTH EVERY DAY  Qty: 30 tablet, Refills: 3    Associated Diagnoses: Essential hypertension      diclofenac (VOLTAREN) 75 MG EC tablet Take 1 tablet (75 mg total) by mouth 2 (two) times daily.  Qty: 60 tablet, Refills: 0    Associated Diagnoses: Lumbar radiculopathy; Chronic bilateral low back pain with left-sided sciatica       !! - Potential duplicate medications found. Please discuss with provider.           I certify that this patient is confined to her home and needs physical therapy and occupational therapy.

## 2020-02-24 NOTE — PROGRESS NOTES
Discharge orders in for the pt, pt will go home with home health. Pt started crying when she was informed that she goes home with home health. She stated that she wants to go to a rehab facility since she can't get up by herself because of the numbness and tingling on her legs and feet, and she lives by herself.  notified. Dr. Wiggins notified.

## 2020-02-24 NOTE — PHYSICIAN QUERY
PT Name: Rachel Murray  MR #: [id number]     Physician Query Form - Documentation Clarification      CDS/: Kemi Enriquez               Contact information: [email or web address]      This form is a permanent document in the medical record.     Query Date: February 24, 2020    By submitting this query, we are merely seeking further clarification of documentation. Please utilize your independent clinical judgment when addressing the question(s) below.    The Medical record reflects the following:    Supporting Clinical Findings Location in Medical Record   PAD (peripheral artery disease)  69 y.o. female with critical limb ischemia with rest pain     S/p L EIA, L CFA endarterectomy, L profundaplasty, JESU balloon angioplasty (6x60mm mustang) 2/18/20     PRN pain control  Diet as tolerated  Out of bed, ambulate, PT, OT  Wean oxygen as tolerated, IS, aggressive pulmonary toilet  Bowel regimen  ASA, statin  No diagnosis that would allow patient to have home oxygen, will continue to work on aggressive pulmonary toilet. Consider pulm consult if unable to wean to eval for possible COPD given her smoking history.   Edel BLOUNT removed        Vascular surgery note 2/24                                                                             Doctor, Please specify diagnosis or diagnoses associated with above clinical findings.    Provider Use Only      [     X ] PAD due to Atherosclerosis of the Left  lower  extremity with rest pain   [      ] Other:_____________________                                                                                                               [  ] Clinically Undetermined

## 2020-02-24 NOTE — PT/OT/SLP PROGRESS
"Physical Therapy Treatment    Patient Name:  Rachel Murray   MRN:  [id number]    Recommendations:     Discharge Recommendations:  home health PT   Discharge Equipment Recommendations: none   Barriers to discharge: Decreased caregiver support    Assessment:     Rachel Murray is a 69 y.o. female admitted with a medical diagnosis of PAD (peripheral artery disease).  She presents with the following impairments/functional limitations:  weakness, impaired endurance, impaired self care skills, impaired functional mobilty, pain, decreased lower extremity function, impaired cardiopulmonary response to activity. Pt tolerates session well and mobilizes without physical assistance this day at a supervision to SBA level for all mobility. Pt progressing well and expressing concern over return to home d/t lack of mobility. Pt educated that she was educated and encouraged to ambulate and mobilize with [facility] [place] as tolerated as she has progressed well with therapy over last several sessions. Pt more confident in ability to ambulate and care for self after completion of gait trial and cues and encouragement on functional ability from therapist. Pt will continue to benefit from therapy services to address impairments listed above.     Rehab Prognosis: Good; patient would benefit from acute skilled PT services to address these deficits and reach maximum level of function.    Recent Surgery: Procedure(s) (LRB):  ENDARTERECTOMY, FEMORAL (Left)  PTA, PROFUNDA FEMORAL (Left)  AORTOGRAM (N/A)  ANGIOGRAM, EXTREMITY, UNILATERAL (Right) 7 Days Post-Op    Plan:     During this hospitalization, patient to be seen 3 x/week to address the identified rehab impairments via gait training, therapeutic activities, therapeutic exercises, neuromuscular re-education and progress toward the following goals:    · Plan of Care Expires:  03/18/20    Subjective     Chief Complaint: pain in L leg  Patient/Family Comments/goals: "I was hoping for someone to address " "my sciatica in this leg too before I left."  Pain/Comfort:  · Pain Rating 1: 4/10  · Location - Side 1: Left  · Location - Orientation 1: generalized  · Location 1: leg  · Pain Addressed 1: Pre-medicate for activity, Reposition, Distraction  · Pain Rating Post-Intervention 1: 4/10      Objective:     Communicated with NSG prior to session.  Patient found sitting EOB with NSG preparing to ambulate with telemetry, peripheral IV upon PTA entry to room.     General Precautions: Standard, fall   Orthopedic Precautions:N/A   Braces: N/A     Functional Mobility:  · Bed Mobility:     · Sit to Supine: supervision  · Transfers:     · Sit to Stand:  supervision with rolling walker  · Toilet Transfer: stand by assistance with  rolling walker  using  Step Transfer  · Gait: Pt ambulates 104 ft and 10 ft x 2 with RW and SBA/Supervision. Pt with antalgic gait over LLE and increased reliance on BUE support. Pt with seated rest between all trials. No physical assistance or steadying contact required.       AM-PAC 6 CLICK MOBILITY  Turning over in bed (including adjusting bedclothes, sheets and blankets)?: 4  Sitting down on and standing up from a chair with arms (e.g., wheelchair, bedside commode, etc.): 3  Moving from lying on back to sitting on the side of the bed?: 3  Moving to and from a bed to a chair (including a wheelchair)?: 3  Need to walk in hospital room?: 3  Climbing 3-5 steps with a railing?: 2  Basic Mobility Total Score: 18       Therapeutic Activities and Exercises:  Pt assisted with functional mobility as noted above.   Time spent in education on safety in home environment and safety with all mobility.   Pt educated that primary limitation at this time is pain and limited endurance and that both should improve with increased mobility.     Patient left HOB elevated with all lines intact and call button in reach.    GOALS:   Multidisciplinary Problems     Physical Therapy Goals        Problem: Physical Therapy Goal    " Goal Priority Disciplines Outcome Goal Variances Interventions   Physical Therapy Goal     PT, PT/OT Ongoing, Progressing     Description:  Goals to be met by: 3/7/2020     Patient will increase functional independence with mobility by performin. Supine to sit with Modified [place]  2. Sit to supine with Modified [place]  3. Sit to stand transfer with Supervision   4. Bed to chair transfer with Supervision c LRAD  5. Gait  x 150 feet with Supervision c LRAD  6. Ascend/Descend 4 inch curb step with Supervision c LRAD                        Time Tracking:     PT Received On: 20  PT Start Time: 1438     PT Stop Time: 1508  PT Total Time (min): 30 min     Billable Minutes: Gait Training 16 and Therapeutic Activity 14    Treatment Type: Treatment  PT/PTA: PTA     PTA Visit Number: 2     Duarte Rock, PTA  2020

## 2020-02-24 NOTE — ASSESSMENT & PLAN NOTE
69 y.o. female with critical limb ischemia with rest pain    S/p L EIA, L CFA endarterectomy, L profundaplasty, JESU balloon angioplasty (6x60mm mustang) 2/18/20    PRN pain control  Diet as tolerated  Out of bed, ambulate, PT, OT  Wean oxygen as tolerated, IS, aggressive pulmonary toilet  Bowel regimen  ASA, statin  No diagnosis that would allow patient to have home oxygen, will continue to work on aggressive pulmonary toilet. Consider pulm consult if unable to wean to eval for possible COPD given her smoking history.   Edel BLOUNT removed     Dispo: PT/OT now recommending SNF, working on placement. If able to go to SNF may continue to wean oxygen there.

## 2020-02-24 NOTE — PLAN OF CARE
Pt's son- John Paul would like to talk to the team about pt discharge plan. His phone number is [phone]. Dr. Jeffery Boles notified.

## 2020-02-24 NOTE — PROGRESS NOTES
[facility]  Vascular Surgery  Progress Note    Patient Name: Rachel Murray  MRN: [id number]  Admission Date: 2/17/2020  Primary Care Provider: Anel Parikh MD    Subjective:       Post-Op Info:  Procedure(s) (LRB):  ENDARTERECTOMY, FEMORAL (Left)  PTA, PROFUNDA FEMORAL (Left)  AORTOGRAM (N/A)  ANGIOGRAM, EXTREMITY, UNILATERAL (Right)   7 Days Post-Op     Interval History:   NAEON. Pain well controlled. Working with PT and OT, now recommending SNF. Patient continues to require supplemental oxygen, working with IS No complaints. No change in NV exam.       Medications:  Continuous Infusions:  Scheduled Meds:   albuterol-ipratropium  3 mL Nebulization Q6H WAKE    aspirin  81 mg Oral Daily    atorvastatin  40 mg Oral QHS    docusate sodium  50 mg Oral Daily    gabapentin  200 mg Oral TID    losartan-hydrochlorothiazide 50-12.5 mg  1 tablet Oral Daily    methocarbamol  500 mg Oral QID    polyethylene glycol  17 g Oral BID     PRN Meds:bisacodyL, HYDROcodone-acetaminophen, HYDROmorphone, ibuprofen, ondansetron, sodium chloride 0.9%     Objective:     Vital Signs (Most Recent):  Temp: 98 °F (36.7 °C) (02/24/20 0505)  Pulse: 82 (02/24/20 0715)  Resp: 18 (02/23/20 1939)  BP: (!) 127/58 (02/24/20 0505)  SpO2: (!) 92 % (02/24/20 0505) Vital Signs (24h Range):  Temp:  [97.6 °F (36.4 °C)-98.4 °F (36.9 °C)] 98 °F (36.7 °C)  Pulse:  [] 82  Resp:  [18-20] 18  SpO2:  [84 %-98 %] 92 %  BP: (104-144)/(51-65) 127/58         Physical Exam   Constitutional: She is oriented to person, place, and time. She appears well-developed and well-nourished.   HENT:   Head: Normocephalic and atraumatic.   Eyes: EOM are normal.   Cardiovascular: Normal rate and regular rhythm.   LLE DP/PT biphasic  RLE DP/PT biphasic   Pulmonary/Chest: Effort normal. No respiratory distress.   Abdominal: Soft. She exhibits no distension.   Musculoskeletal: Normal range of motion.   L groin incision c/d/i  R groin access site soft    Neurological: She is alert and oriented to person, place, and time.   Skin: Skin is warm and dry.   Vitals reviewed.      Significant Labs:  CBC:   Recent Labs   Lab 02/24/20  0420   WBC 8.42   RBC 3.60*   HGB 10.3*   HCT 33.3*      MCV 93   MCH 28.6   MCHC 30.9*     CMP:   Recent Labs   Lab 02/24/20  0420   *   CALCIUM 8.9   *   K 4.2   CO2 24      BUN 24*   CREATININE 0.8       Significant Diagnostics:  I have reviewed all pertinent imaging results/findings within the past 24 hours.    Assessment/Plan:     * PAD (peripheral artery disease)  69 y.o. female with critical limb ischemia with rest pain    S/p L EIA, L CFA endarterectomy, L profundaplasty, JESU balloon angioplasty (6x60mm mustang) 2/18/20    PRN pain control  Diet as tolerated  Out of bed, ambulate, PT, OT  Wean oxygen as tolerated, IS, aggressive pulmonary toilet  Bowel regimen  ASA, statin  No diagnosis that would allow patient to have home oxygen, will continue to work on aggressive pulmonary toilet. Consider pulm consult if unable to wean to eval for possible COPD given her smoking history.   Edel BLOUNT removed     Dispo: PT/OT now recommending SNF, working on placement. If able to go to SNF may continue to wean oxygen there.         Jeffery Boles MD  Vascular Surgery  [facility]

## 2020-02-24 NOTE — PT/OT/SLP PROGRESS
Occupational Therapy   Treatment    Name: Rachel Murray  MRN: [id number]  Admitting Diagnosis:  PAD (peripheral artery disease)  7 Days Post-Op    Recommendations:     Discharge Recommendations: home health OT  Discharge Equipment Recommendations:  none  Barriers to discharge:  Decreased caregiver support    Assessment:     Rachel Murray is a 69 y.o. female with a medical diagnosis of PAD (peripheral artery disease).  She presents with concern over discharge home this date per face-to-face conversation with MD. Therapeutic listening and education provided to patient throughout session regarding discharge recommendation of home health OT and PT. Pt performed all mobility and transfers with SBA and RW this date despite constant complaints of LLE pain. She performed toileting with supervision from standard toilet.  Performance deficits affecting function are impaired endurance, impaired self care skills, weakness, impaired functional mobilty, pain. Pt would benefit from skilled OT services in order to maximize independence with ADLs and facilitate safe discharge. Pt remains appropriate and would benefit from home health OT once medically stable for discharge.    Rehab Prognosis:  Good; patient would benefit from acute skilled OT services to address these deficits and reach maximum level of function.       Plan:     Patient to be seen 3 x/week to address the above listed problems via self-care/home management, therapeutic activities, therapeutic exercises  · Plan of Care Expires: 03/21/20  · Plan of Care Reviewed with: patient    Subjective     Pain/Comfort:  · Pain Rating 1: (pt c/o of 4-5/10 pain during functional mobility)  · Location - Side 1: Left  · Location - Orientation 1: generalized  · Location 1: leg  · Pain Addressed 1: Pre-medicate for activity, Reposition, Distraction  · Pain Addressed 2: Cessation of Activity, Pre-medicate for activity, Distraction    Objective:     Communicated with: RN and PTA prior to  session.  Patient found HOB elevated with telemetry, peripheral IV upon OT entry to room.    General Precautions: Standard, fall   Orthopedic Precautions:N/A   Braces: N/A     Occupational Performance:     Bed Mobility:    · Patient completed Supine to Sit with stand by assistance  · Patient completed Sit to Supine with stand by assistance     Functional Mobility/Transfers:  · Patient completed Sit <> Stand Transfer with stand by assistance  with  rolling walker   · Patient completed Toilet Transfer Step Transfer technique with stand by assistance with  rolling walker  · Functional Mobility: Pt ambulated ~150 ft with SBA and RW. Brief standing rest break 2* fatigue and LLE pain. No LOB, SOB, or c/o of dizziness    Activities of Daily Living:  · Upper Body Dressing: stand by assistance to don gown simulating jacket  · Toileting: supervision jennifer-care and clothing management from standard toilet   · Feeding: independence to drink water seated EOB      [facility] 6 Click ADL: 22    Treatment & Education:  -Therapist provided facilitation and instruction of proper body mechanics, energy conservation, and fall prevention strategies during tasks listed above.  -Pt educated on role of OT, POC and goals for therapy  -Pt educated throughout session on discharge recommendations, safety techniques within home such as grab bars, bedside commode placement, and removal of rugs, along with energy conservation stategies  -Pt educated on importance of OOB activities with staff member assistance and sitting OOB majority of the day.   -Pt verbalized understanding. Pt expressed no further concerns/questions  -Whiteboard updated      Patient left HOB elevated with all lines intact and call button in reachEducation:      GOALS:   Multidisciplinary Problems     Occupational Therapy Goals        Problem: Occupational Therapy Goal    Goal Priority Disciplines Outcome Interventions   Occupational Therapy Goal     OT, PT/OT Ongoing, Progressing     Description:  Goals to be met by: 3/1/20     Patient will increase functional independence with ADLs by performing:    LE Dressing with Stand-by Assistance and Assistive Devices as needed.  Grooming while standing at sink with Supervision.  Toileting from toilet with Supervision for hygiene and clothing management. -MET 2/24  Toilet transfer to toilet with Supervision. -MET 2/24                       Time Tracking:     OT Date of Treatment: 02/24/20  OT Start Time: 1438  OT Stop Time: 1513  OT Total Time (min): 35 min    Billable Minutes:Self Care/Home Management 15  Therapeutic Activity 20    Carmelina Gregorio OT  2/24/2020

## 2020-02-24 NOTE — PLAN OF CARE
Plan of care reviewed with patient who verbalized understanding. AAOx4. VSS on 1 L of O2. Patient tolerating regular diet. No complaints of nausea. PRN Dilaudid and Norco controlling pain. Left wound vac to groin site. No output recorded. NV checks performed. Patient pulses 2+ with doppler. Patient reportedly passing gas during shift. Call light within reach. Bed in the lowest position. Patient mostly slept in between care. No acute events this shift. WCTM.

## 2020-02-24 NOTE — PLAN OF CARE
Goals remain appropriate.     Problem: Physical Therapy Goal  Goal: Physical Therapy Goal  Description  Goals to be met by: 3/7/2020     Patient will increase functional independence with mobility by performin. Supine to sit with Modified [place]  2. Sit to supine with Modified [place]  3. Sit to stand transfer with Supervision   4. Bed to chair transfer with Supervision c LRAD  5. Gait  x 150 feet with Supervision c LRAD  6. Ascend/Descend 4 inch curb step with Supervision c LRAD       Outcome: Ongoing, Progressing

## 2020-02-24 NOTE — CONSULTS
Wound care consult for moisture under breasts.    Patient assessed and no redness or breakdown noted to the breasts or perineal area.     Patient states she does sweat under breasts and abdomen.     Recommend:  Interdry Ag as needed to skin folds.     Wound care to sign off.   Latesha Ceja RN [facility]   [phone]

## 2020-02-24 NOTE — SUBJECTIVE & OBJECTIVE
Interval History:   NAEON. Pain well controlled. Working with PT and OT, now recommending SNF. Patient continues to require supplemental oxygen, working with IS No complaints. No change in NV exam.       Medications:  Continuous Infusions:  Scheduled Meds:   albuterol-ipratropium  3 mL Nebulization Q6H WAKE    aspirin  81 mg Oral Daily    atorvastatin  40 mg Oral QHS    docusate sodium  50 mg Oral Daily    gabapentin  200 mg Oral TID    losartan-hydrochlorothiazide 50-12.5 mg  1 tablet Oral Daily    methocarbamol  500 mg Oral QID    polyethylene glycol  17 g Oral BID     PRN Meds:bisacodyL, HYDROcodone-acetaminophen, HYDROmorphone, ibuprofen, ondansetron, sodium chloride 0.9%     Objective:     Vital Signs (Most Recent):  Temp: 98 °F (36.7 °C) (02/24/20 0505)  Pulse: 82 (02/24/20 0715)  Resp: 18 (02/23/20 1939)  BP: (!) 127/58 (02/24/20 0505)  SpO2: (!) 92 % (02/24/20 0505) Vital Signs (24h Range):  Temp:  [97.6 °F (36.4 °C)-98.4 °F (36.9 °C)] 98 °F (36.7 °C)  Pulse:  [] 82  Resp:  [18-20] 18  SpO2:  [84 %-98 %] 92 %  BP: (104-144)/(51-65) 127/58         Physical Exam   Constitutional: She is oriented to person, place, and time. She appears well-developed and well-nourished.   HENT:   Head: Normocephalic and atraumatic.   Eyes: EOM are normal.   Cardiovascular: Normal rate and regular rhythm.   LLE DP/PT biphasic  RLE DP/PT biphasic   Pulmonary/Chest: Effort normal. No respiratory distress.   Abdominal: Soft. She exhibits no distension.   Musculoskeletal: Normal range of motion.   L groin incision c/d/i  R groin access site soft   Neurological: She is alert and oriented to person, place, and time.   Skin: Skin is warm and dry.   Vitals reviewed.      Significant Labs:  CBC:   Recent Labs   Lab 02/24/20  0420   WBC 8.42   RBC 3.60*   HGB 10.3*   HCT 33.3*      MCV 93   MCH 28.6   MCHC 30.9*     CMP:   Recent Labs   Lab 02/24/20  0420   *   CALCIUM 8.9   *   K 4.2   CO2 24       BUN 24*   CREATININE 0.8       Significant Diagnostics:  I have reviewed all pertinent imaging results/findings within the past 24 hours.

## 2020-02-25 VITALS
WEIGHT: 164.88 LBS | HEART RATE: 97 BPM | TEMPERATURE: 99 F | OXYGEN SATURATION: 92 % | HEIGHT: 62 IN | RESPIRATION RATE: 17 BRPM | DIASTOLIC BLOOD PRESSURE: 62 MMHG | SYSTOLIC BLOOD PRESSURE: 145 MMHG | BODY MASS INDEX: 30.34 KG/M2

## 2020-02-25 LAB
ANION GAP SERPL CALC-SCNC: 9 MMOL/L (ref 8–16)
BASOPHILS # BLD AUTO: 0.05 K/UL (ref 0–0.2)
BASOPHILS NFR BLD: 0.7 % (ref 0–1.9)
BUN SERPL-MCNC: 17 MG/DL (ref 8–23)
CALCIUM SERPL-MCNC: 9.2 MG/DL (ref 8.7–10.5)
CHLORIDE SERPL-SCNC: 99 MMOL/L (ref 95–110)
CO2 SERPL-SCNC: 26 MMOL/L (ref 23–29)
CREAT SERPL-MCNC: 0.8 MG/DL (ref 0.5–1.4)
DIFFERENTIAL METHOD: ABNORMAL
EOSINOPHIL # BLD AUTO: 0.3 K/UL (ref 0–0.5)
EOSINOPHIL NFR BLD: 3.7 % (ref 0–8)
ERYTHROCYTE [DISTWIDTH] IN BLOOD BY AUTOMATED COUNT: 15.3 % (ref 11.5–14.5)
EST. GFR  (AFRICAN AMERICAN): >60 ML/MIN/1.73 M^2
EST. GFR  (NON AFRICAN AMERICAN): >60 ML/MIN/1.73 M^2
GLUCOSE SERPL-MCNC: 97 MG/DL (ref 70–110)
HCT VFR BLD AUTO: 34.9 % (ref 37–48.5)
HGB BLD-MCNC: 10.6 G/DL (ref 12–16)
IMM GRANULOCYTES # BLD AUTO: 0.08 K/UL (ref 0–0.04)
IMM GRANULOCYTES NFR BLD AUTO: 1.1 % (ref 0–0.5)
LYMPHOCYTES # BLD AUTO: 1.2 K/UL (ref 1–4.8)
LYMPHOCYTES NFR BLD: 16.7 % (ref 18–48)
MCH RBC QN AUTO: 28 PG (ref 27–31)
MCHC RBC AUTO-ENTMCNC: 30.4 G/DL (ref 32–36)
MCV RBC AUTO: 92 FL (ref 82–98)
MONOCYTES # BLD AUTO: 0.8 K/UL (ref 0.3–1)
MONOCYTES NFR BLD: 11.1 % (ref 4–15)
NEUTROPHILS # BLD AUTO: 4.7 K/UL (ref 1.8–7.7)
NEUTROPHILS NFR BLD: 66.7 % (ref 38–73)
NRBC BLD-RTO: 0 /100 WBC
PLATELET # BLD AUTO: 365 K/UL (ref 150–350)
PMV BLD AUTO: 8.9 FL (ref 9.2–12.9)
POTASSIUM SERPL-SCNC: 4.5 MMOL/L (ref 3.5–5.1)
RBC # BLD AUTO: 3.79 M/UL (ref 4–5.4)
SODIUM SERPL-SCNC: 134 MMOL/L (ref 136–145)
WBC # BLD AUTO: 7.05 K/UL (ref 3.9–12.7)

## 2020-02-25 PROCEDURE — 36415 COLL VENOUS BLD VENIPUNCTURE: CPT

## 2020-02-25 PROCEDURE — 25000003 PHARM REV CODE 250: Performed by: STUDENT IN AN ORGANIZED HEALTH CARE EDUCATION/TRAINING PROGRAM

## 2020-02-25 PROCEDURE — 99900035 HC TECH TIME PER 15 MIN (STAT)

## 2020-02-25 PROCEDURE — 94761 N-INVAS EAR/PLS OXIMETRY MLT: CPT

## 2020-02-25 PROCEDURE — 63600175 PHARM REV CODE 636 W HCPCS: Performed by: STUDENT IN AN ORGANIZED HEALTH CARE EDUCATION/TRAINING PROGRAM

## 2020-02-25 PROCEDURE — 80048 BASIC METABOLIC PNL TOTAL CA: CPT

## 2020-02-25 PROCEDURE — 25000242 PHARM REV CODE 250 ALT 637 W/ HCPCS: Performed by: SURGERY

## 2020-02-25 PROCEDURE — 85025 COMPLETE CBC W/AUTO DIFF WBC: CPT

## 2020-02-25 PROCEDURE — 94640 AIRWAY INHALATION TREATMENT: CPT

## 2020-02-25 RX ADMIN — IPRATROPIUM BROMIDE AND ALBUTEROL SULFATE 3 ML: .5; 3 SOLUTION RESPIRATORY (INHALATION) at 01:02

## 2020-02-25 RX ADMIN — GABAPENTIN 200 MG: 100 CAPSULE ORAL at 09:02

## 2020-02-25 RX ADMIN — HYDROMORPHONE HYDROCHLORIDE 1 MG: 1 INJECTION, SOLUTION INTRAMUSCULAR; INTRAVENOUS; SUBCUTANEOUS at 04:02

## 2020-02-25 RX ADMIN — IPRATROPIUM BROMIDE AND ALBUTEROL SULFATE 3 ML: .5; 3 SOLUTION RESPIRATORY (INHALATION) at 08:02

## 2020-02-25 RX ADMIN — METHOCARBAMOL TABLETS 500 MG: 500 TABLET, COATED ORAL at 09:02

## 2020-02-25 RX ADMIN — DOCUSATE SODIUM 50 MG: 50 CAPSULE, LIQUID FILLED ORAL at 09:02

## 2020-02-25 RX ADMIN — ASPIRIN 81 MG: 81 TABLET, COATED ORAL at 09:02

## 2020-02-25 RX ADMIN — LOSARTAN POTASSIUM AND HYDROCHLOROTHIAZIDE 1 TABLET: 50; 12.5 TABLET, FILM COATED ORAL at 09:02

## 2020-02-25 NOTE — PLAN OF CARE
Plan of care reviewed with pt: AAOx4, weaned pt off from O2, now pt is on RA, SpO2 stayed between 89-91%. Still complained of sciatic nerve pain, MD awared. Pain under control with pain meds. Neurovascular checked q4h WDL. Pt will be discharged to home with home health tomorrow, medicines already delivered to the room. Had multiple bowel movements with soft form stool. Voided by Purewick with clear yellow urine. Pt was encouraged to get out of bed by herself. Ambulated in the campos with PT and OT. WCRUBINA.

## 2020-02-25 NOTE — HPI
Patient is a 69 y.o. female with PMHx of HTN, tobacco abuse (25 pk/yr), sciatica who is here for follow up for LLE PAD and to review CTA A/P with runoff. Patient reports developing a swollen, erythematous, painful left foot last November which has progressively worsened. A month ago she developed a painful erythematous stripe to the medial aspect of her left thigh. She reports some decreased sensation in her left toes, as well as subjective weakness in her left leg. Endorses to cramping pain in her left calf after walking approx 3 blocks which is slightly improved with rest.

## 2020-02-25 NOTE — PLAN OF CARE
SW updated DC date in [facility]. Notified [facility].    Hannah Mayer, MICHAEL  [facility]  [id number]

## 2020-02-25 NOTE — PLAN OF CARE
Plan of care reviewed with patient who verbalized understanding. AAOx4. VSS on 1 L of O2. Patient tolerating regular diet. No complaints of nausea. PRN Dilaudid and Norco controlling pain. NV checks performed. Patient pulses 2+ with doppler. Patient reportedly passing gas during shift. Call light within reach. Bed in the lowest position. Patient mostly slept in between care. No acute events this shift. WCTM.

## 2020-02-25 NOTE — OP NOTE
2/17/2020      Pre-operative Diagnosis:  Poor peripheral circulation [R09.89]  Post-operative Diagnosis: same.    Procedures:  1. L CFA endarterectomy with bovine pericardial patch angioplasty  2. L SFA endarterectomy with bovine pericardial patch angioplasty  3. L PFA endarterectomy with bovine pericardial patch angioplasty  4. Aortogram  5. L EIA angioplasty (6x60 mustang)  6. US guided access, R common femoral artery  7) R retrograde pelvic angiogram    Surgeon: Olayinka Harris MD     Assistants: Angella Connolly M.D.     Anesthesia: General    Findings/Key elements:   Good back bleeding from profunda. Mild backbleeding from SFA. Strong pulse in L CFA after endarterectomy    Implants:   Implant Name Type Inv. Item Serial No.  Lot No. LRB No. Used   GRAFT VAS GUARD 0.8X8CM BOVINE - [id number]  GRAFT VAS GUARD 0.8X8CM BOVINE  [employer] [id number] Left 1             Indications: 69 y F with LLE rest pain, found to have L CFA occlusion.    Procedure Details:  The patient was seen in the Holding Room. The risks, benefits, complications, treatment options, and expected outcomes were discussed with the patient. The patient concurred with the proposed plan, giving informed consent.  The site of surgery properly noted/marked.     Patient was brought to the OR and positioned supine on the table. General anesthesia was administered by anesthesia team. Patient's groins was prepped and draped in usual sterile fashion. A Time Out was held and the above information confirmed.    A longitudinal incision was made directly over the common femoral artery and the dissection carried down the electrocautery to the level of the common femoral with proximal extension to the level of the external iliac artery .  Dissection was carried distal to the level of the SFA and profunda and each was encircled with a vessel loop as well. Systemic heparin was administered and ACT confirmed adequate anticoagulation.  Vesseloops were used for occlusion of the superficial femoral and profunda arteries. The dissection of the profunda was down to the level of the first branch.  A profunda clamp was placed proximally and the vessel loops distally to obtain hemostasis an 11 blade used to make an arteriotomy and a densely calcified common femoral extended with the Teague scissors with proximal extension up to the level of the circumflex levels and distal extension to the level of the SFA. The Carmel elevator was used to perform extensive endarterectomy in extensive amount of plaque was removed. Smooth end points were obtained proximally and distally.  A short femoral endarterectomy was performed, with mild backbleeding noted.  A 2 mm dilator was inserted down the profundus, with good back bleeding afterward. There was widely patent flow and very strong pulsatile flow from the external iliac arteries. The arteriotomy was closed utilizing running 5-0 prolene and a bovine pericardial patch and prior to completion the arteriotomy site was allowed to backbleed and flushed antegrade and the closure completed reestablishing very strong pulsatile normal flow with no thrill and normal Doppler perfusion.  A pursestring suture was placed with 5-0 Prolene suture.  The center of the pursestring suture was accessed with a micropuncture needle, mandril wire, and 4/3 micropuncture sheath.  A J-wire was inserted retrograde to the aorta common, and the sheath upsized to 5 Togolese.  A retrograde angiogram was then performed, showing a high-grade stenosis in the left external iliac artery.  The right common femoral artery was accessed with micropuncture needle under ultrasound guidance, and a mandril wire and 4/3 micropuncture sheath was inserted.  The sheath was upsized to a 5 Togolese sheath over a J-wire, and a retrograde angiogram performed we noted a small dissection in the common iliac artery but no obvious evidence of a high-grade stenosis in either  common iliac artery.  Given this, decision was made to not perform bilateral iliac stents.  A 6 x 60 mustang balloon was inserted into the left external iliac artery, and the stenosis treated (2 min, 12 rey).  Completion angiogram showed less than 30% residual stenosis.  At this point the sheath was removed from the left femoral patch, and pursestring suture tied down with good hemostasis.  The sheath was removed from the right common femoral artery and pressure held for 20 minutes.  Meticulous hemostasis was obtained with thrombin Gelfoam and surgicel.  Heparin was reversed with protamine.  The incision site was closed in multiple layers with interrupted 2-0 Vicryl suture followed by 3-0 Vicryl and the skin closed with running 4-0 monocryl subcuticular and a prevena vac applied.    EBL:  50 mL           Complications:  None; patient tolerated the procedure well.           Disposition: Stable to recovery.      Dr Harris was scrubbed and present for the entire procedure.    Angella Connolly MD   Fellow, Vascular/Endovascular Surgery

## 2020-02-25 NOTE — DISCHARGE SUMMARY
"[facility]  Vascular Surgery  Discharge Summary      Patient Name: Rachel Murray  MRN: [id number]  Admission Date: 2/17/2020  Hospital Length of Stay: 8 days  Discharge Date and Time:  02/25/2020 9:55 AM  Attending Physician: Olayinka Harris MD   Discharging Provider: Jeffery Boles MD  Primary Care Provider: Anel Parikh MD    HPI:   Patient is a 69 y.o. female with PMHx of HTN, tobacco abuse (25 pk/yr), sciatica who is here for follow up for LLE PAD and to review CTA A/P with runoff. Patient reports developing a swollen, erythematous, painful left foot last November which has progressively worsened. A month ago she developed a painful erythematous stripe to the medial aspect of her left thigh. She reports some decreased sensation in her left toes, as well as subjective weakness in her left leg. Endorses to cramping pain in her left calf after walking approx 3 blocks which is slightly improved with rest.      Procedure(s) (LRB):  ENDARTERECTOMY, FEMORAL (Left)  PTA, PROFUNDA FEMORAL (Left)  AORTOGRAM (N/A)  ANGIOGRAM, EXTREMITY, UNILATERAL (Right)     Hospital Course: 69 y.o female with critical limb ischemia with rest pain underwent L EIA, L CFA endarterectomy, L profundaplasty, JESU balloon angioplasty (6x60mm mustang) 2/18/20. See operative note for procedure details. Tolerated procedure well. Post-operative course was uncomplicated, patient was weaned off oxygen and PT /OT recommended home health. All post-operative milestones were met without delay. She was tolerating diet, ambulating, voiding spontaneously, and her pain was well controlled. Patient was instructed and educated on discharge instructions      Physical exam:   BP (!) 145/62 (BP Location: Right arm, Patient Position: Lying)   Pulse 97   Temp 98.5 °F (36.9 °C) (Oral)   Resp 17   Ht 5' 2" (1.575 m)   Wt 74.8 kg (164 lb 14.5 oz)   SpO2 (!) 93%   Breastfeeding? No   BMI 30.16 kg/m²   Constitutional: She is " oriented to person, place, and time. She appears well-developed and well-nourished.   HENT:   Head: Normocephalic and atraumatic.   Eyes: EOM are normal.   Cardiovascular: Normal rate and regular rhythm.   LLE DP/PT biphasic  RLE DP/PT biphasic   Pulmonary/Chest: Effort normal. No respiratory distress.   Abdominal: Soft. She exhibits no distension.   Musculoskeletal: Normal range of motion.   L groin incision c/d/i  R groin access site soft   Neurological: She is alert and oriented to person, place, and time.   Skin: Skin is warm and dry.   Vitals reviewed.    Consults:   Consults (From admission, onward)        Status Ordering Provider     IP consult to case management  Once     Provider:  (Not yet assigned)    Completed OLAYINKA ANTOINE          Significant Diagnostic Studies: Labs:   BMP:   Recent Labs   Lab 02/24/20  0420 02/25/20  0609   * 97   * 134*   K 4.2 4.5    99   CO2 24 26   BUN 24* 17   CREATININE 0.8 0.8   CALCIUM 8.9 9.2       Pending Diagnostic Studies:     None        Final Active Diagnoses:    Diagnosis Date Noted POA    PRINCIPAL PROBLEM:  PAD (peripheral artery disease) [I73.9]  Yes      Problems Resolved During this Admission:      Discharged Condition: good    Disposition: Home or Self Care    Follow Up:  Follow-up Information     Olayinka Antoine MD In 2 weeks.    Specialty:  Vascular Surgery  Contact information:  [address] [facility] [id number]  [phone]                   Patient Instructions:      Notify your health care provider if you experience any of the following:  temperature >100.4     Notify your health care provider if you experience any of the following:  severe uncontrolled pain     Notify your health care provider if you experience any of the following:  redness, tenderness, or signs of infection (pain, swelling, redness, odor or green/yellow discharge around incision site)     Change dressing (specify)   Order Comments: Change dressing  to left groin daily  Apply betadine to incision  Then cover incision with dry gauze and tape     Medications:  Reconciled Home Medications:      Medication List      START taking these medications    aspirin 81 MG EC tablet  Commonly known as:  ECOTRIN  Take 1 tablet (81 mg total) by mouth once daily.  Replaces:  aspirin 325 MG tablet        CHANGE how you take these medications    gabapentin 100 MG capsule  Commonly known as:  NEURONTIN  Take 2 capsules (200 mg total) by mouth 3 (three) times daily.  What changed:  how much to take     * HYDROcodone-acetaminophen 5-325 mg per tablet  Commonly known as:  NORCO  Take 1 tablet by mouth every 6 (six) hours as needed for Pain.  What changed:  reasons to take this     * HYDROcodone-acetaminophen 5-325 mg per tablet  Commonly known as:  NORCO  Take 1 tablet by mouth every 6 (six) hours as needed for Pain.  What changed:  You were already taking a medication with the same name, and this prescription was added. Make sure you understand how and when to take each.         * This list has 2 medication(s) that are the same as other medications prescribed for you. Read the directions carefully, and ask your doctor or other care provider to review them with you.            CONTINUE taking these medications    acetaminophen 500 MG tablet  Commonly known as:  TYLENOL  Take 2 tablets (1,000 mg total) by mouth 3 (three) times daily.     * candesartan-hydrochlorothiazide 16-12.5 mg per tablet  Commonly known as:  ATACAND HCT  Take 1 tablet by mouth once daily.     * candesartan-hydrochlorothiazide 16-12.5 mg per tablet  Commonly known as:  ATACAND HCT  TAKE 1 TABLET BY MOUTH EVERY DAY     * candesartan-hydrochlorothiazide 16-12.5 mg per tablet  Commonly known as:  ATACAND HCT  TAKE 1 TABLET BY MOUTH EVERY DAY     diclofenac 75 MG EC tablet  Commonly known as:  VOLTAREN  Take 1 tablet (75 mg total) by mouth 2 (two) times daily.         * This list has 3 medication(s) that are the same  as other medications prescribed for you. Read the directions carefully, and ask your doctor or other care provider to review them with you.            STOP taking these medications    aspirin 325 MG tablet  Replaced by:  aspirin 81 MG EC tablet            Jeffery Boles MD  Vascular Surgery  [facility]

## 2020-02-26 DIAGNOSIS — I73.9 PAD (PERIPHERAL ARTERY DISEASE): Primary | ICD-10-CM

## 2020-02-26 PROCEDURE — G0180 MD CERTIFICATION HHA PATIENT: HCPCS | Mod: ,,, | Performed by: SURGERY

## 2020-02-26 PROCEDURE — G0180 PR HOME HEALTH MD CERTIFICATION: ICD-10-PCS | Mod: ,,, | Performed by: SURGERY

## 2020-02-26 NOTE — PLAN OF CARE
Patient discharged home with [facility]  2/25/2020.    Message sent to Vascular clinic to schedule appt and contact patient.       02/26/20 [id number]   Final Note   Assessment Type Final Discharge Note   Anticipated Discharge Disposition Home-Health   Right Care Referral Info   Post Acute Recommendation Home-care   Post-Acute Status   Post-Acute Authorization Home Health/Hospice   Home Health/Hospice Status Set-up Complete

## 2020-02-27 ENCOUNTER — PATIENT OUTREACH (OUTPATIENT)
Dept: ADMINISTRATIVE | Facility: CLINIC | Age: 70
End: 2020-02-27

## 2020-02-27 ENCOUNTER — TELEPHONE (OUTPATIENT)
Dept: VASCULAR SURGERY | Facility: CLINIC | Age: 70
End: 2020-02-27

## 2020-02-27 RX ORDER — CIPROFLOXACIN 500 MG/1
500 TABLET ORAL 2 TIMES DAILY
Qty: 14 TABLET | Refills: 0 | Status: CANCELLED | OUTPATIENT
Start: 2020-02-27 | End: 2020-03-05

## 2020-02-27 RX ORDER — AMOXICILLIN AND CLAVULANATE POTASSIUM 875; 125 MG/1; MG/1
1 TABLET, FILM COATED ORAL EVERY 12 HOURS
Qty: 14 TABLET | Refills: 0 | Status: SHIPPED | OUTPATIENT
Start: 2020-02-27 | End: 2020-03-20 | Stop reason: SDUPTHER

## 2020-02-27 NOTE — PATIENT INSTRUCTIONS
Discharge Instructions for Carotid Endarterectomy   Your doctor performed surgery called a carotid endarterectomy. This is the most common way to restore normal blood flow through the vessels that carry blood to your brain. These vessels are called the carotid arteries. During the surgery, a surgeon made a small incisionin the side of your neck, just below your jaw. The artery was opened and the blockage was cleared. This procedure was done to reduce your risk of a stroke, which can occur when the carotid arteries are severely blocked or narrowed.     Ask a friend or family member to help with chores, especially those that involve lifting.   Home care  · Spend your first few days after surgery relaxing at home. It's OK to do quiet activities such as reading or watching TV.  · Take your medications exactly as instructed. Dont skip doses.  · Dont drive until your doctor says its OK. This will most likely take 1 to 2 weeks.  · Keep the wound dry until your doctor says it's OK to shower. Don't scrub your incision.  · Avoid strenuous activity for 7 to 10 days after your surgery.  · Dont lift anything heavier than 10 pounds for 2 to 3 weeks after your surgery.  · Ask your doctor when you can expect to return to work.  · Shave carefully around your incision. You may want to use an electric razor.  · Gradually increase your activity. It may take some time for you to return to your normal activities.  · Check your incision every day for signs of infection (redness, swelling, drainage, or warmth).  · Dont be alarmed if you have some loss of feeling along your jaw line, the incision line, and earlobe. This is a result of the incision and usually goes away after 6 to 12 months.  Long-term changes at home  · Eat a healthy, low-fat, low cholesterol, and low calorie diet. Ask your doctor for menus and other diet information.  · Maintain your ideal body weight.  · After you have recovered from surgery, try to exercise more,  especially walking. Ask your doctor for guidance.  · If you smoke ask your doctor for help quitting.   Follow-up care  Make a follow-up appointment with your doctor as directed.     When to call your doctor  Call your doctor immediately if you have any of the following (or go to the emergency room if your doctor's office is closed):  · Neck swelling  · Headache, particularly if it does not go away after a couple of hours  · Redness, pain, swelling, or drainage from your incision  · Fever above 100°F (37.7°C)  · Numbness or weakness in your face, arms, or legs  · Sudden changes in your vision  · Loss of vision in 1 eye  · Trouble speaking  · Trouble breathing  · Trouble swallowing     © 5422-5617 [employer]. [address] [place] [address], [place] [address]. All rights reserved. This information is not intended as a substitute for professional medical care. Always follow your healthcare professional's instructions.

## 2020-02-27 NOTE — TELEPHONE ENCOUNTER
----- Message from Jaquelin Call MA sent at 2/27/2020  8:53 AM CST -----  Contact: Self  Pt refused  the ED for symptoms of a UTI.pt had a recent surgery with the vascular surgery dept.  She will be calling for a appointment .Can someone call her to schedule ? Thanks in advance.   ----- Message -----  From: Val Clark  Sent: 2/27/2020   8:35 AM CST  To: Steven Bhagat Staff    Please call Ms. Murray, she states she believes she is starting a UTI.  I told her someone would call her by the end of the day.      Thanks, Val

## 2020-02-27 NOTE — TELEPHONE ENCOUNTER
Spoke to the pt , recommended she come into the ED , pt refused. She will call her PCP. I also sent a message. To Dr. Parikh office.

## 2020-02-27 NOTE — TELEPHONE ENCOUNTER
Called and pt states she has frequency and dysuria she has been going every 20 mins or more she has no fever and she states she has had these issues she knows the Sx she cant drive at this moment.

## 2020-02-27 NOTE — TELEPHONE ENCOUNTER
----- Message from Val Clark sent at 2/27/2020  8:35 AM CST -----  Contact: Self  Please call Ms. Murray, she states she believes she is starting a UTI.  I told her someone would call her by the end of the day.      Thanks, Val

## 2020-02-27 NOTE — TELEPHONE ENCOUNTER
----- Message from Karen Thomas sent at 2/27/2020  9:01 AM CST -----  Contact: self/[phone]  .1 Patient would like to get medical advice.  Symptoms (please be specific): Urinary Tract Infection   How long has patient had these symptoms: 02/27/20  [facility] and phone[id number] [id number] [address] AT [facility] [address] [phone] (Phone)  [phone] (Fax)  Any drug allergies: onfile  Comments: Patient would like to get medical advice.

## 2020-02-27 NOTE — TELEPHONE ENCOUNTER
Please inform patient that prescription for Augmentin has been sent to her pharmacy electronically.

## 2020-03-10 ENCOUNTER — PATIENT OUTREACH (OUTPATIENT)
Dept: ADMINISTRATIVE | Facility: OTHER | Age: 70
End: 2020-03-10

## 2020-03-12 ENCOUNTER — HOSPITAL ENCOUNTER (OUTPATIENT)
Dept: VASCULAR SURGERY | Facility: CLINIC | Age: 70
Discharge: HOME | End: 2020-03-12
Attending: SURGERY
Payer: COMMERCIAL

## 2020-03-12 ENCOUNTER — OFFICE VISIT (OUTPATIENT)
Dept: VASCULAR SURGERY | Facility: CLINIC | Age: 70
End: 2020-03-12
Attending: SURGERY
Payer: COMMERCIAL

## 2020-03-12 ENCOUNTER — TELEPHONE (OUTPATIENT)
Dept: INTERNAL MEDICINE | Facility: CLINIC | Age: 70
End: 2020-03-12

## 2020-03-12 VITALS
HEART RATE: 80 BPM | DIASTOLIC BLOOD PRESSURE: 64 MMHG | SYSTOLIC BLOOD PRESSURE: 126 MMHG | HEIGHT: 62 IN | TEMPERATURE: 99 F | BODY MASS INDEX: 28.8 KG/M2 | WEIGHT: 156.5 LBS

## 2020-03-12 DIAGNOSIS — I73.9 PAD (PERIPHERAL ARTERY DISEASE): ICD-10-CM

## 2020-03-12 DIAGNOSIS — I70.202 FEMORAL ARTERY OCCLUSION, LEFT: Primary | ICD-10-CM

## 2020-03-12 DIAGNOSIS — Z12.31 ENCOUNTER FOR SCREENING MAMMOGRAM FOR BREAST CANCER: Primary | ICD-10-CM

## 2020-03-12 PROCEDURE — 93926 PR DUPLEX LO EXTREM ART UNILAT/LTD: ICD-10-PCS | Mod: S$GLB,,, | Performed by: SURGERY

## 2020-03-12 PROCEDURE — 93923 PR NON-INVASIVE PHYSIOLOGIC STUDY EXTREMITY 3 LEVELS: ICD-10-PCS | Mod: S$GLB,,, | Performed by: SURGERY

## 2020-03-12 PROCEDURE — 99999 PR PBB SHADOW E&M-EST. PATIENT-LVL III: ICD-10-PCS | Mod: PBBFAC,,, | Performed by: SURGERY

## 2020-03-12 PROCEDURE — 99999 PR PBB SHADOW E&M-EST. PATIENT-LVL III: CPT | Mod: PBBFAC,,, | Performed by: SURGERY

## 2020-03-12 PROCEDURE — 99024 POSTOP FOLLOW-UP VISIT: CPT | Mod: S$GLB,,, | Performed by: SURGERY

## 2020-03-12 PROCEDURE — 93923 UPR/LXTR ART STDY 3+ LVLS: CPT | Mod: S$GLB,,, | Performed by: SURGERY

## 2020-03-12 PROCEDURE — 93926 LOWER EXTREMITY STUDY: CPT | Mod: S$GLB,,, | Performed by: SURGERY

## 2020-03-12 PROCEDURE — 99024 PR POST-OP FOLLOW-UP VISIT: ICD-10-PCS | Mod: S$GLB,,, | Performed by: SURGERY

## 2020-03-12 RX ORDER — CLOPIDOGREL BISULFATE 75 MG/1
75 TABLET ORAL DAILY
Qty: 30 TABLET | Refills: 6 | Status: SHIPPED | OUTPATIENT
Start: 2020-03-12 | End: 2020-10-27 | Stop reason: SDUPTHER

## 2020-03-12 NOTE — TELEPHONE ENCOUNTER
Called and spoke with the pt and she states she has 100% blood flow back in her legs and confirmed the appointment she states she has to go back to pain management. She will discuss at appointment. Termed the call

## 2020-03-12 NOTE — TELEPHONE ENCOUNTER
----- Message from Jasmyn Dudley sent at 3/12/2020 12:02 PM CDT -----  Contact: self   Patient called in requesting to speak with Dr Parikh in regards to disability forms she filled out please advised

## 2020-03-12 NOTE — PROGRESS NOTES
Rachel JO Trevor  2020    HPI:  Patient is a 69 y.o. female with PMHx of HTN, tobacco abuse (25 pk/yr), sciatica, scc anal canal s/p chemotherapy, LLE PAD who is here for follow up critical limb ischemia with rest pain underwent L EIA, L CFA endarterectomy, L profundaplasty, JESU balloon angioplasty (6x60mm mustang) 20. Patient discharged home on .   Today patient reports doing well. She endorses continued LLE numbness from the knee down. She has left groin pain associated with the wound. She is using a sanitary pad for dressing of the groin wound. Not yet taking showers. Ambulating moderately well with a cane. Continues to report chronic sciatic back pain.    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 Rose    DENTAL SURGERY  2017    ENDARTERECTOMY OF FEMORAL ARTERY Left 2020    Procedure: ENDARTERECTOMY, FEMORAL;  Surgeon: Olayinka Harris MD;  [facility];  Service: Peripheral Vascular;  Laterality: Left;  SFA endarterectomy  fluoro time: 9.2 min mGy: 2336.86    EPIDURAL STEROID INJECTION INTO LUMBAR SPINE N/A 2020    Procedure: Injection-steroid-epidural-lumbar--Review MRI- L5-S1 after MRI review;  Surgeon: Ely Syed Jr., MD;  Location: [facility] PAIN MGT;  Service: Pain Management;   Laterality: N/A;    gyn surgery      ex laparotomy    HYSTERECTOMY       Family History   Problem Relation Age of Onset    Hypertension Mother     Heart disease Mother 40        CABG x 5    Seizures Mother     Cancer Father         prostate    Stroke Father     Cancer Brother         colon    Diabetes Maternal Aunt     Breast cancer Maternal Aunt     Diabetes Maternal Uncle     Stroke Maternal Grandmother     Stroke Paternal Grandmother     Diabetes Cousin     Breast cancer Cousin      Social History     Socioeconomic History    Marital status: Single     Spouse name: Not on file    Number of children: Not on file    Years of education: Not on file    Highest education level: Not on file   Occupational History     Employer: [employer]   Social Needs    Financial resource strain: Not on file    Food insecurity:     Worry: Not on file     Inability: Not on file    Transportation needs:     Medical: Not on file     Non-medical: Not on file   Tobacco Use    Smoking status: Current Every Day Smoker     Packs/day: 0.50     Years: 40.00     Pack years: 20.00     Types: Cigarettes    Smokeless tobacco: Never Used    Tobacco comment: occasionally   Substance and Sexual Activity    Alcohol use: Yes     Alcohol/week: 0.0 standard drinks     Comment: rarely    Drug use: No    Sexual activity: Never   Lifestyle    Physical activity:     Days per week: Not on file     Minutes per session: Not on file    Stress: Not on file   Relationships    Social connections:     Talks on phone: Not on file     Gets together: Not on file     Attends [employer] service: Not on file     Active member of club or organization: Not on file     Attends meetings of clubs or organizations: Not on file     Relationship status: Not on file   Other Topics Concern    Not on file   Social History Narrative    Not on file       Current Outpatient Medications:     acetaminophen (TYLENOL) 500 MG tablet, Take 2 tablets  (1,000 mg total) by mouth 3 (three) times daily., Disp: , Rfl: 0    aspirin (ECOTRIN) 81 MG EC tablet, Take 1 tablet (81 mg total) by mouth once daily., Disp: 360 tablet, Rfl: 0    candesartan-hydrochlorothiazide (ATACAND HCT) 16-12.5 mg per tablet, Take 1 tablet by mouth once daily., Disp: 30 tablet, Rfl: 3    gabapentin (NEURONTIN) 100 MG capsule, Take 2 capsules (200 mg total) by mouth 3 (three) times daily., Disp: 180 capsule, Rfl: 11    amoxicillin-clavulanate 875-125mg (AUGMENTIN) 875-125 mg per tablet, Take 1 tablet by mouth every 12 (twelve) hours. (Patient not taking: Reported on 3/12/2020), Disp: 14 tablet, Rfl: 0    candesartan-hydrochlorothiazide (ATACAND HCT) 16-12.5 mg per tablet, TAKE 1 TABLET BY MOUTH EVERY DAY (Patient not taking: Reported on 3/12/2020), Disp: 30 tablet, Rfl: 3    candesartan-hydrochlorothiazide (ATACAND HCT) 16-12.5 mg per tablet, TAKE 1 TABLET BY MOUTH EVERY DAY (Patient not taking: Reported on 3/12/2020), Disp: 30 tablet, Rfl: 3    diclofenac (VOLTAREN) 75 MG EC tablet, Take 1 tablet (75 mg total) by mouth 2 (two) times daily. (Patient not taking: Reported on 2/27/2020), Disp: 60 tablet, Rfl: 0    HYDROcodone-acetaminophen (NORCO) 5-325 mg per tablet, Take 1 tablet by mouth every 6 (six) hours as needed for Pain. (Patient not taking: Reported on 3/12/2020), Disp: 20 tablet, Rfl: 0    HYDROcodone-acetaminophen (NORCO) 5-325 mg per tablet, Take 1 tablet by mouth every 6 (six) hours as needed for Pain., Disp: 20 tablet, Rfl: 0    REVIEW OF SYSTEMS:  General: negative; ENT: negative; Allergy and Immunology: negative; Hematological and Lymphatic: Negative; Endocrine: negative; Respiratory: no cough, shortness of breath, or wheezing; Cardiovascular: no chest pain or dyspnea on exertion; Gastrointestinal: no abdominal pain/back, change in bowel habits, or bloody stools; Genito-Urinary: no dysuria, trouble voiding, or hematuria; Musculoskeletal: left lower extremity  pain  Neurological: no TIA or stroke symptoms; Psychiatric: no nervousness, anxiety or depression.    PHYSICAL EXAM:      Pulse: 80  Temp: 98.7 °F (37.1 °C)      General appearance:  Alert, well-appearing, and in no distress.  Oriented to person, place, and time   Neurological: Normal speech, no focal findings noted; CN II - XII grossly intact           Musculoskeletal: Digits/nail without cyanosis/clubbing.  Normal muscle strength/tone.                 Neck: Supple, no significant adenopathy; thyroid is not enlarged                  No carotid bruit can be auscultated                Chest:  Clear to auscultation, no wheezes, rales or rhonchi, symmetric air entry     No use of accessory muscles             Cardiac: Normal rate and regular rhythm, S1 and S2 normal; PMI non-displaced          Abdomen: Soft, nontender, nondistended, no masses or organomegaly     No rebound tenderness noted; bowel sounds normal     Pulsatile aortic mass is not palpable.     No groin adenopathy      Extremities:     Left groin incision with some fibrinous exudate. Moist region. No concern for wound descience or infection. Induration of soft tissue underlying incision.   Left leg warm. Discoloration, blanching of foot with movement. Dopplerable signals left dp and pt    LAB RESULTS:  Lab Results   Component Value Date    K 4.5 02/25/2020    K 4.2 02/24/2020    K 4.4 02/23/2020    CREATININE 0.8 02/25/2020    CREATININE 0.8 02/24/2020    CREATININE 0.9 02/23/2020     Lab Results   Component Value Date    WBC 7.05 02/25/2020    WBC 8.42 02/24/2020    WBC 8.28 02/23/2020    HCT 34.9 (L) 02/25/2020    HCT 33.3 (L) 02/24/2020    HCT 33.0 (L) 02/23/2020     (H) 02/25/2020     02/24/2020     02/23/2020     No results found for: HGBA1C  IMAGING:    Reviewed in clinic. =========  Right Leg: Segmental pressures and PVR waveforms suggest mild peripheral arterial occlusive disease.  Left Leg: Segmental pressures and PVR waveforms  suggest mild peripheral arterial occlusive disease.    IMP/PLAN:  69 y.o. female with HTN, active smoker (1/2 ppd since teenager)  who presents with severe PAD (L>R). S/p L EIA, L CFA endarterectomy, L profundaplasty, JESU balloon angioplasty (6x60mm mustang) 2/18/20. Imaging today 3/12 reviewed, no concerns.  Doing well currently, LLE rest pain resolved.  Persistent sciatica.  WALT and PVR reviewed and show improvement in LLE WALT from undetectable to 0.7.  L groin wound with mild fibrinous exudate, no discharge.     - Continue daily wound care of left groin incision. Imperative to keep wound dressed with dry cotton gauze to keep incision clean and dry. Okay to take a shower, do no soak in bath or pool, etc for 12 weeks post operatively.   - Follow up in 3 weeks for wound check   - continue asa,  Initiate plavix    Olayinka Harris MD  Vascular & Endovascular Surgery

## 2020-03-20 ENCOUNTER — TELEPHONE (OUTPATIENT)
Dept: RADIOLOGY | Facility: HOSPITAL | Age: 70
End: 2020-03-20

## 2020-03-20 ENCOUNTER — OFFICE VISIT (OUTPATIENT)
Dept: INTERNAL MEDICINE | Facility: CLINIC | Age: 70
End: 2020-03-20
Payer: COMMERCIAL

## 2020-03-20 VITALS
BODY MASS INDEX: 29.08 KG/M2 | WEIGHT: 158.06 LBS | SYSTOLIC BLOOD PRESSURE: 110 MMHG | TEMPERATURE: 98 F | HEART RATE: 78 BPM | HEIGHT: 62 IN | DIASTOLIC BLOOD PRESSURE: 68 MMHG | RESPIRATION RATE: 18 BRPM

## 2020-03-20 DIAGNOSIS — I73.9 PAD (PERIPHERAL ARTERY DISEASE): ICD-10-CM

## 2020-03-20 DIAGNOSIS — R35.89 POLYURIA: ICD-10-CM

## 2020-03-20 DIAGNOSIS — M79.89 PAIN AND SWELLING OF LEFT LOWER LEG: Primary | ICD-10-CM

## 2020-03-20 DIAGNOSIS — M79.662 PAIN AND SWELLING OF LEFT LOWER LEG: Primary | ICD-10-CM

## 2020-03-20 PROCEDURE — 87086 URINE CULTURE/COLONY COUNT: CPT

## 2020-03-20 PROCEDURE — 1159F PR MEDICATION LIST DOCUMENTED IN MEDICAL RECORD: ICD-10-PCS | Mod: S$GLB,,, | Performed by: INTERNAL MEDICINE

## 2020-03-20 PROCEDURE — 99213 PR OFFICE/OUTPT VISIT, EST, LEVL III, 20-29 MIN: ICD-10-PCS | Mod: S$GLB,,, | Performed by: INTERNAL MEDICINE

## 2020-03-20 PROCEDURE — 87077 CULTURE AEROBIC IDENTIFY: CPT

## 2020-03-20 PROCEDURE — 99999 PR PBB SHADOW E&M-EST. PATIENT-LVL III: ICD-10-PCS | Mod: PBBFAC,,, | Performed by: INTERNAL MEDICINE

## 2020-03-20 PROCEDURE — 3074F SYST BP LT 130 MM HG: CPT | Mod: CPTII,S$GLB,, | Performed by: INTERNAL MEDICINE

## 2020-03-20 PROCEDURE — 3078F DIAST BP <80 MM HG: CPT | Mod: CPTII,S$GLB,, | Performed by: INTERNAL MEDICINE

## 2020-03-20 PROCEDURE — 1101F PR PT FALLS ASSESS DOC 0-1 FALLS W/OUT INJ PAST YR: ICD-10-PCS | Mod: CPTII,S$GLB,, | Performed by: INTERNAL MEDICINE

## 2020-03-20 PROCEDURE — 87186 SC STD MICRODIL/AGAR DIL: CPT

## 2020-03-20 PROCEDURE — 1101F PT FALLS ASSESS-DOCD LE1/YR: CPT | Mod: CPTII,S$GLB,, | Performed by: INTERNAL MEDICINE

## 2020-03-20 PROCEDURE — 3074F PR MOST RECENT SYSTOLIC BLOOD PRESSURE < 130 MM HG: ICD-10-PCS | Mod: CPTII,S$GLB,, | Performed by: INTERNAL MEDICINE

## 2020-03-20 PROCEDURE — 3078F PR MOST RECENT DIASTOLIC BLOOD PRESSURE < 80 MM HG: ICD-10-PCS | Mod: CPTII,S$GLB,, | Performed by: INTERNAL MEDICINE

## 2020-03-20 PROCEDURE — 87088 URINE BACTERIA CULTURE: CPT

## 2020-03-20 PROCEDURE — 1159F MED LIST DOCD IN RCRD: CPT | Mod: S$GLB,,, | Performed by: INTERNAL MEDICINE

## 2020-03-20 PROCEDURE — 99999 PR PBB SHADOW E&M-EST. PATIENT-LVL III: CPT | Mod: PBBFAC,,, | Performed by: INTERNAL MEDICINE

## 2020-03-20 PROCEDURE — 99213 OFFICE O/P EST LOW 20 MIN: CPT | Mod: S$GLB,,, | Performed by: INTERNAL MEDICINE

## 2020-03-20 RX ORDER — HYDROCODONE BITARTRATE AND ACETAMINOPHEN 5; 325 MG/1; MG/1
1 TABLET ORAL EVERY 6 HOURS PRN
Qty: 30 TABLET | Refills: 0 | Status: SHIPPED | OUTPATIENT
Start: 2020-03-20 | End: 2020-07-20 | Stop reason: CLARIF

## 2020-03-20 RX ORDER — TRIPROLIDINE/PSEUDOEPHEDRINE 2.5MG-60MG
TABLET ORAL EVERY 6 HOURS PRN
COMMUNITY
End: 2020-08-05

## 2020-03-20 RX ORDER — HYDROCODONE BITARTRATE AND ACETAMINOPHEN 5; 325 MG/1; MG/1
1 TABLET ORAL EVERY 6 HOURS PRN
Qty: 30 TABLET | Refills: 0 | Status: SHIPPED | OUTPATIENT
Start: 2020-03-20 | End: 2020-03-20 | Stop reason: SDUPTHER

## 2020-03-20 RX ORDER — AMOXICILLIN AND CLAVULANATE POTASSIUM 875; 125 MG/1; MG/1
1 TABLET, FILM COATED ORAL EVERY 12 HOURS
Qty: 14 TABLET | Refills: 0 | Status: SHIPPED | OUTPATIENT
Start: 2020-03-20 | End: 2020-06-26 | Stop reason: SDUPTHER

## 2020-03-20 NOTE — PROGRESS NOTES
Transitional Care Note  Subjective:       Patient ID: Rachel Murray is a 69 y.o. female.  Chief Complaint: Follow-up (Hosp)    Family and/or Caretaker present at visit?  No.  Diagnostic tests reviewed/disposition: No diagnosic tests pending after this hospitalization.  Disease/illness education: PAD  Home health/community services discussion/referrals: Patient does not have home health established from hospital visit.  They do not need home health.  If needed, we will set up home health for the patient.   Establishment or re-establishment of referral orders for community resources: No other necessary community resources.   Discussion with other health care providers: No discussion with other health care providers necessary.   CC: followup of hospitalization for L EIA, L CFA endarterectomy, L profundaplasty, JESU balloon angioplasty  HPI:  The patient is a 69 y.o. year old female who presents to the office for followup of hospitalization for L EIA, L CFA endarterectomy, L profundaplasty, JESU balloon angioplasty.  Currently she complains of numbness and burning pain of left leg.  Pain is currently 6/10 and is constant.  She is taking gabapentin without relief.  She also reports she is unable to get comfortable.  Epidural lasted only about 3 days.  The patient is currently doing physical therapy and developed swelling of her left leg and foot.  She also reports polyuria.      PAST MEDICAL HISTORY:  Past Medical History:  No date: anal cancer      Comment:  Squamous cell carcinoma of the anal canal with radiation               and chemotherapy  9/10/2012: Hypertension    SURGICAL HISTORY:  Past Surgical History:  2020: AORTOGRAPHY; N/A      Comment:  Procedure: AORTOGRAM;  Surgeon: Olayinka Harris MD;               Location: [facility] OR [address] [place] [address];  Service: Peripheral Vascular;               Laterality: N/A;  No date: APPENDECTOMY  No date: BREAST BIOPSY  1987:  SECTION, CLASSIC  No date:  COLONOSCOPY  1/16/2017: COLONOSCOPY; N/A      Comment:  Procedure: COLONOSCOPY;  Surgeon: Gabriel Rose MD;               Location: [facility] ENDO (4TH FLR);  Service: Endoscopy;                 Laterality: N/A;  Miralax prep per Dr. Rose  11/06/2017: DENTAL SURGERY  2/17/2020: ENDARTERECTOMY OF FEMORAL ARTERY; Left      Comment:  Procedure: ENDARTERECTOMY, FEMORAL;  Surgeon: Olayinka Harris MD;  Location: [facility] OR 2ND FLR;  Service:                Peripheral Vascular;  Laterality: Left;  SFA                endarterectomyfluoro time: 9.2 min mGy: 2336.86  1/16/2020: EPIDURAL STEROID INJECTION INTO LUMBAR SPINE; N/A      Comment:  Procedure: Injection-steroid-epidural-lumbar--Review                MRI- L5-S1 after MRI review;  Surgeon: Ely Syed Jr., MD;  Location: [facility] PAIN MGT;  Service: Pain                Management;  Laterality: N/A;  No date: gyn surgery      Comment:  ex laparotomy  No date: HYSTERECTOMY    MEDS:  Medcard reviewed and updated    ALLERGIES: Allergy Card reviewed and updated    SOCIAL HISTORY:   The patient is a nonsmoker.        Review of Systems   Constitutional: Negative for chills and fever.   Respiratory: Negative for cough, chest tightness and shortness of breath.    Cardiovascular: Positive for leg swelling. Negative for chest pain and palpitations.   Gastrointestinal: Negative for abdominal pain, nausea and vomiting.   Genitourinary: Positive for dysuria and frequency.   Musculoskeletal: Positive for back pain and gait problem.   Neurological: Negative for dizziness, light-headedness and headaches.   Psychiatric/Behavioral: Positive for sleep disturbance.       Objective:      Physical Exam   Constitutional: She is oriented to person, place, and time. She appears well-developed and well-nourished.   Cardiovascular: Normal rate, regular rhythm and normal heart sounds.   No murmur heard.  Pulmonary/Chest: Effort normal and breath sounds normal.    Abdominal: Soft. Bowel sounds are normal. She exhibits no distension. There is no tenderness.   Musculoskeletal: She exhibits edema.   Neurological: She is alert and oriented to person, place, and time.   Skin: Skin is warm and dry.   Psychiatric: She has a normal mood and affect. Her behavior is normal. Judgment and thought content normal.       Assessment:       1. Pain and swelling of left lower leg    2. Polyuria    3. PAD (peripheral artery disease)        Plan:     Rachel was seen today for follow-up.    Diagnoses and all orders for this visit:    Pain and swelling of left lower leg  -     Discontinue: HYDROcodone-acetaminophen (NORCO) 5-325 mg per tablet; Take 1 tablet by mouth every 6 (six) hours as needed for Pain.  -     HYDROcodone-acetaminophen (NORCO) 5-325 mg per tablet; Take 1 tablet by mouth every 6 (six) hours as needed for Pain.    Polyuria  -     Urinalysis; Future  -     Urine culture  -     Prescribed augmentin    PAD (peripheral artery disease)  -     S/p L EIA, L CFA endarterectomy, L profundaplasty, JESU balloon angioplasty  -      Followed by vascular surgery    Other orders  -     amoxicillin-clavulanate 875-125mg (AUGMENTIN) 875-125 mg per tablet; Take 1 tablet by mouth every 12 (twelve) hours.

## 2020-03-22 LAB — BACTERIA UR CULT: ABNORMAL

## 2020-03-23 ENCOUNTER — TELEPHONE (OUTPATIENT)
Dept: INTERNAL MEDICINE | Facility: CLINIC | Age: 70
End: 2020-03-23

## 2020-03-23 ENCOUNTER — PATIENT MESSAGE (OUTPATIENT)
Dept: INTERNAL MEDICINE | Facility: CLINIC | Age: 70
End: 2020-03-23

## 2020-03-24 ENCOUNTER — EXTERNAL HOME HEALTH (OUTPATIENT)
Dept: HOME HEALTH SERVICES | Facility: HOSPITAL | Age: 70
End: 2020-03-24
Payer: COMMERCIAL

## 2020-03-24 ENCOUNTER — TELEPHONE (OUTPATIENT)
Dept: PAIN MEDICINE | Facility: CLINIC | Age: 70
End: 2020-03-24

## 2020-03-24 NOTE — TELEPHONE ENCOUNTER
Called patient and informed her that  will call her at her appointment time. Patient verbalized understanding.

## 2020-03-25 ENCOUNTER — TELEPHONE (OUTPATIENT)
Dept: INTERNAL MEDICINE | Facility: CLINIC | Age: 70
End: 2020-03-25

## 2020-03-25 RX ORDER — SULFAMETHOXAZOLE AND TRIMETHOPRIM 800; 160 MG/1; MG/1
1 TABLET ORAL 2 TIMES DAILY
Qty: 10 TABLET | Refills: 0 | Status: SHIPPED | OUTPATIENT
Start: 2020-03-25 | End: 2020-03-30

## 2020-03-25 NOTE — TELEPHONE ENCOUNTER
----- Message from Russ Smith sent at 3/25/2020  9:59 AM CDT -----  Contact: Self [phone]  Patient will like to speak to the nurse in regards to amoxicillin-clavulanate 875-125mg (AUGMENTIN) 875-125 mg per tablet, pt states she throw-up when taking medication, please advise.

## 2020-03-25 NOTE — TELEPHONE ENCOUNTER
Advised patient to discontinue Augmentin.  A prescription for Bactrim has been sent to her pharmacy electronically.

## 2020-03-27 ENCOUNTER — PATIENT OUTREACH (OUTPATIENT)
Dept: ADMINISTRATIVE | Facility: OTHER | Age: 70
End: 2020-03-27

## 2020-03-30 ENCOUNTER — TELEPHONE (OUTPATIENT)
Dept: PAIN MEDICINE | Facility: CLINIC | Age: 70
End: 2020-03-30

## 2020-03-30 DIAGNOSIS — M54.16 LUMBAR RADICULOPATHY: Primary | ICD-10-CM

## 2020-03-30 DIAGNOSIS — M43.10 SPONDYLOLISTHESIS, UNSPECIFIED SPINAL REGION: ICD-10-CM

## 2020-03-30 DIAGNOSIS — M48.062 SPINAL STENOSIS OF LUMBAR REGION WITH NEUROGENIC CLAUDICATION: ICD-10-CM

## 2020-03-30 RX ORDER — TRAMADOL HYDROCHLORIDE 50 MG/1
100 TABLET ORAL EVERY 12 HOURS PRN
Qty: 60 TABLET | Refills: 0 | Status: SHIPPED | OUTPATIENT
Start: 2020-03-30 | End: 2020-04-14

## 2020-03-30 RX ORDER — PREGABALIN 50 MG/1
50 CAPSULE ORAL 3 TIMES DAILY
Qty: 90 CAPSULE | Refills: 2 | Status: SHIPPED | OUTPATIENT
Start: 2020-03-30 | End: 2020-04-21

## 2020-03-30 NOTE — TELEPHONE ENCOUNTER
Returned call to patient. Patient stated that her phone disconnected when  tried to call her. Rescheduled her for an appointment at 1:15 today. Patient verbalized understanding.     ----- Message from Corey Garcia, Patient Care Assistant sent at 3/30/2020 10:05 AM CDT -----  Contact: MIKIE ROJAS [[id number]]  Type:  Patient Returning Call    Who Called: MIKIE ROJAS [[id number]]    Who Left Message for Patient: Dr. Syed    Does the patient know what this is regarding?:Yes    Best Call Back Number:[phone]    Additional Information:   None

## 2020-03-30 NOTE — TELEPHONE ENCOUNTER
[facility] Telephone Encounter      Telephone Bundle:  This visit was completed during the Coronavirus Crisis to enhance patient safety.  The patient location is: patient's home  The chief complaint leading to consultation is: Back Pain; Leg Pain; and Hip Pain  Visit type: Telephone  Total time spent with patient: 14 minutes  Each patient to whom he or she provides medical services by telemedicine is:    (1) informed of the relationship between the physician and patient and the respective role of any other health care provider with respect to management of the patient  (2) notified that he or she may decline to receive medical services by telephone and may withdraw from such care at any time.    Reason for Call:   1. Corona Virus Phone Encounter  2. Back and Leg Pain F/U    Subjective:  The clinic visit for Mrs. Murray has been converted to a telephone encounter because he/she has declined a telemedicine encounter due to technical difficulty or preference.  She is reporting acutely increased shooting pains originating in her back and traveling into the legs since having vascular surgery of her leg.  She was evaluated by her vascular surgery team and they believe her symptoms are not related to the recent surgery but are most likely to be an exacerbation of her pre-existing lumbar radiculopathy.  She states that her pain is severe and rates it 10/10 at this time.  She states inability to sleep comfortably at night and that her current medication regimen has failed to provide her significant relief.  She reports no relief with tramadol 50 mg and very little relief with a recent prescription of [place] 5-325 she received from her PCP.    Assessment:  Problem List Items Addressed This Visit     Lumbar radiculopathy - Primary    Relevant Medications    pregabalin (LYRICA) 50 MG capsule    traMADoL (ULTRAM) 50 mg tablet    Spinal stenosis of lumbar region with neurogenic claudication    Relevant  Medications    pregabalin (LYRICA) 50 MG capsule    traMADoL (ULTRAM) 50 mg tablet    Spondylolisthesis    Relevant Medications    pregabalin (LYRICA) 50 MG capsule    traMADoL (ULTRAM) 50 mg tablet           3/30/2020 - I reviewed her most recent MRI lumbar spine from 01/10/2020 which shows significant lateral recess and neural foraminal stenosis at L3-4 and L4-5 consistent with radiating pattern she is experiencing at this time.  Patient would be an excellent candidate for repeating the lumbar epidural steroid injection at L5-S1 as this provided her 90-95% relief.  Patient was advised that low back pain and radiculopathy can increase after surgical procedure due to immobility and reduced physical activity postoperatively.  In addition to the epidural steroid injection, she will also likely need to be evaluated by Neurosurgery in the symptoms remain refractory to the medications I will prescribe today and then epidural steroid injection.  Epidural steroid injections on hold pending resolution of the Coronavirus crisis.    Plan:  1. Scheduled for follow-up visit in 2-4 weeks  2. Electronically prescribed tramadol 100 mg p.o. b.i.d. p.r.n. times 15 days  3. Discontinue gabapentin and start Lyrica 50 mg p.o. t.i.d.  4. Patient to follow up with me in 1 week by my chart message    Ely Syed Jr, MD  Interventional Pain Medicine / Anesthesiology  03/30/2020

## 2020-04-16 ENCOUNTER — TELEPHONE (OUTPATIENT)
Dept: INTERNAL MEDICINE | Facility: CLINIC | Age: 70
End: 2020-04-16

## 2020-04-16 ENCOUNTER — TELEPHONE (OUTPATIENT)
Dept: PAIN MEDICINE | Facility: CLINIC | Age: 70
End: 2020-04-16

## 2020-04-16 DIAGNOSIS — I73.9 PAD (PERIPHERAL ARTERY DISEASE): Primary | ICD-10-CM

## 2020-04-16 NOTE — TELEPHONE ENCOUNTER
Pt states her employer advised that they need copies of her forms for her short term disability to show that she needs an extended leave until 4/30/2020 they need the forms by Tomorrow and if they don't get the forms she has to got back Monday  [facility] forms are being re faxed showing the rte as 5/1/2020 faxed to [phone]

## 2020-04-16 NOTE — TELEPHONE ENCOUNTER
Spoke with pt regarding a f/u appt with Dr. Syed. Pt is scheduled for 4/23/20 at 10 am. Pt verbalized understanding and confirmed appt date and time.

## 2020-04-16 NOTE — TELEPHONE ENCOUNTER
----- Message from Safia Goodwin sent at 4/16/2020  9:12 AM CDT -----  Contact: [phone]  Patient would like to speak to the nurse in regards to a medical leave she needs extended till 4/30/20 . Please call and advise.

## 2020-04-16 NOTE — TELEPHONE ENCOUNTER
----- Message from Nayana HUNTJacques Lynn sent at 4/16/2020 12:06 PM CDT -----  Contact: [phone] self   Pt is requesting to speak with you re: audio appt. Pt states that she was suppose to have a 2 weeks follow up appt following her appt that took place on 3/30. Please advise

## 2020-04-17 ENCOUNTER — PATIENT OUTREACH (OUTPATIENT)
Dept: ADMINISTRATIVE | Facility: OTHER | Age: 70
End: 2020-04-17

## 2020-04-21 ENCOUNTER — OFFICE VISIT (OUTPATIENT)
Dept: PAIN MEDICINE | Facility: CLINIC | Age: 70
End: 2020-04-21
Attending: SURGERY
Payer: COMMERCIAL

## 2020-04-21 DIAGNOSIS — M43.10 SPONDYLOLISTHESIS, UNSPECIFIED SPINAL REGION: ICD-10-CM

## 2020-04-21 DIAGNOSIS — M48.062 SPINAL STENOSIS OF LUMBAR REGION WITH NEUROGENIC CLAUDICATION: ICD-10-CM

## 2020-04-21 DIAGNOSIS — M54.16 LUMBAR RADICULOPATHY: Primary | ICD-10-CM

## 2020-04-21 PROCEDURE — 99212 OFFICE O/P EST SF 10 MIN: CPT | Mod: 95,,, | Performed by: PAIN MEDICINE

## 2020-04-21 PROCEDURE — 99212 PR OFFICE/OUTPT VISIT, EST, LEVL II, 10-19 MIN: ICD-10-PCS | Mod: 95,,, | Performed by: PAIN MEDICINE

## 2020-04-21 PROCEDURE — 99999 PR PBB SHADOW E&M-EST. PATIENT-LVL II: ICD-10-PCS | Mod: PBBFAC,,, | Performed by: PAIN MEDICINE

## 2020-04-21 PROCEDURE — 99999 PR PBB SHADOW E&M-EST. PATIENT-LVL II: CPT | Mod: PBBFAC,,, | Performed by: PAIN MEDICINE

## 2020-04-21 RX ORDER — TRAMADOL HYDROCHLORIDE 50 MG/1
50 TABLET ORAL 3 TIMES DAILY PRN
Qty: 60 TABLET | Refills: 0 | Status: SHIPPED | OUTPATIENT
Start: 2020-04-21 | End: 2020-05-11

## 2020-04-21 RX ORDER — PREGABALIN 75 MG/1
75 CAPSULE ORAL 2 TIMES DAILY
Qty: 60 CAPSULE | Refills: 6 | Status: SHIPPED | OUTPATIENT
Start: 2020-04-21 | End: 2020-05-26 | Stop reason: SDUPTHER

## 2020-04-21 NOTE — H&P (VIEW-ONLY)
"[facility] Established Patient Evaluation  Telemedicine (Audio Only) Encounter    Telephone Bundle:  This visit was completed during the Coronavirus Crisis to enhance patient safety.  The patient location is: patient's home  The chief complaint leading to consultation is: Back Pain and Leg Pain  Visit type: Virtual visit with synchronous audio only  Total time spent with patient: 18 minutes  Each patient to whom he or she provides medical services by telemedicine is:    (1) informed of the relationship between the physician and patient and the respective role of any other health care provider with respect to management of the patient and  (2) notified that he or she may decline to receive medical services by telephone and may withdraw from such care at any time.    Referred by: Anel Parikh MD  Reason for referral: Sciatica, unspecified laterality    CC:   Chief Complaint   Patient presents with    Back Pain    Leg Pain     Last 3 PDI Scores 2/6/2020 1/6/2020   Pain Disability Index (PDI) 53 48     Interval Update:  4/21/20 - Mrs. Murray returns to clinic for follow up visit reporting stable back and leg pain.  Numbness, tightness, and pain are very unpleasant in the ankle and foot bilaterally, but the left is "way worse".  Patient is here for medication refill of Tramadol 50 mg and Lyrica 50 mg.  She reports 30-50% relief with the current medication regimen.  Pain intensity is currently 5/10 ranging 5-8/10.    3/30/20 - The clinic visit for Mrs. Murray has been converted to a telephone encounter because he/she has declined a telemedicine encounter due to technical difficulty or preference.  She is reporting acutely increased shooting pains originating in her back and traveling into the legs since having vascular surgery of her leg.  She was evaluated by her vascular surgery team and they believe her symptoms are not related to the recent surgery but are most likely to be an exacerbation of her " pre-existing lumbar radiculopathy.  She states that her pain is severe and rates it 10/10 at this time.  She states inability to sleep comfortably at night and that her current medication regimen has failed to provide her significant relief.  She reports no relief with tramadol 50 mg and very little relief with a recent prescription of [place] 5-325 she received from her PCP.    2/6/2020 - Mrs. Murray returns to clinic for follow up visit reporting improved back pain.  Patient is s/p Lumbar MIRNA on 1/16/20 with 95% continued relief. She reports beginning PT and having to stop due to  developing a swollen, erythematous, painful left foot.  she has recently seen Cardiology and has  been diagnosed with severe PAD (L>R). CTA with segmental occlusion of the left common and proximal SFA and occlusion of the left tibioperoneal trunk as well as occlusion of the left anterior tibial artery in its proximal 3rd. Moderate degree of calcific and hypodense plaque in the abdominal aorta and iliac arteries bilaterally.  She is now scheduled for a LLE iliac to femoral bypass creation with bilateral iliac stents on 2/17/2020   Pain intensity is currently 2/10.      Background / HPI:   Rachel Murray is a 69 y.o. female who complains of Low back pain with Sciatica.  She reports pain that starts in the left side of her low back and travels through the buttocks, posterolateral thigh, anterior shin, and into the top of her foot.  Alleviating and exacerbating factors are variable but prolonged sitting and walking for more than 5 min appear to drastically increase her pain. She also reports insomnia due to pain and has been using a recliner to sleep for approximately 90 min at a time. She reports dealing with low back pain for several years but noticed an acute increase in March 2019 which subsided somewhat.  In October 2019 her pain increased drastically and has not subsided since that time. Her history is positive for cervical disc herniation  which was previously treated with oral medications and physical therapy.  She also notes associated numbness in the left lower extremity and a sensation of weakness in the left leg.  She denies falls or saddle anesthesia.    Location: Low back   Onset: 10/2019  Current Pain Score: 8/10  Daily Pain of Range: 5-8/10  Quality: Burning, Throbbing, Grabbing, Tingling, Numb, Sharp, Electric, Hot and Cold  Radiation: Radiates down left side to foot.  Worsened by: extension, lying down, sitting and walking for more than 5 minutes  Improved by: nothing    Previous Therapies:  PT/OT: Denies  HEP: Denies  Interventions: Denies  Surgery:Denies  Medications:   - NSAIDS:   - MSK Relaxants:   - TCAs:   - SNRIs:   - Topicals:   - Anticonvulsants:  - Opioids:     Current Pain Medications:  1. Aleve   2. Tramadol 50 mg TID PRN  3. Lyrica 50 TID    Review of Systems:  Review of Systems   Constitutional: Negative for chills and fever.   HENT: Negative for nosebleeds.    Eyes: Negative for blurred vision and pain.   Respiratory: Negative for hemoptysis.    Cardiovascular: Negative for chest pain and palpitations.   Gastrointestinal: Negative for heartburn, nausea and vomiting.   Genitourinary: Negative for dysuria and hematuria.   Musculoskeletal: Positive for back pain. Negative for myalgias.   Skin: Negative for rash.   Neurological: Positive for tingling, sensory change and focal weakness (LLE). Negative for seizures and loss of consciousness.   Endo/Heme/Allergies: Does not bruise/bleed easily.   Psychiatric/Behavioral: Negative for hallucinations. The patient has insomnia (2/2 pain).        History:    Current Outpatient Medications:     acetaminophen (TYLENOL) 500 MG tablet, Take 2 tablets (1,000 mg total) by mouth 3 (three) times daily. (Patient not taking: Reported on 3/20/2020), Disp: , Rfl: 0    amoxicillin-clavulanate 875-125mg (AUGMENTIN) 875-125 mg per tablet, Take 1 tablet by mouth every 12 (twelve) hours., Disp: 14  tablet, Rfl: 0    aspirin (ECOTRIN) 81 MG EC tablet, Take 1 tablet (81 mg total) by mouth once daily., Disp: 360 tablet, Rfl: 0    candesartan-hydrochlorothiazide (ATACAND HCT) 16-12.5 mg per tablet, Take 1 tablet by mouth once daily., Disp: 30 tablet, Rfl: 3    candesartan-hydrochlorothiazide (ATACAND HCT) 16-12.5 mg per tablet, TAKE 1 TABLET BY MOUTH EVERY DAY, Disp: 30 tablet, Rfl: 3    candesartan-hydrochlorothiazide (ATACAND HCT) 16-12.5 mg per tablet, TAKE 1 TABLET BY MOUTH EVERY DAY, Disp: 30 tablet, Rfl: 3    clopidogreL (PLAVIX) 75 mg tablet, Take 1 tablet (75 mg total) by mouth once daily., Disp: 30 tablet, Rfl: 6    diclofenac (VOLTAREN) 75 MG EC tablet, Take 1 tablet (75 mg total) by mouth 2 (two) times daily. (Patient not taking: Reported on 2020), Disp: 60 tablet, Rfl: 0    HYDROcodone-acetaminophen (NORCO) 5-325 mg per tablet, Take 1 tablet by mouth every 6 (six) hours as needed for Pain. 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 Rose    DENTAL SURGERY  11/06/2017    ENDARTERECTOMY OF FEMORAL ARTERY Left 2/17/2020    Procedure: ENDARTERECTOMY, FEMORAL;  Surgeon: Olayinka Harris MD;  Location: [facility] OR 2ND FLR;  Service: Peripheral Vascular;  Laterality: Left;  SFA endarterectomy  fluoro time: 9.2 min mGy: 2336.86    EPIDURAL STEROID INJECTION INTO LUMBAR SPINE N/A 1/16/2020    Procedure: Injection-steroid-epidural-lumbar--Review MRI- L5-S1 after MRI review;  Surgeon: Ely Syed Jr., MD;  Location: [facility] PAIN MGT;  Service: Pain Management;  Laterality: N/A;    gyn surgery      ex laparotomy    HYSTERECTOMY         Family History   Problem Relation Age of Onset    Hypertension Mother     Heart disease Mother 40        CABG x 5    Seizures Mother     Cancer Father         prostate    Stroke Father     Cancer Brother         colon    Diabetes Maternal Aunt     Breast cancer Maternal Aunt     Diabetes Maternal Uncle     Stroke Maternal Grandmother     Stroke Paternal Grandmother     Diabetes Cousin     Breast cancer Cousin        Social History     Socioeconomic History    Marital status: Single     Spouse name: Not on file    Number of children: Not on file    Years of education: Not on file    Highest education level: Not on file   Occupational History     Employer: [employer]   Social Needs    Financial resource strain: Not on file    Food insecurity:     Worry: Not on file     Inability: Not on file    Transportation needs:     Medical: Not on file     Non-medical: Not on file   Tobacco Use    Smoking status: Current Every Day Smoker     Packs/day: 0.50     Years: 40.00     Pack years: 20.00     Types: Cigarettes    Smokeless tobacco: Never Used    Tobacco comment: occasionally   Substance and Sexual Activity    Alcohol use: Yes     Alcohol/week: 0.0 standard drinks      Comment: rarely    Drug use: No    Sexual activity: Never   Lifestyle    Physical activity:     Days per week: Not on file     Minutes per session: Not on file    Stress: Not on file   Relationships    Social connections:     Talks on phone: Not on file     Gets together: Not on file     Attends Sabianist service: Not on file     Active member of club or organization: Not on file     Attends meetings of clubs or organizations: Not on file     Relationship status: Not on file   Other Topics Concern    Not on file   Social History Narrative    Not on file       Review of patient's allergies indicates:   Allergen Reactions    Adhesive Blisters    Ciprofloxacin Hives    Latex, natural rubber Blisters       Physical Exam:  There were no vitals filed for this visit.  General    Nursing note and vitals reviewed.  Constitutional: She is oriented to person, place, and time. She appears well-developed and well-nourished. No distress.   HENT:   Head: Normocephalic and atraumatic.   Nose: Nose normal.   Eyes: Conjunctivae and EOM are normal. Pupils are equal, round, and reactive to light. Right eye exhibits no discharge. Left eye exhibits no discharge. No scleral icterus.   Neck: No JVD present.   Cardiovascular: Intact distal pulses.    Pulmonary/Chest: Effort normal. No respiratory distress.   Abdominal: She exhibits no distension.   Neurological: She is alert and oriented to person, place, and time. Coordination normal.   Psychiatric: She has a normal mood and affect. Her behavior is normal. Judgment and thought content normal.     General Musculoskeletal Exam   Gait: normal     Back (L-Spine & T-Spine) / Neck (C-Spine) Exam     Tenderness Right paramedian tenderness of the Lower L-Spine. Left paramedian tenderness of the Lower L-Spine.     Back (L-Spine & T-Spine) Range of Motion   Back extension: facet loading is positive and exacerabtes/reproduces the patient's typical low back pain    Back flexion: limited  ROM but partial relief of low back pain noted.     Spinal Sensation   Right Side Sensation  L-Spine Level: normal  Left Side Sensation  L-Spine Level: normal    Other She has no scoliosis .    Comments:  Left Leg + SLR      Muscle Strength   Right Lower Extremity   Hip Flexion: 5/5   Hip Extensors: 5/5  Quadriceps:  5/5   Hamstrin/5   Gastrocsoleus:  5/5/5  Left Lower Extremity   Hip Flexion: 5/5   Hip Extensors: 5/5  Quadriceps:  5/5   Hamstrin/5   Gastrocsoleus:  5/5/5    Reflexes     Left Side  Quadriceps:  2+  Achilles:  2+    Right Side   Quadriceps:  2+  Achilles:  2+      Imaging:  None pertinent to review.    Labs:  BMP  Lab Results   Component Value Date     (L) 2020    K 4.5 2020    CL 99 2020    CO2 26 2020    BUN 17 2020    CREATININE 0.8 2020    CALCIUM 9.2 2020    ANIONGAP 9 2020    ESTGFRAFRICA >60.0 2020    EGFRNONAA >60.0 2020     Lab Results   Component Value Date    ALT 19 2020    AST 16 2020    ALKPHOS 97 2020    BILITOT 0.3 2020       Assessment:  Problem List Items Addressed This Visit     None          2020 - Rachel Murray is a 69 y.o. female with chronic low back pain radiating into the left lower extremity consistent with radiculopathy at L5.  I will order an MRI of her lumbar spine to confirm this diagnosis as she has been dealing with the symptoms for the better part of 2019.  I have also ordered several medications to help address her pain and symptoms in addition to physical therapy.  I have also proposed a lumbar epidural steroid injection to be scheduled after completion of the MRI.  I would anticipate performing this injection at L5-S1; however, this location may change based on the results of the MRI.  She was also provided a letter for work to park closer to the building.    [id number]-old female presents for a follow-up status post and L5-S1 lumbar MIRNA with reported 95%  relief her pain score today is 2/10 patient reports feeling much better following the injection patient states she started physical therapy however 2 days into PT she began developing a swollen, erythematous, painful left foot.  She  has recently seen Cardiology and has  been diagnosed with severe PAD (L>R). CTA with segmental occlusion of the left common and proximal SFA and occlusion of the left tibioperoneal trunk as well as occlusion of the left anterior tibial artery in its proximal 3rd. Moderate degree of calcific and hypodense plaque in the abdominal aorta and iliac arteries bilaterally.  She is now scheduled for a LLE iliac to femoral bypass creation with bilateral iliac stents on 2/17/2020 with Dr Harris/cardiology.  She endorses feeling much better following her pain procedure and that the pain she originally came in for is much better, discussed that we could repeat as needed.    3/30/2020 - I reviewed her most recent MRI lumbar spine from 01/10/2020 which shows significant lateral recess and neural foraminal stenosis at L3-4 and L4-5 consistent with radiating pattern she is experiencing at this time.  Patient would be an excellent candidate for repeating the lumbar epidural steroid injection at L5-S1 as this provided her 90-95% relief.  Patient was advised that low back pain and radiculopathy can increase after surgical procedure due to immobility and reduced physical activity postoperatively.  In addition to the epidural steroid injection, she will also likely need to be evaluated by Neurosurgery in the symptoms remain refractory to the medications I will prescribe today and then epidural steroid injection.  Epidural steroid injections on hold pending resolution of the Coronavirus crisis.    4/21/20 - Pain symptoms remain unchanged but appear better managed with the combination of Tramadol and Lyrica.  Goal is to wean off of opioids through PT, injections, and other non-opioids, but those efforts are  limited until the Coronavirus restrictions pass.  Cont and reduce tramadol to her current usage level of TID PRN; cont and increase Lyrica.  Wait list for MIRNA.    Plan:  2. Placed on wait list for IESI @ L5-S1  3. Electronically prescribed tramadol 50 mg TID PRN   4. Cont and increase Lyrica to 75 mg p.o. TID    Ely Syed Jr, MD  Interventional Pain Medicine / Anesthesiology      Disclaimer: This note was partly generated using dictation software which may occasionally result in transcription errors.

## 2020-04-21 NOTE — LETTER
April 21, 2020      Olayinka Harris MD  [address]  [facility] [address]           [place] [facility]  [address]  [place] [address]  Phone: [phone]          Patient: Rachel Murray   MR Number: [id number]   YOB: 1950   Date of Visit: 4/21/2020       Dear Dr. Olayinka Harris:    Thank you for referring Rachel Murray to me for evaluation. Attached you will find relevant portions of my assessment and plan of care.    If you have questions, please do not hesitate to call me. I look forward to following Rachel Murray along with you.    Sincerely,    Ely Syed Jr., MD    Enclosure  CC:  No Recipients    If you would like to receive this communication electronically, please contact [email or web address] or [phone] to request more information on [employer] Link access.    For providers and/or their staff who would like to refer a patient to [facility], please contact us through our one-stop-shop provider referral line, [facility], at [phone].    If you feel you have received this communication in error or would no longer like to receive these types of communications, please e-mail [email or web address]

## 2020-04-21 NOTE — PROGRESS NOTES
"[facility] Established Patient Evaluation  Telemedicine (Audio Only) Encounter    Telephone Bundle:  This visit was completed during the Coronavirus Crisis to enhance patient safety.  The patient location is: patient's home  The chief complaint leading to consultation is: Back Pain and Leg Pain  Visit type: Virtual visit with synchronous audio only  Total time spent with patient: 18 minutes  Each patient to whom he or she provides medical services by telemedicine is:    (1) informed of the relationship between the physician and patient and the respective role of any other health care provider with respect to management of the patient and  (2) notified that he or she may decline to receive medical services by telephone and may withdraw from such care at any time.    Referred by: Anel Parikh MD  Reason for referral: Sciatica, unspecified laterality    CC:   Chief Complaint   Patient presents with    Back Pain    Leg Pain     Last 3 PDI Scores 2/6/2020 1/6/2020   Pain Disability Index (PDI) 53 48     Interval Update:  4/21/20 - Mrs. Murray returns to clinic for follow up visit reporting stable back and leg pain.  Numbness, tightness, and pain are very unpleasant in the ankle and foot bilaterally, but the left is "way worse".  Patient is here for medication refill of Tramadol 50 mg and Lyrica 50 mg.  She reports 30-50% relief with the current medication regimen.  Pain intensity is currently 5/10 ranging 5-8/10.    3/30/20 - The clinic visit for Mrs. Murray has been converted to a telephone encounter because he/she has declined a telemedicine encounter due to technical difficulty or preference.  She is reporting acutely increased shooting pains originating in her back and traveling into the legs since having vascular surgery of her leg.  She was evaluated by her vascular surgery team and they believe her symptoms are not related to the recent surgery but are most likely to be an exacerbation of her " pre-existing lumbar radiculopathy.  She states that her pain is severe and rates it 10/10 at this time.  She states inability to sleep comfortably at night and that her current medication regimen has failed to provide her significant relief.  She reports no relief with tramadol 50 mg and very little relief with a recent prescription of [place] 5-325 she received from her PCP.    2/6/2020 - Mrs. Murray returns to clinic for follow up visit reporting improved back pain.  Patient is s/p Lumbar MIRNA on 1/16/20 with 95% continued relief. She reports beginning PT and having to stop due to  developing a swollen, erythematous, painful left foot.  she has recently seen Cardiology and has  been diagnosed with severe PAD (L>R). CTA with segmental occlusion of the left common and proximal SFA and occlusion of the left tibioperoneal trunk as well as occlusion of the left anterior tibial artery in its proximal 3rd. Moderate degree of calcific and hypodense plaque in the abdominal aorta and iliac arteries bilaterally.  She is now scheduled for a LLE iliac to femoral bypass creation with bilateral iliac stents on 2/17/2020   Pain intensity is currently 2/10.      Background / HPI:   Rachel Murray is a 69 y.o. female who complains of Low back pain with Sciatica.  She reports pain that starts in the left side of her low back and travels through the buttocks, posterolateral thigh, anterior shin, and into the top of her foot.  Alleviating and exacerbating factors are variable but prolonged sitting and walking for more than 5 min appear to drastically increase her pain. She also reports insomnia due to pain and has been using a recliner to sleep for approximately 90 min at a time. She reports dealing with low back pain for several years but noticed an acute increase in March 2019 which subsided somewhat.  In October 2019 her pain increased drastically and has not subsided since that time. Her history is positive for cervical disc herniation  which was previously treated with oral medications and physical therapy.  She also notes associated numbness in the left lower extremity and a sensation of weakness in the left leg.  She denies falls or saddle anesthesia.    Location: Low back   Onset: 10/2019  Current Pain Score: 8/10  Daily Pain of Range: 5-8/10  Quality: Burning, Throbbing, Grabbing, Tingling, Numb, Sharp, Electric, Hot and Cold  Radiation: Radiates down left side to foot.  Worsened by: extension, lying down, sitting and walking for more than 5 minutes  Improved by: nothing    Previous Therapies:  PT/OT: Denies  HEP: Denies  Interventions: Denies  Surgery:Denies  Medications:   - NSAIDS:   - MSK Relaxants:   - TCAs:   - SNRIs:   - Topicals:   - Anticonvulsants:  - Opioids:     Current Pain Medications:  1. Aleve   2. Tramadol 50 mg TID PRN  3. Lyrica 50 TID    Review of Systems:  Review of Systems   Constitutional: Negative for chills and fever.   HENT: Negative for nosebleeds.    Eyes: Negative for blurred vision and pain.   Respiratory: Negative for hemoptysis.    Cardiovascular: Negative for chest pain and palpitations.   Gastrointestinal: Negative for heartburn, nausea and vomiting.   Genitourinary: Negative for dysuria and hematuria.   Musculoskeletal: Positive for back pain. Negative for myalgias.   Skin: Negative for rash.   Neurological: Positive for tingling, sensory change and focal weakness (LLE). Negative for seizures and loss of consciousness.   Endo/Heme/Allergies: Does not bruise/bleed easily.   Psychiatric/Behavioral: Negative for hallucinations. The patient has insomnia (2/2 pain).        History:    Current Outpatient Medications:     acetaminophen (TYLENOL) 500 MG tablet, Take 2 tablets (1,000 mg total) by mouth 3 (three) times daily. (Patient not taking: Reported on 3/20/2020), Disp: , Rfl: 0    amoxicillin-clavulanate 875-125mg (AUGMENTIN) 875-125 mg per tablet, Take 1 tablet by mouth every 12 (twelve) hours., Disp: 14  tablet, Rfl: 0    aspirin (ECOTRIN) 81 MG EC tablet, Take 1 tablet (81 mg total) by mouth once daily., Disp: 360 tablet, Rfl: 0    candesartan-hydrochlorothiazide (ATACAND HCT) 16-12.5 mg per tablet, Take 1 tablet by mouth once daily., Disp: 30 tablet, Rfl: 3    candesartan-hydrochlorothiazide (ATACAND HCT) 16-12.5 mg per tablet, TAKE 1 TABLET BY MOUTH EVERY DAY, Disp: 30 tablet, Rfl: 3    candesartan-hydrochlorothiazide (ATACAND HCT) 16-12.5 mg per tablet, TAKE 1 TABLET BY MOUTH EVERY DAY, Disp: 30 tablet, Rfl: 3    clopidogreL (PLAVIX) 75 mg tablet, Take 1 tablet (75 mg total) by mouth once daily., Disp: 30 tablet, Rfl: 6    diclofenac (VOLTAREN) 75 MG EC tablet, Take 1 tablet (75 mg total) by mouth 2 (two) times daily. (Patient not taking: Reported on 2020), Disp: 60 tablet, Rfl: 0    HYDROcodone-acetaminophen (NORCO) 5-325 mg per tablet, Take 1 tablet by mouth every 6 (six) hours as needed for Pain. 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 Rose    DENTAL SURGERY  11/06/2017    ENDARTERECTOMY OF FEMORAL ARTERY Left 2/17/2020    Procedure: ENDARTERECTOMY, FEMORAL;  Surgeon: Olayinka Harris MD;  Location: [facility] OR 2ND FLR;  Service: Peripheral Vascular;  Laterality: Left;  SFA endarterectomy  fluoro time: 9.2 min mGy: 2336.86    EPIDURAL STEROID INJECTION INTO LUMBAR SPINE N/A 1/16/2020    Procedure: Injection-steroid-epidural-lumbar--Review MRI- L5-S1 after MRI review;  Surgeon: Ely Syed Jr., MD;  Location: [facility] PAIN MGT;  Service: Pain Management;  Laterality: N/A;    gyn surgery      ex laparotomy    HYSTERECTOMY         Family History   Problem Relation Age of Onset    Hypertension Mother     Heart disease Mother 40        CABG x 5    Seizures Mother     Cancer Father         prostate    Stroke Father     Cancer Brother         colon    Diabetes Maternal Aunt     Breast cancer Maternal Aunt     Diabetes Maternal Uncle     Stroke Maternal Grandmother     Stroke Paternal Grandmother     Diabetes Cousin     Breast cancer Cousin        Social History     Socioeconomic History    Marital status: Single     Spouse name: Not on file    Number of children: Not on file    Years of education: Not on file    Highest education level: Not on file   Occupational History     Employer: [employer]   Social Needs    Financial resource strain: Not on file    Food insecurity:     Worry: Not on file     Inability: Not on file    Transportation needs:     Medical: Not on file     Non-medical: Not on file   Tobacco Use    Smoking status: Current Every Day Smoker     Packs/day: 0.50     Years: 40.00     Pack years: 20.00     Types: Cigarettes    Smokeless tobacco: Never Used    Tobacco comment: occasionally   Substance and Sexual Activity    Alcohol use: Yes     Alcohol/week: 0.0 standard drinks      Comment: rarely    Drug use: No    Sexual activity: Never   Lifestyle    Physical activity:     Days per week: Not on file     Minutes per session: Not on file    Stress: Not on file   Relationships    Social connections:     Talks on phone: Not on file     Gets together: Not on file     Attends Quaker service: Not on file     Active member of club or organization: Not on file     Attends meetings of clubs or organizations: Not on file     Relationship status: Not on file   Other Topics Concern    Not on file   Social History Narrative    Not on file       Review of patient's allergies indicates:   Allergen Reactions    Adhesive Blisters    Ciprofloxacin Hives    Latex, natural rubber Blisters       Physical Exam:  There were no vitals filed for this visit.  General    Nursing note and vitals reviewed.  Constitutional: She is oriented to person, place, and time. She appears well-developed and well-nourished. No distress.   HENT:   Head: Normocephalic and atraumatic.   Nose: Nose normal.   Eyes: Conjunctivae and EOM are normal. Pupils are equal, round, and reactive to light. Right eye exhibits no discharge. Left eye exhibits no discharge. No scleral icterus.   Neck: No JVD present.   Cardiovascular: Intact distal pulses.    Pulmonary/Chest: Effort normal. No respiratory distress.   Abdominal: She exhibits no distension.   Neurological: She is alert and oriented to person, place, and time. Coordination normal.   Psychiatric: She has a normal mood and affect. Her behavior is normal. Judgment and thought content normal.     General Musculoskeletal Exam   Gait: normal     Back (L-Spine & T-Spine) / Neck (C-Spine) Exam     Tenderness Right paramedian tenderness of the Lower L-Spine. Left paramedian tenderness of the Lower L-Spine.     Back (L-Spine & T-Spine) Range of Motion   Back extension: facet loading is positive and exacerabtes/reproduces the patient's typical low back pain    Back flexion: limited  ROM but partial relief of low back pain noted.     Spinal Sensation   Right Side Sensation  L-Spine Level: normal  Left Side Sensation  L-Spine Level: normal    Other She has no scoliosis .    Comments:  Left Leg + SLR      Muscle Strength   Right Lower Extremity   Hip Flexion: 5/5   Hip Extensors: 5/5  Quadriceps:  5/5   Hamstrin/5   Gastrocsoleus:  5/5/5  Left Lower Extremity   Hip Flexion: 5/5   Hip Extensors: 5/5  Quadriceps:  5/5   Hamstrin/5   Gastrocsoleus:  5/5/5    Reflexes     Left Side  Quadriceps:  2+  Achilles:  2+    Right Side   Quadriceps:  2+  Achilles:  2+      Imaging:  None pertinent to review.    Labs:  BMP  Lab Results   Component Value Date     (L) 2020    K 4.5 2020    CL 99 2020    CO2 26 2020    BUN 17 2020    CREATININE 0.8 2020    CALCIUM 9.2 2020    ANIONGAP 9 2020    ESTGFRAFRICA >60.0 2020    EGFRNONAA >60.0 2020     Lab Results   Component Value Date    ALT 19 2020    AST 16 2020    ALKPHOS 97 2020    BILITOT 0.3 2020       Assessment:  Problem List Items Addressed This Visit     None          2020 - Rachel Murray is a 69 y.o. female with chronic low back pain radiating into the left lower extremity consistent with radiculopathy at L5.  I will order an MRI of her lumbar spine to confirm this diagnosis as she has been dealing with the symptoms for the better part of 2019.  I have also ordered several medications to help address her pain and symptoms in addition to physical therapy.  I have also proposed a lumbar epidural steroid injection to be scheduled after completion of the MRI.  I would anticipate performing this injection at L5-S1; however, this location may change based on the results of the MRI.  She was also provided a letter for work to park closer to the building.    [id number] female presents for a follow-up status post and L5-S1 lumbar MIRNA with reported 95%  relief her pain score today is 2/10 patient reports feeling much better following the injection patient states she started physical therapy however 2 days into PT she began developing a swollen, erythematous, painful left foot.  She  has recently seen Cardiology and has  been diagnosed with severe PAD (L>R). CTA with segmental occlusion of the left common and proximal SFA and occlusion of the left tibioperoneal trunk as well as occlusion of the left anterior tibial artery in its proximal 3rd. Moderate degree of calcific and hypodense plaque in the abdominal aorta and iliac arteries bilaterally.  She is now scheduled for a LLE iliac to femoral bypass creation with bilateral iliac stents on 2/17/2020 with Dr Harris/cardiology.  She endorses feeling much better following her pain procedure and that the pain she originally came in for is much better, discussed that we could repeat as needed.    3/30/2020 - I reviewed her most recent MRI lumbar spine from 01/10/2020 which shows significant lateral recess and neural foraminal stenosis at L3-4 and L4-5 consistent with radiating pattern she is experiencing at this time.  Patient would be an excellent candidate for repeating the lumbar epidural steroid injection at L5-S1 as this provided her 90-95% relief.  Patient was advised that low back pain and radiculopathy can increase after surgical procedure due to immobility and reduced physical activity postoperatively.  In addition to the epidural steroid injection, she will also likely need to be evaluated by Neurosurgery in the symptoms remain refractory to the medications I will prescribe today and then epidural steroid injection.  Epidural steroid injections on hold pending resolution of the Coronavirus crisis.    4/21/20 - Pain symptoms remain unchanged but appear better managed with the combination of Tramadol and Lyrica.  Goal is to wean off of opioids through PT, injections, and other non-opioids, but those efforts are  limited until the Coronavirus restrictions pass.  Cont and reduce tramadol to her current usage level of TID PRN; cont and increase Lyrica.  Wait list for MIRNA.    Plan:  2. Placed on wait list for IESI @ L5-S1  3. Electronically prescribed tramadol 50 mg TID PRN   4. Cont and increase Lyrica to 75 mg p.o. TID    Ely Syed Jr, MD  Interventional Pain Medicine / Anesthesiology      Disclaimer: This note was partly generated using dictation software which may occasionally result in transcription errors.

## 2020-04-28 ENCOUNTER — TELEPHONE (OUTPATIENT)
Dept: PAIN MEDICINE | Facility: CLINIC | Age: 70
End: 2020-04-28

## 2020-04-28 DIAGNOSIS — M54.16 LUMBAR RADICULOPATHY: Primary | ICD-10-CM

## 2020-04-28 DIAGNOSIS — Z01.818 PREOP TESTING: Primary | ICD-10-CM

## 2020-04-28 NOTE — TELEPHONE ENCOUNTER
Pt scheduled for 5/7/20 at 0930 am for Lumbar MIRNA. Pt aware to check in at registration desk on the first floor of the hospital for 0830 am. Pt is taking Plavix and ASA. Sent med clearance request to Dr. Harris. Pt aware we will contact her to advise her when to hold medications.  Pt scheduled covid testing for 5/5/20 at the [facility] location.

## 2020-04-28 NOTE — TELEPHONE ENCOUNTER
Pt scheduled for Lumbar MIRNA for 5/7/20. Requesting medical clearance to hold Plavix 7 days and ASA 3 days prior to procedure. Please advise.

## 2020-04-29 NOTE — TELEPHONE ENCOUNTER
Message   Received: Today   Message Contents   MD Jaquelin Noonan MA   Cc: Sonny Calle LPN   Caller: Unspecified (Yesterday,  2:32 PM)             OK to hold plavix and resume post intervention.  Should remain on asa if at all possible.

## 2020-05-05 ENCOUNTER — LAB VISIT (OUTPATIENT)
Dept: INTERNAL MEDICINE | Facility: CLINIC | Age: 70
End: 2020-05-05
Payer: COMMERCIAL

## 2020-05-05 DIAGNOSIS — Z01.818 PREOP TESTING: ICD-10-CM

## 2020-05-05 LAB — SARS-COV-2 RNA RESP QL NAA+PROBE: NOT DETECTED

## 2020-05-05 PROCEDURE — U0002 COVID-19 LAB TEST NON-CDC: HCPCS

## 2020-05-07 ENCOUNTER — HOSPITAL ENCOUNTER (OUTPATIENT)
Facility: HOSPITAL | Age: 70
Discharge: HOME | End: 2020-05-07
Attending: PAIN MEDICINE | Admitting: PAIN MEDICINE
Payer: COMMERCIAL

## 2020-05-07 VITALS
HEIGHT: 62 IN | BODY MASS INDEX: 29.44 KG/M2 | RESPIRATION RATE: 16 BRPM | WEIGHT: 160 LBS | HEART RATE: 74 BPM | TEMPERATURE: 98 F | OXYGEN SATURATION: 97 % | DIASTOLIC BLOOD PRESSURE: 59 MMHG | SYSTOLIC BLOOD PRESSURE: 94 MMHG

## 2020-05-07 DIAGNOSIS — M54.16 LUMBAR RADICULOPATHY: Primary | ICD-10-CM

## 2020-05-07 DIAGNOSIS — G89.29 CHRONIC PAIN: ICD-10-CM

## 2020-05-07 PROCEDURE — 62323 PR INJ LUMBAR/SACRAL, W/IMAGING GUIDANCE: ICD-10-PCS | Mod: ,,, | Performed by: PAIN MEDICINE

## 2020-05-07 PROCEDURE — 25500020 PHARM REV CODE 255: Performed by: PAIN MEDICINE

## 2020-05-07 PROCEDURE — 63600175 PHARM REV CODE 636 W HCPCS: Performed by: PAIN MEDICINE

## 2020-05-07 PROCEDURE — 25000003 PHARM REV CODE 250: Performed by: PAIN MEDICINE

## 2020-05-07 PROCEDURE — 62323 NJX INTERLAMINAR LMBR/SAC: CPT | Performed by: PAIN MEDICINE

## 2020-05-07 PROCEDURE — 62323 NJX INTERLAMINAR LMBR/SAC: CPT | Mod: ,,, | Performed by: PAIN MEDICINE

## 2020-05-07 RX ORDER — SODIUM BICARBONATE 1 MEQ/ML
SYRINGE (ML) INTRAVENOUS
Status: DISCONTINUED | OUTPATIENT
Start: 2020-05-07 | End: 2020-05-07 | Stop reason: HOSPADM

## 2020-05-07 RX ORDER — BUPIVACAINE HYDROCHLORIDE 2.5 MG/ML
INJECTION, SOLUTION EPIDURAL; INFILTRATION; INTRACAUDAL
Status: DISCONTINUED | OUTPATIENT
Start: 2020-05-07 | End: 2020-05-07 | Stop reason: HOSPADM

## 2020-05-07 RX ORDER — DEXAMETHASONE SODIUM PHOSPHATE 10 MG/ML
INJECTION INTRAMUSCULAR; INTRAVENOUS
Status: DISCONTINUED | OUTPATIENT
Start: 2020-05-07 | End: 2020-05-07 | Stop reason: HOSPADM

## 2020-05-07 RX ORDER — LIDOCAINE HYDROCHLORIDE 10 MG/ML
INJECTION INFILTRATION; PERINEURAL
Status: DISCONTINUED | OUTPATIENT
Start: 2020-05-07 | End: 2020-05-07 | Stop reason: HOSPADM

## 2020-05-07 NOTE — DISCHARGE SUMMARY
[facility]  Discharge Note  Short Stay     Admit Date: 5/7/2020    Discharge Date: 5/7/2020     Attending Physician: Ely Syed Jr, MD    Diagnoses:  Active Hospital Problems    Diagnosis  POA    Chronic pain [G89.29]  Yes      Resolved Hospital Problems   No resolved problems to display.     Discharged Condition: Good     Hospital Course: Patient was admitted for an outpatient interventional pain management procedure and tolerated the procedure well with no complications.     Final Diagnoses: Same as principal problem.     Disposition: Home or Self Care     Follow up/Patient Instructions:    Follow-up Information     Ely Syed Jr, MD. Go in 2 weeks.    Specialty:  Pain Medicine  Why:  Post-procedural Follow Up As Scheduled  Contact information:  [address]  [address]  [place] [id number] [phone]                   Reconciled Medications:     Medication List      CONTINUE taking these medications    acetaminophen 500 MG tablet  Commonly known as:  TYLENOL  Take 2 tablets (1,000 mg total) by mouth 3 (three) times daily.     amoxicillin-clavulanate 875-125mg 875-125 mg per tablet  Commonly known as:  AUGMENTIN  Take 1 tablet by mouth every 12 (twelve) hours.     aspirin 81 MG EC tablet  Commonly known as:  ECOTRIN  Take 1 tablet (81 mg total) by mouth once daily.     * candesartan-hydrochlorothiazide 16-12.5 mg per tablet  Commonly known as:  ATACAND HCT  Take 1 tablet by mouth once daily.     * candesartan-hydrochlorothiazide 16-12.5 mg per tablet  Commonly known as:  ATACAND HCT  TAKE 1 TABLET BY MOUTH EVERY DAY     * candesartan-hydrochlorothiazide 16-12.5 mg per tablet  Commonly known as:  ATACAND HCT  TAKE 1 TABLET BY MOUTH EVERY DAY     clopidogreL 75 mg tablet  Commonly known as:  PLAVIX  Take 1 tablet (75 mg total) by mouth once daily.     diclofenac 75 MG EC tablet  Commonly known as:  VOLTAREN  Take 1 tablet (75 mg total) by mouth 2 (two) times daily.     *  HYDROcodone-acetaminophen 5-325 mg per tablet  Commonly known as:  NORCO  Take 1 tablet by mouth every 6 (six) hours as needed for Pain.     * HYDROcodone-acetaminophen 5-325 mg per tablet  Commonly known as:  NORCO  Take 1 tablet by mouth every 6 (six) hours as needed for Pain.     ibuprofen 100 mg/5 mL suspension  Commonly known as:  ADVIL,MOTRIN  Take by mouth every 6 (six) hours as needed for Temperature greater than.     pregabalin 75 MG capsule  Commonly known as:  LYRICA  Take 1 capsule (75 mg total) by mouth 2 (two) times daily.     traMADoL 50 mg tablet  Commonly known as:  ULTRAM  Take 1 tablet (50 mg total) by mouth 3 (three) times daily as needed for Pain.         * This list has 5 medication(s) that are the same as other medications prescribed for you. Read the directions carefully, and ask your doctor or other care provider to review them with you.               Discharge Procedure Orders (must include Diet, Follow-up, Activity)   Call MD for:  temperature >100.4     Call MD for:  severe uncontrolled pain     Call MD for:  redness, tenderness, or signs of infection (pain, swelling, redness, odor or green/yellow discharge around incision site)     Call MD for:  difficulty breathing or increased cough     Call MD for:  severe persistent headache     Call MD for:  worsening rash     Remove dressing in 24 hours       Ely Syed Jr, MD  Interventional Pain Medicine / Anesthesiology

## 2020-05-07 NOTE — DISCHARGE INSTRUCTIONS
Home Care Instructions Pain Management:    1.  DIET:    You may resume your normal diet today.    2.  BATHING:    You may shower with luke warm water.    3.  DRESSING:    You may remove your bandage today.    4.  ACTIVITY LEVEL:      You may resume your normal activities 24 hours after your procedure.    5.  MEDICATIONS:    You may resume your normal medications today.    6.  SPECIAL INSTRUCTIONS:    No heat to the injection site for 24 hours including bath or shower, heating pad, moist heat or hot tubs.    Use an ice pack to the injection site for any pain or discomfort.  Apply ice packs for 20 minute intervals as needed.    If you have received any sedatives by mouth today, you can not drive for 12 hours.    If you have received sedation through an IV, you can not drive for 24 hours.    PLEASE CALL YOUR DOCTOR FOR THE FOLLOWIN.  Redness or swelling around the injection site.  2.  Fever of 101 degrees.  3.  Drainage (pus) from the injection site.  4.  For any continuous bleeding (some dried blood over the incision is normal.)    FOR EMERGENCIES:    If any unusual problems or difficulties occur during clinic hours, call [phone] or dial [phone].    Follow up with with your physician in 2-3 weeks.

## 2020-05-07 NOTE — PLAN OF CARE
Discharge instructions with pain diary given and explained, pt voiced understanding.  No sedation given pt transported to car alone, safety maintained.

## 2020-05-07 NOTE — INTERVAL H&P NOTE
The patient has been examined and the H&P has been reviewed:    I concur with the findings and no changes have occurred since H&P was written.    Anesthesia/Surgery risks, benefits and alternative options discussed and understood by patient/family.    The procedure is deemed medically urgent to treat severe pain and is performed in accordance with [place] Department of Health guidelines employing appropriate PPE and social distancing.      Active Hospital Problems    Diagnosis  POA    Chronic pain [G89.29]  Yes      Resolved Hospital Problems   No resolved problems to display.

## 2020-05-07 NOTE — OP NOTE
"Procedure Note    Pre-operative Diagnosis: Lumbar Radiculopathy  Post-operative Diagnosis: Lumbar Radiculopathy  Procedure Date: 05/07/2020  Procedure:  (1) Lumbar Epidural Steroid Injection at L5-S1    (2) Intraoperative fluoroscopy     Anesthesia: Local    Indications: To alleviate pain and suffering, and reduce functional impairment.    Procedure in Detail:   The patients history and physical exam were reviewed. The risks, benefits and alternatives to the procedure were discussed, and all questions were answered to the patients satisfaction. The patient agreed to proceed, and written informed consent was verified.    The patient was brought into the procedure room and placed in the prone position on the fluoroscopy table. The area of the lumbar spine was prepped with Chloraprep and draped in a sterile manner. The L5-S1 interspace was identified and marked under AP fluoroscopy. The skin and subcutaneous tissues overlying the targeted interspace were anesthetized with 3-5 mL of 1% lidocaine using a 25G, 1.5" needle. A 17G, 3.5" Tuohy epidural needle was directed toward the interspace under fluoroscopic guidance until the ligamentum flavum was engaged. From this point, a loss of resistance technique with a glass syringe and saline was used to identify entrance of the needle into the epidural space. Once loss of resistance was observed, up to 1 mL of contrast solution was injected. An appropriate epidurogram was noted.    A 5 mL mixture consisting of PF saline (2 mL), MPF Bupivacaine 0.25% (2 mL), and Dexamethasone 10 mg (1 mL) was injected slowly and without resistance.  The needle was removed and a bandage applied to puncture site.    Blood Loss: nil    Disposition: The patient tolerated the procedure well, and there were no apparent complications. Vital signs remained stable throughout the procedure. The patient was taken to the recovery area where written discharge instructions for the procedure were given. "     Follow-up: RTC as scheduled      Ely Syed Jr, MD  Interventional Pain Medicine / Anesthesiology

## 2020-05-08 ENCOUNTER — PATIENT OUTREACH (OUTPATIENT)
Dept: ADMINISTRATIVE | Facility: OTHER | Age: 70
End: 2020-05-08

## 2020-05-11 ENCOUNTER — TELEPHONE (OUTPATIENT)
Dept: INTERNAL MEDICINE | Facility: CLINIC | Age: 70
End: 2020-05-11

## 2020-05-12 ENCOUNTER — TELEPHONE (OUTPATIENT)
Dept: INTERNAL MEDICINE | Facility: CLINIC | Age: 70
End: 2020-05-12

## 2020-05-12 NOTE — TELEPHONE ENCOUNTER
----- Message from Karo Epperson sent at 5/12/2020  3:26 PM CDT -----  Needs Advice    Reason for call:--Disablitiy paper work--        Communication Preference:--Rachel--[phone]--    Additional Information:Pt calling to see if the paper work is going to be sent today because they informed her if she don't have this in before Friday that they will terminate her employment. Please call to advise.

## 2020-05-13 ENCOUNTER — OFFICE VISIT (OUTPATIENT)
Dept: PAIN MEDICINE | Facility: CLINIC | Age: 70
End: 2020-05-13
Payer: COMMERCIAL

## 2020-05-13 VITALS — BODY MASS INDEX: 29.51 KG/M2 | WEIGHT: 160.38 LBS | TEMPERATURE: 98 F | HEIGHT: 62 IN

## 2020-05-13 DIAGNOSIS — M47.819 ARTHROPATHY OF FACET JOINTS AT MULTIPLE LEVELS: ICD-10-CM

## 2020-05-13 DIAGNOSIS — M48.062 SPINAL STENOSIS OF LUMBAR REGION WITH NEUROGENIC CLAUDICATION: ICD-10-CM

## 2020-05-13 DIAGNOSIS — G89.4 CHRONIC PAIN SYNDROME: ICD-10-CM

## 2020-05-13 DIAGNOSIS — M54.16 LUMBAR RADICULOPATHY: Primary | ICD-10-CM

## 2020-05-13 DIAGNOSIS — M43.10 SPONDYLOLISTHESIS, UNSPECIFIED SPINAL REGION: ICD-10-CM

## 2020-05-13 PROCEDURE — 1125F PR PAIN SEVERITY QUANTIFIED, PAIN PRESENT: ICD-10-PCS | Mod: S$GLB,,, | Performed by: NURSE PRACTITIONER

## 2020-05-13 PROCEDURE — 1125F AMNT PAIN NOTED PAIN PRSNT: CPT | Mod: S$GLB,,, | Performed by: NURSE PRACTITIONER

## 2020-05-13 PROCEDURE — 1101F PR PT FALLS ASSESS DOC 0-1 FALLS W/OUT INJ PAST YR: ICD-10-PCS | Mod: CPTII,S$GLB,, | Performed by: NURSE PRACTITIONER

## 2020-05-13 PROCEDURE — 1101F PT FALLS ASSESS-DOCD LE1/YR: CPT | Mod: CPTII,S$GLB,, | Performed by: NURSE PRACTITIONER

## 2020-05-13 PROCEDURE — 99999 PR PBB SHADOW E&M-EST. PATIENT-LVL IV: ICD-10-PCS | Mod: PBBFAC,,, | Performed by: NURSE PRACTITIONER

## 2020-05-13 PROCEDURE — 1159F PR MEDICATION LIST DOCUMENTED IN MEDICAL RECORD: ICD-10-PCS | Mod: S$GLB,,, | Performed by: NURSE PRACTITIONER

## 2020-05-13 PROCEDURE — 1159F MED LIST DOCD IN RCRD: CPT | Mod: S$GLB,,, | Performed by: NURSE PRACTITIONER

## 2020-05-13 PROCEDURE — 99213 PR OFFICE/OUTPT VISIT, EST, LEVL III, 20-29 MIN: ICD-10-PCS | Mod: S$GLB,,, | Performed by: NURSE PRACTITIONER

## 2020-05-13 PROCEDURE — 99999 PR PBB SHADOW E&M-EST. PATIENT-LVL IV: CPT | Mod: PBBFAC,,, | Performed by: NURSE PRACTITIONER

## 2020-05-13 PROCEDURE — 99213 OFFICE O/P EST LOW 20 MIN: CPT | Mod: S$GLB,,, | Performed by: NURSE PRACTITIONER

## 2020-05-13 RX ORDER — TRAMADOL HYDROCHLORIDE 50 MG/1
50 TABLET ORAL EVERY 6 HOURS PRN
COMMUNITY
End: 2020-07-20 | Stop reason: CLARIF

## 2020-05-13 NOTE — PROGRESS NOTES
"[facility] Established Patient Evaluation      Referred by: Anel Parikh MD  Reason for referral: Sciatica, unspecified laterality    CC:   post op -  6 days s/p -Injection-steroid-epidural-lumbar-- Interlaminar L5-S1    Last 3 PDI Scores 5/13/2020 2/6/2020 1/6/2020   Pain Disability Index (PDI) 49 [phone] 48     Interval Update:05/13/2020 - Mrs. Murray returns to clinic for follow up visit reporting no improvement in lower back pain. Reports numbness in left hip and left foot.  Patient is s/p Injection-steroid-epidural-lumbar-- Interlaminar L5-S1 on 05/07/2020 with 1% relief. She continues to have low back and left left pain.   Pain intensity is currently 7/10.       4/21/20 - Mrs. Murray returns to clinic for follow up visit reporting stable back and leg pain.  Numbness, tightness, and pain are very unpleasant in the ankle and foot bilaterally, but the left is "way worse".  Patient is here for medication refill of Tramadol 50 mg and Lyrica 50 mg.  She reports 30-50% relief with the current medication regimen.  Pain intensity is currently 5/10 ranging 5-8/10.    3/30/20 - The clinic visit for Mrs. Murray has been converted to a telephone encounter because he/she has declined a telemedicine encounter due to technical difficulty or preference.  She is reporting acutely increased shooting pains originating in her back and traveling into the legs since having vascular surgery of her leg.  She was evaluated by her vascular surgery team and they believe her symptoms are not related to the recent surgery but are most likely to be an exacerbation of her pre-existing lumbar radiculopathy.  She states that her pain is severe and rates it 10/10 at this time.  She states inability to sleep comfortably at night and that her current medication regimen has failed to provide her significant relief.  She reports no relief with tramadol 50 mg and very little relief with a recent prescription of Voss 5-325 she received from her " PCP.    2/6/2020 - Mrs. Murray returns to clinic for follow up visit reporting improved back pain.  Patient is s/p Lumbar MIRNA on 1/16/20 with 95% continued relief. She reports beginning PT and having to stop due to  developing a swollen, erythematous, painful left foot.  she has recently seen Cardiology and has  been diagnosed with severe PAD (L>R). CTA with segmental occlusion of the left common and proximal SFA and occlusion of the left tibioperoneal trunk as well as occlusion of the left anterior tibial artery in its proximal 3rd. Moderate degree of calcific and hypodense plaque in the abdominal aorta and iliac arteries bilaterally.  She is now scheduled for a LLE iliac to femoral bypass creation with bilateral iliac stents on 2/17/2020   Pain intensity is currently 2/10.      Background / HPI:   Rachel Murray is a 69 y.o. female who complains of Low back pain with Sciatica.  She reports pain that starts in the left side of her low back and travels through the buttocks, posterolateral thigh, anterior shin, and into the top of her foot.  Alleviating and exacerbating factors are variable but prolonged sitting and walking for more than 5 min appear to drastically increase her pain. She also reports insomnia due to pain and has been using a recliner to sleep for approximately 90 min at a time. She reports dealing with low back pain for several years but noticed an acute increase in March 2019 which subsided somewhat.  In October 2019 her pain increased drastically and has not subsided since that time. Her history is positive for cervical disc herniation which was previously treated with oral medications and physical therapy.  She also notes associated numbness in the left lower extremity and a sensation of weakness in the left leg.  She denies falls or saddle anesthesia.    Location: Low back   Onset: 10/2019  Current Pain Score: 8/10  Daily Pain of Range: 5-8/10  Quality: Burning, Throbbing, Grabbing, Tingling, Numb,  Sharp, Electric, Hot and Cold  Radiation: Radiates down left side to foot.  Worsened by: extension, lying down, sitting and walking for more than 5 minutes  Improved by: nothing    Previous Therapies:  PT/OT: Denies  HEP: Denies  Interventions: Denies  Surgery:Denies  Medications:   - NSAIDS:   - MSK Relaxants:   - TCAs:   - SNRIs:   - Topicals:   - Anticonvulsants:  - Opioids:     Current Pain Medications:  1. Aleve   2. Tramadol 50 mg TID PRN  3. Lyrica 50 TID    Review of Systems:  Review of Systems   Constitutional: Negative for chills and fever.   HENT: Negative for nosebleeds.    Eyes: Negative for blurred vision and pain.   Respiratory: Negative for hemoptysis.    Cardiovascular: Negative for chest pain and palpitations.   Gastrointestinal: Negative for heartburn, nausea and vomiting.   Genitourinary: Negative for dysuria and hematuria.   Musculoskeletal: Positive for back pain. Negative for myalgias.   Skin: Negative for rash.   Neurological: Positive for tingling, sensory change and focal weakness (LLE). Negative for seizures and loss of consciousness.   Endo/Heme/Allergies: Does not bruise/bleed easily.   Psychiatric/Behavioral: Negative for hallucinations. The patient has insomnia (2/2 pain).        History:    Current Outpatient Medications:     acetaminophen (TYLENOL) 500 MG tablet, Take 2 tablets (1,000 mg total) by mouth 3 (three) times daily. (Patient not taking: Reported on 3/20/2020), Disp: , Rfl: 0    amoxicillin-clavulanate 875-125mg (AUGMENTIN) 875-125 mg per tablet, Take 1 tablet by mouth every 12 (twelve) hours., Disp: 14 tablet, Rfl: 0    aspirin (ECOTRIN) 81 MG EC tablet, Take 1 tablet (81 mg total) by mouth once daily., Disp: 360 tablet, Rfl: 0    candesartan-hydrochlorothiazide (ATACAND HCT) 16-12.5 mg per tablet, Take 1 tablet by mouth once daily., Disp: 30 tablet, Rfl: 3    candesartan-hydrochlorothiazide (ATACAND HCT) 16-12.5 mg per tablet, TAKE 1 TABLET BY MOUTH EVERY DAY, Disp:  30 tablet, Rfl: 3    candesartan-hydrochlorothiazide (ATACAND HCT) 16-12.5 mg per tablet, TAKE 1 TABLET BY MOUTH EVERY DAY, Disp: 30 tablet, Rfl: 3    clopidogreL (PLAVIX) 75 mg tablet, Take 1 tablet (75 mg total) by mouth once daily., Disp: 30 tablet, Rfl: 6    diclofenac (VOLTAREN) 75 MG EC tablet, Take 1 tablet (75 mg total) by mouth 2 (two) times daily. (Patient not taking: Reported on 2020), Disp: 60 tablet, Rfl: 0    HYDROcodone-acetaminophen (NORCO) 5-325 mg per tablet, Take 1 tablet by mouth every 6 (six) hours as needed for Pain., Disp: 20 tablet, Rfl: 0    HYDROcodone-acetaminophen (NORCO) 5-325 mg per tablet, Take 1 tablet by mouth every 6 (six) hours as needed for Pain., Disp: 30 tablet, Rfl: 0    ibuprofen (ADVIL,MOTRIN) 100 mg/5 mL suspension, Take by mouth every 6 (six) hours as needed for Temperature greater than., Disp: , Rfl:     pregabalin (LYRICA) 75 MG capsule, Take 1 capsule (75 mg total) by mouth 2 (two) times daily., Disp: 60 capsule, Rfl: 6    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 Rose    DENTAL SURGERY  2017    ENDARTERECTOMY OF FEMORAL ARTERY Left 2020    Procedure: ENDARTERECTOMY, FEMORAL;  Surgeon: Olayinka Harris MD;  Location: [facility] OR [address] [place] [address];  Service: Peripheral Vascular;  Laterality: Left;  SFA endarterectomy  fluoro time: 9.2 min mGy:  2336.86    EPIDURAL STEROID INJECTION INTO LUMBAR SPINE N/A 1/16/2020    Procedure: Injection-steroid-epidural-lumbar--Review MRI- L5-S1 after MRI review;  Surgeon: Ely Syed Jr., MD;  Location: [facility];  Service: Pain Management;  Laterality: N/A;    EPIDURAL STEROID INJECTION INTO LUMBAR SPINE N/A 5/7/2020    Procedure: Injection-steroid-epidural-lumbar-- Interlaminar L5-S1/ pt aware she can continue taking ASA per Dr. Harris and hold plavix.;  Surgeon: Ely Syed Jr., MD;  Location: [facility];  Service: Pain Management;  Laterality: N/A;  Will sign consent at DOS.    gyn surgery      ex laparotomy    HYSTERECTOMY         Family History   Problem Relation Age of Onset    Hypertension Mother     Heart disease Mother 40        CABG x 5    Seizures Mother     Cancer Father         prostate    Stroke Father     Cancer Brother         colon    Diabetes Maternal Aunt     Breast cancer Maternal Aunt     Diabetes Maternal Uncle     Stroke Maternal Grandmother     Stroke Paternal Grandmother     Diabetes Cousin     Breast cancer Cousin        Social History     Socioeconomic History    Marital status: Single     Spouse name: Not on file    Number of children: Not on file    Years of education: Not on file    Highest education level: Not on file   Occupational History     Employer: [employer]   Social Needs    Financial resource strain: Not on file    Food insecurity:     Worry: Not on file     Inability: Not on file    Transportation needs:     Medical: Not on file     Non-medical: Not on file   Tobacco Use    Smoking status: Current Every Day Smoker     Packs/day: 0.50     Years: 40.00     Pack years: 20.00     Types: Cigarettes    Smokeless tobacco: Never Used    Tobacco comment: occasionally   Substance and Sexual Activity    Alcohol use: Yes     Alcohol/week: 0.0 standard drinks     Comment: rarely    Drug use: No    Sexual activity: Never   Lifestyle     "Physical activity:     Days per week: Not on file     Minutes per session: Not on file    Stress: Not on file   Relationships    Social connections:     Talks on phone: Not on file     Gets together: Not on file     Attends Sabianist service: Not on file     Active member of club or organization: Not on file     Attends meetings of clubs or organizations: Not on file     Relationship status: Not on file   Other Topics Concern    Not on file   Social History Narrative    Not on file       Review of patient's allergies indicates:   Allergen Reactions    Adhesive Blisters    Ciprofloxacin Hives    Latex, natural rubber Blisters       Physical Exam:  Vitals:    05/13/20 0920   Temp: 98.1 °F (36.7 °C)   TempSrc: Oral   Weight: 72.7 kg (160 lb 6.2 oz)   Height: 5' 2" (1.575 m)   PainSc:   7   PainLoc: Generalized     General    Nursing note and vitals reviewed.  Constitutional: She is oriented to person, place, and time. She appears well-developed and well-nourished. No distress.   HENT:   Head: Normocephalic and atraumatic.   Nose: Nose normal.   Eyes: Conjunctivae and EOM are normal. Pupils are equal, round, and reactive to light. Right eye exhibits no discharge. Left eye exhibits no discharge. No scleral icterus.   Neck: No JVD present.   Cardiovascular: Intact distal pulses.    Pulmonary/Chest: Effort normal. No respiratory distress.   Abdominal: She exhibits no distension.   Neurological: She is alert and oriented to person, place, and time. Coordination normal.   Psychiatric: She has a normal mood and affect. Her behavior is normal. Judgment and thought content normal.     General Musculoskeletal Exam   Gait: normal     Back (L-Spine & T-Spine) / Neck (C-Spine) Exam     Tenderness Right paramedian tenderness of the Lower L-Spine. Left paramedian tenderness of the Lower L-Spine.     Back (L-Spine & T-Spine) Range of Motion   Back extension: facet loading is positive and exacerabtes/reproduces the patient's " typical low back pain    Back flexion: limited ROM but partial relief of low back pain noted.     Spinal Sensation   Right Side Sensation  L-Spine Level: normal  Left Side Sensation  L-Spine Level: normal    Other She has no scoliosis .    Comments:  Left Leg + SLR      Muscle Strength   Right Lower Extremity   Hip Flexion: 5/5   Hip Extensors: 5/5  Quadriceps:  5/5   Hamstrin/5   Gastrocsoleus:  5/5/5  Left Lower Extremity   Hip Flexion: 5/5   Hip Extensors: 5/5  Quadriceps:  5/5   Hamstrin/5   Gastrocsoleus:  5/5/5    Reflexes     Left Side  Quadriceps:  2+  Achilles:  2+    Right Side   Quadriceps:  2+  Achilles:  2+      Imaging:  None pertinent to review.    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 female with chronic low back pain radiating into the left lower extremity consistent with radiculopathy at L5.  I will order an MRI of her lumbar spine to confirm this diagnosis as she has been dealing with the symptoms for the better part of 2019.  I have also ordered several medications to help address her pain and symptoms in addition to physical therapy.  I have also proposed a lumbar epidural steroid injection to be scheduled after completion of the MRI.  I would anticipate  performing this injection at L5-S1; however, this location may change based on the results of the MRI.  She was also provided a letter for work to park closer to the building.    [birth year]-26-bbud-old female presents for a follow-up status post and L5-S1 lumbar MIRNA with reported 95% relief her pain score today is 2/10 patient reports feeling much better following the injection patient states she started physical therapy however 2 days into PT she began developing a swollen, erythematous, painful left foot.  She  has recently seen Cardiology and has  been diagnosed with severe PAD (L>R). CTA with segmental occlusion of the left common and proximal SFA and occlusion of the left tibioperoneal trunk as well as occlusion of the left anterior tibial artery in its proximal 3rd. Moderate degree of calcific and hypodense plaque in the abdominal aorta and iliac arteries bilaterally.  She is now scheduled for a LLE iliac to femoral bypass creation with bilateral iliac stents on 2/17/2020 with Dr Harris/cardiology.  She endorses feeling much better following her pain procedure and that the pain she originally came in for is much better, discussed that we could repeat as needed.    3/30/2020 - I reviewed her most recent MRI lumbar spine from 01/10/2020 which shows significant lateral recess and neural foraminal stenosis at L3-4 and L4-5 consistent with radiating pattern she is experiencing at this time.  Patient would be an excellent candidate for repeating the lumbar epidural steroid injection at L5-S1 as this provided her 90-95% relief.  Patient was advised that low back pain and radiculopathy can increase after surgical procedure due to immobility and reduced physical activity postoperatively.  In addition to the epidural steroid injection, she will also likely need to be evaluated by Neurosurgery in the symptoms remain refractory to the medications I will prescribe today and then epidural steroid injection.  Epidural  steroid injections on hold pending resolution of the Coronavirus crisis.    4/21/2020 - Pain symptoms remain unchanged but appear better managed with the combination of Tramadol and Lyrica.  Goal is to wean off of opioids through PT, injections, and other non-opioids, but those efforts are limited until the Coronavirus restrictions pass.  Cont and reduce tramadol to her current usage level of TID PRN; cont and increase Lyrica.  Wait list for MIRNA.     5/13/2020- 70 y/o female s/p L5-S1 IESI with reported 0% relief, she continues to have low back and left leg pain radiating to her left foot.  optimized her Lyrica on her previous CV to be taking Lyrica 75 mg TID however she was only taking it BID. Also I recommended that we need to give the injection more time to work. She will began her correct Lyrica dose and give the injection more time to be effective.  Previous imaging was reviewed and discussed with the patient today.       Plan:  2. No procedures at this time   3. Continue tramadol 50 mg nightly    4. Start  Lyrica to 75 mg p.o. TID, pt was previously taking it BID.   5. If Lumbar MIRNA is not effective a referral to neurosurgery may be warranted.     RTC in 4 weeks    TAYLOR Mueller  Interventional Pain Management       Disclaimer: This note was partly generated using dictation software which may occasionally result in transcription errors.

## 2020-05-18 ENCOUNTER — HOSPITAL ENCOUNTER (OUTPATIENT)
Dept: RADIOLOGY | Facility: HOSPITAL | Age: 70
Discharge: HOME | End: 2020-05-18
Attending: INTERNAL MEDICINE
Payer: COMMERCIAL

## 2020-05-18 DIAGNOSIS — Z12.31 ENCOUNTER FOR SCREENING MAMMOGRAM FOR BREAST CANCER: ICD-10-CM

## 2020-05-18 PROCEDURE — 77063 MAMMO DIGITAL SCREENING BILAT WITH TOMOSYNTHESIS_CAD: ICD-10-PCS | Mod: 26,,, | Performed by: RADIOLOGY

## 2020-05-18 PROCEDURE — 77067 SCR MAMMO BI INCL CAD: CPT | Mod: TC

## 2020-05-18 PROCEDURE — 77067 SCR MAMMO BI INCL CAD: CPT | Mod: 26,,, | Performed by: RADIOLOGY

## 2020-05-18 PROCEDURE — 77063 BREAST TOMOSYNTHESIS BI: CPT | Mod: 26,,, | Performed by: RADIOLOGY

## 2020-05-18 PROCEDURE — 77067 MAMMO DIGITAL SCREENING BILAT WITH TOMOSYNTHESIS_CAD: ICD-10-PCS | Mod: 26,,, | Performed by: RADIOLOGY

## 2020-05-19 ENCOUNTER — TELEPHONE (OUTPATIENT)
Dept: INTERNAL MEDICINE | Facility: CLINIC | Age: 70
End: 2020-05-19

## 2020-05-22 ENCOUNTER — TELEPHONE (OUTPATIENT)
Dept: INTERNAL MEDICINE | Facility: CLINIC | Age: 70
End: 2020-05-22

## 2020-05-26 ENCOUNTER — PATIENT OUTREACH (OUTPATIENT)
Dept: ADMINISTRATIVE | Facility: OTHER | Age: 70
End: 2020-05-26

## 2020-05-26 DIAGNOSIS — M43.10 SPONDYLOLISTHESIS, UNSPECIFIED SPINAL REGION: ICD-10-CM

## 2020-05-26 DIAGNOSIS — M48.062 SPINAL STENOSIS OF LUMBAR REGION WITH NEUROGENIC CLAUDICATION: ICD-10-CM

## 2020-05-26 DIAGNOSIS — M54.16 LUMBAR RADICULOPATHY: ICD-10-CM

## 2020-05-26 RX ORDER — PREGABALIN 75 MG/1
75 CAPSULE ORAL 3 TIMES DAILY
Qty: 90 CAPSULE | Refills: 2 | Status: SHIPPED | OUTPATIENT
Start: 2020-05-26 | End: 2020-07-25 | Stop reason: SDUPTHER

## 2020-05-26 RX ORDER — TRAMADOL HYDROCHLORIDE 50 MG/1
50 TABLET ORAL EVERY 12 HOURS PRN
Qty: 60 TABLET | Refills: 0 | Status: SHIPPED | OUTPATIENT
Start: 2020-05-26 | End: 2020-06-24 | Stop reason: SDUPTHER

## 2020-05-26 NOTE — TELEPHONE ENCOUNTER
Needs refills on Tramadol and Lyrica. Pharmacy states they need a new prescription for Lyrica since she was advised to take 7mg TID by Reuben.

## 2020-05-26 NOTE — PROGRESS NOTES
LINKS immunization registry triggered and Health Maintenance updated.  Chart reviewed for overdue Proactive [facility] health maintenance testing.Patient has open case request for colonoscopy.

## 2020-05-26 NOTE — TELEPHONE ENCOUNTER
----- Message from Sofienati Raphael sent at 5/26/2020  8:13 AM CDT -----  Contact: [phone]/self  Type:  RX Refill Request    Who Called:  self  Refill or New Rx: Refill  RX Name and Strength: traMADoL (ULTRAM) 50 mg tablet  How is the patient currently taking it? (ex. 1XDay):  Take 50 mg by mouth every 6 (six) hours as needed for Pain. - Oral  Is this a 30 day or 90 day RX: 60 tablets    RX Name and Strength: pregabalin (LYRICA) 75 MG capsule  How is the patient currently taking it? (ex. 1XDay): Take 1 capsule (75 mg total) by mouth 2 (two) times daily. - Oral  Is this a 30 day or 90 day RX: 30/6 refills  Additional Information: Reuben Diaz changed her directions to take 1 capsule 3 times a day and she has run out. She needs a new rx with the new instructions that she can start today.       Preferred Pharm[facility] with phone number[id number] [place] [facility] [place] [facility] [address] [facility] [address];  Local or Mail Order:   Ordering Provider:   Would the patient rather a call back or a response via [facility]?    Best Call Back Number: [phone]/self

## 2020-05-28 ENCOUNTER — HOSPITAL ENCOUNTER (OUTPATIENT)
Dept: VASCULAR SURGERY | Facility: CLINIC | Age: 70
Discharge: HOME | End: 2020-05-28
Attending: SURGERY
Payer: COMMERCIAL

## 2020-05-28 ENCOUNTER — OFFICE VISIT (OUTPATIENT)
Dept: VASCULAR SURGERY | Facility: CLINIC | Age: 70
End: 2020-05-28
Attending: SURGERY
Payer: COMMERCIAL

## 2020-05-28 VITALS
TEMPERATURE: 98 F | WEIGHT: 165.38 LBS | BODY MASS INDEX: 30.44 KG/M2 | SYSTOLIC BLOOD PRESSURE: 110 MMHG | HEART RATE: 83 BPM | DIASTOLIC BLOOD PRESSURE: 60 MMHG | HEIGHT: 62 IN

## 2020-05-28 DIAGNOSIS — I73.9 PAD (PERIPHERAL ARTERY DISEASE): Primary | ICD-10-CM

## 2020-05-28 DIAGNOSIS — I73.9 PAD (PERIPHERAL ARTERY DISEASE): ICD-10-CM

## 2020-05-28 DIAGNOSIS — I70.202 FEMORAL ARTERY OCCLUSION, LEFT: ICD-10-CM

## 2020-05-28 DIAGNOSIS — M53.86 SCIATICA ASSOCIATED WITH DISORDER OF LUMBAR SPINE: ICD-10-CM

## 2020-05-28 PROCEDURE — 3074F PR MOST RECENT SYSTOLIC BLOOD PRESSURE < 130 MM HG: ICD-10-PCS | Mod: CPTII,S$GLB,, | Performed by: SURGERY

## 2020-05-28 PROCEDURE — 1159F MED LIST DOCD IN RCRD: CPT | Mod: S$GLB,,, | Performed by: SURGERY

## 2020-05-28 PROCEDURE — 1101F PT FALLS ASSESS-DOCD LE1/YR: CPT | Mod: CPTII,S$GLB,, | Performed by: SURGERY

## 2020-05-28 PROCEDURE — 99214 OFFICE O/P EST MOD 30 MIN: CPT | Mod: S$GLB,,, | Performed by: SURGERY

## 2020-05-28 PROCEDURE — 99214 PR OFFICE/OUTPT VISIT, EST, LEVL IV, 30-39 MIN: ICD-10-PCS | Mod: S$GLB,,, | Performed by: SURGERY

## 2020-05-28 PROCEDURE — 1125F AMNT PAIN NOTED PAIN PRSNT: CPT | Mod: S$GLB,,, | Performed by: SURGERY

## 2020-05-28 PROCEDURE — 1101F PR PT FALLS ASSESS DOC 0-1 FALLS W/OUT INJ PAST YR: ICD-10-PCS | Mod: CPTII,S$GLB,, | Performed by: SURGERY

## 2020-05-28 PROCEDURE — 93926 PR DUPLEX LO EXTREM ART UNILAT/LTD: ICD-10-PCS | Mod: S$GLB,,, | Performed by: SURGERY

## 2020-05-28 PROCEDURE — 93923 PR NON-INVASIVE PHYSIOLOGIC STUDY EXTREMITY 3 LEVELS: ICD-10-PCS | Mod: S$GLB,,, | Performed by: SURGERY

## 2020-05-28 PROCEDURE — 1159F PR MEDICATION LIST DOCUMENTED IN MEDICAL RECORD: ICD-10-PCS | Mod: S$GLB,,, | Performed by: SURGERY

## 2020-05-28 PROCEDURE — 99999 PR PBB SHADOW E&M-EST. PATIENT-LVL IV: CPT | Mod: PBBFAC,,, | Performed by: SURGERY

## 2020-05-28 PROCEDURE — 1125F PR PAIN SEVERITY QUANTIFIED, PAIN PRESENT: ICD-10-PCS | Mod: S$GLB,,, | Performed by: SURGERY

## 2020-05-28 PROCEDURE — 93926 LOWER EXTREMITY STUDY: CPT | Mod: S$GLB,,, | Performed by: SURGERY

## 2020-05-28 PROCEDURE — 3078F PR MOST RECENT DIASTOLIC BLOOD PRESSURE < 80 MM HG: ICD-10-PCS | Mod: CPTII,S$GLB,, | Performed by: SURGERY

## 2020-05-28 PROCEDURE — 3074F SYST BP LT 130 MM HG: CPT | Mod: CPTII,S$GLB,, | Performed by: SURGERY

## 2020-05-28 PROCEDURE — 3078F DIAST BP <80 MM HG: CPT | Mod: CPTII,S$GLB,, | Performed by: SURGERY

## 2020-05-28 PROCEDURE — 99999 PR PBB SHADOW E&M-EST. PATIENT-LVL IV: ICD-10-PCS | Mod: PBBFAC,,, | Performed by: SURGERY

## 2020-05-28 PROCEDURE — 93923 UPR/LXTR ART STDY 3+ LVLS: CPT | Mod: S$GLB,,, | Performed by: SURGERY

## 2020-05-28 NOTE — PROGRESS NOTES
Rachel Murray  2020    HPI:  Patient is a 69 y.o. female with a h/o who is here today for follow-up appointment. She reports of medial LLE paresthesias that radiate to ipsilateral hip and are worst in the toes. She additionally complains of a 'squeezing' pain in the left foot and intermittent episodes of blanching of the left foot. She continues to have intermittent radicular pain in bilateral legs, for which she received an L5-S1 lumbar epidural steroid injection on 2020 that the patient reports was minimally helpful. The patient takes Lyrica and Tramadol for her leg pain with modest pain relief, rated 5/10 at the time of history taking.     Ms. Murray reports a small, persistent defect in her left groin incision, which is not painful or erythematous and has not been productive of discharge. She continues to dress the incision with a dry gauze. She continues to smoke cigarettes but has decreased her usage from 1/2 pack per day, down from 1 pack daily.       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 Rose    DENTAL SURGERY  2017    ENDARTERECTOMY OF FEMORAL ARTERY Left 2020    Procedure: ENDARTERECTOMY, FEMORAL;  Surgeon: Olayinka Harris MD;  Location: [facility] OR [address] [place] [address];  Service: Peripheral Vascular;  Laterality: Left;  SFA endarterectomy  fluoro time: 9.2 min mGy:  2336.86    EPIDURAL STEROID INJECTION INTO LUMBAR SPINE N/A 1/16/2020    Procedure: Injection-steroid-epidural-lumbar--Review MRI- L5-S1 after MRI review;  Surgeon: Ely Syed Jr., MD;  Location: [facility] PAIN MGT;  Service: Pain Management;  Laterality: N/A;    EPIDURAL STEROID INJECTION INTO LUMBAR SPINE N/A 5/7/2020    Procedure: Injection-steroid-epidural-lumbar-- Interlaminar L5-S1/ pt aware she can continue taking ASA per Dr. Harris and hold plavix.;  Surgeon: Ely Syed Jr., MD;  Location: [facility] PAIN MGT;  Service: Pain Management;  Laterality: N/A;  Will sign consent at DOS.    gyn surgery      ex laparotomy    HYSTERECTOMY       Family History   Problem Relation Age of Onset    Hypertension Mother     Heart disease Mother 40        CABG x 5    Seizures Mother     Cancer Father         prostate    Stroke Father     Cancer Brother         colon    Diabetes Maternal Aunt     Breast cancer Maternal Aunt     Diabetes Maternal Uncle     Stroke Maternal Grandmother     Stroke Paternal Grandmother     Diabetes Cousin     Breast cancer Cousin      Social History     Socioeconomic History    Marital status: Single     Spouse name: Not on file    Number of children: Not on file    Years of education: Not on file    Highest education level: Not on file   Occupational History     Employer: [employer]   Social Needs    Financial resource strain: Not on file    Food insecurity:     Worry: Not on file     Inability: Not on file    Transportation needs:     Medical: Not on file     Non-medical: Not on file   Tobacco Use    Smoking status: Current Every Day Smoker     Packs/day: 0.50     Years: 40.00     Pack years: 20.00     Types: Cigarettes    Smokeless tobacco: Never Used    Tobacco comment: occasionally   Substance and Sexual Activity    Alcohol use: Yes     Alcohol/week: 0.0 standard drinks     Comment: rarely    Drug use: No    Sexual activity: Never   Lifestyle     Physical activity:     Days per week: Not on file     Minutes per session: Not on file    Stress: Not on file   Relationships    Social connections:     Talks on phone: Not on file     Gets together: Not on file     Attends [employer] service: Not on file     Active member of club or organization: Not on file     Attends meetings of clubs or organizations: Not on file     Relationship status: Not on file   Other Topics Concern    Not on file   Social History Narrative    Not on file       Current Outpatient Medications:     acetaminophen (TYLENOL) 500 MG tablet, Take 2 tablets (1,000 mg total) by mouth 3 (three) times daily., Disp: , Rfl: 0    amoxicillin-clavulanate 875-125mg (AUGMENTIN) 875-125 mg per tablet, Take 1 tablet by mouth every 12 (twelve) hours., Disp: 14 tablet, Rfl: 0    aspirin (ECOTRIN) 81 MG EC tablet, Take 1 tablet (81 mg total) by mouth once daily., Disp: 360 tablet, Rfl: 0    candesartan-hydrochlorothiazide (ATACAND HCT) 16-12.5 mg per tablet, Take 1 tablet by mouth once daily., Disp: 30 tablet, Rfl: 3    candesartan-hydrochlorothiazide (ATACAND HCT) 16-12.5 mg per tablet, TAKE 1 TABLET BY MOUTH EVERY DAY, Disp: 30 tablet, Rfl: 3    candesartan-hydrochlorothiazide (ATACAND HCT) 16-12.5 mg per tablet, TAKE 1 TABLET BY MOUTH EVERY DAY, Disp: 30 tablet, Rfl: 3    diclofenac (VOLTAREN) 75 MG EC tablet, Take 1 tablet (75 mg total) by mouth 2 (two) times daily., Disp: 60 tablet, Rfl: 0    HYDROcodone-acetaminophen (NORCO) 5-325 mg per tablet, Take 1 tablet by mouth every 6 (six) hours as needed for Pain., Disp: 20 tablet, Rfl: 0    HYDROcodone-acetaminophen (NORCO) 5-325 mg per tablet, Take 1 tablet by mouth every 6 (six) hours as needed for Pain., Disp: 30 tablet, Rfl: 0    ibuprofen (ADVIL,MOTRIN) 100 mg/5 mL suspension, Take by mouth every 6 (six) hours as needed for Temperature greater than., Disp: , Rfl:     pregabalin (LYRICA) 75 MG capsule, Take 1 capsule (75 mg total) by mouth 3  (three) times daily., Disp: 90 capsule, Rfl: 2    traMADoL (ULTRAM) 50 mg tablet, Take 50 mg by mouth every 6 (six) hours as needed for Pain., Disp: , Rfl:     traMADoL (ULTRAM) 50 mg tablet, Take 1 tablet (50 mg total) by mouth every 12 (twelve) hours as needed for Pain., Disp: 60 tablet, Rfl: 0    clopidogreL (PLAVIX) 75 mg tablet, Take 1 tablet (75 mg total) by mouth once daily., Disp: 30 tablet, Rfl: 6    REVIEW OF SYSTEMS:  General: negative; ENT: negative; Allergy and Immunology: negative; Hematological and Lymphatic: Negative; Endocrine: negative; Respiratory: no cough, shortness of breath, or wheezing; Cardiovascular: no chest pain or dyspnea on exertion; Gastrointestinal: no abdominal pain/back, change in bowel habits, or bloody stools; Genito-Urinary: no dysuria, trouble voiding, or hematuria; Musculoskeletal: Bilateral (L >>R) radicular pain and paresthesias  Neurological: no TIA or stroke symptoms; Psychiatric: no nervousness, anxiety or depression.    PHYSICAL EXAM:   Right Arm BP - Sittin/60 (20 1116)  Left Arm BP - Sittin/48 (20 1116)  Pulse: 83  Temp: 98.4 °F (36.9 °C)      General appearance:  Alert, well-appearing, and in no distress.  Oriented to person, place, and time   Neurological: Normal speech, no focal findings noted; CN II - XII grossly intact           Musculoskeletal: Digits/nail without cyanosis/clubbing.  Normal muscle strength/tone.                 Neck: Supple, no significant adenopathy; thyroid is not enlarged                  No carotid bruit can be auscultated                Chest:  Clear to auscultation, no wheezes, rales or rhonchi, symmetric air entry     No use of accessory muscles             Cardiac: Normal rate and regular rhythm, S1 and S2 normal; PMI non-displaced          Abdomen: Soft, nontender, nondistended, no masses or organomegaly     No rebound tenderness noted; bowel sounds normal     Pulsatile aortic mass is not palpable.     No groin  adenopathy      Extremities:   2+ femoral pulses bilaterally     Pedal pulses nonpalpable.     No pre-tibial edema     No ulcerations    LAB RESULTS:  Lab Results   Component Value Date    K 4.5 02/25/2020    K 4.2 02/24/2020    K 4.4 02/23/2020    CREATININE 0.8 02/25/2020    CREATININE 0.8 02/24/2020    CREATININE 0.9 02/23/2020     Lab Results   Component Value Date    WBC 7.05 02/25/2020    WBC 8.42 02/24/2020    WBC 8.28 02/23/2020    HCT 34.9 (L) 02/25/2020    HCT 33.3 (L) 02/24/2020    HCT 33.0 (L) 02/23/2020     (H) 02/25/2020     02/24/2020     02/23/2020     No results found for: HGBA1C  IMAGING:  Lower Extremity Segmental WALT 05/28/2020  R WALT: 0.54  L WALT: 0.48    IMP/PLAN:  69 y.o. female with HTN, active smoker (1/2 ppd) and PAD s/p L EIA, L CFA endarterectomy, JESU balloon angioplasty and L profundaplasty on 02/18/2020, who presents today for follow-up with consistent left LLE radiculopathy. She reports LLE pain after walking less than 1 block.  I believe her symptoms are principally related to her lower back radiculopathy based on her description of pain, her pulse exam, and her WALT and duplex results.  Her PAD has progressed, however, and we will obtain a CTA with runoff to evaluate.     - Will obtain CTA A/P with run-off at [facility]   - we will speak by phone discuss options of reintervention   - continue asa, plavix, statin    Olayinka Harris MD  Vascular & Endovascular Surgery

## 2020-05-29 ENCOUNTER — TELEPHONE (OUTPATIENT)
Dept: VASCULAR SURGERY | Facility: CLINIC | Age: 70
End: 2020-05-29

## 2020-05-29 NOTE — TELEPHONE ENCOUNTER
----- Message from India Causey sent at 5/29/2020  8:23 AM CDT -----  Contact: pt called [phone]  Patient states that she has a CT this morning scheduled at [facility] and she was supposed to be scheduled at [facility].  Please contact

## 2020-06-02 ENCOUNTER — TELEPHONE (OUTPATIENT)
Dept: INTERNAL MEDICINE | Facility: CLINIC | Age: 70
End: 2020-06-02

## 2020-06-02 NOTE — TELEPHONE ENCOUNTER
Called and spoke with the pt she advised [place] wants progress notes she has not received a paycheck since March and she possible would be fired she requested progress notes are sent,

## 2020-06-02 NOTE — TELEPHONE ENCOUNTER
----- Message from Lisa Porras sent at 6/1/2020 10:58 AM CDT -----  Contact: self/[phone]  Pt called in regards to talking about getting additional information for her short term disability paper work. She would like a call back.     Please advise

## 2020-06-04 ENCOUNTER — TELEPHONE (OUTPATIENT)
Dept: VASCULAR SURGERY | Facility: CLINIC | Age: 70
End: 2020-06-04

## 2020-06-04 NOTE — TELEPHONE ENCOUNTER
Contacted patient to notify her that she does not need to FU in clinic with Dr. Harris on 06/18/2020 and that he will call her with results of CTA. Pt verbalized understanding. Appt cancelled.

## 2020-06-05 ENCOUNTER — PATIENT OUTREACH (OUTPATIENT)
Dept: ADMINISTRATIVE | Facility: OTHER | Age: 70
End: 2020-06-05

## 2020-06-10 ENCOUNTER — OFFICE VISIT (OUTPATIENT)
Dept: PAIN MEDICINE | Facility: CLINIC | Age: 70
End: 2020-06-10
Payer: COMMERCIAL

## 2020-06-10 VITALS
HEART RATE: 97 BPM | HEIGHT: 62 IN | SYSTOLIC BLOOD PRESSURE: 105 MMHG | DIASTOLIC BLOOD PRESSURE: 67 MMHG | BODY MASS INDEX: 30.36 KG/M2 | WEIGHT: 165 LBS

## 2020-06-10 DIAGNOSIS — M54.15 RADICULOPATHY, THORACOLUMBAR REGION: ICD-10-CM

## 2020-06-10 DIAGNOSIS — M43.10 SPONDYLOLISTHESIS, UNSPECIFIED SPINAL REGION: ICD-10-CM

## 2020-06-10 DIAGNOSIS — M54.16 LUMBAR RADICULOPATHY: Primary | ICD-10-CM

## 2020-06-10 DIAGNOSIS — M47.819 ARTHROPATHY OF FACET JOINTS AT MULTIPLE LEVELS: ICD-10-CM

## 2020-06-10 DIAGNOSIS — M48.062 SPINAL STENOSIS OF LUMBAR REGION WITH NEUROGENIC CLAUDICATION: ICD-10-CM

## 2020-06-10 DIAGNOSIS — M54.42 CHRONIC BILATERAL LOW BACK PAIN WITH LEFT-SIDED SCIATICA: ICD-10-CM

## 2020-06-10 DIAGNOSIS — G89.29 CHRONIC BILATERAL LOW BACK PAIN WITH LEFT-SIDED SCIATICA: ICD-10-CM

## 2020-06-10 PROCEDURE — 1159F MED LIST DOCD IN RCRD: CPT | Mod: S$GLB,,, | Performed by: NURSE PRACTITIONER

## 2020-06-10 PROCEDURE — 1159F PR MEDICATION LIST DOCUMENTED IN MEDICAL RECORD: ICD-10-PCS | Mod: S$GLB,,, | Performed by: NURSE PRACTITIONER

## 2020-06-10 PROCEDURE — 99999 PR PBB SHADOW E&M-EST. PATIENT-LVL IV: CPT | Mod: PBBFAC,,, | Performed by: NURSE PRACTITIONER

## 2020-06-10 PROCEDURE — 3074F SYST BP LT 130 MM HG: CPT | Mod: CPTII,S$GLB,, | Performed by: NURSE PRACTITIONER

## 2020-06-10 PROCEDURE — 1125F PR PAIN SEVERITY QUANTIFIED, PAIN PRESENT: ICD-10-PCS | Mod: S$GLB,,, | Performed by: NURSE PRACTITIONER

## 2020-06-10 PROCEDURE — 3078F PR MOST RECENT DIASTOLIC BLOOD PRESSURE < 80 MM HG: ICD-10-PCS | Mod: CPTII,S$GLB,, | Performed by: NURSE PRACTITIONER

## 2020-06-10 PROCEDURE — 99213 OFFICE O/P EST LOW 20 MIN: CPT | Mod: S$GLB,,, | Performed by: NURSE PRACTITIONER

## 2020-06-10 PROCEDURE — 1101F PR PT FALLS ASSESS DOC 0-1 FALLS W/OUT INJ PAST YR: ICD-10-PCS | Mod: CPTII,S$GLB,, | Performed by: NURSE PRACTITIONER

## 2020-06-10 PROCEDURE — 99999 PR PBB SHADOW E&M-EST. PATIENT-LVL IV: ICD-10-PCS | Mod: PBBFAC,,, | Performed by: NURSE PRACTITIONER

## 2020-06-10 PROCEDURE — 3078F DIAST BP <80 MM HG: CPT | Mod: CPTII,S$GLB,, | Performed by: NURSE PRACTITIONER

## 2020-06-10 PROCEDURE — 99213 PR OFFICE/OUTPT VISIT, EST, LEVL III, 20-29 MIN: ICD-10-PCS | Mod: S$GLB,,, | Performed by: NURSE PRACTITIONER

## 2020-06-10 PROCEDURE — 1101F PT FALLS ASSESS-DOCD LE1/YR: CPT | Mod: CPTII,S$GLB,, | Performed by: NURSE PRACTITIONER

## 2020-06-10 PROCEDURE — 3074F PR MOST RECENT SYSTOLIC BLOOD PRESSURE < 130 MM HG: ICD-10-PCS | Mod: CPTII,S$GLB,, | Performed by: NURSE PRACTITIONER

## 2020-06-10 PROCEDURE — 1125F AMNT PAIN NOTED PAIN PRSNT: CPT | Mod: S$GLB,,, | Performed by: NURSE PRACTITIONER

## 2020-06-10 NOTE — PROGRESS NOTES
" [facility] Established Patient Evaluation      Referred by: Anel Parikh MD  Reason for referral: Sciatica, unspecified laterality    CC:   post op -  6 days s/p -Injection-steroid-epidural-lumbar-- Interlaminar L5-S1    Last 3 PDI Scores 6/10/2020 5/13/2020 2/6/2020   Pain Disability Index (PDI) 49 49 53     Interval Update:    6/10/20 - Ms. Murray returns to clinic for follow up visit reporting worse back and left leg  pain.  Patient is s/p Lumbar MIRNA on 5/7/20 with 0% relief.  Pain intensity is currently 7/10.    Ms Murray continues to c/o  medial LLE paresthesias that radiate to ipsilateral hip and are worst in the toes. She additionally complains of a 'squeezing' pain in the left foot and intermittent episodes of blanching of the left foot. She continues to have intermittent radicular pain in bilateral legs  Reports numbness in left hip and left foot.  Patient is currently being evaluated by vascular and endovascular surgery/Dr. Harris to rule out further pathology with her history of PAD he has  ordered a CTA for further evaluation. Dr Harris believes her pain is orginating in her back but wants to make sure.   Pain intensity is currently 7/10.       4/21/20 - Mrs. Murray returns to clinic for follow up visit reporting stable back and leg pain.  Numbness, tightness, and pain are very unpleasant in the ankle and foot bilaterally, but the left is "way worse".  Patient is here for medication refill of Tramadol 50 mg and Lyrica 50 mg.  She reports 30-50% relief with the current medication regimen.  Pain intensity is currently 5/10 ranging 5-8/10.    3/30/20 - The clinic visit for Mrs. Murray has been converted to a telephone encounter because he/she has declined a telemedicine encounter due to technical difficulty or preference.  She is reporting acutely increased shooting pains originating in her back and traveling into the legs since having vascular surgery of her leg.  She was evaluated by her " vascular surgery team and they believe her symptoms are not related to the recent surgery but are most likely to be an exacerbation of her pre-existing lumbar radiculopathy.  She states that her pain is severe and rates it 10/10 at this time.  She states inability to sleep comfortably at night and that her current medication regimen has failed to provide her significant relief.  She reports no relief with tramadol 50 mg and very little relief with a recent prescription of Paxinos 5-325 she received from her PCP.    2/6/2020 - Mrs. Murray returns to clinic for follow up visit reporting improved back pain.  Patient is s/p Lumbar MIRNA on 1/16/20 with 95% continued relief. She reports beginning PT and having to stop due to  developing a swollen, erythematous, painful left foot.  she has recently seen Cardiology and has  been diagnosed with severe PAD (L>R). CTA with segmental occlusion of the left common and proximal SFA and occlusion of the left tibioperoneal trunk as well as occlusion of the left anterior tibial artery in its proximal 3rd. Moderate degree of calcific and hypodense plaque in the abdominal aorta and iliac arteries bilaterally.  She is now scheduled for a LLE iliac to femoral bypass creation with bilateral iliac stents on 2/17/2020   Pain intensity is currently 2/10.      Background / HPI:   Rachel Murray is a 69 y.o. female who complains of Low back pain with Sciatica.  She reports pain that starts in the left side of her low back and travels through the buttocks, posterolateral thigh, anterior shin, and into the top of her foot.  Alleviating and exacerbating factors are variable but prolonged sitting and walking for more than 5 min appear to drastically increase her pain. She also reports insomnia due to pain and has been using a recliner to sleep for approximately 90 min at a time. She reports dealing with low back pain for several years but noticed an acute increase in March 2019 which subsided somewhat.   In October 2019 her pain increased drastically and has not subsided since that time. Her history is positive for cervical disc herniation which was previously treated with oral medications and physical therapy.  She also notes associated numbness in the left lower extremity and a sensation of weakness in the left leg.  She denies falls or saddle anesthesia.    Location: Low back   Onset: 10/2019  Current Pain Score: 8/10  Daily Pain of Range: 5-8/10  Quality: Burning, Throbbing, Grabbing, Tingling, Numb, Sharp, Electric, Hot and Cold  Radiation: Radiates down left side to foot.  Worsened by: extension, lying down, sitting and walking for more than 5 minutes  Improved by: nothing    Previous Therapies:  PT/OT: Denies  HEP: Denies  Interventions: Denies  Surgery:Denies  Medications:   - NSAIDS:   - MSK Relaxants:   - TCAs:   - SNRIs:   - Topicals:   - Anticonvulsants:  - Opioids:     Current Pain Medications:  1. Aleve   2. Tramadol 50 mg TID PRN  3. Lyrica 50 TID    Review of Systems:  Review of Systems   Constitutional: Negative for chills and fever.   HENT: Negative for nosebleeds.    Eyes: Negative for blurred vision and pain.   Respiratory: Negative for hemoptysis.    Cardiovascular: Negative for chest pain and palpitations.   Gastrointestinal: Negative for heartburn, nausea and vomiting.   Genitourinary: Negative for dysuria and hematuria.   Musculoskeletal: Positive for back pain. Negative for myalgias.   Skin: Negative for rash.   Neurological: Positive for tingling, sensory change and focal weakness (LLE). Negative for seizures and loss of consciousness.   Endo/Heme/Allergies: Does not bruise/bleed easily.   Psychiatric/Behavioral: Negative for hallucinations. The patient has insomnia (2/2 pain).        History:    Current Outpatient Medications:     aspirin (ECOTRIN) 81 MG EC tablet, Take 1 tablet (81 mg total) by mouth once daily., Disp: 360 tablet, Rfl: 0    candesartan-hydrochlorothiazide (ATACAND HCT)  16-12.5 mg per tablet, Take 1 tablet by mouth once daily., Disp: 30 tablet, Rfl: 3    candesartan-hydrochlorothiazide (ATACAND HCT) 16-12.5 mg per tablet, TAKE 1 TABLET BY MOUTH EVERY DAY, Disp: 30 tablet, Rfl: 3    candesartan-hydrochlorothiazide (ATACAND HCT) 16-12.5 mg per tablet, TAKE 1 TABLET BY MOUTH EVERY DAY, Disp: 30 tablet, Rfl: 3    ibuprofen (ADVIL,MOTRIN) 100 mg/5 mL suspension, Take by mouth every 6 (six) hours as needed for Temperature greater than., Disp: , Rfl:     pregabalin (LYRICA) 75 MG capsule, Take 1 capsule (75 mg total) by mouth 3 (three) times daily., Disp: 90 capsule, Rfl: 2    traMADoL (ULTRAM) 50 mg tablet, Take 50 mg by mouth every 6 (six) hours as needed for Pain., Disp: , Rfl:     traMADoL (ULTRAM) 50 mg tablet, Take 1 tablet (50 mg total) by mouth every 12 (twelve) hours as needed for Pain., Disp: 60 tablet, Rfl: 0    acetaminophen (TYLENOL) 500 MG tablet, Take 2 tablets (1,000 mg total) by mouth 3 (three) times daily. (Patient not taking: Reported on 6/10/2020), Disp: , Rfl: 0    amoxicillin-clavulanate 875-125mg (AUGMENTIN) 875-125 mg per tablet, Take 1 tablet by mouth every 12 (twelve) hours. (Patient not taking: Reported on 6/10/2020), Disp: 14 tablet, Rfl: 0    clopidogreL (PLAVIX) 75 mg tablet, Take 1 tablet (75 mg total) by mouth once daily., Disp: 30 tablet, Rfl: 6    diclofenac (VOLTAREN) 75 MG EC tablet, Take 1 tablet (75 mg total) by mouth 2 (two) times daily. (Patient not taking: Reported on 6/10/2020), Disp: 60 tablet, Rfl: 0    HYDROcodone-acetaminophen (NORCO) 5-325 mg per tablet, Take 1 tablet by mouth every 6 (six) hours as needed for Pain. (Patient not taking: Reported on 6/10/2020), Disp: 20 tablet, Rfl: 0    HYDROcodone-acetaminophen (NORCO) 5-325 mg per tablet, Take 1 tablet by mouth every 6 (six) hours as needed for Pain. (Patient not taking: Reported on 6/10/2020), Disp: 30 tablet, Rfl: 0  No current facility-administered medications for this visit.      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    Colon polyp     Hypertension 9/10/2012       Past Surgical History:   Procedure Laterality Date    AORTOGRAPHY N/A 2020    Procedure: AORTOGRAM;  Surgeon: Olayinka Harris MD;  Location: [facility] OR [address] [place] [address];  Service: Peripheral Vascular;  Laterality: N/A;    APPENDECTOMY      BREAST BIOPSY       SECTION, CLASSIC      COLONOSCOPY      COLONOSCOPY N/A 2017    Procedure: COLONOSCOPY;  Surgeon: Gabriel Rose MD;  Location: [facility] ENDO (4TH FLR);  Service: Endoscopy;  Laterality: N/A;  Miralax prep per Dr. Rose    DENTAL SURGERY  2017    ENDARTERECTOMY OF FEMORAL ARTERY Left 2020    Procedure: ENDARTERECTOMY, FEMORAL;  Surgeon: Olayinka Harris MD;  [facility];  Service: Peripheral Vascular;  Laterality: Left;  SFA endarterectomy  fluoro time: 9.2 min mGy: 2336.86    EPIDURAL STEROID INJECTION INTO LUMBAR SPINE N/A 2020    Procedure: Injection-steroid-epidural-lumbar--Review MRI- L5-S1 after MRI review;  Surgeon: Ely Syed Jr., MD;  Location: [facility] PAIN MGT;  Service: Pain Management;  Laterality: N/A;    EPIDURAL STEROID INJECTION INTO LUMBAR SPINE N/A 2020    Procedure: Injection-steroid-epidural-lumbar-- Interlaminar L5-S1/ pt aware she can continue taking ASA per Dr. Harris and hold plavix.;  Surgeon: Ely Syed Jr., MD;  Location: [facility] PAIN MGT;  Service: Pain Management;  Laterality: N/A;  Will sign consent at DOS.    gyn surgery      ex laparotomy    HYSTERECTOMY         Family History   Problem Relation Age of Onset    Hypertension Mother     Heart disease Mother 40        CABG x 5    Seizures Mother     Cancer Father         prostate    Stroke Father     Cancer Brother         colon    Diabetes Maternal Aunt     Breast cancer Maternal Aunt     Diabetes Maternal Uncle   "   Stroke Maternal Grandmother     Stroke Paternal Grandmother     Diabetes Cousin     Breast cancer Cousin        Social History     Socioeconomic History    Marital status: Single     Spouse name: Not on file    Number of children: Not on file    Years of education: Not on file    Highest education level: Not on file   Occupational History     Employer: [employer]   Social Needs    Financial resource strain: Not on file    Food insecurity:     Worry: Not on file     Inability: Not on file    Transportation needs:     Medical: Not on file     Non-medical: Not on file   Tobacco Use    Smoking status: Current Every Day Smoker     Packs/day: 0.50     Years: 40.00     Pack years: 20.00     Types: Cigarettes    Smokeless tobacco: Never Used    Tobacco comment: occasionally   Substance and Sexual Activity    Alcohol use: Yes     Alcohol/week: 0.0 standard drinks     Comment: rarely    Drug use: No    Sexual activity: Never   Lifestyle    Physical activity:     Days per week: Not on file     Minutes per session: Not on file    Stress: Not on file   Relationships    Social connections:     Talks on phone: Not on file     Gets together: Not on file     Attends Jewish service: Not on file     Active member of club or organization: Not on file     Attends meetings of clubs or organizations: Not on file     Relationship status: Not on file   Other Topics Concern    Not on file   Social History Narrative    Not on file       Review of patient's allergies indicates:   Allergen Reactions    Adhesive Blisters    Ciprofloxacin Hives    Latex, natural rubber Blisters       Physical Exam:  Vitals:    06/10/20 1321   Weight: 74.8 kg (165 lb)   Height: 5' 2" (1.575 m)   PainSc:   7     General    Nursing note and vitals reviewed.  Constitutional: She is oriented to person, place, and time. She appears well-developed and well-nourished. No distress.   HENT:   Head: Normocephalic and atraumatic. "   Nose: Nose normal.   Eyes: Conjunctivae and EOM are normal. Pupils are equal, round, and reactive to light. Right eye exhibits no discharge. Left eye exhibits no discharge. No scleral icterus.   Neck: No JVD present.   Cardiovascular: Intact distal pulses.    Pulmonary/Chest: Effort normal. No respiratory distress.   Abdominal: She exhibits no distension.   Neurological: She is alert and oriented to person, place, and time. Coordination normal.   Psychiatric: She has a normal mood and affect. Her behavior is normal. Judgment and thought content normal.     General Musculoskeletal Exam   Gait: normal     Back (L-Spine & T-Spine) / Neck (C-Spine) Exam     Tenderness Right paramedian tenderness of the Lower L-Spine. Left paramedian tenderness of the Lower L-Spine.     Back (L-Spine & T-Spine) Range of Motion   Back extension: facet loading is positive and exacerabtes/reproduces the patient's typical low back pain    Back flexion: limited ROM but partial relief of low back pain noted.     Spinal Sensation   Right Side Sensation  L-Spine Level: normal  Left Side Sensation  L-Spine Level: normal    Other She has no scoliosis .    Comments:  Left Leg + SLR      Muscle Strength   Right Lower Extremity   Hip Flexion: 5/5   Hip Extensors: 5/5  Quadriceps:  5/5   Hamstrin/5   Gastrocsoleus:  5/5/5  Left Lower Extremity   Hip Flexion: 5/5   Hip Extensors: 5/5  Quadriceps:  5/5   Hamstrin/5   Gastrocsoleus:  5/5/5    Reflexes     Left Side  Quadriceps:  2+  Achilles:  2+    Right Side   Quadriceps:  2+  Achilles:  2+      Imaging:  None pertinent to review.    Labs:  BMP  Lab Results   Component Value Date     (L) 2020    K 4.5 2020    CL 99 2020    CO2 26 2020    BUN 17 2020    CREATININE 0.8 2020    CALCIUM 9.2 2020    ANIONGAP 9 2020    ESTGFRAFRICA >60.0 2020    EGFRNONAA >60.0 2020     Lab Results   Component Value Date    ALT 19 2020    AST  16 02/14/2020    ALKPHOS 97 02/14/2020    BILITOT 0.3 02/14/2020       Assessment:  Problem List Items Addressed This Visit        Neuro    Lumbar radiculopathy - Primary    Spinal stenosis of lumbar region with neurogenic claudication       Orthopedic    Chronic bilateral low back pain with left-sided sciatica    Spondylolisthesis      Other Visit Diagnoses     Arthropathy of facet joints at multiple levels        Radiculopathy, thoracolumbar region              1/6/2020 - Rachel Murray is a 69 y.o. female with chronic low back pain radiating into the left lower extremity consistent with radiculopathy at L5.  I will order an MRI of her lumbar spine to confirm this diagnosis as she has been dealing with the symptoms for the better part of 2019.  I have also ordered several medications to help address her pain and symptoms in addition to physical therapy.  I have also proposed a lumbar epidural steroid injection to be scheduled after completion of the MRI.  I would anticipate performing this injection at L5-S1; however, this location may change based on the results of the MRI.  She was also provided a letter for work to park closer to the building.    02/06/20201752-66-kdnd-old female presents for a follow-up status post and L5-S1 lumbar MIRNA with reported 95% relief her pain score today is 2/10 patient reports feeling much better following the injection patient states she started physical therapy however 2 days into PT she began developing a swollen, erythematous, painful left foot.  She  has recently seen Cardiology and has  been diagnosed with severe PAD (L>R). CTA with segmental occlusion of the left common and proximal SFA and occlusion of the left tibioperoneal trunk as well as occlusion of the left anterior tibial artery in its proximal 3rd. Moderate degree of calcific and hypodense plaque in the abdominal aorta and iliac arteries bilaterally.  She is now scheduled for a LLE iliac to femoral bypass creation with  bilateral iliac stents on 2/17/2020 with Dr Harris/cardiology.  She endorses feeling much better following her pain procedure and that the pain she originally came in for is much better, discussed that we could repeat as needed.    3/30/2020 - I reviewed her most recent MRI lumbar spine from 01/10/2020 which shows significant lateral recess and neural foraminal stenosis at L3-4 and L4-5 consistent with radiating pattern she is experiencing at this time.  Patient would be an excellent candidate for repeating the lumbar epidural steroid injection at L5-S1 as this provided her 90-95% relief.  Patient was advised that low back pain and radiculopathy can increase after surgical procedure due to immobility and reduced physical activity postoperatively.  In addition to the epidural steroid injection, she will also likely need to be evaluated by Neurosurgery in the symptoms remain refractory to the medications I will prescribe today and then epidural steroid injection.  Epidural steroid injections on hold pending resolution of the Coronavirus crisis.    4/21/2020 - Pain symptoms remain unchanged but appear better managed with the combination of Tramadol and Lyrica.  Goal is to wean off of opioids through PT, injections, and other non-opioids, but those efforts are limited until the Coronavirus restrictions pass.  Cont and reduce tramadol to her current usage level of TID PRN; cont and increase Lyrica.  Wait list for MIRNA.     5/13/2020- 68 y/o female s/p L5-S1 IESI with reported 0% relief, she continues to have low back and left leg pain radiating to her left foot.  optimized her Lyrica on her previous CV to be taking Lyrica 75 mg TID however she was only taking it BID. Also I recommended that we need to give the injection more time to work. She will began her correct Lyrica dose and give the injection more time to be effective.  Previous imaging was reviewed and discussed with the patient today.      06/10/3109-67-kthi-old female presents with continued low back and sciatic pain left greater than right, pain is medial LLE paresthesias that radiate to ipsilateral hip and are worst in the toes.  She is status post lumbar L5-S1 interlaminar MIRNA with 0% relief this was her 2nd injection with minimal to no relief.  Of considering her symptoms I recommended a referral to Neurosurgery patient is currently being evaluated by her vascular surgeon in effort to rule out advanced PAD, patient stated that she would like to follow up with her vascular surgeon and discussed her recent CTA A/P results once obtained.  She states she would like to know if it is her PAD that is getting worse before she see's NSYG.  Patient did report that her Lyrica 75 mg t.i.d. has helped her symptoms she  was previously on Lyrica 75 mg b.i.d.      Plan:      No procedures at this time   Continue tramadol 50 mg nightly  PRN for pain  Continue  Lyrica to 75 mg p.o. TID  Referral to NSYG, pt refused today.   I encouraged the patient to maintain a home exercise regimen that includes daily, moderate cardiovascular exercise lasting at least 30 minutes.  This may include yoga, mitzi chi, walking, swimming, aqua aerobics, or other exercises that maintain a heart rate of 50-70% of the calculated maximum heart rate.  I also encouraged light, daily stretching focused on the target area.  The above plan and management options were discussed at length with patient. Patient is in agreement with the above and verbalized understanding. Dr. Syed was consulted on this patient  and agrees with this plan.      RTC in 4 weeks or  PRN     TAYLOR Mueller  Interventional Pain Management       Disclaimer: This note was partly generated using dictation software which may occasionally result in transcription errors.

## 2020-06-23 ENCOUNTER — LAB VISIT (OUTPATIENT)
Dept: LAB | Facility: HOSPITAL | Age: 70
End: 2020-06-23
Attending: INTERNAL MEDICINE
Payer: COMMERCIAL

## 2020-06-23 ENCOUNTER — OFFICE VISIT (OUTPATIENT)
Dept: INTERNAL MEDICINE | Facility: CLINIC | Age: 70
End: 2020-06-23
Payer: COMMERCIAL

## 2020-06-23 VITALS
RESPIRATION RATE: 16 BRPM | TEMPERATURE: 97 F | SYSTOLIC BLOOD PRESSURE: 112 MMHG | HEART RATE: 92 BPM | DIASTOLIC BLOOD PRESSURE: 60 MMHG | WEIGHT: 159.81 LBS | BODY MASS INDEX: 29.41 KG/M2 | HEIGHT: 62 IN

## 2020-06-23 DIAGNOSIS — M54.30 SCIATICA, UNSPECIFIED LATERALITY: ICD-10-CM

## 2020-06-23 DIAGNOSIS — R35.0 URINARY FREQUENCY: ICD-10-CM

## 2020-06-23 DIAGNOSIS — I73.9 PAD (PERIPHERAL ARTERY DISEASE): ICD-10-CM

## 2020-06-23 DIAGNOSIS — E78.5 HYPERLIPIDEMIA, UNSPECIFIED HYPERLIPIDEMIA TYPE: Primary | ICD-10-CM

## 2020-06-23 LAB
BACTERIA #/AREA URNS AUTO: ABNORMAL /HPF
BILIRUB UR QL STRIP: NEGATIVE
CLARITY UR REFRACT.AUTO: ABNORMAL
COLOR UR AUTO: YELLOW
GLUCOSE UR QL STRIP: NEGATIVE
HGB UR QL STRIP: NEGATIVE
KETONES UR QL STRIP: NEGATIVE
LEUKOCYTE ESTERASE UR QL STRIP: ABNORMAL
MICROSCOPIC COMMENT: ABNORMAL
NITRITE UR QL STRIP: NEGATIVE
NON-SQ EPI CELLS #/AREA URNS AUTO: <1 /HPF
PH UR STRIP: 6 [PH] (ref 5–8)
PROT UR QL STRIP: NEGATIVE
RBC #/AREA URNS AUTO: 6 /HPF (ref 0–4)
SP GR UR STRIP: 1.02 (ref 1–1.03)
URN SPEC COLLECT METH UR: ABNORMAL
WBC #/AREA URNS AUTO: >100 /HPF (ref 0–5)
WBC CLUMPS UR QL AUTO: ABNORMAL

## 2020-06-23 PROCEDURE — 1159F MED LIST DOCD IN RCRD: CPT | Mod: S$GLB,,, | Performed by: INTERNAL MEDICINE

## 2020-06-23 PROCEDURE — 87088 URINE BACTERIA CULTURE: CPT

## 2020-06-23 PROCEDURE — 1101F PT FALLS ASSESS-DOCD LE1/YR: CPT | Mod: CPTII,S$GLB,, | Performed by: INTERNAL MEDICINE

## 2020-06-23 PROCEDURE — 1125F PR PAIN SEVERITY QUANTIFIED, PAIN PRESENT: ICD-10-PCS | Mod: S$GLB,,, | Performed by: INTERNAL MEDICINE

## 2020-06-23 PROCEDURE — 99999 PR PBB SHADOW E&M-EST. PATIENT-LVL V: ICD-10-PCS | Mod: PBBFAC,,, | Performed by: INTERNAL MEDICINE

## 2020-06-23 PROCEDURE — 1125F AMNT PAIN NOTED PAIN PRSNT: CPT | Mod: S$GLB,,, | Performed by: INTERNAL MEDICINE

## 2020-06-23 PROCEDURE — 3074F SYST BP LT 130 MM HG: CPT | Mod: CPTII,S$GLB,, | Performed by: INTERNAL MEDICINE

## 2020-06-23 PROCEDURE — 99214 OFFICE O/P EST MOD 30 MIN: CPT | Mod: S$GLB,,, | Performed by: INTERNAL MEDICINE

## 2020-06-23 PROCEDURE — 87077 CULTURE AEROBIC IDENTIFY: CPT

## 2020-06-23 PROCEDURE — 81001 URINALYSIS AUTO W/SCOPE: CPT

## 2020-06-23 PROCEDURE — 99999 PR PBB SHADOW E&M-EST. PATIENT-LVL V: CPT | Mod: PBBFAC,,, | Performed by: INTERNAL MEDICINE

## 2020-06-23 PROCEDURE — 1101F PR PT FALLS ASSESS DOC 0-1 FALLS W/OUT INJ PAST YR: ICD-10-PCS | Mod: CPTII,S$GLB,, | Performed by: INTERNAL MEDICINE

## 2020-06-23 PROCEDURE — 87086 URINE CULTURE/COLONY COUNT: CPT

## 2020-06-23 PROCEDURE — 3074F PR MOST RECENT SYSTOLIC BLOOD PRESSURE < 130 MM HG: ICD-10-PCS | Mod: CPTII,S$GLB,, | Performed by: INTERNAL MEDICINE

## 2020-06-23 PROCEDURE — 87186 SC STD MICRODIL/AGAR DIL: CPT

## 2020-06-23 PROCEDURE — 3078F DIAST BP <80 MM HG: CPT | Mod: CPTII,S$GLB,, | Performed by: INTERNAL MEDICINE

## 2020-06-23 PROCEDURE — 99214 PR OFFICE/OUTPT VISIT, EST, LEVL IV, 30-39 MIN: ICD-10-PCS | Mod: S$GLB,,, | Performed by: INTERNAL MEDICINE

## 2020-06-23 PROCEDURE — 1159F PR MEDICATION LIST DOCUMENTED IN MEDICAL RECORD: ICD-10-PCS | Mod: S$GLB,,, | Performed by: INTERNAL MEDICINE

## 2020-06-23 PROCEDURE — 3008F PR BODY MASS INDEX (BMI) DOCUMENTED: ICD-10-PCS | Mod: CPTII,S$GLB,, | Performed by: INTERNAL MEDICINE

## 2020-06-23 PROCEDURE — 3008F BODY MASS INDEX DOCD: CPT | Mod: CPTII,S$GLB,, | Performed by: INTERNAL MEDICINE

## 2020-06-23 PROCEDURE — 3078F PR MOST RECENT DIASTOLIC BLOOD PRESSURE < 80 MM HG: ICD-10-PCS | Mod: CPTII,S$GLB,, | Performed by: INTERNAL MEDICINE

## 2020-06-23 RX ORDER — ATORVASTATIN CALCIUM 40 MG/1
40 TABLET, FILM COATED ORAL DAILY
Qty: 90 TABLET | Refills: 3 | Status: SHIPPED | OUTPATIENT
Start: 2020-06-23 | End: 2021-06-14

## 2020-06-23 NOTE — PROGRESS NOTES
CC: followup of hypertension  HPI:  The patient is a 69 y.o. year old female who presents to the office for followup of hypertension.  The patient denies any chest pain, shortness of breath, headache, blurred vision, excessive fatigue, nausea or vomiting.  She complains of continued back and leg pain.  She reports pain radiates down her left leg.  She is now starting to get pain in the right leg.  She also complains of numbness.  The patient states she is in constant pain, and states she is not sleeping.  She is unable to return to work.  The patient also complains of urinary frequency and incontinence.    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    Colon polyp     Hypertension 9/10/2012       SURGICAL HISTORY:  Past Surgical History:   Procedure Laterality Date    AORTOGRAPHY N/A 2020    Procedure: AORTOGRAM;  Surgeon: Olayinka Harris MD;  Location: [facility] OR [address] [place] [address];  Service: Peripheral Vascular;  Laterality: N/A;    APPENDECTOMY      BREAST BIOPSY       SECTION, CLASSIC      COLONOSCOPY      COLONOSCOPY N/A 2017    Procedure: COLONOSCOPY;  Surgeon: Gabriel Rose MD;  [facility]: [facility] ([facility]TH [facility]);  Service: Endoscopy;  Laterality: N/A;  Miralax prep per Dr. Rose    DENTAL SURGERY  2017    ENDARTERECTOMY OF FEMORAL ARTERY Left 2020    Procedure: ENDARTERECTOMY, FEMORAL;  Surgeon: Olayinka Harris MD;  Location: [facility] OR [address] [place] [address];  Service: Peripheral Vascular;  Laterality: Left;  SFA endarterectomy  fluoro time: 9.2 min mGy: 2336.86    EPIDURAL STEROID INJECTION INTO LUMBAR SPINE N/A 2020    Procedure: Injection-steroid-epidural-lumbar--Review MRI- L5-S1 after MRI review;  Surgeon: Ely Syed Jr., MD;  Location: [facility] PAIN MGT;  Service: Pain Management;  Laterality: N/A;    EPIDURAL STEROID INJECTION INTO LUMBAR SPINE N/A 2020     Procedure: Injection-steroid-epidural-lumbar-- Interlaminar L5-S1/ pt aware she can continue taking ASA per Dr. Harris and hold plavix.;  Surgeon: Ely Syed Jr., MD;  Location: [facility] PAIN T;  Service: Pain Management;  Laterality: N/A;  Will sign consent at DOS.    gyn surgery      ex laparotomy    HYSTERECTOMY         MEDS:  Medcard reviewed and updated    ALLERGIES: Allergy Card reviewed and updated    SOCIAL HISTORY:   The patient is a smoker.    PE:   APPEARANCE: Well nourished, well developed, in no acute distress.    CHEST: Lungs clear to auscultation with unlabored respirations.  CARDIOVASCULAR: Normal S1, S2. No murmurs. No carotid bruits. No pedal edema.  ABDOMEN: Bowel sounds normal. Not distended. Soft. No tenderness or masses.   MUSCULOSKELETAL:  Abnormal gait, positive tenderness to palpation to lower back.  Ambulates with a cane.  PSYCHIATRIC: The patient is oriented to person, place, and time and has a pleasant affect.        ASSESSMENT/PLAN:  Rachel was seen today for 3 month f/u.    Diagnoses and all orders for this visit:    Hyperlipidemia, unspecified hyperlipidemia type  -     Comprehensive metabolic panel; Future  -     Lipid Panel; Future    PAD (peripheral artery disease)  -     follow-up with vascular surgeon    Sciatica, unspecified laterality  -      follow-up by pain management    Urinary frequency  -     Urinalysis; Future  -     Urine culture; Future    Other orders  -     atorvastatin (LIPITOR) 40 MG tablet; Take 1 tablet (40 mg total) by mouth once daily.

## 2020-06-24 DIAGNOSIS — M54.15 RADICULOPATHY, THORACOLUMBAR REGION: ICD-10-CM

## 2020-06-24 DIAGNOSIS — M54.16 LUMBAR RADICULOPATHY: Primary | ICD-10-CM

## 2020-06-24 NOTE — TELEPHONE ENCOUNTER
----- Message from Melia Tovar sent at 6/24/2020  3:55 PM CDT -----  Patient would like refill of traMADoL (ULTRAM) 50 mg tablet sent to [id number] [place] - [address] AT [facility] [address]. Please advise. [phone]

## 2020-06-25 RX ORDER — TRAMADOL HYDROCHLORIDE 50 MG/1
50 TABLET ORAL EVERY 12 HOURS PRN
Qty: 60 TABLET | Refills: 0 | Status: SHIPPED | OUTPATIENT
Start: 2020-06-25 | End: 2020-07-25 | Stop reason: CLARIF

## 2020-06-25 NOTE — TELEPHONE ENCOUNTER
Tramadol 50 mg #60 refilled.   reviewed and appropriate.    Ely Syed Jr, MD  Interventional Pain Medicine / Anesthesiology

## 2020-06-26 ENCOUNTER — TELEPHONE (OUTPATIENT)
Dept: INTERNAL MEDICINE | Facility: CLINIC | Age: 70
End: 2020-06-26

## 2020-06-26 LAB — BACTERIA UR CULT: ABNORMAL

## 2020-06-26 RX ORDER — AMOXICILLIN AND CLAVULANATE POTASSIUM 875; 125 MG/1; MG/1
1 TABLET, FILM COATED ORAL EVERY 12 HOURS
Qty: 14 TABLET | Refills: 0 | Status: SHIPPED | OUTPATIENT
Start: 2020-06-26 | End: 2020-07-25 | Stop reason: CLARIF

## 2020-06-26 NOTE — TELEPHONE ENCOUNTER
Informed patient that urine culture is positive for UTI.  Prescription for Augmentin has been sent to the pharmacy electronically.

## 2020-06-27 ENCOUNTER — NURSE TRIAGE (OUTPATIENT)
Dept: ADMINISTRATIVE | Facility: CLINIC | Age: 70
End: 2020-06-27

## 2020-06-27 RX ORDER — NITROFURANTOIN 25; 75 MG/1; MG/1
100 CAPSULE ORAL 2 TIMES DAILY
Qty: 14 CAPSULE | Refills: 0 | Status: SHIPPED | OUTPATIENT
Start: 2020-06-27 | End: 2020-07-25 | Stop reason: CLARIF

## 2020-06-27 NOTE — TELEPHONE ENCOUNTER
Please inform patient that prescription for Macrobid has been sent to her pharmacy electronically.

## 2020-06-27 NOTE — TELEPHONE ENCOUNTER
"Spoke with patient she states that Dr. Parikh prescribed her Augmentin to treat a bladder infection.  Patient states that she cannot take Augmentin because it makes her very sick.  States that she has not taken a dose yet.    Spoke with Dr. Parikh she stated that she would send something into pharmacy.  Patient made aware that Dr. Parikh will send in new prescription shortly.  Advised patient to call back if she has additional questions or concerns.     Reason for Disposition   [1] Request for URGENT new prescription or refill of "essential" medication (i.e., likelihood of harm to patient if not taken) AND [2] triager unable to fill per unit policy    Protocols used: MEDICATION QUESTION CALL-A-AH      "

## 2020-06-29 ENCOUNTER — TELEPHONE (OUTPATIENT)
Dept: INTERNAL MEDICINE | Facility: CLINIC | Age: 70
End: 2020-06-29

## 2020-06-29 NOTE — TELEPHONE ENCOUNTER
----- Message from Karen Thomas sent at 6/29/2020 10:00 AM CDT -----  Regarding: July paper work  Contact: self/[phone]  Patient called in regards needing to inform that the [facility] needs July paper work refax. Please call and advise. Thank you

## 2020-06-29 NOTE — TELEPHONE ENCOUNTER
----- Message from Karen Thomas sent at 6/29/2020  9:55 AM CDT -----  Contact: self/[phone]  Patient called in regards needing to tell Dr Parikh's nurse that pharmacy did not get the new Rx on Saturday [id number] - [place], [place] - [address] [facility] [address] [phone] (Phone) [phone] (Fax).   Patient does not know the medication that pcp was going to sent. Thank you

## 2020-06-30 ENCOUNTER — TELEPHONE (OUTPATIENT)
Dept: INTERNAL MEDICINE | Facility: CLINIC | Age: 70
End: 2020-06-30

## 2020-06-30 NOTE — TELEPHONE ENCOUNTER
----- Message from Olivia Hannahry sent at 6/30/2020  9:38 AM CDT -----  Regarding: prescription change and paper work  Contact: self   Pt states she is doing a f/u on a medication that was suppose to be sent to [facility] to replace amoxicillin-clavulanate 875-125mg (AUGMENTIN) 875-125 mg per tablet because this medication makes she sick. [facility] the new [facility]. Pt also wants to know has her paper work for the month of July has been sent to [facility]. Please call and advise

## 2020-06-30 NOTE — TELEPHONE ENCOUNTER
Called and spoke with the pt and advised of the Rx she understood and I called [facility] and clarify the Rx was there also advised she needed her insurance forms completed for [facility] and faxed RTW by 8/1/2020

## 2020-07-06 ENCOUNTER — TELEPHONE (OUTPATIENT)
Dept: INTERNAL MEDICINE | Facility: CLINIC | Age: 70
End: 2020-07-06

## 2020-07-08 ENCOUNTER — TELEPHONE (OUTPATIENT)
Dept: INTERNAL MEDICINE | Facility: CLINIC | Age: 70
End: 2020-07-08

## 2020-07-08 ENCOUNTER — PATIENT OUTREACH (OUTPATIENT)
Dept: ADMINISTRATIVE | Facility: OTHER | Age: 70
End: 2020-07-08

## 2020-07-09 ENCOUNTER — OFFICE VISIT (OUTPATIENT)
Dept: VASCULAR SURGERY | Facility: CLINIC | Age: 70
End: 2020-07-09
Attending: SURGERY
Payer: COMMERCIAL

## 2020-07-09 VITALS
DIASTOLIC BLOOD PRESSURE: 53 MMHG | TEMPERATURE: 99 F | WEIGHT: 162 LBS | SYSTOLIC BLOOD PRESSURE: 128 MMHG | HEIGHT: 62 IN | HEART RATE: 94 BPM | BODY MASS INDEX: 29.81 KG/M2

## 2020-07-09 DIAGNOSIS — I77.1 ILIAC ARTERY STENOSIS, BILATERAL: ICD-10-CM

## 2020-07-09 DIAGNOSIS — I73.9 PAD (PERIPHERAL ARTERY DISEASE): Primary | ICD-10-CM

## 2020-07-09 DIAGNOSIS — Z01.818 PRE-OP EVALUATION: ICD-10-CM

## 2020-07-09 PROCEDURE — 99999 PR PBB SHADOW E&M-EST. PATIENT-LVL IV: ICD-10-PCS | Mod: PBBFAC,,, | Performed by: SURGERY

## 2020-07-09 PROCEDURE — 1101F PR PT FALLS ASSESS DOC 0-1 FALLS W/OUT INJ PAST YR: ICD-10-PCS | Mod: CPTII,S$GLB,, | Performed by: SURGERY

## 2020-07-09 PROCEDURE — 99999 PR PBB SHADOW E&M-EST. PATIENT-LVL IV: CPT | Mod: PBBFAC,,, | Performed by: SURGERY

## 2020-07-09 PROCEDURE — 1125F AMNT PAIN NOTED PAIN PRSNT: CPT | Mod: S$GLB,,, | Performed by: SURGERY

## 2020-07-09 PROCEDURE — 1125F PR PAIN SEVERITY QUANTIFIED, PAIN PRESENT: ICD-10-PCS | Mod: S$GLB,,, | Performed by: SURGERY

## 2020-07-09 PROCEDURE — 99214 PR OFFICE/OUTPT VISIT, EST, LEVL IV, 30-39 MIN: ICD-10-PCS | Mod: S$GLB,,, | Performed by: SURGERY

## 2020-07-09 PROCEDURE — 1159F MED LIST DOCD IN RCRD: CPT | Mod: S$GLB,,, | Performed by: SURGERY

## 2020-07-09 PROCEDURE — 3008F BODY MASS INDEX DOCD: CPT | Mod: CPTII,S$GLB,, | Performed by: SURGERY

## 2020-07-09 PROCEDURE — 3078F DIAST BP <80 MM HG: CPT | Mod: CPTII,S$GLB,, | Performed by: SURGERY

## 2020-07-09 PROCEDURE — 99214 OFFICE O/P EST MOD 30 MIN: CPT | Mod: S$GLB,,, | Performed by: SURGERY

## 2020-07-09 PROCEDURE — 1159F PR MEDICATION LIST DOCUMENTED IN MEDICAL RECORD: ICD-10-PCS | Mod: S$GLB,,, | Performed by: SURGERY

## 2020-07-09 PROCEDURE — 3074F SYST BP LT 130 MM HG: CPT | Mod: CPTII,S$GLB,, | Performed by: SURGERY

## 2020-07-09 PROCEDURE — 3074F PR MOST RECENT SYSTOLIC BLOOD PRESSURE < 130 MM HG: ICD-10-PCS | Mod: CPTII,S$GLB,, | Performed by: SURGERY

## 2020-07-09 PROCEDURE — 3008F PR BODY MASS INDEX (BMI) DOCUMENTED: ICD-10-PCS | Mod: CPTII,S$GLB,, | Performed by: SURGERY

## 2020-07-09 PROCEDURE — 3078F PR MOST RECENT DIASTOLIC BLOOD PRESSURE < 80 MM HG: ICD-10-PCS | Mod: CPTII,S$GLB,, | Performed by: SURGERY

## 2020-07-09 PROCEDURE — 1101F PT FALLS ASSESS-DOCD LE1/YR: CPT | Mod: CPTII,S$GLB,, | Performed by: SURGERY

## 2020-07-09 NOTE — PROGRESS NOTES
Rachel Gastelumhn  2020    HPI:  Patient is a 69 y.o. female with a h/o who is here today for follow-up appointment. She reports of medial LLE paresthesias that radiate to ipsilateral hip and are worst in the toes. She additionally complains of a 'squeezing' pain in the left foot and intermittent episodes of blanching of the left foot. She continues to have intermittent radicular pain in bilateral legs, for which she received an L5-S1 lumbar epidural steroid injection on 2020 that the patient reports was minimally helpful. The patient takes Lyrica and Tramadol for her leg pain with modest pain relief, rated 5/10 at the time of history taking.     Interval History:   Patient states she is here for follow up regarding her CTA. She notes continued symptoms as above without any relief.     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);  Service: Endoscopy;  Laterality: N/A;  Miralax prep per Dr. Rose    DENTAL SURGERY  2017    ENDARTERECTOMY OF FEMORAL ARTERY Left 2020    Procedure: ENDARTERECTOMY, FEMORAL;  Surgeon: Olayinka Harris MD;  Location: [address];  Service: Peripheral Vascular;  Laterality: Left;  SFA endarterectomy  fluoro time: 9.2 min mGy: 2336.86    EPIDURAL STEROID INJECTION INTO LUMBAR SPINE N/A 2020    Procedure: Injection-steroid-epidural-lumbar--Review MRI- L5-S1 after MRI review;  Surgeon: Ely CHRISTIE  Kunal Abdul MD;  Location: [facility];  Service: Pain Management;  Laterality: N/A;    EPIDURAL STEROID INJECTION INTO LUMBAR SPINE N/A 5/7/2020    Procedure: Injection-steroid-epidural-lumbar-- Interlaminar L5-S1/ pt aware she can continue taking ASA per Dr. Harris and hold plavix.;  Surgeon: Ely Syed Jr., MD;  Location: [facility];  Service: Pain Management;  Laterality: N/A;  Will sign consent at DOS.    gyn surgery      ex laparotomy    HYSTERECTOMY       Family History   Problem Relation Age of Onset    Hypertension Mother     Heart disease Mother 40        CABG x 5    Seizures Mother     Cancer Father         prostate    Stroke Father     Cancer Brother         colon    Diabetes Maternal Aunt     Breast cancer Maternal Aunt     Diabetes Maternal Uncle     Stroke Maternal Grandmother     Stroke Paternal Grandmother     Diabetes Cousin     Breast cancer Cousin      Social History     Socioeconomic History    Marital status: Single     Spouse name: Not on file    Number of children: Not on file    Years of education: Not on file    Highest education level: Not on file   Occupational History     Employer: [employer]   Social Needs    Financial resource strain: Not on file    Food insecurity     Worry: Not on file     Inability: Not on file    Transportation needs     Medical: Not on file     Non-medical: Not on file   Tobacco Use    Smoking status: Current Every Day Smoker     Packs/day: 0.50     Years: 40.00     Pack years: 20.00     Types: Cigarettes    Smokeless tobacco: Never Used    Tobacco comment: occasionally   Substance and Sexual Activity    Alcohol use: Yes     Alcohol/week: 0.0 standard drinks     Comment: rarely    Drug use: No    Sexual activity: Never   Lifestyle    Physical activity     Days per week: Not on file     Minutes per session: Not on file    Stress: Not on file   Relationships    Social connections     Talks on phone: Not on  file     Gets together: Not on file     Attends Zoroastrian service: Not on file     Active member of club or organization: Not on file     Attends meetings of clubs or organizations: Not on file     Relationship status: Not on file   Other Topics Concern    Not on file   Social History Narrative    Not on file       Current Outpatient Medications:     acetaminophen (TYLENOL) 500 MG tablet, Take 2 tablets (1,000 mg total) by mouth 3 (three) times daily., Disp: , Rfl: 0    amoxicillin-clavulanate 875-125mg (AUGMENTIN) 875-125 mg per tablet, Take 1 tablet by mouth every 12 (twelve) hours., Disp: 14 tablet, Rfl: 0    aspirin (ECOTRIN) 81 MG EC tablet, Take 1 tablet (81 mg total) by mouth once daily., Disp: 360 tablet, Rfl: 0    atorvastatin (LIPITOR) 40 MG tablet, Take 1 tablet (40 mg total) by mouth once daily., Disp: 90 tablet, Rfl: 3    candesartan-hydrochlorothiazide (ATACAND HCT) 16-12.5 mg per tablet, TAKE 1 TABLET BY MOUTH EVERY DAY, Disp: 30 tablet, Rfl: 3    diclofenac (VOLTAREN) 75 MG EC tablet, Take 1 tablet (75 mg total) by mouth 2 (two) times daily., Disp: 60 tablet, Rfl: 0    HYDROcodone-acetaminophen (NORCO) 5-325 mg per tablet, Take 1 tablet by mouth every 6 (six) hours as needed for Pain., Disp: 20 tablet, Rfl: 0    HYDROcodone-acetaminophen (NORCO) 5-325 mg per tablet, Take 1 tablet by mouth every 6 (six) hours as needed for Pain., Disp: 30 tablet, Rfl: 0    ibuprofen (ADVIL,MOTRIN) 100 mg/5 mL suspension, Take by mouth every 6 (six) hours as needed for Temperature greater than., Disp: , Rfl:     nitrofurantoin, macrocrystal-monohydrate, (MACROBID) 100 MG capsule, Take 1 capsule (100 mg total) by mouth 2 (two) times daily., Disp: 14 capsule, Rfl: 0    pregabalin (LYRICA) 75 MG capsule, Take 1 capsule (75 mg total) by mouth 3 (three) times daily., Disp: 90 capsule, Rfl: 2    traMADoL (ULTRAM) 50 mg tablet, Take 50 mg by mouth every 6 (six) hours as needed for Pain., Disp: , Rfl:      traMADoL (ULTRAM) 50 mg tablet, Take 1 tablet (50 mg total) by mouth every 12 (twelve) hours as needed for Pain., Disp: 60 tablet, Rfl: 0    clopidogreL (PLAVIX) 75 mg tablet, Take 1 tablet (75 mg total) by mouth once daily., Disp: 30 tablet, Rfl: 6    REVIEW OF SYSTEMS:  General: negative; ENT: negative; Allergy and Immunology: negative; Hematological and Lymphatic: Negative; Endocrine: negative; Respiratory: no cough, shortness of breath, or wheezing; Cardiovascular: no chest pain or dyspnea on exertion; Gastrointestinal: no abdominal pain/back, change in bowel habits, or bloody stools; Genito-Urinary: no dysuria, trouble voiding, or hematuria; Musculoskeletal: Bilateral (L >>R) radicular pain and paresthesias  Neurological: no TIA or stroke symptoms; Psychiatric: no nervousness, anxiety or depression.    PHYSICAL EXAM:   Right Arm BP - Sittin/53 (20 1316)  Left Arm BP - Sittin/40 (20 1316)  Pulse: 94  Temp: 99 °F (37.2 °C)      General appearance:  Alert, well-appearing, and in no distress.  Oriented to person, place, and time   Neurological: Normal speech, no focal findings noted; CN II - XII grossly intact           Musculoskeletal: Digits/nail without cyanosis/clubbing.  Normal muscle strength/tone.                 Neck: Supple, no significant adenopathy; thyroid is not enlarged                  No carotid bruit can be auscultated                Chest:  Clear to auscultation, no wheezes, rales or rhonchi, symmetric air entry     No use of accessory muscles             Cardiac: Normal rate and regular rhythm, S1 and S2 normal; PMI non-displaced          Abdomen: Soft, nontender, nondistended, no masses or organomegaly     No rebound tenderness noted; bowel sounds normal     Pulsatile aortic mass is not palpable.     No groin adenopathy      Extremities:   2+ femoral pulses bilaterally     Pedal pulses nonpalpable.     No pre-tibial edema     No ulcerations  No palpable radial pulse left.  Triphasic on doppler. 2+ right radial pulse.     LAB RESULTS:  Lab Results   Component Value Date    K 4.5 02/25/2020    K 4.2 02/24/2020    K 4.4 02/23/2020    CREATININE 0.8 06/04/2020    CREATININE 0.8 02/25/2020    CREATININE 0.8 02/24/2020     Lab Results   Component Value Date    WBC 7.05 02/25/2020    WBC 8.42 02/24/2020    WBC 8.28 02/23/2020    HCT 34.9 (L) 02/25/2020    HCT 33.3 (L) 02/24/2020    HCT 33.0 (L) 02/23/2020     (H) 02/25/2020     02/24/2020     02/23/2020     No results found for: HGBA1C  IMAGING:  Lower Extremity Segmental WALT 05/28/2020  R WALT: 0.54  L WALT: 0.48    CTA:   1. Postoperative changes at the left groin, presumably a short bypass graft at the level of the common femoral artery.  2. Greater than 70% stenosis of the external iliac artery with short-segment occlusion of the proximal femoral arteries.  3. Greater than 70% stenosis of the right external iliac artery.    IMP/PLAN:  69 y.o. female with HTN, active smoker (1/2 ppd) and PAD s/p L EIA, L CFA endarterectomy, JESU balloon angioplasty and L profundaplasty on 02/18/2020, who presents today for follow-up with consistent left LLE radiculopathy. She reports LLE pain after walking less than 1 block.  I believe her symptoms are principally related to her lower back radiculopathy based on her description of pain, her pulse exam, and her WALT and duplex results.  CTA also supports this.     - follow up with neurosurgery for radicular pain.  - return to clinic in *** for ***  - continue asa, plavix, statin    Olayinka Harris MD  Vascular & Endovascular Surgery

## 2020-07-10 ENCOUNTER — TELEPHONE (OUTPATIENT)
Dept: INTERNAL MEDICINE | Facility: CLINIC | Age: 70
End: 2020-07-10

## 2020-07-14 ENCOUNTER — TELEPHONE (OUTPATIENT)
Dept: VASCULAR SURGERY | Facility: CLINIC | Age: 70
End: 2020-07-14

## 2020-07-14 NOTE — TELEPHONE ENCOUNTER
Spoke to the pt , she wanted to confirm arrival time. I explained that this information is given out the day before but the pt wanted to get a general idea of what time that would be.

## 2020-07-14 NOTE — TELEPHONE ENCOUNTER
----- Message from Val Clark sent at 7/14/2020  3:43 PM CDT -----  Regarding: Questions regarding procedure  Please call Ms. Murray back, she did not leave a number.  Val

## 2020-07-15 ENCOUNTER — TELEPHONE (OUTPATIENT)
Dept: INTERNAL MEDICINE | Facility: CLINIC | Age: 70
End: 2020-07-15

## 2020-07-20 ENCOUNTER — TELEPHONE (OUTPATIENT)
Dept: VASCULAR SURGERY | Facility: CLINIC | Age: 70
End: 2020-07-20

## 2020-07-20 NOTE — PROGRESS NOTES
Rachel Murray  2020    HPI:  Patient is a 69 y.o. female with a h/o who is here today for follow-up appointment. She reports of medial LLE paresthesias that radiate to ipsilateral hip and are worst in the toes. She additionally complains of a 'squeezing' pain in the left foot and intermittent episodes of blanching of the left foot. She continues to have intermittent radicular pain in bilateral legs, for which she received an L5-S1 lumbar epidural steroid injection on 2020 that the patient reports was minimally helpful. The patient takes Lyrica and Tramadol for her leg pain with modest pain relief, rated 5/10 at the time of history taking.     Ms. Murray reports a small, persistent defect in her left groin incision, which is not painful or erythematous and has not been productive of discharge. She continues to dress the incision with a dry gauze. She continues to smoke cigarettes but has decreased her usage from 1/2 pack per day, down from 1 pack daily.       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 Rose    DENTAL SURGERY  2017    ENDARTERECTOMY OF FEMORAL ARTERY Left 2020    Procedure: ENDARTERECTOMY, FEMORAL;  Surgeon: Olayinka Harris MD;  Location: [facility] OR [address] [place] [address];  Service: Peripheral Vascular;  Laterality: Left;  SFA endarterectomy  fluoro time: 9.2 min mGy:  2336.86    EPIDURAL STEROID INJECTION INTO LUMBAR SPINE N/A 1/16/2020    Procedure: Injection-steroid-epidural-lumbar--Review MRI- L5-S1 after MRI review;  Surgeon: Ely Syed Jr., MD;  Location: [facility];  Service: Pain Management;  Laterality: N/A;    EPIDURAL STEROID INJECTION INTO LUMBAR SPINE N/A 5/7/2020    Procedure: Injection-steroid-epidural-lumbar-- Interlaminar L5-S1/ pt aware she can continue taking ASA per Dr. Harris and hold plavix.;  Surgeon: Ely Syed Jr., MD;  Location: [facility];  Service: Pain Management;  Laterality: N/A;  Will sign consent at DOS.    gyn surgery      ex laparotomy    HYSTERECTOMY       Family History   Problem Relation Age of Onset    Hypertension Mother     Heart disease Mother 40        CABG x 5    Seizures Mother     Cancer Father         prostate    Stroke Father     Cancer Brother         colon    Diabetes Maternal Aunt     Breast cancer Maternal Aunt     Diabetes Maternal Uncle     Stroke Maternal Grandmother     Stroke Paternal Grandmother     Diabetes Cousin     Breast cancer Cousin      Social History     Socioeconomic History    Marital status: Single     Spouse name: Not on file    Number of children: Not on file    Years of education: Not on file    Highest education level: Not on file   Occupational History     Employer: [employer]   Social Needs    Financial resource strain: Not on file    Food insecurity     Worry: Not on file     Inability: Not on file    Transportation needs     Medical: Not on file     Non-medical: Not on file   Tobacco Use    Smoking status: Current Every Day Smoker     Packs/day: 0.50     Years: 40.00     Pack years: 20.00     Types: Cigarettes    Smokeless tobacco: Never Used    Tobacco comment: occasionally   Substance and Sexual Activity    Alcohol use: Yes     Alcohol/week: 0.0 standard drinks     Comment: rarely    Drug use: No    Sexual activity: Never   Lifestyle     Physical activity     Days per week: Not on file     Minutes per session: Not on file    Stress: Not on file   Relationships    Social connections     Talks on phone: Not on file     Gets together: Not on file     Attends Christianity service: Not on file     Active member of club or organization: Not on file     Attends meetings of clubs or organizations: Not on file     Relationship status: Not on file   Other Topics Concern    Not on file   Social History Narrative    Not on file       Current Outpatient Medications:     acetaminophen (TYLENOL) 500 MG tablet, Take 2 tablets (1,000 mg total) by mouth 3 (three) times daily., Disp: , Rfl: 0    amoxicillin-clavulanate 875-125mg (AUGMENTIN) 875-125 mg per tablet, Take 1 tablet by mouth every 12 (twelve) hours., Disp: 14 tablet, Rfl: 0    aspirin (ECOTRIN) 81 MG EC tablet, Take 1 tablet (81 mg total) by mouth once daily. (Patient taking differently: Take 81 mg by mouth every morning. ), Disp: 360 tablet, Rfl: 0    atorvastatin (LIPITOR) 40 MG tablet, Take 1 tablet (40 mg total) by mouth once daily. (Patient taking differently: Take 40 mg by mouth every morning. ), Disp: 90 tablet, Rfl: 3    candesartan-hydrochlorothiazide (ATACAND HCT) 16-12.5 mg per tablet, TAKE 1 TABLET BY MOUTH EVERY DAY (Patient taking differently: Take 1 tablet by mouth every morning. ), Disp: 30 tablet, Rfl: 3    diclofenac (VOLTAREN) 75 MG EC tablet, Take 1 tablet (75 mg total) by mouth 2 (two) times daily., Disp: 60 tablet, Rfl: 0    ibuprofen (ADVIL,MOTRIN) 100 mg/5 mL suspension, Take by mouth every 6 (six) hours as needed for Temperature greater than., Disp: , Rfl:     nitrofurantoin, macrocrystal-monohydrate, (MACROBID) 100 MG capsule, Take 1 capsule (100 mg total) by mouth 2 (two) times daily., Disp: 14 capsule, Rfl: 0    pregabalin (LYRICA) 75 MG capsule, Take 1 capsule (75 mg total) by mouth 3 (three) times daily., Disp: 90 capsule, Rfl: 2    traMADoL (ULTRAM) 50 mg tablet,  Take 1 tablet (50 mg total) by mouth every 12 (twelve) hours as needed for Pain., Disp: 60 tablet, Rfl: 0    clopidogreL (PLAVIX) 75 mg tablet, Take 1 tablet (75 mg total) by mouth once daily. (Patient taking differently: Take 75 mg by mouth every morning. ), Disp: 30 tablet, Rfl: 6    REVIEW OF SYSTEMS:  General: negative; ENT: negative; Allergy and Immunology: negative; Hematological and Lymphatic: Negative; Endocrine: negative; Respiratory: no cough, shortness of breath, or wheezing; Cardiovascular: no chest pain or dyspnea on exertion; Gastrointestinal: no abdominal pain/back, change in bowel habits, or bloody stools; Genito-Urinary: no dysuria, trouble voiding, or hematuria; Musculoskeletal: Bilateral (L >>R) radicular pain and paresthesias  Neurological: no TIA or stroke symptoms; Psychiatric: no nervousness, anxiety or depression.    PHYSICAL EXAM:   Right Arm BP - Sittin/53 (20 1316)  Left Arm BP - Sittin/40 (20 1316)  Pulse: 94  Temp: 99 °F (37.2 °C)      General appearance:  Alert, well-appearing, and in no distress.  Oriented to person, place, and time   Neurological: Normal speech, no focal findings noted; CN II - XII grossly intact           Musculoskeletal: Digits/nail without cyanosis/clubbing.  Normal muscle strength/tone.                 Neck: Supple, no significant adenopathy; thyroid is not enlarged                  No carotid bruit can be auscultated                Chest:  Clear to auscultation, no wheezes, rales or rhonchi, symmetric air entry     No use of accessory muscles             Cardiac: Normal rate and regular rhythm, S1 and S2 normal; PMI non-displaced          Abdomen: Soft, nontender, nondistended, no masses or organomegaly     No rebound tenderness noted; bowel sounds normal     Pulsatile aortic mass is not palpable.     No groin adenopathy      Extremities:   2+ femoral pulses bilaterally     Pedal pulses nonpalpable.     No pre-tibial edema     No  ulcerations    LAB RESULTS:  Lab Results   Component Value Date    K 3.7 07/09/2020    K 4.5 02/25/2020    K 4.2 02/24/2020    CREATININE 0.8 07/09/2020    CREATININE 0.8 06/04/2020    CREATININE 0.8 02/25/2020     Lab Results   Component Value Date    WBC 10.84 07/09/2020    WBC 7.05 02/25/2020    WBC 8.42 02/24/2020    HCT 33.5 (L) 07/09/2020    HCT 34.9 (L) 02/25/2020    HCT 33.3 (L) 02/24/2020     (H) 07/09/2020     (H) 02/25/2020     02/24/2020     No results found for: HGBA1C  IMAGING:  Lower Extremity Segmental WALT 05/28/2020  R WALT: 0.54  L WALT: 0.48    Aorta: Normal caliber.  No dissection, penetrating ulcer, or intramural hematoma.  Significant atherosclerotic plaque throughout of the course.     Right:     Common iliac artery: Atherosclerotic plaque.     External iliac artery: Atherosclerotic plaque with several foci of greater than 70% stenosis.     Common femoral artery: Atherosclerotic plaque with approximately 50% stenosis.     Femoral artery: Normal.     Popliteal artery: Normal.     Anterior tibial artery: Normal.     Posterior tibial artery: Normal.     Peroneal artery: Normal.     Left: There are postoperative changes at the left groin, presumably a short bypass graft at the level of the common femoral artery.     Common iliac artery: Atherosclerotic plaque     External iliac artery: Atherosclerotic plaque with greater than 70% stenosis     Common femoral artery: Normal.     Femoral artery: Short segment complete occlusion just distal to the origin of the profundus femoris with reconstitution.     Popliteal artery: Normal.     Anterior tibial artery: Normal.     Posterior tibial artery: Normal.     Peroneal artery: Normal.     Liver, gallbladder, pancreas, and spleen are unremarkable.  No biliary ductal dilatation.  There is thickening of the bilateral adrenal glands with 1.7 cm nodule on the left, similar to study dated 12/13/2012.  Kidneys are unremarkable.  No  hydroureteronephrosis.  GI tract is demonstrates no evidence for obstruction or inflammation.  Uterus is absent.  No adnexal masses.  No ascites.  No retroperitoneal lymphadenopathy.     Regional osseous structures demonstrate no aggressive appearing lytic or blastic lesions.     Impression:     1. Postoperative changes at the left groin, presumably a short bypass graft at the level of the common femoral artery.  2. Greater than 70% stenosis of the external iliac artery with short-segment occlusion of the proximal femoral arteries.  3. Greater than 70% stenosis of the right external iliac artery.    IMP/PLAN:  69 y.o. female with HTN, active smoker (1/2 ppd) and PAD s/p L EIA, L CFA endarterectomy, JESU balloon angioplasty and L profundaplasty on 02/18/2020, who presents today for follow-up with consistent left LLE radiculopathy. She reports LLE pain after walking less than 1 block.  I believe her symptoms are principally related to her lower back radiculopathy based on her description of pain, her pulse exam, and her WALT and duplex results.  Her PAD has progressed, however, and CTA demonstrates recurrent left external iliac stenosis and multilevel RLE stenosis, including SU, EIA, CFA severe stenoses.      - Plan for L CFA access and bilateral EIA stenting, poss bilateral SU PTA and stent  - Will likely need R CFA endarterectomy  - continue asa, plavix, statin  - interventional plan discussed in detail with the patient, she understands the risks and wishes to proceed  - Neuro/ortho f/u for lumbar radiculopathy essential    Olayinka Harris MD  Vascular & Endovascular Surgery

## 2020-07-20 NOTE — H&P (VIEW-ONLY)
Rachel Murray  2020    HPI:  Patient is a 69 y.o. female with a h/o who is here today for follow-up appointment. She reports of medial LLE paresthesias that radiate to ipsilateral hip and are worst in the toes. She additionally complains of a 'squeezing' pain in the left foot and intermittent episodes of blanching of the left foot. She continues to have intermittent radicular pain in bilateral legs, for which she received an L5-S1 lumbar epidural steroid injection on 2020 that the patient reports was minimally helpful. The patient takes Lyrica and Tramadol for her leg pain with modest pain relief, rated 5/10 at the time of history taking.     Ms. Murray reports a small, persistent defect in her left groin incision, which is not painful or erythematous and has not been productive of discharge. She continues to dress the incision with a dry gauze. She continues to smoke cigarettes but has decreased her usage from 1/2 pack per day, down from 1 pack daily.       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 Rose    DENTAL SURGERY  2017    ENDARTERECTOMY OF FEMORAL ARTERY Left 2020    Procedure: ENDARTERECTOMY, FEMORAL;  Surgeon: Olayinka Harris MD;  Location: [facility] OR [address] [place] [address];  Service: Peripheral Vascular;  Laterality: Left;  SFA endarterectomy  fluoro time: 9.2 min mGy:  2336.86    EPIDURAL STEROID INJECTION INTO LUMBAR SPINE N/A 1/16/2020    Procedure: Injection-steroid-epidural-lumbar--Review MRI- L5-S1 after MRI review;  Surgeon: Ely Syed Jr., MD;  Location: [facility] PAIN MGT;  Service: Pain Management;  Laterality: N/A;    EPIDURAL STEROID INJECTION INTO LUMBAR SPINE N/A 5/7/2020    Procedure: Injection-steroid-epidural-lumbar-- Interlaminar L5-S1/ pt aware she can continue taking ASA per Dr. Harris and hold plavix.;  Surgeon: Ely Syed Jr., MD;  Location: [facility] PAIN MGT;  Service: Pain Management;  Laterality: N/A;  Will sign consent at DOS.    gyn surgery      ex laparotomy    HYSTERECTOMY       Family History   Problem Relation Age of Onset    Hypertension Mother     Heart disease Mother 40        CABG x 5    Seizures Mother     Cancer Father         prostate    Stroke Father     Cancer Brother         colon    Diabetes Maternal Aunt     Breast cancer Maternal Aunt     Diabetes Maternal Uncle     Stroke Maternal Grandmother     Stroke Paternal Grandmother     Diabetes Cousin     Breast cancer Cousin      Social History     Socioeconomic History    Marital status: Single     Spouse name: Not on file    Number of children: Not on file    Years of education: Not on file    Highest education level: Not on file   Occupational History     Employer: [employer]   Social Needs    Financial resource strain: Not on file    Food insecurity     Worry: Not on file     Inability: Not on file    Transportation needs     Medical: Not on file     Non-medical: Not on file   Tobacco Use    Smoking status: Current Every Day Smoker     Packs/day: 0.50     Years: 40.00     Pack years: 20.00     Types: Cigarettes    Smokeless tobacco: Never Used    Tobacco comment: occasionally   Substance and Sexual Activity    Alcohol use: Yes     Alcohol/week: 0.0 standard drinks     Comment: rarely    Drug use: No    Sexual activity: Never   Lifestyle     Physical activity     Days per week: Not on file     Minutes per session: Not on file    Stress: Not on file   Relationships    Social connections     Talks on phone: Not on file     Gets together: Not on file     Attends [employer] service: Not on file     Active member of club or organization: Not on file     Attends meetings of clubs or organizations: Not on file     Relationship status: Not on file   Other Topics Concern    Not on file   Social History Narrative    Not on file       Current Outpatient Medications:     acetaminophen (TYLENOL) 500 MG tablet, Take 2 tablets (1,000 mg total) by mouth 3 (three) times daily., Disp: , Rfl: 0    amoxicillin-clavulanate 875-125mg (AUGMENTIN) 875-125 mg per tablet, Take 1 tablet by mouth every 12 (twelve) hours., Disp: 14 tablet, Rfl: 0    aspirin (ECOTRIN) 81 MG EC tablet, Take 1 tablet (81 mg total) by mouth once daily. (Patient taking differently: Take 81 mg by mouth every morning. ), Disp: 360 tablet, Rfl: 0    atorvastatin (LIPITOR) 40 MG tablet, Take 1 tablet (40 mg total) by mouth once daily. (Patient taking differently: Take 40 mg by mouth every morning. ), Disp: 90 tablet, Rfl: 3    candesartan-hydrochlorothiazide (ATACAND HCT) 16-12.5 mg per tablet, TAKE 1 TABLET BY MOUTH EVERY DAY (Patient taking differently: Take 1 tablet by mouth every morning. ), Disp: 30 tablet, Rfl: 3    diclofenac (VOLTAREN) 75 MG EC tablet, Take 1 tablet (75 mg total) by mouth 2 (two) times daily., Disp: 60 tablet, Rfl: 0    ibuprofen (ADVIL,MOTRIN) 100 mg/5 mL suspension, Take by mouth every 6 (six) hours as needed for Temperature greater than., Disp: , Rfl:     nitrofurantoin, macrocrystal-monohydrate, (MACROBID) 100 MG capsule, Take 1 capsule (100 mg total) by mouth 2 (two) times daily., Disp: 14 capsule, Rfl: 0    pregabalin (LYRICA) 75 MG capsule, Take 1 capsule (75 mg total) by mouth 3 (three) times daily., Disp: 90 capsule, Rfl: 2    traMADoL (ULTRAM) 50 mg tablet,  Take 1 tablet (50 mg total) by mouth every 12 (twelve) hours as needed for Pain., Disp: 60 tablet, Rfl: 0    clopidogreL (PLAVIX) 75 mg tablet, Take 1 tablet (75 mg total) by mouth once daily. (Patient taking differently: Take 75 mg by mouth every morning. ), Disp: 30 tablet, Rfl: 6    REVIEW OF SYSTEMS:  General: negative; ENT: negative; Allergy and Immunology: negative; Hematological and Lymphatic: Negative; Endocrine: negative; Respiratory: no cough, shortness of breath, or wheezing; Cardiovascular: no chest pain or dyspnea on exertion; Gastrointestinal: no abdominal pain/back, change in bowel habits, or bloody stools; Genito-Urinary: no dysuria, trouble voiding, or hematuria; Musculoskeletal: Bilateral (L >>R) radicular pain and paresthesias  Neurological: no TIA or stroke symptoms; Psychiatric: no nervousness, anxiety or depression.    PHYSICAL EXAM:   Right Arm BP - Sittin/53 (20 1316)  Left Arm BP - Sittin/40 (20 1316)  Pulse: 94  Temp: 99 °F (37.2 °C)      General appearance:  Alert, well-appearing, and in no distress.  Oriented to person, place, and time   Neurological: Normal speech, no focal findings noted; CN II - XII grossly intact           Musculoskeletal: Digits/nail without cyanosis/clubbing.  Normal muscle strength/tone.                 Neck: Supple, no significant adenopathy; thyroid is not enlarged                  No carotid bruit can be auscultated                Chest:  Clear to auscultation, no wheezes, rales or rhonchi, symmetric air entry     No use of accessory muscles             Cardiac: Normal rate and regular rhythm, S1 and S2 normal; PMI non-displaced          Abdomen: Soft, nontender, nondistended, no masses or organomegaly     No rebound tenderness noted; bowel sounds normal     Pulsatile aortic mass is not palpable.     No groin adenopathy      Extremities:   2+ femoral pulses bilaterally     Pedal pulses nonpalpable.     No pre-tibial edema     No  ulcerations    LAB RESULTS:  Lab Results   Component Value Date    K 3.7 07/09/2020    K 4.5 02/25/2020    K 4.2 02/24/2020    CREATININE 0.8 07/09/2020    CREATININE 0.8 06/04/2020    CREATININE 0.8 02/25/2020     Lab Results   Component Value Date    WBC 10.84 07/09/2020    WBC 7.05 02/25/2020    WBC 8.42 02/24/2020    HCT 33.5 (L) 07/09/2020    HCT 34.9 (L) 02/25/2020    HCT 33.3 (L) 02/24/2020     (H) 07/09/2020     (H) 02/25/2020     02/24/2020     No results found for: HGBA1C  IMAGING:  Lower Extremity Segmental WALT 05/28/2020  R WALT: 0.54  L WALT: 0.48    Aorta: Normal caliber.  No dissection, penetrating ulcer, or intramural hematoma.  Significant atherosclerotic plaque throughout of the course.     Right:     Common iliac artery: Atherosclerotic plaque.     External iliac artery: Atherosclerotic plaque with several foci of greater than 70% stenosis.     Common femoral artery: Atherosclerotic plaque with approximately 50% stenosis.     Femoral artery: Normal.     Popliteal artery: Normal.     Anterior tibial artery: Normal.     Posterior tibial artery: Normal.     Peroneal artery: Normal.     Left: There are postoperative changes at the left groin, presumably a short bypass graft at the level of the common femoral artery.     Common iliac artery: Atherosclerotic plaque     External iliac artery: Atherosclerotic plaque with greater than 70% stenosis     Common femoral artery: Normal.     Femoral artery: Short segment complete occlusion just distal to the origin of the profundus femoris with reconstitution.     Popliteal artery: Normal.     Anterior tibial artery: Normal.     Posterior tibial artery: Normal.     Peroneal artery: Normal.     Liver, gallbladder, pancreas, and spleen are unremarkable.  No biliary ductal dilatation.  There is thickening of the bilateral adrenal glands with 1.7 cm nodule on the left, similar to study dated 12/13/2012.  Kidneys are unremarkable.  No  hydroureteronephrosis.  GI tract is demonstrates no evidence for obstruction or inflammation.  Uterus is absent.  No adnexal masses.  No ascites.  No retroperitoneal lymphadenopathy.     Regional osseous structures demonstrate no aggressive appearing lytic or blastic lesions.     Impression:     1. Postoperative changes at the left groin, presumably a short bypass graft at the level of the common femoral artery.  2. Greater than 70% stenosis of the external iliac artery with short-segment occlusion of the proximal femoral arteries.  3. Greater than 70% stenosis of the right external iliac artery.    IMP/PLAN:  69 y.o. female with HTN, active smoker (1/2 ppd) and PAD s/p L EIA, L CFA endarterectomy, JESU balloon angioplasty and L profundaplasty on 02/18/2020, who presents today for follow-up with consistent left LLE radiculopathy. She reports LLE pain after walking less than 1 block.  I believe her symptoms are principally related to her lower back radiculopathy based on her description of pain, her pulse exam, and her WALT and duplex results.  Her PAD has progressed, however, and CTA demonstrates recurrent left external iliac stenosis and multilevel RLE stenosis, including SU, EIA, CFA severe stenoses.      - Plan for L CFA access and bilateral EIA stenting, poss bilateral SU PTA and stent  - Will likely need R CFA endarterectomy  - continue asa, plavix, statin  - interventional plan discussed in detail with the patient, she understands the risks and wishes to proceed  - Neuro/ortho f/u for lumbar radiculopathy essential    Olayinka Harris MD  Vascular & Endovascular Surgery

## 2020-07-20 NOTE — PRE-PROCEDURE INSTRUCTIONS
PRE-OP INSTRUCTIONS:Instructed patient to have nothing by mouth past midnight.It is ok to take AM medications with a few sips of water.Patient instructed to shower the night before surgery as well as the morning of surgery with an antibacterial soap like dial or hibiclens from the neck down.Encouraged patient to wear loose fitting, comfortable clothing .Medication instructions for pm prior to and am of surgery reviewed.Instructed patient to avoid taking vitamins,supplements or ibuprofen the am of surgery.Patient stated an understanding.Patient informed of the current visitor policy and advised patient that one visitor may accompany them into the hospital and wait (socially distanced) until a member of the medical team provides an update at the conclusion of the procedure.When they enter the hospital both patient and visitor will have their temperature checked.All visitors are asked to arrive with a mask and to keep their mask on throughout the visit.Each inpatient is allowed 1 visitor per day between the hours of 10am and 6pm.This visitor must remain in the patient's room and not gather in common areas such as waiting rooms or cafeterias.The visitor must be 18 years or older and arrive with a mask and keep the mask on throughout the visit.    Patient stated that she is waiting to hear back from  as her legs are swollen,her left foot is red and her toes are hot to the touch.Patient stated her legs are hurting and there is a possibility she may be admitted today.    Covid test completed 7/18/2020-Negative    Patient denies any side effects or issues with anesthesia or sedation.

## 2020-07-21 ENCOUNTER — HOSPITAL ENCOUNTER (OUTPATIENT)
Facility: HOSPITAL | Age: 70
Discharge: HOME | End: 2020-07-21
Attending: SURGERY | Admitting: SURGERY
Payer: COMMERCIAL

## 2020-07-21 ENCOUNTER — DOCUMENTATION ONLY (OUTPATIENT)
Dept: VASCULAR SURGERY | Facility: CLINIC | Age: 70
End: 2020-07-21

## 2020-07-21 ENCOUNTER — ANESTHESIA EVENT (OUTPATIENT)
Dept: SURGERY | Facility: HOSPITAL | Age: 70
End: 2020-07-21
Payer: COMMERCIAL

## 2020-07-21 ENCOUNTER — ANESTHESIA (OUTPATIENT)
Dept: SURGERY | Facility: HOSPITAL | Age: 70
End: 2020-07-21
Payer: COMMERCIAL

## 2020-07-21 VITALS
WEIGHT: 162 LBS | OXYGEN SATURATION: 93 % | RESPIRATION RATE: 22 BRPM | SYSTOLIC BLOOD PRESSURE: 109 MMHG | HEART RATE: 76 BPM | BODY MASS INDEX: 29.81 KG/M2 | DIASTOLIC BLOOD PRESSURE: 55 MMHG | TEMPERATURE: 98 F | HEIGHT: 62 IN

## 2020-07-21 DIAGNOSIS — I73.9 PERIPHERAL ARTERY DISEASE: ICD-10-CM

## 2020-07-21 PROCEDURE — 37000008 HC ANESTHESIA 1ST 15 MINUTES: Performed by: SURGERY

## 2020-07-21 PROCEDURE — C1887 CATHETER, GUIDING: HCPCS | Performed by: SURGERY

## 2020-07-21 PROCEDURE — C1876 STENT, NON-COA/NON-COV W/DEL: HCPCS | Performed by: SURGERY

## 2020-07-21 PROCEDURE — 71000044 HC DOSC ROUTINE RECOVERY FIRST HOUR: Performed by: SURGERY

## 2020-07-21 PROCEDURE — 27201423 OPTIME MED/SURG SUP & DEVICES STERILE SUPPLY: Performed by: SURGERY

## 2020-07-21 PROCEDURE — 71000016 HC POSTOP RECOV ADDL HR: Performed by: SURGERY

## 2020-07-21 PROCEDURE — 63600175 PHARM REV CODE 636 W HCPCS: Performed by: SURGERY

## 2020-07-21 PROCEDURE — 36000706: Performed by: SURGERY

## 2020-07-21 PROCEDURE — 71000015 HC POSTOP RECOV 1ST HR: Performed by: SURGERY

## 2020-07-21 PROCEDURE — D9220A PRA ANESTHESIA: ICD-10-PCS | Mod: CRNA,,, | Performed by: NURSE ANESTHETIST, CERTIFIED REGISTERED

## 2020-07-21 PROCEDURE — 25000003 PHARM REV CODE 250: Performed by: SURGERY

## 2020-07-21 PROCEDURE — 36000707: Performed by: SURGERY

## 2020-07-21 PROCEDURE — 75716 PR  ANGIO EXTERMITY BILAT: ICD-10-PCS | Mod: 26,59,, | Performed by: SURGERY

## 2020-07-21 PROCEDURE — 71000045 HC DOSC ROUTINE RECOVERY EA ADD'L HR: Performed by: SURGERY

## 2020-07-21 PROCEDURE — 25000003 PHARM REV CODE 250: Performed by: ANESTHESIOLOGY

## 2020-07-21 PROCEDURE — D9220A PRA ANESTHESIA: Mod: CRNA,,, | Performed by: NURSE ANESTHETIST, CERTIFIED REGISTERED

## 2020-07-21 PROCEDURE — 25000003 PHARM REV CODE 250: Performed by: NURSE ANESTHETIST, CERTIFIED REGISTERED

## 2020-07-21 PROCEDURE — C1725 CATH, TRANSLUMIN NON-LASER: HCPCS | Performed by: SURGERY

## 2020-07-21 PROCEDURE — 63600175 PHARM REV CODE 636 W HCPCS: Performed by: NURSE ANESTHETIST, CERTIFIED REGISTERED

## 2020-07-21 PROCEDURE — C1769 GUIDE WIRE: HCPCS | Performed by: SURGERY

## 2020-07-21 PROCEDURE — 75716 ARTERY X-RAYS ARMS/LEGS: CPT | Mod: 26,59,, | Performed by: SURGERY

## 2020-07-21 PROCEDURE — 37000009 HC ANESTHESIA EA ADD 15 MINS: Performed by: SURGERY

## 2020-07-21 PROCEDURE — D9220A PRA ANESTHESIA: ICD-10-PCS | Mod: ANES,,, | Performed by: ANESTHESIOLOGY

## 2020-07-21 PROCEDURE — D9220A PRA ANESTHESIA: Mod: ANES,,, | Performed by: ANESTHESIOLOGY

## 2020-07-21 PROCEDURE — 37221 PR REVASCULARIZE ILIAC ARTERY,ANGIOPLASTY/STENT, INITIAL VESSEL: CPT | Mod: 50,,, | Performed by: SURGERY

## 2020-07-21 PROCEDURE — C1760 CLOSURE DEV, VASC: HCPCS | Performed by: SURGERY

## 2020-07-21 PROCEDURE — 63600175 PHARM REV CODE 636 W HCPCS: Performed by: ANESTHESIOLOGY

## 2020-07-21 PROCEDURE — 25500020 PHARM REV CODE 255: Performed by: SURGERY

## 2020-07-21 PROCEDURE — 37221 PR REVASCULARIZE ILIAC ARTERY,ANGIOPLASTY/STENT, INITIAL VESSEL: ICD-10-PCS | Mod: 50,,, | Performed by: SURGERY

## 2020-07-21 PROCEDURE — C1894 INTRO/SHEATH, NON-LASER: HCPCS | Performed by: SURGERY

## 2020-07-21 DEVICE — IMPLANTABLE DEVICE: Type: IMPLANTABLE DEVICE | Site: ARTERIAL | Status: FUNCTIONAL

## 2020-07-21 RX ORDER — PROPOFOL 10 MG/ML
VIAL (ML) INTRAVENOUS
Status: DISCONTINUED | OUTPATIENT
Start: 2020-07-21 | End: 2020-07-21

## 2020-07-21 RX ORDER — PROTAMINE SULFATE 10 MG/ML
INJECTION, SOLUTION INTRAVENOUS
Status: DISCONTINUED | OUTPATIENT
Start: 2020-07-21 | End: 2020-07-21

## 2020-07-21 RX ORDER — ZOLPIDEM TARTRATE 5 MG/1
5 TABLET ORAL NIGHTLY PRN
Qty: 7 TABLET | Refills: 0 | OUTPATIENT
Start: 2020-07-21 | End: 2020-07-25

## 2020-07-21 RX ORDER — PREGABALIN 50 MG/1
50 CAPSULE ORAL ONCE
Status: COMPLETED | OUTPATIENT
Start: 2020-07-21 | End: 2020-07-21

## 2020-07-21 RX ORDER — HEPARIN SODIUM 1000 [USP'U]/ML
INJECTION, SOLUTION INTRAVENOUS; SUBCUTANEOUS
Status: DISCONTINUED | OUTPATIENT
Start: 2020-07-21 | End: 2020-07-21

## 2020-07-21 RX ORDER — CEFAZOLIN SODIUM 1 G/3ML
INJECTION, POWDER, FOR SOLUTION INTRAMUSCULAR; INTRAVENOUS
Status: DISCONTINUED | OUTPATIENT
Start: 2020-07-21 | End: 2020-07-21

## 2020-07-21 RX ORDER — HYDROMORPHONE HYDROCHLORIDE 1 MG/ML
0.2 INJECTION, SOLUTION INTRAMUSCULAR; INTRAVENOUS; SUBCUTANEOUS EVERY 5 MIN PRN
Status: COMPLETED | OUTPATIENT
Start: 2020-07-21 | End: 2020-07-21

## 2020-07-21 RX ORDER — MIDAZOLAM HYDROCHLORIDE 1 MG/ML
INJECTION, SOLUTION INTRAMUSCULAR; INTRAVENOUS
Status: DISCONTINUED | OUTPATIENT
Start: 2020-07-21 | End: 2020-07-21

## 2020-07-21 RX ORDER — MIDAZOLAM HYDROCHLORIDE 1 MG/ML
1 INJECTION INTRAMUSCULAR; INTRAVENOUS EVERY 10 MIN PRN
Status: DISCONTINUED | OUTPATIENT
Start: 2020-07-21 | End: 2020-07-21 | Stop reason: HOSPADM

## 2020-07-21 RX ORDER — PREGABALIN 25 MG/1
25 CAPSULE ORAL ONCE
Status: COMPLETED | OUTPATIENT
Start: 2020-07-21 | End: 2020-07-21

## 2020-07-21 RX ORDER — SODIUM CHLORIDE 9 MG/ML
INJECTION, SOLUTION INTRAVENOUS CONTINUOUS PRN
Status: DISCONTINUED | OUTPATIENT
Start: 2020-07-21 | End: 2020-07-21

## 2020-07-21 RX ORDER — PROPOFOL 10 MG/ML
VIAL (ML) INTRAVENOUS CONTINUOUS PRN
Status: DISCONTINUED | OUTPATIENT
Start: 2020-07-21 | End: 2020-07-21

## 2020-07-21 RX ORDER — IODIXANOL 320 MG/ML
INJECTION, SOLUTION INTRAVASCULAR
Status: DISCONTINUED | OUTPATIENT
Start: 2020-07-21 | End: 2020-07-21 | Stop reason: HOSPADM

## 2020-07-21 RX ORDER — SODIUM CHLORIDE 0.9 % (FLUSH) 0.9 %
10 SYRINGE (ML) INJECTION
Status: ACTIVE | OUTPATIENT
Start: 2020-07-21

## 2020-07-21 RX ORDER — HYDROMORPHONE HYDROCHLORIDE 1 MG/ML
0.2 INJECTION, SOLUTION INTRAMUSCULAR; INTRAVENOUS; SUBCUTANEOUS EVERY 5 MIN PRN
Status: DISCONTINUED | OUTPATIENT
Start: 2020-07-21 | End: 2020-07-21 | Stop reason: HOSPADM

## 2020-07-21 RX ORDER — LIDOCAINE HYDROCHLORIDE 10 MG/ML
INJECTION, SOLUTION EPIDURAL; INFILTRATION; INTRACAUDAL; PERINEURAL
Status: DISCONTINUED | OUTPATIENT
Start: 2020-07-21 | End: 2020-07-21 | Stop reason: HOSPADM

## 2020-07-21 RX ORDER — FENTANYL CITRATE 50 UG/ML
INJECTION, SOLUTION INTRAMUSCULAR; INTRAVENOUS
Status: DISCONTINUED | OUTPATIENT
Start: 2020-07-21 | End: 2020-07-21

## 2020-07-21 RX ORDER — ONDANSETRON 2 MG/ML
INJECTION INTRAMUSCULAR; INTRAVENOUS
Status: DISCONTINUED | OUTPATIENT
Start: 2020-07-21 | End: 2020-07-21

## 2020-07-21 RX ORDER — HEPARIN SODIUM 1000 [USP'U]/ML
INJECTION, SOLUTION INTRAVENOUS; SUBCUTANEOUS
Status: DISCONTINUED | OUTPATIENT
Start: 2020-07-21 | End: 2020-07-21 | Stop reason: HOSPADM

## 2020-07-21 RX ORDER — PHENYLEPHRINE HYDROCHLORIDE 10 MG/ML
INJECTION INTRAVENOUS
Status: DISCONTINUED | OUTPATIENT
Start: 2020-07-21 | End: 2020-07-21

## 2020-07-21 RX ORDER — SODIUM CHLORIDE 0.9 % (FLUSH) 0.9 %
3 SYRINGE (ML) INJECTION
Status: DISCONTINUED | OUTPATIENT
Start: 2020-07-21 | End: 2020-07-21 | Stop reason: HOSPADM

## 2020-07-21 RX ORDER — ACETAMINOPHEN 650 MG/20.3ML
650 LIQUID ORAL ONCE
Status: COMPLETED | OUTPATIENT
Start: 2020-07-21 | End: 2020-07-21

## 2020-07-21 RX ORDER — LIDOCAINE HYDROCHLORIDE 20 MG/ML
INJECTION INTRAVENOUS
Status: DISCONTINUED | OUTPATIENT
Start: 2020-07-21 | End: 2020-07-21

## 2020-07-21 RX ADMIN — FENTANYL CITRATE 50 MCG: 50 INJECTION, SOLUTION INTRAMUSCULAR; INTRAVENOUS at 07:07

## 2020-07-21 RX ADMIN — HYDROMORPHONE HYDROCHLORIDE 0.2 MG: 1 INJECTION, SOLUTION INTRAMUSCULAR; INTRAVENOUS; SUBCUTANEOUS at 09:07

## 2020-07-21 RX ADMIN — MIDAZOLAM 1 MG: 1 INJECTION INTRAMUSCULAR; INTRAVENOUS at 10:07

## 2020-07-21 RX ADMIN — MIDAZOLAM HYDROCHLORIDE 2 MG: 1 INJECTION, SOLUTION INTRAMUSCULAR; INTRAVENOUS at 06:07

## 2020-07-21 RX ADMIN — SODIUM CHLORIDE: 0.9 INJECTION, SOLUTION INTRAVENOUS at 06:07

## 2020-07-21 RX ADMIN — HYDROMORPHONE HYDROCHLORIDE 0.2 MG: 1 INJECTION, SOLUTION INTRAMUSCULAR; INTRAVENOUS; SUBCUTANEOUS at 10:07

## 2020-07-21 RX ADMIN — PROTAMINE SULFATE 10 MG: 10 INJECTION, SOLUTION INTRAVENOUS at 08:07

## 2020-07-21 RX ADMIN — PHENYLEPHRINE HYDROCHLORIDE 100 MCG: 10 INJECTION INTRAVENOUS at 08:07

## 2020-07-21 RX ADMIN — PHENYLEPHRINE HYDROCHLORIDE 100 MCG: 10 INJECTION INTRAVENOUS at 07:07

## 2020-07-21 RX ADMIN — PROPOFOL 40 MG: 10 INJECTION, EMULSION INTRAVENOUS at 07:07

## 2020-07-21 RX ADMIN — PROTAMINE SULFATE 30 MG: 10 INJECTION, SOLUTION INTRAVENOUS at 08:07

## 2020-07-21 RX ADMIN — HEPARIN SODIUM 5000 UNITS: 1000 INJECTION, SOLUTION INTRAVENOUS; SUBCUTANEOUS at 07:07

## 2020-07-21 RX ADMIN — PROPOFOL 150 MCG/KG/MIN: 10 INJECTION, EMULSION INTRAVENOUS at 07:07

## 2020-07-21 RX ADMIN — FENTANYL CITRATE 50 MCG: 50 INJECTION, SOLUTION INTRAMUSCULAR; INTRAVENOUS at 08:07

## 2020-07-21 RX ADMIN — SODIUM CHLORIDE: 0.9 INJECTION, SOLUTION INTRAVENOUS at 08:07

## 2020-07-21 RX ADMIN — PREGABALIN 25 MG: 25 CAPSULE ORAL at 11:07

## 2020-07-21 RX ADMIN — LIDOCAINE HYDROCHLORIDE 80 MG: 20 INJECTION, SOLUTION INTRAVENOUS at 07:07

## 2020-07-21 RX ADMIN — ONDANSETRON 4 MG: 2 INJECTION, SOLUTION INTRAMUSCULAR; INTRAVENOUS at 08:07

## 2020-07-21 RX ADMIN — CEFAZOLIN 2 G: 330 INJECTION, POWDER, FOR SOLUTION INTRAMUSCULAR; INTRAVENOUS at 07:07

## 2020-07-21 RX ADMIN — PREGABALIN 50 MG: 50 CAPSULE ORAL at 11:07

## 2020-07-21 RX ADMIN — HEPARIN SODIUM 1000 UNITS: 1000 INJECTION, SOLUTION INTRAVENOUS; SUBCUTANEOUS at 08:07

## 2020-07-21 RX ADMIN — ACETAMINOPHEN 650 MG: 160 SOLUTION ORAL at 11:07

## 2020-07-21 NOTE — TRANSFER OF CARE
"Anesthesia Transfer of Care Note    Patient: Rachel Murray    Procedure(s) Performed: Procedure(s) (LRB):  Angiogram Extremity Unilateral (Bilateral)  REVASCULARIZATION, ARTERY, ILIAC, WITH STENT PLACEMENT (Bilateral)    Patient location: [facility]    Anesthesia Type: general    Transport from OR: Transported from OR on 6-10 L/min O2 by face mask with adequate spontaneous ventilation    Post pain: adequate analgesia    Post assessment: no apparent anesthetic complications and tolerated procedure well    Post vital signs: stable    Level of consciousness: responds to stimulation    Nausea/Vomiting: no nausea/vomiting    Complications: none    Transfer of care protocol was followed      Last vitals:   Visit Vitals  BP (!) 123/57 (BP Location: Right arm, Patient Position: Lying)   Pulse 75   Temp 36.7 °C (98.1 °F) (Oral)   Resp 19   Ht 5' 2" (1.575 m)   Wt 73.5 kg (162 lb)   SpO2 100%   Breastfeeding No   BMI 29.63 kg/m²     "

## 2020-07-21 NOTE — ANESTHESIA RELEASE NOTE
"Anesthesia Release from PACU Note    Patient: Rachel Murray    Procedure(s) Performed: Procedure(s) (LRB):  Angiogram Extremity Unilateral (Bilateral)  REVASCULARIZATION, ARTERY, ILIAC, WITH STENT PLACEMENT (Bilateral)    Anesthesia type: GEN    Post pain: she reports severe neuropathic pain.  This is very similar to her usual attack.  She does not really know what helps.  I have ordered tylenol, versed, lyrica, and more dilaudid    Post assessment: no apparent anesthetic complications, tolerated procedure well and no evidence of recall    Post vital signs: BP (!) 153/86   Pulse 98   Temp 36.6 °C (97.9 °F) (Temporal)   Resp 20   Ht 5' 2" (1.575 m)   Wt 73.5 kg (162 lb)   SpO2 96%   Breastfeeding No   BMI 29.63 kg/m²     Level of consciousness: awake, alert and oriented    Nausea/Vomiting: no nausea/no vomiting    Complications: none    Airway Patency: patent    Respiratory: unassisted, spontaneous ventilation, room air    Cardiovascular: stable and blood pressure at baseline    Hydration: euvolemic    "

## 2020-07-21 NOTE — INTERVAL H&P NOTE
The patient has been examined and the H&P has been reviewed:    I concur with the findings and no changes have occurred since H&P was written.    Anesthesia/Surgery risks, benefits and alternative options discussed and understood by patient/family.          Active Hospital Problems    Diagnosis  POA    Peripheral artery disease [I73.9]  Yes      Resolved Hospital Problems   No resolved problems to display.     
Physiatry

## 2020-07-21 NOTE — DISCHARGE INSTRUCTIONS
During the procedure  · A member of your healthcare team will numb the skin at the insertion site (usually the groin, but sometimes the wrist) with a local anesthetic. He or she will make a needle puncture to insert the catheter.  · Your doctor will insert a guide wire through the catheter (a thin, flexible tube) and move it to the narrow spot in your heart's artery. Your doctor tracks its movement on an angiogram, a special kind of X-ray.  · Your doctor will then insert a balloon-tipped catheter through the guide catheter and thread it over the guide wire. He or she will position it at the narrow part of the artery.  · Your doctor will deliver a stent mounted on a balloon-tipped catheter to the blockage in your artery.  · He or she will inflate the balloon to expand the stent.  · The expanded stent further compresses the plaque against the arterial wall, increasing the blood flow to the heart muscle.  After the procedure  · Your doctor will give you medicine to prevent blood clots from forming on the new stent. You will continue to take this medicine until the stent and artery have healed. Your healthcare team will tell you how long you should take this. Your doctor will give you a prescription before you go home.  · Your healthcare team will tell you how long to lie down and keep the insertion site still. The amount of time may depend on whether a closure device such as a stitch or collagen plug was used to close the opening made in your artery. The time you must be still may be shorter if one of these devices was used. The amount of time will also depend on if there is any bleeding at the artery site.  · If the insertion site was in your groin, you may need to lie down with your leg still for several hours.  · A nurse will check your blood pressure and the insertion site.  · You may be asked to drink fluid to help flush the contrast liquid out of your system.  · You may stay for several hours overnight in the  hospital. Have someone drive you home from the hospital.  · Its normal to find a small bruise or lump at the insertion site. This should disappear within a few weeks.  When to call your healthcare provider  Call your healthcare provider right away if you have any of the following:  · Angina (a feeling of pain, pressure, aching, tingling, or burning in the chest, back, neck, throat, jaw, arms, or shoulders)  · Increasing pain, swelling, redness, bleeding, or drainage at the insertion site  · Severe pain, coldness, or a bluish color in the leg or arm that held the catheter  · Shortness of breath  · Difficulty urinating or blood in your urine  · Fever of 100.4°F (38°C) or higher, or as directed by your healthcare provider   Date Last Reviewed: 10/1/2016  © 8084-3958 [employer]. [address] [place] [id number]. All rights reserved. This information is not intended as a substitute for professional medical care. Always follow your healthcare professional's instructions.

## 2020-07-21 NOTE — ANESTHESIA PREPROCEDURE EVALUATION
07/21/2020  Rachel Murray is a 69 y.o., female.      Procedure: Angiogram Extremity Unilateral (Left Groin) - tabatha. iliac stent placement, LLE angiogram   Anesthesia type: Local MAC   Diagnosis: Iliac artery stenosis, bilateral [I77.1]         Pre-operative evaluation for Procedure(s) (LRB):  Angiogram Extremity Unilateral (Left)    Encounter Diagnosis   Name Primary?    Peripheral artery disease        Review of patient's allergies indicates:   Allergen Reactions    Adhesive Blisters    Augmentin [amoxicillin-pot clavulanate]      Vomiting    Latex, natural rubber Blisters    Ciprofloxacin Hives       No current facility-administered medications on file prior to encounter.      Current Outpatient Medications on File Prior to Encounter   Medication Sig Dispense Refill    aspirin (ECOTRIN) 81 MG EC tablet Take 1 tablet (81 mg total) by mouth once daily. (Patient taking differently: Take 81 mg by mouth every morning. ) 360 tablet 0    atorvastatin (LIPITOR) 40 MG tablet Take 1 tablet (40 mg total) by mouth once daily. (Patient taking differently: Take 40 mg by mouth every morning. ) 90 tablet 3    candesartan-hydrochlorothiazide (ATACAND HCT) 16-12.5 mg per tablet TAKE 1 TABLET BY MOUTH EVERY DAY (Patient taking differently: Take 1 tablet by mouth every morning. ) 30 tablet 3    clopidogreL (PLAVIX) 75 mg tablet Take 1 tablet (75 mg total) by mouth once daily. (Patient taking differently: Take 75 mg by mouth every morning. ) 30 tablet 6    pregabalin (LYRICA) 75 MG capsule Take 1 capsule (75 mg total) by mouth 3 (three) times daily. 90 capsule 2    traMADoL (ULTRAM) 50 mg tablet Take 1 tablet (50 mg total) by mouth every 12 (twelve) hours as needed for Pain. 60 tablet 0    acetaminophen (TYLENOL) 500 MG tablet Take 2 tablets (1,000 mg total) by mouth 3 (three) times daily.  0     amoxicillin-clavulanate 875-125mg (AUGMENTIN) 875-125 mg per tablet Take 1 tablet by mouth every 12 (twelve) hours. 14 tablet 0    diclofenac (VOLTAREN) 75 MG EC tablet Take 1 tablet (75 mg total) by mouth 2 (two) times daily. 60 tablet 0    ibuprofen (ADVIL,MOTRIN) 100 mg/5 mL suspension Take by mouth every 6 (six) hours as needed for Temperature greater than.      nitrofurantoin, macrocrystal-monohydrate, (MACROBID) 100 MG capsule Take 1 capsule (100 mg total) by mouth 2 (two) times daily. 14 capsule 0       Social History     Tobacco Use   Smoking Status Current Every Day Smoker    Packs/day: 0.50    Years: 40.00    Pack years: 20.00    Types: Cigarettes   Smokeless Tobacco Never Used   Tobacco Comment    occasionally       Social History     Substance and Sexual Activity   Alcohol Use Yes    Alcohol/week: 0.0 standard drinks    Comment: rarely       Patient Active Problem List   Diagnosis    Hypertension    History of rectal or anal cancer    Special screening for malignant neoplasms, colon    Lumbar radiculopathy    Chronic bilateral low back pain with left-sided sciatica    Chronic pain    PAD (peripheral artery disease)    Spinal stenosis of lumbar region with neurogenic claudication    Spondylolisthesis    Peripheral artery disease       Past Surgical History:   Procedure Laterality Date    AORTOGRAPHY N/A 2020    Procedure: AORTOGRAM;  Surgeon: Olayinka Harris MD;  Location: [facility] OR [address] [place] [address];  Service: Peripheral Vascular;  Laterality: N/A;    APPENDECTOMY      BREAST BIOPSY       SECTION, CLASSIC      COLONOSCOPY      COLONOSCOPY N/A 2017    Procedure: COLONOSCOPY;  Surgeon: Gabriel Rose MD;  [facility] (4[facility]LR);  Service: Endoscopy;  Laterality: N/A;  Miralax prep per Dr. Rose    DENTAL SURGERY  2017    ENDARTERECTOMY OF FEMORAL ARTERY Left 2020    Procedure: ENDARTERECTOMY, FEMORAL;  Surgeon: Olayinka Harris MD;   Location: [facility] OR [facility] F[facility];  Service: Peripheral Vascular;  Laterality: Left;  SFA endarterectomy  fluoro time: 9.2 min mGy: 2336.86    EPIDURAL STEROID INJECTION INTO LUMBAR SPINE N/A 1/16/2020    Procedure: Injection-steroid-epidural-lumbar--Review MRI- L5-S1 after MRI review;  Surgeon: Ely Syed Jr., MD;  Location: [facility] PAIN T;  Service: Pain Management;  Laterality: N/A;    EPIDURAL STEROID INJECTION INTO LUMBAR SPINE N/A 5/7/2020    Procedure: Injection-steroid-epidural-lumbar-- Interlaminar L5-S1/ pt aware she can continue taking ASA per Dr. Harris and hold plavix.;  Surgeon: Ely Syed Jr., MD;  [place] [facility] [place] [facility]  Service: Pain Management;  Laterality: N/A;  Will sign consent at [facility].    gyn surgery      ex laparotomy    HYSTERECTOMY             No results for input(s): HCT in the last 72 hours.  No results for input(s): PLT in the last 72 hours.  No results for input(s): K in the last 72 hours.  No results for input(s): CREATININE in the last 72 hours.  No results for input(s): GLU in the last 72 hours.  No results for input(s): PT in the last 72 hours.                    Anesthesia Evaluation          Review of Systems  Anesthesia Hx:  No problems with previous Anesthesia    Hematology/Oncology:        Hematology Comments: On plavix for PAD, last took yesterday Oncology Comments: [facility] ANUS chemo and radiation, [id number], ALFONZO now    EENT/Dental:EENT/Dental Normal   Cardiovascular:   Hypertension, well controlled Denies MI.    Denies Angina. Walking limited by sever leg sciatica.   Pulmonary:  Pulmonary Normal  Denies COPD.  Denies Asthma.  Denies Shortness of breath.    Renal/:   Denies Chronic Renal Disease.     Hepatic/GI:   Denies Liver Disease.    Neurological:   Denies TIA. Denies CVA. Denies Seizures.   Peripheral Neuropathy (severe sciatica)    Endocrine:   Denies Diabetes.        Physical Exam  General:  Well nourished    Airway/Jaw/Neck:  Airway Findings: Mouth Opening:  Normal Tongue: Normal  General Airway Assessment: Adult, Average  Mallampati: II  TM Distance: Normal, at least 6 cm  Jaw/Neck Findings:  Neck ROM: Normal ROM            Mental Status:  Mental Status Findings:  Cooperative, Alert and Oriented  No teeth       Anesthesia Plan  Type of Anesthesia, risks & benefits discussed:  Anesthesia Type:  general, MAC  Patient's Preference:   Intra-op Monitoring Plan:   Intra-op Monitoring Plan Comments:   Post Op Pain Control Plan:   Post Op Pain Control Plan Comments: As per surgeon's plan  Induction:   IV  Beta Blocker:  Patient is not currently on a Beta-Blocker (No further documentation required).       Informed Consent: Patient understands risks and agrees with Anesthesia plan.  Questions answered. Anesthesia consent signed with patient.  ASA Score: 3     Day of Surgery Review of History & Physical:    H&P update referred to the surgeon.         Ready For Surgery From Anesthesia Perspective.     Placement Date: 02/17/20; Placement Time: 0713 (created via procedure documentation); Method of Intubation: Direct laryngoscopy; Mask Ventilation: Easy; Intubated: Postinduction; Blade: Dale #2; Airway Device Size: 7.5; Placement Verified By: Capnometry; Complicating Factors: None; Intubation Findings: Bilateral breath sounds; Securment: Lips; Complications: None; Removal Date: 02/17/20;  Removal Time: 1214

## 2020-07-21 NOTE — ANESTHESIA POSTPROCEDURE EVALUATION
Anesthesia Post Evaluation    Patient: Rachel Murray    Procedure(s) Performed: Procedure(s) (LRB):  Angiogram Extremity Unilateral (Bilateral)  REVASCULARIZATION, ARTERY, ILIAC, WITH STENT PLACEMENT (Bilateral)    Final Anesthesia Type: general    Patient location during evaluation: PACU  Patient participation: Yes- Able to Participate  Level of consciousness: awake and alert and oriented  Post-procedure vital signs: reviewed and stable  Pain management: pain needs to be addressed (she reports severe neuropathic pain.  This is very similar to her usual attack.  She does not really know what helps.  I have ordered tylenol, versed, lyrica, and more dilaudid)  Airway patency: patent    PONV status at discharge: No PONV  Anesthetic complications: no      Cardiovascular status: stable  Respiratory status: unassisted, spontaneous ventilation and room air  Hydration status: euvolemic  Follow-up not needed.          Vitals Value Taken Time   /86 07/21/20 1003   Temp 36.6 °C (97.9 °F) 07/21/20 0900   Pulse 76 07/21/20 1030   Resp 23 07/21/20 1030   SpO2 94 % 07/21/20 1030   Vitals shown include unvalidated device data.      No case tracking events are documented in the log.      Pain/Montez Score: Pain Rating Prior to Med Admin: 8 (7/21/2020 10:23 AM)  Montez Score: 8 (7/21/2020  9:30 AM)

## 2020-07-21 NOTE — PLAN OF CARE
Dr. Norman came to assess pt pain at bedside. Angella Connolly MD and Dr. Harris also at bedside to assess pt and discuss procedure with pt son. Will continue to monitor

## 2020-07-22 NOTE — OP NOTE
Date: 07/21/2020    Surgeon: Olayinka Harris MD    Assistant: Angella Connolly MD    Pre-op Diagnosis:   Iliac artery stenosis, bilateral [I77.1]    Post-op Diagnosis: Same    Procedures:   1. L external iliac artery stent (7x80 life) PTA 6 x 100 mustang  2. R external iliac artery stent (6x80 life) PTA 6 x 80 mustang  3. Bilateral lower extremity angiogram, aortogram  4. L CFA 5 Fr mynx closure    Anesthesia: Local MAC    EBL: Minimal    Findings:   Multiple 90% stenoses in L and R external iliac arteries, <10% residual    Description of Procedure:  After an informed consent was obtained the patient was brought to the OR and placed in the supine position. Both the patient and procedure were confirmed and identified during timeout process. The patient received perioperative antibiotics. The patient's bilateral groins were prepped and draped in usual sterile fashion. Using ultrasound guidance, the L common femoral artery vessel patency was confirmed. We noted significant scar tissue from her prior endarterectomy. Then direct ultrasound guidance the L CFA was entered with a 21-G needle, Mandril wire and 4-Fr micropuncture needle. Retrograde angiogram was done showing mulitple 90% stenoses in the L external iliac artery, as well as a high grade L SFA stenosis. Then under fluoroscopic guidance, a j-wire was placed and the sheath upsized to 5 fr. Next an 0.035-in wire was used to cross the stenoses and advanced into the distal aorta. Aortogram was performed with a pigtail catheter showing multiple 90% stenoses in the bilateral external iliac arteries.     Based on the images from this diagnostic angio, a decision was made to intervene. We then systemically heparinized the patient with 5000 u of heparin.     Next, a 6x100 balloon was used to treat the L SU and EIA stenoses (8 rey 2 min). Repeat angiogram showed persistent stenosis, so a 7x80 life stent was deployed in the L EIA and post dilated with the 6x80 balloon. We  noted <10% residual stenosis. The VCF catheter was then used to select the R SU and the wire advanced into the L CFA. It was exhcanged for stiff anged glidewire over a glidecath. The R SU and EIA were then treated with a 5x80 balloon (8 rey 2 min). Angiogram showed a persistent stenosis, so a 6x80 life stent was placed in the R EIA, and post dialted with a 5x80 balloon. Completion angiogram showed <10% residual stenosis. Runoff angiograms of the bilateral lower extremities were performed. At this point heparin was reversed with 40 units of protamine. We then deployed a 5-Swedish Mynx closure device without issue. At the conclusion of the case the patient was noted to have no evidence of a groin hematoma. All instrument and sponge counts were correct at the end of the case. The patient tolerated the procedure well and was transferred to the pacu for further recovery.       Complications:  None; patient tolerated the procedure well.    Disposition: Recovery- hemodynamically stable in good condition    Dr Harris was present for the procedure    Angella Connolly MD   Fellow, Vascular/Endovascular Surgery

## 2020-07-22 NOTE — BRIEF OP NOTE
[facility]  Brief Operative Note    Surgery Date: 7/21/2020     Surgeon(s) and Role:     * Olayinka Harris MD - Primary     * Angella Connolly MD - Fellow    Assisting Surgeon: None    Pre-op Diagnosis:  Iliac artery stenosis, bilateral [I77.1]    Post-op Diagnosis:  Post-Op Diagnosis Codes:     * Iliac artery stenosis, bilateral [I77.1]    Procedure:  1. L external iliac artery stent (7x80 life)  2. R external iliac artery stent (6x80 life)  3. Aortogram with runoff  4. L CFA 5 Fr mynx closure       Anesthesia: Local MAC    Description of the findings of the procedure(s): Multiple 90% stenoses in L and R external iliac arteries, <10% residual    Estimated Blood Loss: 10 cc         Specimens:   Specimen (12h ago, onward)    None            Discharge Note    OUTCOME: Patient tolerated treatment/procedure well without complication and is now ready for discharge.    DISPOSITION: Home or Self Care    FINAL DIAGNOSIS:  Peripheral artery disease    FOLLOWUP: In clinic    DISCHARGE INSTRUCTIONS:    Discharge Procedure Orders   Diet general     Remove dressing in 24 hours        Clinical Reference Documents Added to Patient Instructions       Document    STENTS, CORONARY (ENGLISH)

## 2020-07-23 ENCOUNTER — TELEPHONE (OUTPATIENT)
Dept: NEUROLOGY | Facility: CLINIC | Age: 70
End: 2020-07-23

## 2020-07-23 ENCOUNTER — PATIENT OUTREACH (OUTPATIENT)
Dept: ADMINISTRATIVE | Facility: OTHER | Age: 70
End: 2020-07-23

## 2020-07-23 ENCOUNTER — TELEPHONE (OUTPATIENT)
Dept: INTERNAL MEDICINE | Facility: CLINIC | Age: 70
End: 2020-07-23

## 2020-07-23 DIAGNOSIS — M54.30 SCIATICA, UNSPECIFIED LATERALITY: Primary | ICD-10-CM

## 2020-07-23 NOTE — TELEPHONE ENCOUNTER
----- Message from Ilda Rivers sent at 7/23/2020 11:10 AM CDT -----  Contact: [phone]  Pt previously had Surgery for blockages and now had been referred  by PCP to see Neuro.  Pt states she has really bad pain from back to hip. Please call with availability

## 2020-07-23 NOTE — TELEPHONE ENCOUNTER
----- Message from Safia Goodwin sent at 7/22/2020  2:18 PM CDT -----  Contact: [phone]  Patient is requesting a call back from the nurse for an update on her health. Please call and advise

## 2020-07-23 NOTE — TELEPHONE ENCOUNTER
Called and spoke with the patient and advised that she has to call Dr Frank office and they have to complete the disability forms  She gave a number to there office as [phone] and advised call and speak with Vesta Frank nurse Referral to Neurology for sciatica message has been sent to that staff as well.

## 2020-07-23 NOTE — PROGRESS NOTES
Chart reviewed.   Immunizations: Triggered Imm Registry     Orders placed: n/a  Upcoming appts to satisfy DEMIAN topics: n/a

## 2020-07-24 ENCOUNTER — TELEPHONE (OUTPATIENT)
Dept: PAIN MEDICINE | Facility: CLINIC | Age: 70
End: 2020-07-24

## 2020-07-24 ENCOUNTER — TELEPHONE (OUTPATIENT)
Dept: NEUROLOGY | Facility: CLINIC | Age: 70
End: 2020-07-24

## 2020-07-24 NOTE — TELEPHONE ENCOUNTER
Spoke with patient and she states that she wants to provide information for the [employer] prior to her appt. Informed her that Trudy will address paperwork at her visit on 8/5/2020.     [employer]: Claim Handler.Tyler Ro                         Claim ID:[id number]                         Fax #: [phone]

## 2020-07-24 NOTE — TELEPHONE ENCOUNTER
----- Message from Rudolph Purvis sent at 7/24/2020 10:55 AM CDT -----  Contact: pt @ [phone]  Pt is asking to speak w/ Gisela regarding insurance papers being faxed over

## 2020-07-25 ENCOUNTER — HOSPITAL ENCOUNTER (EMERGENCY)
Facility: HOSPITAL | Age: 70
Discharge: HOME | End: 2020-07-25
Attending: EMERGENCY MEDICINE
Payer: COMMERCIAL

## 2020-07-25 VITALS
WEIGHT: 165 LBS | SYSTOLIC BLOOD PRESSURE: 112 MMHG | HEIGHT: 62 IN | DIASTOLIC BLOOD PRESSURE: 54 MMHG | HEART RATE: 74 BPM | TEMPERATURE: 98 F | RESPIRATION RATE: 20 BRPM | BODY MASS INDEX: 30.36 KG/M2 | OXYGEN SATURATION: 99 %

## 2020-07-25 DIAGNOSIS — M48.062 SPINAL STENOSIS OF LUMBAR REGION WITH NEUROGENIC CLAUDICATION: ICD-10-CM

## 2020-07-25 DIAGNOSIS — I73.9 PERIPHERAL VASCULAR DISEASE: ICD-10-CM

## 2020-07-25 DIAGNOSIS — M54.32 SCIATIC NERVE PAIN, LEFT: Primary | ICD-10-CM

## 2020-07-25 DIAGNOSIS — M54.16 LUMBAR RADICULOPATHY: ICD-10-CM

## 2020-07-25 DIAGNOSIS — M43.10 SPONDYLOLISTHESIS, UNSPECIFIED SPINAL REGION: ICD-10-CM

## 2020-07-25 DIAGNOSIS — G89.29 OTHER CHRONIC PAIN: ICD-10-CM

## 2020-07-25 PROCEDURE — 96375 TX/PRO/DX INJ NEW DRUG ADDON: CPT

## 2020-07-25 PROCEDURE — 96374 THER/PROPH/DIAG INJ IV PUSH: CPT

## 2020-07-25 PROCEDURE — 99284 EMERGENCY DEPT VISIT MOD MDM: CPT | Mod: 25

## 2020-07-25 PROCEDURE — 63600175 PHARM REV CODE 636 W HCPCS: Performed by: EMERGENCY MEDICINE

## 2020-07-25 PROCEDURE — 25000003 PHARM REV CODE 250: Performed by: EMERGENCY MEDICINE

## 2020-07-25 PROCEDURE — 99285 EMERGENCY DEPT VISIT HI MDM: CPT | Mod: ,,, | Performed by: EMERGENCY MEDICINE

## 2020-07-25 PROCEDURE — 99285 PR EMERGENCY DEPT VISIT,LEVEL V: ICD-10-PCS | Mod: ,,, | Performed by: EMERGENCY MEDICINE

## 2020-07-25 RX ORDER — ONDANSETRON 2 MG/ML
4 INJECTION INTRAMUSCULAR; INTRAVENOUS
Status: COMPLETED | OUTPATIENT
Start: 2020-07-25 | End: 2020-07-25

## 2020-07-25 RX ORDER — MORPHINE SULFATE 4 MG/ML
4 INJECTION, SOLUTION INTRAMUSCULAR; INTRAVENOUS
Status: COMPLETED | OUTPATIENT
Start: 2020-07-25 | End: 2020-07-25

## 2020-07-25 RX ORDER — NAPROXEN 500 MG/1
500 TABLET ORAL
Status: COMPLETED | OUTPATIENT
Start: 2020-07-25 | End: 2020-07-25

## 2020-07-25 RX ORDER — PREGABALIN 75 MG/1
75 CAPSULE ORAL 3 TIMES DAILY
Qty: 90 CAPSULE | Refills: 2 | Status: SHIPPED | OUTPATIENT
Start: 2020-07-25 | End: 2020-11-06

## 2020-07-25 RX ORDER — OXYCODONE AND ACETAMINOPHEN 5; 325 MG/1; MG/1
1 TABLET ORAL EVERY 4 HOURS PRN
Qty: 18 TABLET | Refills: 0 | Status: SHIPPED | OUTPATIENT
Start: 2020-07-25 | End: 2020-11-20 | Stop reason: CLARIF

## 2020-07-25 RX ADMIN — MORPHINE SULFATE 4 MG: 4 INJECTION INTRAVENOUS at 01:07

## 2020-07-25 RX ADMIN — ONDANSETRON 4 MG: 2 INJECTION INTRAMUSCULAR; INTRAVENOUS at 01:07

## 2020-07-25 RX ADMIN — NAPROXEN 500 MG: 500 TABLET ORAL at 01:07

## 2020-07-25 NOTE — ED PROVIDER NOTES
"Encounter Date: 2020       History     Chief Complaint   Patient presents with    Foot Pain     bilateral foot pain "going on since January" and "has been having swelling". just had angiogram Tuesday with stent placement. also c/o sciatic pain.     Foot Swelling     68yo female with peripheral vascular disease and L leg angiogram w/ stent placement Tuesday presents for L sided lower back pain shooting down her leg. The pain is almost constant and worsened by movement. She has had chronic sciatic pain since January, for which she has seen pain management. She denies urinary retention or saddle area numbess. She denies chest pain, fever, chills, or dyspnea. She took 200mg of tylenol at 1am this morning for the pain with insufficient relief.        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 iliac stent placement, LLE angiogram  17.4 min  837.37 mGy  172.81 Gycm2  37 ml contrast    AORTOGRAPHY N/A 2020    Procedure: AORTOGRAM;  Surgeon: Olayinka Harris MD;  [facility];  Service: Peripheral Vascular;  Laterality: N/A;    APPENDECTOMY      BREAST BIOPSY       SECTION, CLASSIC      COLONOSCOPY      COLONOSCOPY N/A 2017    Procedure: COLONOSCOPY;  Surgeon: Gabriel Rose MD;  Location: [facility] (4TH FLR);  Service: Endoscopy;  Laterality: N/A; "  Miralax prep per Dr. Rose    DENTAL SURGERY  11/06/2017    ENDARTERECTOMY OF FEMORAL ARTERY Left 2/17/2020    Procedure: ENDARTERECTOMY, FEMORAL;  Surgeon: Olayinka Harris MD;  Location: [facility] OR [address] [place] [address];  Service: Peripheral Vascular;  Laterality: Left;  SFA endarterectomy  fluoro time: 9.2 min mGy: 2336.86    EPIDURAL STEROID INJECTION INTO LUMBAR SPINE N/A 1/16/2020    Procedure: Injection-steroid-epidural-lumbar--Review MRI- L5-S1 after MRI review;  Surgeon: Ely Syed Jr., MD;  Location: [facility] PAIN MGT;  Service: Pain Management;  Laterality: N/A;    EPIDURAL STEROID INJECTION INTO LUMBAR SPINE N/A 5/7/2020    Procedure: Injection-steroid-epidural-lumbar-- Interlaminar L5-S1/ pt aware she can continue taking ASA per Dr. Harris and hold plavix.;  Surgeon: Ely Syed Jr., MD;  Location: [facility] PAIN MGT;  Service: Pain Management;  Laterality: N/A;  Will sign consent at DOS.    gyn surgery      ex laparotomy    HYSTERECTOMY       Family History   Problem Relation Age of Onset    Hypertension Mother     Heart disease Mother 40        CABG x 5    Seizures Mother     Cancer Father         prostate    Stroke Father     Cancer Brother         colon    Diabetes Maternal Aunt     Breast cancer Maternal Aunt     Diabetes Maternal Uncle     Stroke Maternal Grandmother     Stroke Paternal Grandmother     Diabetes Cousin     Breast cancer Cousin      Social History     Tobacco Use    Smoking status: Current Every Day Smoker     Packs/day: 0.50     Years: 40.00     Pack years: 20.00     Types: Cigarettes    Smokeless tobacco: Never Used    Tobacco comment: occasionally   Substance Use Topics    Alcohol use: Yes     Alcohol/week: 0.0 standard drinks     Comment: rarely    Drug use: No     Review of Systems   Constitutional: Negative for chills and fever.   HENT: Negative for rhinorrhea and sore throat.    Eyes: Negative for photophobia and visual disturbance.   Respiratory:  Negative for cough, chest tightness and shortness of breath.    Cardiovascular: Negative for chest pain and palpitations.   Gastrointestinal: Negative for nausea and vomiting.   Genitourinary: Negative for difficulty urinating and dysuria.   Musculoskeletal: Positive for arthralgias, back pain and joint swelling. Negative for myalgias.   Skin: Negative for pallor and rash.   Neurological: Positive for numbness. Negative for dizziness, weakness and headaches.   Hematological: Does not bruise/bleed easily.   Psychiatric/Behavioral: Negative for agitation and confusion.       Physical Exam     Initial Vitals [07/25/20 1053]   BP Pulse Resp Temp SpO2   103/60 82 20 98.4 °F (36.9 °C) 99 %      MAP       --         Physical Exam    Constitutional: She appears well-developed and well-nourished. She appears distressed.   HENT:   Head: Normocephalic and atraumatic.   Mouth/Throat: Oropharynx is clear and moist.   Eyes: Conjunctivae and EOM are normal. Pupils are equal, round, and reactive to light.   Neck: Normal range of motion. Neck supple.   Cardiovascular: Normal rate and regular rhythm.   Pulses:       Dorsalis pedis pulses are 1+ on the right side and 1+ on the left side.        Posterior tibial pulses are 1+ on the right side and 1+ on the left side.   Pulmonary/Chest: Breath sounds normal. No respiratory distress.   Abdominal: Soft. She exhibits no distension.   L groin vascular access point with hardened vessels/scarring. No tenderness, erythema, or swelling   Musculoskeletal: Tenderness and edema present.      Comments: ROM limited by pain   Neurological: She is alert and oriented to person, place, and time. GCS eye subscore is 4. GCS verbal subscore is 5. GCS motor subscore is 6.   Skin: There is erythema (L toes and ankle).   Psychiatric: She has a normal mood and affect. Her behavior is normal.         ED Course   Procedures  Labs Reviewed - No data to display       Imaging Results    None          Medical  Decision Making:   Initial Assessment:   70yo female with chronic sciatic pain and peripheral vascular disease s/p L leg stent Tuesday presents for worsening of lower back and L leg pain since last night. Concern for ischemic foot initially but DP and PT pulses present on doppler exam, with no signs of ischemic limb on exam. Appears to be worsening of sciatic pain on L side.  Differential Diagnosis:   Ischemic foot, sciatic pain, DVT, cellulitis.  ED Management:  Pt with adequate peripheral pulses. Morphine, naproxen, and zofran ordered with some relief of pain. Outpatient consult to neurosurgery placed. Patient given short course of opiates for her severe, intractable pain                                 Clinical Impression:       ICD-10-CM ICD-9-CM   1. Sciatic nerve pain, left  M54.32 724.3   2. Peripheral vascular disease  I73.9 443.9   3. Lumbar radiculopathy  M54.16 724.4   4. Spondylolisthesis, unspecified spinal region  M43.10 756.12   5. Spinal stenosis of lumbar region with neurogenic claudication  M48.062 724.03   6. Other chronic pain  G89.29 338.29                                Randell Fair MD  Resident  07/25/20 4072

## 2020-07-25 NOTE — DISCHARGE INSTRUCTIONS
Do not drink or drive on this medication.  This medication puts you at risk for a fall.  Please use a cane or walker as needed.     STOP AMBIEN.    Our goal in the emergency department is to always give you outstanding care and exceptional service. You may receive a survey by mail or e-mail in the next week regarding your experience in our ED. We would greatly appreciate your completing and returning the survey. Your feedback provides us with a way to recognize our staff who give very good care and it helps us learn how to improve when your experience was below our aspiration of excellence.

## 2020-07-25 NOTE — ED TRIAGE NOTES
"Pt presents to the ED c/o bilateral foot pain since January that worsened "weeks ago." Hx sciatica, 3 bulging discs. Left foot red and swollen x 3 weeks. Angiogram done Tuesday. Swelling in bilateral feet since then with increased pain. Difficulty with ambulation. +weakness.  "

## 2020-07-27 ENCOUNTER — TELEPHONE (OUTPATIENT)
Dept: INTERNAL MEDICINE | Facility: CLINIC | Age: 70
End: 2020-07-27

## 2020-07-27 DIAGNOSIS — M54.32 SCIATICA OF LEFT SIDE: Primary | ICD-10-CM

## 2020-07-27 DIAGNOSIS — I73.9 PAD (PERIPHERAL ARTERY DISEASE): Primary | ICD-10-CM

## 2020-07-27 NOTE — TELEPHONE ENCOUNTER
"----- Message from Rony Scott sent at 7/27/2020  8:33 AM CDT -----  Regarding: Order  Contact: Patient [phone]  Patient would like to get medical advice.     Comments: Patient stating is needing "MRI" current, prior to seeing the Neuro Surgent, call to discuss.  Patient stating also went to ER on Saturday, due to Extreme pain, which was informed that is needing to be seen by Neurologist ASAP.    Please call an advise  Thank you      "

## 2020-07-31 ENCOUNTER — TELEPHONE (OUTPATIENT)
Dept: INTERNAL MEDICINE | Facility: CLINIC | Age: 70
End: 2020-07-31

## 2020-07-31 NOTE — TELEPHONE ENCOUNTER
Please inform patient that MRI shows neuroforaminal encroachment, which means narrowing of the space where the nerve exits the spinal canal.

## 2020-08-03 ENCOUNTER — TELEPHONE (OUTPATIENT)
Dept: NEUROSURGERY | Facility: CLINIC | Age: 70
End: 2020-08-03

## 2020-08-03 ENCOUNTER — TELEPHONE (OUTPATIENT)
Dept: INTERNAL MEDICINE | Facility: CLINIC | Age: 70
End: 2020-08-03

## 2020-08-03 DIAGNOSIS — M54.32 SCIATICA OF LEFT SIDE: Primary | ICD-10-CM

## 2020-08-03 DIAGNOSIS — I10 ESSENTIAL HYPERTENSION: ICD-10-CM

## 2020-08-03 DIAGNOSIS — I73.9 PAD (PERIPHERAL ARTERY DISEASE): ICD-10-CM

## 2020-08-03 RX ORDER — CANDESARTAN CILEXETIL AND HYDROCHLOROTHIAZIDE 16; 12.5 MG/1; MG/1
1 TABLET ORAL DAILY
Qty: 30 TABLET | Refills: 3 | Status: SHIPPED | OUTPATIENT
Start: 2020-08-03 | End: 2020-12-18

## 2020-08-03 NOTE — TELEPHONE ENCOUNTER
----- Message from Annalisa Jay sent at 8/3/2020  2:45 PM CDT -----  She said someone told her that your office can help her set up transportation services by way of a     Thank you

## 2020-08-03 NOTE — TELEPHONE ENCOUNTER
----- Message from Annalisa Jay sent at 8/3/2020  2:47 PM CDT -----  Requesting an RX refill or new RX.  Is this a refill or new RX:  Refill  RX name and strength: candesartan-hydrochlorothiazide (ATACAND HCT) 16-12.5 mg per tablet  Directions (copy/paste from chart):    Is this a 30 day or 90 day RX:    Local pharmacy or mail order pharmacy:    Pharmacy name and phone [id number] - [place], [place] - [facility] [address] AT [facility] [address] [phone] (Phone)  [phone] (Fax)

## 2020-08-04 ENCOUNTER — SSC ENCOUNTER (OUTPATIENT)
Dept: ADMINISTRATIVE | Facility: OTHER | Age: 70
End: 2020-08-04

## 2020-08-04 NOTE — PROGRESS NOTES
Please note that patient was called on 8/4/20 regarding the OPCM referral. Patient was advised to call insurance and request case management if interested. [facility] is unable to follow patient at this time.      Please contact [facility] with any question at Ext [phone].      Thank you,  Vanessa Saavedra, [facility]  Outpatient Case Mgmnt  [phone]

## 2020-08-04 NOTE — PROGRESS NOTES
Neurosurgery  History & Physical    SUBJECTIVE:     Chief Complaint: Low Back Pain    History of Present Illness: Rachel Murray is a 70 y.o. female with PMH of HTN, current smokes, and PAD with stent placement to BLE on 7/21 currently on ASA 81mg. She is being seen in clinic today for follow-up on the ED on 7/25/20 for worsening left-sided low back pain that radiates into the left leg. The patient has been seeing pain management, Dr. Syed, for MIRNA for chronic low back pain with sciatica on the left. Last injection received on 5/7/20 without relief. She was informed that she should see a neurosurgeon for further evaluation.      States that the back pain has become unbearable along with the BLE swelling, numbness/tingling, and redness in the left foot all of which are affecting her ability to ambulate. Reports that since the stent placement with Dr. Harris her BLE have increased swelling R > L as well as the numbness and tingling. States that she was not experiencing numbness in the RLE until she recevied the stents. Reports that the numbness in the left leg radiates down the leg into the foot. Describes the back pain as constant, sharp, and aching L > R with radiating pain into the left thigh anteriorly and posteriorly. States that the leg pain then becomes more neuropathic as it radiates downward towards the foot and is shooting and burning. Rates the back and leg pain as 9/10. Aggravating factors include prolonged sitting and standing, and activity. States that lying down increases the numbness in the left leg as well as pain. When she bends forward the pain becomes excruciating as she has severe scarring in the left groin area from the stent placement.  Denies alleviating factors. Denies b/b dysfunction or saddle anesthesia. Reports weakness in her legs bilaterally and often ambulates with either a cane or walker. She is utilizing a wheelchair today in clinic as she is unable to ambulate due to BLE  swelling and pain.         Review of patient's allergies indicates:   Allergen Reactions    Adhesive Blisters    Augmentin [amoxicillin-pot clavulanate]      Vomiting    Latex, natural rubber Blisters    Ciprofloxacin Hives       Current Outpatient Medications   Medication Sig Dispense Refill    acetaminophen (TYLENOL) 500 MG tablet Take 2 tablets (1,000 mg total) by mouth 3 (three) times daily.  0    aspirin (ECOTRIN) 81 MG EC tablet Take 1 tablet (81 mg total) by mouth once daily. (Patient taking differently: Take 81 mg by mouth every morning. ) 360 tablet 0    atorvastatin (LIPITOR) 40 MG tablet Take 1 tablet (40 mg total) by mouth once daily. (Patient taking differently: Take 40 mg by mouth every morning. ) 90 tablet 3    candesartan-hydrochlorothiazide (ATACAND HCT) 16-12.5 mg per tablet Take 1 tablet by mouth once daily. 30 tablet 3    clopidogreL (PLAVIX) 75 mg tablet Take 1 tablet (75 mg total) by mouth once daily. (Patient taking differently: Take 75 mg by mouth every morning. ) 30 tablet 6    oxyCODONE-acetaminophen (PERCOCET) 5-325 mg per tablet Take 1 tablet by mouth every 4 (four) hours as needed for Pain. 18 tablet 0    pregabalin (LYRICA) 75 MG capsule Take 1 capsule (75 mg total) by mouth 3 (three) times daily. 90 capsule 2    ibuprofen (ADVIL,MOTRIN) 100 mg/5 mL suspension Take by mouth every 6 (six) hours as needed for Temperature greater than.       No current facility-administered medications for this visit.      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 OR 2ND FLR;  Service: Peripheral Vascular;  Laterality: Bilateral;  tabatha. iliac stent placement, LLE angiogram  17.4 min  837.37 mGy  172.81 Gycm2  37 ml contrast    AORTOGRAPHY N/A 2020    Procedure: AORTOGRAM;  Surgeon: Olayinka Harris MD;  Location: [facility] OR 2ND FLR;  Service: Peripheral Vascular;  Laterality: N/A;    APPENDECTOMY      BREAST BIOPSY       SECTION, CLASSIC      COLONOSCOPY      COLONOSCOPY N/A 2017    Procedure: COLONOSCOPY;  Surgeon: Gabriel Rose MD;  Location: [facility] ENDO (4TH FLR);  Service: Endoscopy;  Laterality: N/A;  Miralax prep per Dr. Rose    DENTAL SURGERY  2017    ENDARTERECTOMY OF FEMORAL ARTERY Left 2020    Procedure: ENDARTERECTOMY, FEMORAL;  Surgeon: Olayinka Harris MD;  Location: [facility] OR 2ND FLR;  Service: Peripheral Vascular;  Laterality: Left;  SFA endarterectomy  fluoro time: 9.2 min mGy: 2336.86    EPIDURAL STEROID INJECTION INTO LUMBAR SPINE N/A 2020    Procedure: Injection-steroid-epidural-lumbar--Review MRI- L5-S1 after MRI review;  Surgeon: Ely Syed Jr., MD;  Location: [facility] PAIN MGT;  Service: Pain Management;  Laterality: N/A;    EPIDURAL STEROID INJECTION INTO LUMBAR SPINE N/A 2020    Procedure: Injection-steroid-epidural-lumbar-- Interlaminar L5-S1/ pt aware she can continue taking ASA per Dr. Harris and hold plavix.;  Surgeon: Ely Syed Jr., MD;  Location: [facility] PAIN MGT;  Service: Pain Management;  Laterality: N/A;  Will sign consent at DOS.    gyn surgery      ex laparotomy    HYSTERECTOMY       Family History     Problem Relation (Age of Onset)    Breast cancer Maternal Aunt, Cousin    Cancer Father, Brother    Diabetes Maternal Aunt, Maternal Uncle, Cousin    Heart disease Mother (40)    Hypertension Mother    Seizures Mother    Stroke Father, Maternal  Grandmother, Paternal Grandmother        Social History     Socioeconomic History    Marital status: Single     Spouse name: Not on file    Number of children: Not on file    Years of education: Not on file    Highest education level: Not on file   Occupational History     Employer: [employer]   Social Needs    Financial resource strain: Not on file    Food insecurity     Worry: Not on file     Inability: Not on file    Transportation needs     Medical: Not on file     Non-medical: Not on file   Tobacco Use    Smoking status: Current Every Day Smoker     Packs/day: 0.50     Years: 40.00     Pack years: 20.00     Types: Cigarettes    Smokeless tobacco: Never Used    Tobacco comment: occasionally   Substance and Sexual Activity    Alcohol use: Yes     Alcohol/week: 0.0 standard drinks     Comment: rarely    Drug use: No    Sexual activity: Never   Lifestyle    Physical activity     Days per week: Not on file     Minutes per session: Not on file    Stress: Not on file   Relationships    Social connections     Talks on phone: Not on file     Gets together: Not on file     Attends [employer] service: Not on file     Active member of club or organization: Not on file     Attends meetings of clubs or organizations: Not on file     Relationship status: Not on file   Other Topics Concern    Not on file   Social History Narrative    Not on file       Review of Systems   Constitutional: Positive for activity change (due to pain) and fatigue. Negative for appetite change and fever.   HENT: Negative for ear discharge, hearing loss and trouble swallowing.    Eyes: Negative for pain and visual disturbance.   Respiratory: Negative for cough, chest tightness and shortness of breath.    Cardiovascular: Negative for chest pain and palpitations.   Gastrointestinal: Negative for abdominal distention, abdominal pain, constipation and diarrhea.   Endocrine: Negative for polydipsia, polyphagia and polyuria.  "  Genitourinary: Negative for difficulty urinating, frequency and urgency.   Musculoskeletal: Positive for arthralgias, back pain, gait problem, myalgias and neck stiffness. Negative for neck pain.   Skin: Positive for color change (redness left foot/ankle). Negative for wound.   Neurological: Positive for weakness (BLE and BUE) and numbness (BLE). Negative for dizziness and headaches.   Hematological: Bruises/bleeds easily.   Psychiatric/Behavioral: Negative for behavioral problems, confusion and dysphoric mood. The patient is nervous/anxious.        OBJECTIVE:     Vital Signs  Temp: 97.6 °F (36.4 °C)  Pulse: 81  BP: (!) 112/59  Pain Score: 10-Worst pain ever  Height: 5' 2" (157.5 cm)  Weight: 74.8 kg (164 lb 14.5 oz)  Body mass index is 30.16 kg/m².      Neurosurgery Physical Exam  General: well developed, well nourished, crying and in pain  Head: normocephalic, atraumatic  Neurologic: Alert and oriented. Thought content appropriate.  GCS: Motor: 6/Verbal: 5/Eyes: 4 GCS Total: 15  Mental Status: Awake, Alert, Oriented x 4  Language: No aphasia  Speech: No dysarthria  Cranial nerves: face symmetric, tongue midline, CN II-XII grossly intact.   Eyes: pupils equal, round, reactive to light with accomodation, EOMI.   Pulmonary: normal respirations, no signs of respiratory distress  Abdomen: soft, non-distended  Skin: Skin is warm, dry and intact. Left groin site well healed, no evidence of draining or infection; significant scarring palpated. 3+ pitting edema noted to b/l shins. Slight redness noted to the left foot/ankle but blanchable.   Sensory: intact to light touch with the exception of the bottom of the right foot and anterior left thigh and top of left foot  Motor Strength:Moves all extremities spontaneously with good tone. No abnormal movements seen.     Strength  Deltoids Triceps Biceps Wrist Extension Wrist Flexion Hand    Upper: R 5/5 5/5 5/5 5/5 5/5 5/5    L 5/5 5/5 5/5 5/5 5/5 5/5     Iliopsoas " Quadriceps Knee  Flexion Tibialis  anterior Gastro- cnemius EHL   Lower: R 5-/5 5-/5 5-/5 5-/5 5/5 5/5    L 5/5 5/5 5/5 5/5 5/5 5/5     Reflexes:   DTR: 2+ symmetrically throughout.  Cueto's: Negative.  Babinski's: Negative.  Clonus: Negative.     Cerebellar:   Finger-to-nose: intact bilaterally   Pronator drift: absent bilaterally  Gait unable to perform; pain limited      Cervical:   ROM: Full with flexion, extension, lateral rotation and ear-to-shoulder bend.   Midline TTP: Negative.  Lhermitte's: Negative.     Thoracic:  Midline TTP: Negative.     Lumbar:  Midline TTP: Positive around L4-5    Other:  SI joint TTP: Positive L > R  Greater trochanter TTP: Positive Left  Tenderness with external/internal hip rotation: Negative.    Diagnostic Results:  I have personally reviewed the MRI lumbar spine dated 7/30/20 with Dr. Doan. The imaging shows multilevel degenerative changes most pronounced at L4-5 and L5-S1. Mild spondylolisthesis noted at L5-S1.    ASSESSMENT/PLAN:   Rachel Murray is a 70 y.o. female with PMH of HTN, current smoker, and PAD with stent placement to BLE on 7/21 currently on ASA 81mg. She was seen in clinic today for follow-up on the ED on 7/25/20 for worsening left-sided low back pain that radiated into the left leg. The patient has been seeing pain management, Dr. Syed, for MIRNA for chronic sciatica on the left. Last injection received on 5/7/20 without relief. After reviewing the imaging and case with Dr. Doan, multilevel degenerative changes were noted but no evidence of central stenosis. No neurosurgical intervention seems indicated based on the aforementioned imaging. I would like the patient to obtain a lumbar x-ray flex/ex to evaluate for dynamic instability given the spondylolisthesis. Given the increased swelling, pain, and numbness in her lower extremities I have moved up her vascular imaging to today for further evaluation with Dr. Harris. We discussed returning to pain  management to discuss further injections and possibly restarting PT. She verbalized understanding.    I will call and discuss the results of the x-ray l-spine once obtained. I have encouraged her to contact the clinic with any questions, concerns, or adverse clinical changes. She verbalized understanding.     CELIO Muñoz-TAMANNA  Neurosurgery  [facility].   Note dictated with voice recognition software, please excuse any grammatical errors.

## 2020-08-05 ENCOUNTER — HOSPITAL ENCOUNTER (OUTPATIENT)
Dept: VASCULAR SURGERY | Facility: CLINIC | Age: 70
Discharge: HOME | End: 2020-08-05
Attending: SURGERY
Payer: COMMERCIAL

## 2020-08-05 ENCOUNTER — OFFICE VISIT (OUTPATIENT)
Dept: NEUROSURGERY | Facility: CLINIC | Age: 70
End: 2020-08-05
Payer: COMMERCIAL

## 2020-08-05 VITALS
SYSTOLIC BLOOD PRESSURE: 112 MMHG | HEIGHT: 62 IN | TEMPERATURE: 98 F | HEART RATE: 81 BPM | BODY MASS INDEX: 30.34 KG/M2 | DIASTOLIC BLOOD PRESSURE: 59 MMHG | WEIGHT: 164.88 LBS

## 2020-08-05 DIAGNOSIS — G89.29 CHRONIC BILATERAL LOW BACK PAIN WITH LEFT-SIDED SCIATICA: Primary | ICD-10-CM

## 2020-08-05 DIAGNOSIS — M43.16 SPONDYLOLISTHESIS OF LUMBAR REGION: ICD-10-CM

## 2020-08-05 DIAGNOSIS — G89.29 OTHER CHRONIC PAIN: ICD-10-CM

## 2020-08-05 DIAGNOSIS — I73.9 PAD (PERIPHERAL ARTERY DISEASE): ICD-10-CM

## 2020-08-05 DIAGNOSIS — M54.16 LUMBAR RADICULOPATHY: ICD-10-CM

## 2020-08-05 DIAGNOSIS — M54.9 DORSALGIA, UNSPECIFIED: ICD-10-CM

## 2020-08-05 DIAGNOSIS — M54.42 CHRONIC BILATERAL LOW BACK PAIN WITH LEFT-SIDED SCIATICA: Primary | ICD-10-CM

## 2020-08-05 PROBLEM — M54.32 SCIATIC NERVE PAIN, LEFT: Status: ACTIVE | Noted: 2020-08-05

## 2020-08-05 PROCEDURE — 3078F PR MOST RECENT DIASTOLIC BLOOD PRESSURE < 80 MM HG: ICD-10-PCS | Mod: CPTII,S$GLB,, | Performed by: NURSE PRACTITIONER

## 2020-08-05 PROCEDURE — 1159F MED LIST DOCD IN RCRD: CPT | Mod: S$GLB,,, | Performed by: NURSE PRACTITIONER

## 2020-08-05 PROCEDURE — 1101F PT FALLS ASSESS-DOCD LE1/YR: CPT | Mod: CPTII,S$GLB,, | Performed by: NURSE PRACTITIONER

## 2020-08-05 PROCEDURE — 3078F DIAST BP <80 MM HG: CPT | Mod: CPTII,S$GLB,, | Performed by: NURSE PRACTITIONER

## 2020-08-05 PROCEDURE — 99999 PR PBB SHADOW E&M-EST. PATIENT-LVL IV: CPT | Mod: PBBFAC,,, | Performed by: NURSE PRACTITIONER

## 2020-08-05 PROCEDURE — 1125F PR PAIN SEVERITY QUANTIFIED, PAIN PRESENT: ICD-10-PCS | Mod: S$GLB,,, | Performed by: NURSE PRACTITIONER

## 2020-08-05 PROCEDURE — 99999 PR PBB SHADOW E&M-EST. PATIENT-LVL IV: ICD-10-PCS | Mod: PBBFAC,,, | Performed by: NURSE PRACTITIONER

## 2020-08-05 PROCEDURE — 3008F BODY MASS INDEX DOCD: CPT | Mod: CPTII,S$GLB,, | Performed by: NURSE PRACTITIONER

## 2020-08-05 PROCEDURE — 3074F SYST BP LT 130 MM HG: CPT | Mod: CPTII,S$GLB,, | Performed by: NURSE PRACTITIONER

## 2020-08-05 PROCEDURE — 3008F PR BODY MASS INDEX (BMI) DOCUMENTED: ICD-10-PCS | Mod: CPTII,S$GLB,, | Performed by: NURSE PRACTITIONER

## 2020-08-05 PROCEDURE — 1159F PR MEDICATION LIST DOCUMENTED IN MEDICAL RECORD: ICD-10-PCS | Mod: S$GLB,,, | Performed by: NURSE PRACTITIONER

## 2020-08-05 PROCEDURE — 1101F PR PT FALLS ASSESS DOC 0-1 FALLS W/OUT INJ PAST YR: ICD-10-PCS | Mod: CPTII,S$GLB,, | Performed by: NURSE PRACTITIONER

## 2020-08-05 PROCEDURE — 99203 PR OFFICE/OUTPT VISIT, NEW, LEVL III, 30-44 MIN: ICD-10-PCS | Mod: S$GLB,,, | Performed by: NURSE PRACTITIONER

## 2020-08-05 PROCEDURE — 1125F AMNT PAIN NOTED PAIN PRSNT: CPT | Mod: S$GLB,,, | Performed by: NURSE PRACTITIONER

## 2020-08-05 PROCEDURE — 99203 OFFICE O/P NEW LOW 30 MIN: CPT | Mod: S$GLB,,, | Performed by: NURSE PRACTITIONER

## 2020-08-05 PROCEDURE — 3074F PR MOST RECENT SYSTOLIC BLOOD PRESSURE < 130 MM HG: ICD-10-PCS | Mod: CPTII,S$GLB,, | Performed by: NURSE PRACTITIONER

## 2020-08-05 NOTE — LETTER
August 5, 2020      Aguilar Flores MD  [address]  [facility] [address]           [facility] - Neurosurgery 7th Fl  [address]  [facility] [address]  Phone: [phone]  Fax: [phone]          Patient: Rachel Murray   MR Number: [id number]   YOB: 1950   Date of Visit: 8/5/2020       Dear Dr. Aguilar Flores:    Thank you for referring Rachel Murray to me for evaluation. Attached you will find relevant portions of my assessment and plan of care.    If you have questions, please do not hesitate to call me. I look forward to following Rachel Murray along with you.    Sincerely,    Clari Burger, AMBIKA    Enclosure  CC:  No Recipients    If you would like to receive this communication electronically, please contact [email or web address] or [phone] to request more information on [facility] access.    For providers and/or their staff who would like to refer a patient to [facility], please contact us through our one-stop-shop provider referral line, [facility] [place], at [phone].    If you feel you have received this communication in error or would no longer like to receive these types of communications, please e-mail [email or web address]

## 2020-08-06 ENCOUNTER — HOSPITAL ENCOUNTER (EMERGENCY)
Facility: HOSPITAL | Age: 70
Discharge: HOME | End: 2020-08-06
Attending: EMERGENCY MEDICINE
Payer: COMMERCIAL

## 2020-08-06 ENCOUNTER — PATIENT OUTREACH (OUTPATIENT)
Dept: ADMINISTRATIVE | Facility: OTHER | Age: 70
End: 2020-08-06

## 2020-08-06 ENCOUNTER — TELEPHONE (OUTPATIENT)
Dept: PAIN MEDICINE | Facility: CLINIC | Age: 70
End: 2020-08-06

## 2020-08-06 ENCOUNTER — OFFICE VISIT (OUTPATIENT)
Dept: VASCULAR SURGERY | Facility: CLINIC | Age: 70
End: 2020-08-06
Attending: SURGERY
Payer: COMMERCIAL

## 2020-08-06 VITALS
HEART RATE: 76 BPM | SYSTOLIC BLOOD PRESSURE: 123 MMHG | RESPIRATION RATE: 16 BRPM | BODY MASS INDEX: 30.36 KG/M2 | OXYGEN SATURATION: 95 % | DIASTOLIC BLOOD PRESSURE: 60 MMHG | HEIGHT: 62 IN | WEIGHT: 165 LBS | TEMPERATURE: 98 F

## 2020-08-06 VITALS — TEMPERATURE: 99 F

## 2020-08-06 DIAGNOSIS — I80.231: Primary | ICD-10-CM

## 2020-08-06 DIAGNOSIS — I73.9 PERIPHERAL ARTERY DISEASE: ICD-10-CM

## 2020-08-06 DIAGNOSIS — I77.1 ILIAC ARTERY STENOSIS, BILATERAL: ICD-10-CM

## 2020-08-06 DIAGNOSIS — I73.9 PAD (PERIPHERAL ARTERY DISEASE): Primary | ICD-10-CM

## 2020-08-06 DIAGNOSIS — M79.89 RIGHT LEG SWELLING: ICD-10-CM

## 2020-08-06 DIAGNOSIS — M53.86 SCIATICA ASSOCIATED WITH DISORDER OF LUMBAR SPINE: ICD-10-CM

## 2020-08-06 DIAGNOSIS — M79.89 LEG SWELLING: ICD-10-CM

## 2020-08-06 PROCEDURE — 99283 PR EMERGENCY DEPT VISIT,LEVEL III: ICD-10-PCS | Mod: ,,, | Performed by: SURGERY

## 2020-08-06 PROCEDURE — 99999 PR PBB SHADOW E&M-EST. PATIENT-LVL II: CPT | Mod: PBBFAC,,, | Performed by: SURGERY

## 2020-08-06 PROCEDURE — 99284 PR EMERGENCY DEPT VISIT,LEVEL IV: ICD-10-PCS | Mod: ,,, | Performed by: PHYSICIAN ASSISTANT

## 2020-08-06 PROCEDURE — 1101F PT FALLS ASSESS-DOCD LE1/YR: CPT | Mod: CPTII,S$GLB,, | Performed by: SURGERY

## 2020-08-06 PROCEDURE — 99284 EMERGENCY DEPT VISIT MOD MDM: CPT | Mod: 25

## 2020-08-06 PROCEDURE — 1125F PR PAIN SEVERITY QUANTIFIED, PAIN PRESENT: ICD-10-PCS | Mod: S$GLB,,, | Performed by: SURGERY

## 2020-08-06 PROCEDURE — 1101F PR PT FALLS ASSESS DOC 0-1 FALLS W/OUT INJ PAST YR: ICD-10-PCS | Mod: CPTII,S$GLB,, | Performed by: SURGERY

## 2020-08-06 PROCEDURE — 3078F PR MOST RECENT DIASTOLIC BLOOD PRESSURE < 80 MM HG: ICD-10-PCS | Mod: CPTII,S$GLB,, | Performed by: SURGERY

## 2020-08-06 PROCEDURE — 1159F PR MEDICATION LIST DOCUMENTED IN MEDICAL RECORD: ICD-10-PCS | Mod: S$GLB,,, | Performed by: SURGERY

## 2020-08-06 PROCEDURE — 99283 EMERGENCY DEPT VISIT LOW MDM: CPT | Mod: ,,, | Performed by: SURGERY

## 2020-08-06 PROCEDURE — 3078F DIAST BP <80 MM HG: CPT | Mod: CPTII,S$GLB,, | Performed by: SURGERY

## 2020-08-06 PROCEDURE — 99999 PR PBB SHADOW E&M-EST. PATIENT-LVL II: ICD-10-PCS | Mod: PBBFAC,,, | Performed by: SURGERY

## 2020-08-06 PROCEDURE — 1125F AMNT PAIN NOTED PAIN PRSNT: CPT | Mod: S$GLB,,, | Performed by: SURGERY

## 2020-08-06 PROCEDURE — 3074F SYST BP LT 130 MM HG: CPT | Mod: CPTII,S$GLB,, | Performed by: SURGERY

## 2020-08-06 PROCEDURE — 3074F PR MOST RECENT SYSTOLIC BLOOD PRESSURE < 130 MM HG: ICD-10-PCS | Mod: CPTII,S$GLB,, | Performed by: SURGERY

## 2020-08-06 PROCEDURE — 1159F MED LIST DOCD IN RCRD: CPT | Mod: S$GLB,,, | Performed by: SURGERY

## 2020-08-06 PROCEDURE — 99284 EMERGENCY DEPT VISIT MOD MDM: CPT | Mod: ,,, | Performed by: PHYSICIAN ASSISTANT

## 2020-08-06 NOTE — CONSULTS
[facility]  Vascular Surgery  Consult Note    Inpatient consult to Vascular Surgery  Consult performed by: Noe Trinidad MD  Consult ordered by: Noemy Hummel PA-C        Subjective:     Chief Complaint/Reason for Admission: pain    History of Present Illness: 70-year-old female with severe lumbar radiculopathy and PAD s/p  L EIA and L CFA endarterectomy, JESU balloon angioplasty and L profundaplasty 2/2020 and bilateral external iliac artery stenting 7/21/20 who was following up in clinic today with new swelling to her bilateral legs, right worse than left, associated with severe, excruciating pain, especially on her left leg. The skin is very sensitive to light touch. She says this pain is quite similar to her radiculopathy pain but different in some ways. She also has some numbness and paresthesias to the dorsum of her feet. Currently being worked up by neurosurgery for pain and has pain management appointment coming soon.         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 iliac stent placement, LLE angiogram  17.4 min  837.37 mGy  172.81 Gycm2  37 ml contrast    AORTOGRAPHY N/A 2/17/2020    Procedure: AORTOGRAM;  Surgeon: Olayinka Harris MD;  Location: [facility] OR [address] [place] [address];  Service: Peripheral Vascular;  Laterality: N/A;     APPENDECTOMY      BREAST BIOPSY       SECTION, CLASSIC      COLONOSCOPY      COLONOSCOPY N/A 2017    Procedure: COLONOSCOPY;  Surgeon: Gabriel Rose MD;  Location: [facility] (4TH FLR);  Service: Endoscopy;  Laterality: N/A;  Miralax prep per Dr. Rose    DENTAL SURGERY  2017    ENDARTERECTOMY OF FEMORAL ARTERY Left 2020    Procedure: ENDARTERECTOMY, FEMORAL;  Surgeon: Olayinka Harris MD;  Location: [facility] OR 2ND FLR;  Service: Peripheral Vascular;  Laterality: Left;  SFA endarterectomy  fluoro time: 9.2 min mGy: 2336.86    EPIDURAL STEROID INJECTION INTO LUMBAR SPINE N/A 2020    Procedure: Injection-steroid-epidural-lumbar--Review MRI- L5-S1 after MRI review;  Surgeon: Ely Syed Jr., MD;  Location: [facility] PAIN MGT;  Service: Pain Management;  Laterality: N/A;    EPIDURAL STEROID INJECTION INTO LUMBAR SPINE N/A 2020    Procedure: Injection-steroid-epidural-lumbar-- Interlaminar L5-S1/ pt aware she can continue taking ASA per Dr. Harris and hold plavix.;  Surgeon: Ely Syed Jr., MD;  Location: [facility] PAIN MGT;  Service: Pain Management;  Laterality: N/A;  Will sign consent at DOS.    gyn surgery      ex laparotomy    HYSTERECTOMY       Family History     Problem Relation (Age of Onset)    Breast cancer Maternal Aunt, Cousin    Cancer Father, Brother    Diabetes Maternal Aunt, Maternal Uncle, Cousin    Heart disease Mother (40)    Hypertension Mother    Seizures Mother    Stroke Father, Maternal Grandmother, Paternal Grandmother        Tobacco Use    Smoking status: Current Every Day Smoker     Packs/day: 0.50     Years: 40.00     Pack years: 20.00     Types: Cigarettes    Smokeless tobacco: Never Used    Tobacco comment: occasionally   Substance and Sexual Activity    Alcohol use: Yes     Alcohol/week: 0.0 standard drinks     Comment: rarely    Drug use: No    Sexual activity: Never     Review of Systems   Constitutional: Negative for chills  and fever.   HENT: Negative for sore throat.    Eyes: Negative for redness.   Respiratory: Negative for shortness of breath.    Cardiovascular: Negative for chest pain.   Gastrointestinal: Negative for abdominal pain.   Endocrine: Negative for polyuria.   Genitourinary: Negative for dysuria.   Musculoskeletal: Negative for back pain.        Severe pain and swelling to legs.    Skin: Negative for wound.   Neurological: Negative for headaches.   Psychiatric/Behavioral: Negative for agitation.     Objective:     Vital Signs (Most Recent):  Temp: 97.9 °F (36.6 °C) (08/06/20 1425)  Pulse: 79 (08/06/20 1425)  Resp: 16 (08/06/20 1425)  BP: (!) 100/51 (08/06/20 1425)  SpO2: 98 % (08/06/20 1425) Vital Signs (24h Range):  Temp:  [97.9 °F (36.6 °C)-98.5 °F (36.9 °C)] 97.9 °F (36.6 °C)  Pulse:  [79] 79  Resp:  [16] 16  SpO2:  [98 %] 98 %  BP: (100)/(51) 100/51     Weight: 74.8 kg (165 lb)  Body mass index is 30.18 kg/m².    Physical Exam  Vitals signs and nursing note reviewed.   Constitutional:       General: She is in acute distress.      Appearance: She is well-developed.   HENT:      Head: Normocephalic and atraumatic.   Neck:      Musculoskeletal: Normal range of motion.   Cardiovascular:      Rate and Rhythm: Normal rate.   Pulmonary:      Effort: Pulmonary effort is normal.   Abdominal:      Palpations: Abdomen is soft.      Tenderness: There is no abdominal tenderness.   Musculoskeletal:      Comments: Swelling to bilateral lower extremities, right greater than left. Exquisitely tender to light touch to bilateral lower extremities. Biphasic signals right PT/DP and monophasic signals left PT/DP. Feet are warm and well perfused. Strength 4/5 at ankles. Some pain with passive range of motion. Compartments in legs are soft.    Skin:     General: Skin is warm and dry.   Neurological:      General: No focal deficit present.      Mental Status: She is alert.   Psychiatric:         Behavior: Behavior normal.         Significant  Labs:  None    Significant Diagnostics:  US DVT--small thrombosis in branch of posterior tibial vein.     Assessment/Plan:     Peripheral artery disease  70-year-old female with PAD s/p left EIA and CFA endarterectomy, JESU balloon angioplasty, and L profundaplasty 2/2020 and bilateral external iliac stenting 7/2020 who now has severe pain and swelling to her bilateral lower extremities. This does not appear to be an inflow issue and she does not have any DVTs based on imaging. No emergent surgical intervention is needed at this time.     -elevate legs  -compression stockings for legs, thigh high  -continue with pain clinic as scheduled  -continue with neurosurgery  -we will see patient next week in clinic with venous ultrasound, this has been arranged.           Thank you for your consult. I will sign off. Please contact us if you have any additional questions.    Noe Trinidad MD  Vascular Surgery  [facility]

## 2020-08-06 NOTE — ED TRIAGE NOTES
Patient presents to the ED with bilateral LE pain. Swelling noted to both legs, R leg is more swollen, however patient states pain is worse in L left. She recently had surgery with stents placed in the groin and has been complaining of pain to bilateral legs since surgery x2 weeks ago. Sent by PCP to rule out DVT     Patient identifiers verified and correct for Rachel Murray.    LOC: The patient is awake, alert and aware of environment with an appropriate affect, the patient is oriented x 3 and speaking appropriately.  APPEARANCE: Patient resting comfortably and in no acute distress, patient is clean and well groomed, patient's clothing is properly fastened.  SKIN: The skin is warm and dry, color consistent with ethnicity, patient has normal skin turgor and moist mucus membranes, skin intact, no breakdown or bruising noted.  MUSCULOSKELETAL: Patient moving all extremities spontaneously. Patient having difficulty with ambulation because of swelling in bilateral LEs. Patient complains of pain to both legs but states pain is worse in the L leg. R leg is more swollen than left.   RESPIRATORY: Airway is open and patent, respirations are spontaneous.  CARDIAC: Patient has a normal rate, no periphreal edema noted, capillary refill < 3 seconds.  ABDOMEN: Soft and non tender to palpation.

## 2020-08-06 NOTE — ASSESSMENT & PLAN NOTE
70-year-old female with PAD s/p left EIA and CFA endarterectomy, JESU balloon angioplasty, and L profundaplasty 2/2020 and bilateral external iliac stenting 7/2020 who now has severe pain and swelling to her bilateral lower extremities. This does not appear to be an inflow issue and she does not have any DVTs based on imaging. No emergent surgical intervention is needed at this time.     -elevate legs  -compression stockings for legs, thigh high  -continue with pain clinic as scheduled  -continue with neurosurgery  -we will see patient next week in clinic with venous ultrasound, this has been arranged.

## 2020-08-06 NOTE — ED PROVIDER NOTES
Encounter Date: 2020       History     Chief Complaint   Patient presents with    Leg Pain     + left sided leg pains x 2 weeks. send for DVT rule out. + swelling      71 yo F with PAD, lumbar radiculopathy, hx of anal ca s/p XRT and chemo presents to the ED DVT study.   Patient has had new swelling to the legs R>L since her bilateral iliac stent placement 1 week ago.  She also reports severe pain and numbness.  Pain is significantly worse in the left lower leg and foot. She denies chest pain, shortness of breath or any other acute complaints.  Patient seen in vascular surgery clinic today and sent to the ED for DVT study.         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 iliac stent placement, LLE angiogram  17.4 min  837.37 mGy  172.81 Gycm2  37 ml contrast    AORTOGRAPHY N/A 2020    Procedure: AORTOGRAM;  Surgeon: Olayinka Harris MD;  Location: [facility] OR 2ND [facility];  Service: Peripheral Vascular;  Laterality: N/A;    APPENDECTOMY      BREAST BIOPSY       SECTION, CLASSIC      COLONOSCOPY      COLONOSCOPY N/A 2017    Procedure: COLONOSCOPY;  Surgeon: Gabriel Rose MD;  Location: [facility] (4TH FLR);  Service: Endoscopy;  Laterality: N/A;  Miralax prep per Dr. Rose    DENTAL SURGERY  2017    ENDARTERECTOMY OF FEMORAL ARTERY Left 2020     Procedure: ENDARTERECTOMY, FEMORAL;  Surgeon: Olayinka Harris MD;  [facility];  Service: Peripheral Vascular;  Laterality: Left;  SFA endarterectomy  fluoro time: 9.2 min mGy: 2336.86    EPIDURAL STEROID INJECTION INTO LUMBAR SPINE N/A 1/16/2020    Procedure: Injection-steroid-epidural-lumbar--Review MRI- L5-S1 after MRI review;  Surgeon: Ely Syed Jr., MD;  Location: [facility] PAIN MGT;  Service: Pain Management;  Laterality: N/A;    EPIDURAL STEROID INJECTION INTO LUMBAR SPINE N/A 5/7/2020    Procedure: Injection-steroid-epidural-lumbar-- Interlaminar L5-S1/ pt aware she can continue taking ASA per Dr. Harris and hold plavix.;  Surgeon: Ely Syed Jr., MD;  Location: [facility] PAIN T;  Service: Pain Management;  Laterality: N/A;  Will sign consent at DOS.    gyn surgery      ex laparotomy    HYSTERECTOMY       Family History   Problem Relation Age of Onset    Hypertension Mother     Heart disease Mother 40        CABG x 5    Seizures Mother     Cancer Father         prostate    Stroke Father     Cancer Brother         colon    Diabetes Maternal Aunt     Breast cancer Maternal Aunt     Diabetes Maternal Uncle     Stroke Maternal Grandmother     Stroke Paternal Grandmother     Diabetes Cousin     Breast cancer Cousin      Social History     Tobacco Use    Smoking status: Current Every Day Smoker     Packs/day: 0.50     Years: 40.00     Pack years: 20.00     Types: Cigarettes    Smokeless tobacco: Never Used    Tobacco comment: occasionally   Substance Use Topics    Alcohol use: Yes     Alcohol/week: 0.0 standard drinks     Comment: rarely    Drug use: No     Review of Systems   Constitutional: Negative for fever.   HENT: Negative for sore throat.    Respiratory: Negative for shortness of breath.    Cardiovascular: Positive for leg swelling. Negative for chest pain.   Gastrointestinal: Negative for nausea.   Genitourinary: Negative for dysuria.   Musculoskeletal:  Negative for back pain.        B/l leg pain   Skin: Negative for rash.   Neurological: Negative for weakness.   Hematological: Does not bruise/bleed easily.       Physical Exam     Initial Vitals [08/06/20 1425]   BP Pulse Resp Temp SpO2   (!) 100/51 79 16 97.9 °F (36.6 °C) 98 %      MAP       --         Physical Exam    Nursing note and vitals reviewed.  Constitutional: She appears well-developed and well-nourished. She is not diaphoretic.  Non-toxic appearance. She does not appear ill. No distress.   HENT:   Head: Normocephalic and atraumatic.   Neck: Neck supple.   Cardiovascular: Normal rate and regular rhythm. Exam reveals no gallop and no friction rub.    No murmur heard.  Pulmonary/Chest: Effort normal and breath sounds normal. No accessory muscle usage. No tachypnea. No respiratory distress. She has no decreased breath sounds. She has no wheezes. She has no rhonchi. She has no rales.   Abdominal: She exhibits no distension.   Musculoskeletal:      Comments: Unable to palpate DP, PT or femoral pulses.  Doppler signals are present to bilateral DPs and PTs but decreased on L.  Doppler signals present and equal at peroneal pulses and femoral pulses     There is exquisite tenderness to the LEFT lower leg and foot. Hyperemia noted to the distal foot and toes.  Toes cool to touch.     There is bilateral leg edema worse on RIGHT.  Moderate ttp of the R lower leg and foot. No skin color change. Warmer than the left foot.    Neurological: She is alert.   Skin: No rash noted.   Psychiatric: She has a normal mood and affect. Her behavior is normal.         ED Course   Procedures  Labs Reviewed - No data to display       Imaging Results           US Lower Extremity Veins Bilateral (Final result)  Result time 08/06/20 16:56:14    Final result by Serjio Oneil MD (08/06/20 16:56:14)                 Impression:      Thrombosis of 1 of the paired left posterior tibial veins.    No other thrombus is visualized.    This  report was flagged in Epic as abnormal.    Jaylen Mart MD discussed findings with Noe Trinidad MD on 08/06/2020 at 16:54    Electronically signed by resident: Jaylen Mart MD  Date:    08/06/2020  Time:    16:50    Electronically signed by: Serjio Oneil MD  Date:    08/06/2020  Time:    16:56             Narrative:    EXAMINATION:  US LOWER EXTREMITY VEINS BILATERAL    CLINICAL HISTORY:  Other specified soft tissue disorders    TECHNIQUE:  Duplex and color flow Doppler and dynamic compression was performed of the bilateral lower extremity veins was performed.    COMPARISON:  None    FINDINGS:  Right thigh veins: The common femoral, femoral, popliteal, upper greater saphenous, and deep femoral veins are patent and free of thrombus. The veins are normally compressible and have normal phasic flow and augmentation response.    Right calf veins: The visualized calf veins are patent.    Left thigh veins: The common femoral, femoral, popliteal, upper greater saphenous, and deep femoral veins are patent and free of thrombus. The veins are normally compressible and have normal phasic flow and augmentation response.    Left calf veins: One of the paired posterior tibial veins is thrombosed.  The visualized calf veins are otherwise patent.    Miscellaneous: None                                 Medical Decision Making:   History:   Old Medical Records: I decided to obtain old medical records.  Differential Diagnosis:   My differential diagnosis includes but is not limited to:  DVT, arterial occlusion, ischemic limb, claudication  Clinical Tests:   Radiological Study: Ordered  Other:   I have discussed this case with another health care provider.       <> Summary of the Discussion: Vascular surgery       APC / Resident Notes:   70-year-old female with a history of PAD and recent iliac stent placements presents to the ED with leg swelling and pain.  Please see Physical exam above.  BP is soft at 100/51.    DVT study  ordered.  Given coolness and exquisite tenderness to the left foot and lower leg, I have discussed this with vascular surgery.  They will evaluate the patient in the ED.    Patient signed out to fellow NORBERTO Giovanna pending imaging studies, evaluation by vascular surgery and final recommendations and disposition.    I have reviewed the patient's records and discussed this case with my supervising physician. Please be advised that this text was dictated with [employer] software and may contain dictation errors.                                   Clinical Impression:       ICD-10-CM ICD-9-CM   1. Leg swelling  M79.89 729.81                                Noemy Hummel PA-C  08/06/20 1824

## 2020-08-06 NOTE — PROGRESS NOTES
Rachel Murray  08/06/2020    HPI:  Patient is a 70 y.o. female with a h/o lumbar radiculopathy and PAD who is here today for follow-up appointment after recent bilateral external iliac artery stenting 7/21/2020. Since her procedure she has had significant, new swelling bilaterally in both lower legs R>L, accompanied by excruciating pain. Her skin is sensitive to touch. Her pain is worse on the left limb, although the swelling is worse on the right leg. She has new numbness on the dorsal side of her right foot.     She is in severe back pain, seen by neurosurgery yesterday after obtaining MRI. They have recommended she follow up with pain management for more directed epidural therapy now that images have been obtained.     Her left groin scar is still is still causing her pain, which she reports is spreading and worsening.      Prior:  She reports of medial LLE paresthesias that radiate to ipsilateral hip and are worst in the toes. She additionally complains of a 'squeezing' pain in the left foot and intermittent episodes of blanching of the left foot. She continues to have intermittent radicular pain in bilateral legs, for which she received an L5-S1 lumbar epidural steroid injection on 05/07/2020 that the patient reports was minimally helpful. The patient takes Lyrica and Tramadol for her leg pain with modest pain relief, rated 5/10 at the time of history taking.   Ms. Murray reports a small, persistent defect in her left groin incision, which is not painful or erythematous and has not been productive of discharge. She continues to dress the incision with a dry gauze. She continues to smoke cigarettes but has decreased her usage from 1/2 pack per day, down from 1 pack daily.     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;  Service: Peripheral Vascular;  Laterality: Bilateral;  tabatha. iliac stent placement, LLE angiogram  17.4 min  837.37 mGy  172.81 Gycm2  37 ml contrast    AORTOGRAPHY N/A 2020    Procedure: AORTOGRAM;  Surgeon: Olayinka Harris MD;  Location: [facility] OR 2ND FLR;  Service: Peripheral Vascular;  Laterality: N/A;    APPENDECTOMY      BREAST BIOPSY       SECTION, CLASSIC      COLONOSCOPY      COLONOSCOPY N/A 2017    Procedure: COLONOSCOPY;  Surgeon: Gabriel Rose MD;  Location: [facility] ENDO (4TH FLR);  Service: Endoscopy;  Laterality: N/A;  Miralax prep per Dr. Rose    DENTAL SURGERY  2017    ENDARTERECTOMY OF FEMORAL ARTERY Left 2020    Procedure: ENDARTERECTOMY, FEMORAL;  Surgeon: Olayinka Harris MD;  Location: [facility] OR 2ND FLR;  Service: Peripheral Vascular;  Laterality: Left;  SFA endarterectomy  fluoro time: 9.2 min mGy: 2336.86    EPIDURAL STEROID INJECTION INTO LUMBAR SPINE N/A 2020    Procedure: Injection-steroid-epidural-lumbar--Review MRI- L5-S1 after MRI review;  Surgeon: Ely Syed Jr., MD;  Location: [facility] PAIN MGT;  Service: Pain Management;  Laterality: N/A;    EPIDURAL STEROID INJECTION INTO LUMBAR SPINE N/A 2020    Procedure: Injection-steroid-epidural-lumbar-- Interlaminar L5-S1/ pt aware she can continue taking ASA per Dr. Harris and hold plavix.;  Surgeon: Ely Syed Jr., MD;  Location: [facility] PAIN MGT;  Service: Pain Management;  Laterality: N/A;  Will sign consent at [facility].    gyn surgery      ex laparotomy    HYSTERECTOMY       Family History   Problem Relation Age of Onset    Hypertension Mother     Heart disease Mother 40        CABG x 5    Seizures Mother      Cancer Father         prostate    Stroke Father     Cancer Brother         colon    Diabetes Maternal Aunt     Breast cancer Maternal Aunt     Diabetes Maternal Uncle     Stroke Maternal Grandmother     Stroke Paternal Grandmother     Diabetes Cousin     Breast cancer Cousin      Social History     Socioeconomic History    Marital status: Single     Spouse name: Not on file    Number of children: Not on file    Years of education: Not on file    Highest education level: Not on file   Occupational History     Employer: [employer]   Social Needs    Financial resource strain: Not on file    Food insecurity     Worry: Not on file     Inability: Not on file    Transportation needs     Medical: Not on file     Non-medical: Not on file   Tobacco Use    Smoking status: Current Every Day Smoker     Packs/day: 0.50     Years: 40.00     Pack years: 20.00     Types: Cigarettes    Smokeless tobacco: Never Used    Tobacco comment: occasionally   Substance and Sexual Activity    Alcohol use: Yes     Alcohol/week: 0.0 standard drinks     Comment: rarely    Drug use: No    Sexual activity: Never   Lifestyle    Physical activity     Days per week: Not on file     Minutes per session: Not on file    Stress: Not on file   Relationships    Social connections     Talks on phone: Not on file     Gets together: Not on file     Attends [employer] service: Not on file     Active member of club or organization: Not on file     Attends meetings of clubs or organizations: Not on file     Relationship status: Not on file   Other Topics Concern    Not on file   Social History Narrative    Not on file       Current Outpatient Medications:     acetaminophen (TYLENOL) 500 MG tablet, Take 2 tablets (1,000 mg total) by mouth 3 (three) times daily., Disp: , Rfl: 0    aspirin (ECOTRIN) 81 MG EC tablet, Take 1 tablet (81 mg total) by mouth once daily. (Patient taking differently: Take 81 mg by mouth every morning. ),  Disp: 360 tablet, Rfl: 0    atorvastatin (LIPITOR) 40 MG tablet, Take 1 tablet (40 mg total) by mouth once daily. (Patient taking differently: Take 40 mg by mouth every morning. ), Disp: 90 tablet, Rfl: 3    candesartan-hydrochlorothiazide (ATACAND HCT) 16-12.5 mg per tablet, Take 1 tablet by mouth once daily., Disp: 30 tablet, Rfl: 3    clopidogreL (PLAVIX) 75 mg tablet, Take 1 tablet (75 mg total) by mouth once daily. (Patient taking differently: Take 75 mg by mouth every morning. ), Disp: 30 tablet, Rfl: 6    oxyCODONE-acetaminophen (PERCOCET) 5-325 mg per tablet, Take 1 tablet by mouth every 4 (four) hours as needed for Pain., Disp: 18 tablet, Rfl: 0    pregabalin (LYRICA) 75 MG capsule, Take 1 capsule (75 mg total) by mouth 3 (three) times daily., Disp: 90 capsule, Rfl: 2  No current facility-administered medications for this visit.     Facility-Administered Medications Ordered in Other Visits:     sodium chloride 0.9% flush 10 mL, 10 mL, Intravenous, PRN, Reyna Toledo MD    REVIEW OF SYSTEMS:  General: negative; ENT: negative; Allergy and Immunology: negative; Hematological and Lymphatic: Negative; Endocrine: negative; Respiratory: no cough, shortness of breath, or wheezing; Cardiovascular: no chest pain or dyspnea on exertion; Gastrointestinal: no abdominal pain/back, change in bowel habits, or bloody stools; Genito-Urinary: no dysuria, trouble voiding, or hematuria; Musculoskeletal: Bilateral (L >>R) radicular pain and paresthesias  Neurological: no TIA or stroke symptoms; Psychiatric: no nervousness, anxiety or depression.    PHYSICAL EXAM:                General appearance:  Alert, well-appearing, and in no distress.  Oriented to person, place, and time   Neurological: Normal speech, no focal findings noted; CN II - XII grossly intact           Musculoskeletal: Digits/nail without cyanosis/clubbing.  Normal muscle strength/tone.                 Neck: Supple, no significant adenopathy; thyroid is  not enlarged                  No carotid bruit can be auscultated                Chest:  Clear to auscultation, no wheezes, rales or rhonchi, symmetric air entry     No use of accessory muscles             Cardiac: Normal rate and regular rhythm, S1 and S2 normal; PMI non-displaced          Abdomen: Soft, nontender, nondistended, no masses or organomegaly     No rebound tenderness noted; bowel sounds normal     Pulsatile aortic mass is not palpable.     No groin adenopathy      Extremities:   2+ femoral pulses bilaterally     Left groin incision healed with palpable fibrotic scarring beneath skin     Pedal pulses nonpalpable.     Significant 2+ pitting pre-tibial edema R>L      Hyperalgesia over lower legs and feet      Warmth over RLE, blanching present with movement.      No ulcerations    LAB RESULTS:  Lab Results   Component Value Date    K 3.7 07/09/2020    K 4.5 02/25/2020    K 4.2 02/24/2020    CREATININE 0.8 07/09/2020    CREATININE 0.8 06/04/2020    CREATININE 0.8 02/25/2020     Lab Results   Component Value Date    WBC 10.84 07/09/2020    WBC 7.05 02/25/2020    WBC 8.42 02/24/2020    HCT 33.5 (L) 07/09/2020    HCT 34.9 (L) 02/25/2020    HCT 33.3 (L) 02/24/2020     (H) 07/09/2020     (H) 02/25/2020     02/24/2020     No results found for: HGBA1C  IMAGING:  Lower Extremity Segmental WALT 08/05/2020  R WALT: 0.95  L WALT: 0.44    Lower Extremity Segmental WALT 05/28/2020  R WALT: 0.54  L WALT: 0.48    Aorta: Normal caliber.  No dissection, penetrating ulcer, or intramural hematoma.  Significant atherosclerotic plaque throughout of the course.     Right:     Common iliac artery: Atherosclerotic plaque.     External iliac artery: Atherosclerotic plaque with several foci of greater than 70% stenosis.     Common femoral artery: Atherosclerotic plaque with approximately 50% stenosis.     Femoral artery: Normal.     Popliteal artery: Normal.     Anterior tibial artery: Normal.     Posterior tibial  artery: Normal.     Peroneal artery: Normal.     Left: There are postoperative changes at the left groin, presumably a short bypass graft at the level of the common femoral artery.     Common iliac artery: Atherosclerotic plaque     External iliac artery: Atherosclerotic plaque with greater than 70% stenosis     Common femoral artery: Normal.     Femoral artery: Short segment complete occlusion just distal to the origin of the profundus femoris with reconstitution.     Popliteal artery: Normal.     Anterior tibial artery: Normal.    Posterior tibial artery: Normal.     Peroneal artery: Normal.     Liver, gallbladder, pancreas, and spleen are unremarkable.  No biliary ductal dilatation.  There is thickening of the bilateral adrenal glands with 1.7 cm nodule on the left, similar to study dated 12/13/2012.  Kidneys are unremarkable.  No hydroureteronephrosis.  GI tract is demonstrates no evidence for obstruction or inflammation.  Uterus is absent.  No adnexal masses.  No ascites.  No retroperitoneal lymphadenopathy.     Regional osseous structures demonstrate no aggressive appearing lytic or blastic lesions.     Impression:     1. Postoperative changes at the left groin, presumably a short bypass graft at the level of the common femoral artery.  2. Greater than 70% stenosis of the external iliac artery with short-segment occlusion of the proximal femoral arteries.  3. Greater than 70% stenosis of the right external iliac artery.    IMP/PLAN:  70 y.o. female with HTN, active smoker (1/2 ppd) and PAD s/p L EIA, L CFA endarterectomy, JESU balloon angioplasty and L profundaplasty on 02/18/2020, who presents today for follow-up s/p bilateral external iliac artery stenting 7/21/2020. She is having significant swelling and pain of the lower legs. The right leg is considerably more swollen with pitting edema, warm to touch, and hyperalgesic. This is a new finding concerning for DVT given her lack of ambulation since 7/21.       Prior:  with consistent left LLE radiculopathy. She reports LLE pain after walking less than 1 block.  I believe her symptoms are principally related to her lower back radiculopathy based on her description of pain, her pulse exam, and her WALT and duplex results.  Her PAD has progressed, however, and CTA demonstrates recurrent left external iliac stenosis and multilevel RLE stenosis, including SU, EIA, CFA severe stenoses.      - escort to ER for venous US for DVT screening  - Will schedule FU atilio with WALT, RLE arterial duplex.  - pain mgmt clinic referral    Olayinka Harris MD  Vascular & Endovascular Surgery

## 2020-08-06 NOTE — TELEPHONE ENCOUNTER
----- Message from Juwan Nelson sent at 8/6/2020  8:12 AM CDT -----  Contact: [phone] / self  Patient would like a call back from your office regarding a possible upcoming procedure, and the pt would like to discuss a X-ray and MRI she had done last week. Please Advise.

## 2020-08-06 NOTE — SUBJECTIVE & OBJECTIVE
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 iliac stent placement, LLE angiogram  17.4 min  837.37 mGy  172.81 Gycm2  37 ml contrast    AORTOGRAPHY N/A 2020    Procedure: AORTOGRAM;  Surgeon: Olayinka Harris MD;  [facility];  Service: Peripheral Vascular;  Laterality: N/A;    APPENDECTOMY      BREAST BIOPSY       SECTION, CLASSIC      COLONOSCOPY      COLONOSCOPY N/A 2017    Procedure: COLONOSCOPY;  Surgeon: Gabriel Rose MD;  Location: [facility] ENDO (4TH FLR);  Service: Endoscopy;  Laterality: N/A;  Miralax prep per Dr. Rose    DENTAL SURGERY  2017    ENDARTERECTOMY OF FEMORAL ARTERY Left 2020    Procedure: ENDARTERECTOMY, FEMORAL;  Surgeon: Olayinka Harris MD;  [facility];  Service: Peripheral Vascular;  Laterality: Left;  SFA endarterectomy  fluoro time: 9.2 min mGy: 2336.86    EPIDURAL STEROID INJECTION INTO LUMBAR SPINE N/A 2020    Procedure: Injection-steroid-epidural-lumbar--Review MRI- L5-S1 after MRI review;  Surgeon: Ely Syed Jr., MD;  Location[facility] PAIN MGT;  Service: Pain Management;  Laterality: N/A;    EPIDURAL STEROID INJECTION INTO LUMBAR SPINE N/A 2020    Procedure: Injection-steroid-epidural-lumbar-- Interlaminar L5-S1/ pt aware she can  continue taking ASA per Dr. Harris and hold plavix.;  Surgeon: Ely Syed Jr., MD;  Location: [facility] PAIN T;  Service: Pain Management;  Laterality: N/A;  Will sign consent at DOS.    gyn surgery      ex laparotomy    HYSTERECTOMY       Family History     Problem Relation (Age of Onset)    Breast cancer Maternal Aunt, Cousin    Cancer Father, Brother    Diabetes Maternal Aunt, Maternal Uncle, Cousin    Heart disease Mother (40)    Hypertension Mother    Seizures Mother    Stroke Father, Maternal Grandmother, Paternal Grandmother        Tobacco Use    Smoking status: Current Every Day Smoker     Packs/day: 0.50     Years: 40.00     Pack years: 20.00     Types: Cigarettes    Smokeless tobacco: Never Used    Tobacco comment: occasionally   Substance and Sexual Activity    Alcohol use: Yes     Alcohol/week: 0.0 standard drinks     Comment: rarely    Drug use: No    Sexual activity: Never     Review of Systems   Constitutional: Negative for chills and fever.   HENT: Negative for sore throat.    Eyes: Negative for redness.   Respiratory: Negative for shortness of breath.    Cardiovascular: Negative for chest pain.   Gastrointestinal: Negative for abdominal pain.   Endocrine: Negative for polyuria.   Genitourinary: Negative for dysuria.   Musculoskeletal: Negative for back pain.        Severe pain and swelling to legs.    Skin: Negative for wound.   Neurological: Negative for headaches.   Psychiatric/Behavioral: Negative for agitation.     Objective:     Vital Signs (Most Recent):  Temp: 97.9 °F (36.6 °C) (08/06/20 1425)  Pulse: 79 (08/06/20 1425)  Resp: 16 (08/06/20 1425)  BP: (!) 100/51 (08/06/20 1425)  SpO2: 98 % (08/06/20 1425) Vital Signs (24h Range):  Temp:  [97.9 °F (36.6 °C)-98.5 °F (36.9 °C)] 97.9 °F (36.6 °C)  Pulse:  [79] 79  Resp:  [16] 16  SpO2:  [98 %] 98 %  BP: (100)/(51) 100/51     Weight: 74.8 kg (165 lb)  Body mass index is 30.18 kg/m².    Physical Exam  Vitals signs and nursing note  reviewed.   Constitutional:       General: She is in acute distress.      Appearance: She is well-developed.   HENT:      Head: Normocephalic and atraumatic.   Neck:      Musculoskeletal: Normal range of motion.   Cardiovascular:      Rate and Rhythm: Normal rate.   Pulmonary:      Effort: Pulmonary effort is normal.   Abdominal:      Palpations: Abdomen is soft.      Tenderness: There is no abdominal tenderness.   Musculoskeletal:      Comments: Swelling to bilateral lower extremities, right greater than left. Exquisitely tender to light touch to bilateral lower extremities. Biphasic signals right PT/DP and monophasic signals left PT/DP. Feet are warm and well perfused. Strength 4/5 at ankles. Some pain with passive range of motion. Compartments in legs are soft.    Skin:     General: Skin is warm and dry.   Neurological:      General: No focal deficit present.      Mental Status: She is alert.   Psychiatric:         Behavior: Behavior normal.         Significant Labs:  None    Significant Diagnostics:  US DVT--small thrombosis in branch of posterior tibial vein.

## 2020-08-06 NOTE — TELEPHONE ENCOUNTER
Spoke with pt in regarding her Xray and MRI. Pt stated she would like to be scheduled for a procedure. I advised pt to schedule with Reuben to review MRI and discuss treatment options. Pt is scheduled for 8/10/20 at 10:40 am. Pt verbalized understanding and confirmed appt date and time.

## 2020-08-06 NOTE — PROVIDER PROGRESS NOTES - EMERGENCY DEPT.
Encounter Date: 8/6/2020    ED Physician Progress Notes             Report received from off going PA.    This is the urgent evaluation a 70-year-old female who was sent from the vascular clinic for evaluation of possible DVT, patient reportedly had iliac stents placed last week for her severe PID.    Patient apparently has bilateral lower extremity swelling worse on the right.  However the left ft is cool to touch with Doppler pulse only.  Patient also with exquisitely tender to palpation on the left foot.  Vascular surgery was consulted and they will come see the patient in the emergency department.    DVT studies ordered    Impression:     Thrombosis of 1 of the paired left posterior tibial veins.     No other thrombus is visualized.     This report was flagged in Epic as abnormal.     Jaylen Mart MD discussed findings with Noe Trinidad MD on 08/06/2020 at 16:54     Electronically signed by resident: Jaylen Mart MD  Date:                                            08/06/2020  Time:                                           16:50     Electronically signed by: Serjio Oneil MD  Date:                                            08/06/2020  Time:                                           16:56    Vascular assess the patient, did do not believe the swelling is caused from her DVT.  Discussed using compression stockings and to follow-up with her pain management clinic.  They also discussed the will follow-up with patient in 1 week for repeat studies see if anticoagulation is necessary.  I discussed elevation with the patient extensively.  Discussed return to emergency department swelling worsens or any chest pain or shortness of breath develops.

## 2020-08-06 NOTE — HPI
70-year-old female with severe lumbar radiculopathy and PAD s/p  L EIA and L CFA endarterectomy, JESU balloon angioplasty and L profundaplasty 2/2020 and bilateral external iliac artery stenting 7/21/20 who was following up in clinic today with new swelling to her bilateral legs, right worse than left, associated with severe, excruciating pain, especially on her left leg. The skin is very sensitive to light touch. She says this pain is quite similar to her radiculopathy pain but different in some ways. She also has some numbness and paresthesias to the dorsum of her feet. Currently being worked up by neurosurgery for pain and has pain management appointment coming soon.

## 2020-08-07 ENCOUNTER — TELEPHONE (OUTPATIENT)
Dept: INTERNAL MEDICINE | Facility: CLINIC | Age: 70
End: 2020-08-07

## 2020-08-07 DIAGNOSIS — I73.9 PAD (PERIPHERAL ARTERY DISEASE): Primary | ICD-10-CM

## 2020-08-07 NOTE — ED NOTES
Pt received discharge instructions. Verbalized understanding. IV removed and vitals updated.      Sloane Lepe RN  08/06/20 2013

## 2020-08-07 NOTE — TELEPHONE ENCOUNTER
----- Message from Mary Connolly sent at 8/7/2020  9:13 AM CDT -----  Regarding: Insurance Paper work and Dr Appointment Yesterday  Contact: Patient @ [phone]  Good Morning  Patient would like to have a call to discuss paperwork from insurance and Dr Appointment Yesterday.    Please call

## 2020-08-07 NOTE — DISCHARGE INSTRUCTIONS
Make sure to elevate her legs as often as possible and to wear the compression stockings.  M    Follow up with vascular surgery in 1 week for re-evaluation.

## 2020-08-07 NOTE — TELEPHONE ENCOUNTER
----- Message from Mary Connolly sent at 8/7/2020  9:13 AM CDT -----  Regarding: Insurance Paper work and Dr Appointment Yesterday  Contact: Patient @ [phone]  Good Morning  Patient would like to have a call to discuss paperwork from insurance and Dr Appointment Yesterday.    Please call

## 2020-08-07 NOTE — TELEPHONE ENCOUNTER
----- Message from Jolie Buckner sent at 8/7/2020  1:46 PM CDT -----  Contact: self [phone]  Patient is returning a phone call.  Who left a message for the patient: ?  Does patient know what this is regarding:  longterm disability paperwork  Comments:

## 2020-08-10 ENCOUNTER — OFFICE VISIT (OUTPATIENT)
Dept: PAIN MEDICINE | Facility: CLINIC | Age: 70
End: 2020-08-10
Payer: COMMERCIAL

## 2020-08-10 VITALS
WEIGHT: 164.88 LBS | BODY MASS INDEX: 30.16 KG/M2 | SYSTOLIC BLOOD PRESSURE: 112 MMHG | HEART RATE: 79 BPM | DIASTOLIC BLOOD PRESSURE: 59 MMHG

## 2020-08-10 DIAGNOSIS — M48.062 SPINAL STENOSIS OF LUMBAR REGION WITH NEUROGENIC CLAUDICATION: ICD-10-CM

## 2020-08-10 DIAGNOSIS — M43.10 SPONDYLOLISTHESIS, UNSPECIFIED SPINAL REGION: ICD-10-CM

## 2020-08-10 DIAGNOSIS — G89.29 CHRONIC BILATERAL LOW BACK PAIN WITH LEFT-SIDED SCIATICA: ICD-10-CM

## 2020-08-10 DIAGNOSIS — G89.4 CHRONIC PAIN SYNDROME: ICD-10-CM

## 2020-08-10 DIAGNOSIS — M47.819 ARTHROPATHY OF FACET JOINTS AT MULTIPLE LEVELS: ICD-10-CM

## 2020-08-10 DIAGNOSIS — M54.16 LUMBAR RADICULOPATHY: Primary | ICD-10-CM

## 2020-08-10 DIAGNOSIS — M54.42 CHRONIC BILATERAL LOW BACK PAIN WITH LEFT-SIDED SCIATICA: ICD-10-CM

## 2020-08-10 PROCEDURE — 1159F PR MEDICATION LIST DOCUMENTED IN MEDICAL RECORD: ICD-10-PCS | Mod: S$GLB,,, | Performed by: NURSE PRACTITIONER

## 2020-08-10 PROCEDURE — 3078F PR MOST RECENT DIASTOLIC BLOOD PRESSURE < 80 MM HG: ICD-10-PCS | Mod: CPTII,S$GLB,, | Performed by: NURSE PRACTITIONER

## 2020-08-10 PROCEDURE — 1125F PR PAIN SEVERITY QUANTIFIED, PAIN PRESENT: ICD-10-PCS | Mod: S$GLB,,, | Performed by: NURSE PRACTITIONER

## 2020-08-10 PROCEDURE — 1101F PR PT FALLS ASSESS DOC 0-1 FALLS W/OUT INJ PAST YR: ICD-10-PCS | Mod: CPTII,S$GLB,, | Performed by: NURSE PRACTITIONER

## 2020-08-10 PROCEDURE — 1101F PT FALLS ASSESS-DOCD LE1/YR: CPT | Mod: CPTII,S$GLB,, | Performed by: NURSE PRACTITIONER

## 2020-08-10 PROCEDURE — 99999 PR PBB SHADOW E&M-EST. PATIENT-LVL IV: CPT | Mod: PBBFAC,,, | Performed by: NURSE PRACTITIONER

## 2020-08-10 PROCEDURE — 3074F PR MOST RECENT SYSTOLIC BLOOD PRESSURE < 130 MM HG: ICD-10-PCS | Mod: CPTII,S$GLB,, | Performed by: NURSE PRACTITIONER

## 2020-08-10 PROCEDURE — 3008F BODY MASS INDEX DOCD: CPT | Mod: CPTII,S$GLB,, | Performed by: NURSE PRACTITIONER

## 2020-08-10 PROCEDURE — 99213 OFFICE O/P EST LOW 20 MIN: CPT | Mod: S$GLB,,, | Performed by: NURSE PRACTITIONER

## 2020-08-10 PROCEDURE — 99213 PR OFFICE/OUTPT VISIT, EST, LEVL III, 20-29 MIN: ICD-10-PCS | Mod: S$GLB,,, | Performed by: NURSE PRACTITIONER

## 2020-08-10 PROCEDURE — 1125F AMNT PAIN NOTED PAIN PRSNT: CPT | Mod: S$GLB,,, | Performed by: NURSE PRACTITIONER

## 2020-08-10 PROCEDURE — 1159F MED LIST DOCD IN RCRD: CPT | Mod: S$GLB,,, | Performed by: NURSE PRACTITIONER

## 2020-08-10 PROCEDURE — 3078F DIAST BP <80 MM HG: CPT | Mod: CPTII,S$GLB,, | Performed by: NURSE PRACTITIONER

## 2020-08-10 PROCEDURE — 3008F PR BODY MASS INDEX (BMI) DOCUMENTED: ICD-10-PCS | Mod: CPTII,S$GLB,, | Performed by: NURSE PRACTITIONER

## 2020-08-10 PROCEDURE — 99999 PR PBB SHADOW E&M-EST. PATIENT-LVL IV: ICD-10-PCS | Mod: PBBFAC,,, | Performed by: NURSE PRACTITIONER

## 2020-08-10 PROCEDURE — 3074F SYST BP LT 130 MM HG: CPT | Mod: CPTII,S$GLB,, | Performed by: NURSE PRACTITIONER

## 2020-08-10 RX ORDER — TRAMADOL HYDROCHLORIDE 50 MG/1
50 TABLET ORAL
Qty: 12 TABLET | Refills: 0 | Status: SHIPPED | OUTPATIENT
Start: 2020-08-10 | End: 2020-10-06 | Stop reason: SDUPTHER

## 2020-08-10 NOTE — H&P (VIEW-ONLY)
" [facility] Established Patient Evaluation      Referred by: Anel Parikh MD  Reason for referral: Sciatica, unspecified laterality    CC:   post op -  6 days s/p -Injection-steroid-epidural-lumbar-- Interlaminar L5-S1    Last 3 PDI Scores 8/10/2020 6/10/2020 5/13/2020   Pain Disability Index (PDI) 63 [phone] 49     Interval Update:     8/10/20 - Ms. Murray returns to clinic for follow up visit reporting  continued low back and sciatic pain left greater than right. Pain intensity is currently 9/10.  Recent lumbar MRI done patient like to discuss in further detail.    6/10/20 - Ms. Murray returns to clinic for follow up visit reporting worse back and left leg  pain.  Patient is s/p Lumbar MIRNA on 5/7/20 with 0% relief.  Pain intensity is currently 7/10.    Ms Murray continues to c/o  medial LLE paresthesias that radiate to ipsilateral hip and are worst in the toes. She additionally complains of a 'squeezing' pain in the left foot and intermittent episodes of blanching of the left foot. She continues to have intermittent radicular pain in bilateral legs  Reports numbness in left hip and left foot.  Patient is currently being evaluated by vascular and endovascular surgery/Dr. Harris to rule out further pathology with her history of PAD he has  ordered a CTA for further evaluation. Dr Harris believes her pain is orginating in her back but wants to make sure.   Pain intensity is currently 7/10.       4/21/20 - Mrs. Murray returns to clinic for follow up visit reporting stable back and leg pain.  Numbness, tightness, and pain are very unpleasant in the ankle and foot bilaterally, but the left is "way worse".  Patient is here for medication refill of Tramadol 50 mg and Lyrica 50 mg.  She reports 30-50% relief with the current medication regimen.  Pain intensity is currently 5/10 ranging 5-8/10.    3/30/20 - The clinic visit for Mrs. Murray has been converted to a telephone encounter because he/she has declined a " telemedicine encounter due to technical difficulty or preference.  She is reporting acutely increased shooting pains originating in her back and traveling into the legs since having vascular surgery of her leg.  She was evaluated by her vascular surgery team and they believe her symptoms are not related to the recent surgery but are most likely to be an exacerbation of her pre-existing lumbar radiculopathy.  She states that her pain is severe and rates it 10/10 at this time.  She states inability to sleep comfortably at night and that her current medication regimen has failed to provide her significant relief.  She reports no relief with tramadol 50 mg and very little relief with a recent prescription of [place] 5-325 she received from her PCP.    2/6/2020 - Mrs. Murray returns to clinic for follow up visit reporting improved back pain.  Patient is s/p Lumbar MIRNA on 1/16/20 with 95% continued relief. She reports beginning PT and having to stop due to  developing a swollen, erythematous, painful left foot.  she has recently seen Cardiology and has  been diagnosed with severe PAD (L>R). CTA with segmental occlusion of the left common and proximal SFA and occlusion of the left tibioperoneal trunk as well as occlusion of the left anterior tibial artery in its proximal 3rd. Moderate degree of calcific and hypodense plaque in the abdominal aorta and iliac arteries bilaterally.  She is now scheduled for a LLE iliac to femoral bypass creation with bilateral iliac stents on 2/17/2020   Pain intensity is currently 2/10.      Background / HPI:   Rachel Murray is a 70 y.o. female who complains of Low back pain with Sciatica.  She reports pain that starts in the left side of her low back and travels through the buttocks, posterolateral thigh, anterior shin, and into the top of her foot.  Alleviating and exacerbating factors are variable but prolonged sitting and walking for more than 5 min appear to drastically increase her pain.  She also reports insomnia due to pain and has been using a recliner to sleep for approximately 90 min at a time. She reports dealing with low back pain for several years but noticed an acute increase in March 2019 which subsided somewhat.  In October 2019 her pain increased drastically and has not subsided since that time. Her history is positive for cervical disc herniation which was previously treated with oral medications and physical therapy.  She also notes associated numbness in the left lower extremity and a sensation of weakness in the left leg.  She denies falls or saddle anesthesia.    Location: Low back   Onset: 10/2019  Current Pain Score: 8/10  Daily Pain of Range: 5-8/10  Quality: Burning, Throbbing, Grabbing, Tingling, Numb, Sharp, Electric, Hot and Cold  Radiation: Radiates down left side to foot.  Worsened by: extension, lying down, sitting and walking for more than 5 minutes  Improved by: nothing    Previous Therapies:  PT/OT: Denies  HEP: Denies  Interventions: Denies  Surgery:Denies  Medications:   - NSAIDS:   - MSK Relaxants:   - TCAs:   - SNRIs:   - Topicals:   - Anticonvulsants:  - Opioids:     Current Pain Medications:  1. Aleve   2. Tramadol 50 mg TID PRN  3. Lyrica 50 TID    Review of Systems:  Review of Systems   Constitutional: Negative for chills and fever.   HENT: Negative for nosebleeds.    Eyes: Negative for blurred vision and pain.   Respiratory: Negative for hemoptysis.    Cardiovascular: Negative for chest pain and palpitations.   Gastrointestinal: Negative for heartburn, nausea and vomiting.   Genitourinary: Negative for dysuria and hematuria.   Musculoskeletal: Positive for back pain. Negative for myalgias.   Skin: Negative for rash.   Neurological: Positive for tingling, sensory change and focal weakness (LLE). Negative for seizures and loss of consciousness.   Endo/Heme/Allergies: Does not bruise/bleed easily.   Psychiatric/Behavioral: Negative for hallucinations. The patient  has insomnia (2/2 pain).        History:    Current Outpatient Medications:     acetaminophen (TYLENOL) 500 MG tablet, Take 2 tablets (1,000 mg total) by mouth 3 (three) times daily., Disp: , Rfl: 0    aspirin (ECOTRIN) 81 MG EC tablet, Take 1 tablet (81 mg total) by mouth once daily. (Patient taking differently: Take 81 mg by mouth every morning. ), Disp: 360 tablet, Rfl: 0    atorvastatin (LIPITOR) 40 MG tablet, Take 1 tablet (40 mg total) by mouth once daily. (Patient taking differently: Take 40 mg by mouth every morning. ), Disp: 90 tablet, Rfl: 3    candesartan-hydrochlorothiazide (ATACAND HCT) 16-12.5 mg per tablet, Take 1 tablet by mouth once daily., Disp: 30 tablet, Rfl: 3    oxyCODONE-acetaminophen (PERCOCET) 5-325 mg per tablet, Take 1 tablet by mouth every 4 (four) hours as needed for Pain., Disp: 18 tablet, Rfl: 0    pregabalin (LYRICA) 75 MG capsule, Take 1 capsule (75 mg total) by mouth 3 (three) times daily., Disp: 90 capsule, Rfl: 2    clopidogreL (PLAVIX) 75 mg tablet, Take 1 tablet (75 mg total) by mouth once daily. (Patient taking differently: Take 75 mg by mouth every morning. ), Disp: 30 tablet, Rfl: 6  No current facility-administered medications for this visit.     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 iliac stent placement, LLE angiogram  17.4 min  837.37 mGy  172.81 Gycm2  37 ml contrast    AORTOGRAPHY N/A  2020    Procedure: AORTOGRAM;  Surgeon: Olayinka Harris MD;  Location: [facility] OR 2ND FLR;  Service: Peripheral Vascular;  Laterality: N/A;    APPENDECTOMY      BREAST BIOPSY       SECTION, CLASSIC      COLONOSCOPY      COLONOSCOPY N/A 2017    Procedure: COLONOSCOPY;  Surgeon: Gabriel Rose MD;  Location: [facility] ENDO (4TH FLR);  Service: Endoscopy;  Laterality: N/A;  Miralax prep per Dr. Rose    DENTAL SURGERY  2017    ENDARTERECTOMY OF FEMORAL ARTERY Left 2020    Procedure: ENDARTERECTOMY, FEMORAL;  Surgeon: Olayinka Harris MD;  Location: [facility] OR 2ND FLR;  Service: Peripheral Vascular;  Laterality: Left;  SFA endarterectomy  fluoro time: 9.2 min mGy: 2336.86    EPIDURAL STEROID INJECTION INTO LUMBAR SPINE N/A 2020    Procedure: Injection-steroid-epidural-lumbar--Review MRI- L5-S1 after MRI review;  Surgeon: Ely Syed Jr., MD;  Location: [facility] PAIN MGT;  Service: Pain Management;  Laterality: N/A;    EPIDURAL STEROID INJECTION INTO LUMBAR SPINE N/A 2020    Procedure: Injection-steroid-epidural-lumbar-- Interlaminar L5-S1/ pt aware she can continue taking ASA per Dr. Harris and hold plavix.;  Surgeon: Ely Syed Jr., MD;  Location: [facility] PAIN MGT;  Service: Pain Management;  Laterality: N/A;  Will sign consent at DOS.    gyn surgery      ex laparotomy    HYSTERECTOMY         Family History   Problem Relation Age of Onset    Hypertension Mother     Heart disease Mother 40        CABG x 5    Seizures Mother     Cancer Father         prostate    Stroke Father     Cancer Brother         colon    Diabetes Maternal Aunt     Breast cancer Maternal Aunt     Diabetes Maternal Uncle     Stroke Maternal Grandmother     Stroke Paternal Grandmother     Diabetes Cousin     Breast cancer Cousin        Social History     Socioeconomic History    Marital status: Single     Spouse name: Not on file    Number of children: Not on file     Years of education: Not on file    Highest education level: Not on file   Occupational History     Employer: [employer]   Social Needs    Financial resource strain: Not on file    Food insecurity     Worry: Not on file     Inability: Not on file    Transportation needs     Medical: Not on file     Non-medical: Not on file   Tobacco Use    Smoking status: Current Every Day Smoker     Packs/day: 0.50     Years: 40.00     Pack years: 20.00     Types: Cigarettes    Smokeless tobacco: Never Used    Tobacco comment: occasionally   Substance and Sexual Activity    Alcohol use: Yes     Alcohol/week: 0.0 standard drinks     Comment: rarely    Drug use: No    Sexual activity: Never   Lifestyle    Physical activity     Days per week: Not on file     Minutes per session: Not on file    Stress: Not on file   Relationships    Social connections     Talks on phone: Not on file     Gets together: Not on file     Attends Temple service: Not on file     Active member of club or organization: Not on file     Attends meetings of clubs or organizations: Not on file     Relationship status: Not on file   Other Topics Concern    Not on file   Social History Narrative    Not on file       Review of patient's allergies indicates:   Allergen Reactions    Adhesive Blisters    Augmentin [amoxicillin-pot clavulanate]      Vomiting    Latex, natural rubber Blisters    Ciprofloxacin Hives       Physical Exam:  Vitals:    08/10/20 1040   BP: (!) 112/59   Pulse: 79   Weight: 74.8 kg (164 lb 14.5 oz)   PainSc:   9     General    Nursing note and vitals reviewed.  Constitutional: She is oriented to person, place, and time. She appears well-developed and well-nourished. No distress.   HENT:   Head: Normocephalic and atraumatic.   Nose: Nose normal.   Eyes: Conjunctivae and EOM are normal. Pupils are equal, round, and reactive to light. Right eye exhibits no discharge. Left eye exhibits no discharge. No scleral icterus.    Neck: No JVD present.   Cardiovascular: Intact distal pulses.    Pulmonary/Chest: Effort normal. No respiratory distress.   Abdominal: She exhibits no distension.   Neurological: She is alert and oriented to person, place, and time. Coordination normal.   Psychiatric: She has a normal mood and affect. Her behavior is normal. Judgment and thought content normal.     General Musculoskeletal Exam   Gait: normal     Back (L-Spine & T-Spine) / Neck (C-Spine) Exam     Tenderness Right paramedian tenderness of the Lower L-Spine. Left paramedian tenderness of the Lower L-Spine.     Back (L-Spine & T-Spine) Range of Motion   Back extension: facet loading is positive and exacerabtes/reproduces the patient's typical low back pain    Back flexion: limited ROM but partial relief of low back pain noted.     Spinal Sensation   Right Side Sensation  L-Spine Level: normal  Left Side Sensation  L-Spine Level: normal    Other She has no scoliosis .    Comments:  Left Leg + SLR      Muscle Strength   Right Lower Extremity   Hip Flexion: 5/5   Hip Extensors: 5/5  Quadriceps:  5/5   Hamstrin/5   Gastrocsoleus:  5/5/5  Left Lower Extremity   Hip Flexion: 5/5   Hip Extensors: 5/5  Quadriceps:  5/5   Hamstrin/5   Gastrocsoleus:  5/5/5    Reflexes     Left Side  Quadriceps:  2+  Achilles:  2+    Right Side   Quadriceps:  2+  Achilles:  2+      Imaging:  None pertinent to review.    Labs:  BMP  Lab Results   Component Value Date     2020    K 3.7 2020     2020    CO2 23 2020    BUN 18 2020    CREATININE 0.8 2020    CALCIUM 9.7 2020    ANIONGAP 10 2020    ESTGFRAFRICA >60.0 2020    EGFRNONAA >60.0 2020     Lab Results   Component Value Date    ALT 19 2020    AST 16 2020    ALKPHOS 97 2020    BILITOT 0.3 2020       Assessment:  Problem List Items Addressed This Visit        Neuro    Lumbar radiculopathy - Primary    Chronic pain    Spinal  stenosis of lumbar region with neurogenic claudication       Orthopedic    Chronic bilateral low back pain with left-sided sciatica    Spondylolisthesis      Other Visit Diagnoses     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 female with chronic low back pain radiating into the left lower extremity consistent with radiculopathy at L5.  I will order an MRI of her lumbar spine to confirm this diagnosis as she has been dealing with the symptoms for the better part of 2019.  I have also ordered several medications to help address her pain and symptoms in addition to physical therapy.  I have also proposed a lumbar epidural steroid injection to be scheduled after completion of the MRI.  I would anticipate performing this injection at L5-S1; however, this location may change based on the results of the MRI.  She was also provided a letter for work to park closer to the building.    02/06/20203354-50-cbcp-old female presents for a follow-up status post and L5-S1 lumbar MIRNA with reported 95% relief her pain score today is 2/10 patient reports feeling much better following the injection patient states she started physical therapy however 2 days into PT she began developing a swollen, erythematous, painful left foot.  She  has recently seen Cardiology and has  been diagnosed with severe PAD (L>R). CTA with segmental occlusion of the left common and proximal SFA and occlusion of the left tibioperoneal trunk as well as occlusion of the left anterior tibial artery in its proximal 3rd. Moderate degree of calcific and hypodense plaque in the abdominal aorta and iliac arteries bilaterally.  She is now scheduled for a LLE iliac to femoral bypass creation with bilateral iliac stents on 2/17/2020 with Dr Harris/cardiology.  She endorses feeling much better following her pain procedure and that the pain she originally came in for is much better, discussed that we could repeat as  needed.    3/30/2020 - I reviewed her most recent MRI lumbar spine from 01/10/2020 which shows significant lateral recess and neural foraminal stenosis at L3-4 and L4-5 consistent with radiating pattern she is experiencing at this time.  Patient would be an excellent candidate for repeating the lumbar epidural steroid injection at L5-S1 as this provided her 90-95% relief.  Patient was advised that low back pain and radiculopathy can increase after surgical procedure due to immobility and reduced physical activity postoperatively.  In addition to the epidural steroid injection, she will also likely need to be evaluated by Neurosurgery in the symptoms remain refractory to the medications I will prescribe today and then epidural steroid injection.  Epidural steroid injections on hold pending resolution of the Coronavirus crisis.    4/21/2020 - Pain symptoms remain unchanged but appear better managed with the combination of Tramadol and Lyrica.  Goal is to wean off of opioids through PT, injections, and other non-opioids, but those efforts are limited until the Coronavirus restrictions pass.  Cont and reduce tramadol to her current usage level of TID PRN; cont and increase Lyrica.  Wait list for MIRNA.     5/13/2020- 68 y/o female s/p L5-S1 IESI with reported 0% relief, she continues to have low back and left leg pain radiating to her left foot.  optimized her Lyrica on her previous CV to be taking Lyrica 75 mg TID however she was only taking it BID. Also I recommended that we need to give the injection more time to work. She will began her correct Lyrica dose and give the injection more time to be effective.  Previous imaging was reviewed and discussed with the patient today.     06/10/4328-06-dycn-old female presents with continued low back and sciatic pain left greater than right, pain is medial LLE paresthesias that radiate to ipsilateral hip and are worst in the toes.  She is status post lumbar L5-S1  interlaminar MIRNA with 0% relief this was her 2nd injection with minimal to no relief.  Of considering her symptoms I recommended a referral to Neurosurgery patient is currently being evaluated by her vascular surgeon in effort to rule out advanced PAD, patient stated that she would like to follow up with her vascular surgeon and discussed her recent CTA A/P results once obtained.  She states she would like to know if it is her PAD that is getting worse before she see's NSYG.  Patient did report that her Lyrica 75 mg t.i.d. has helped her symptoms she  was previously on Lyrica 75 mg b.i.d.    08/10/2848-39-cjzq-old female with a history HTN, active smoker (1/2 ppd) and PAD s/p L EIA, L CFA endarterectomy, JESU balloon angioplasty and L profundaplasty on 02/18/2020, presents with continued low back and sciatic pain left greater than right pain is medial left lower extremity paresthesia that radiates to the ipsilateral hip and is worse in her toes.  She previously received a L5-S1 interlaminar MIRNA with 0% relief which was her 2nd injection.  Her lumbar MRI shows Worse pathology is at the L4-5 level where she has moderate facet arthropathy with thickening ligamentum flavum.  There is also degenerative disc disease with a vacuum disc phenomena, mild loss of disc height, degenerative endplate marginal osteophyte formation and diffuse disc bulging.  Is also an annular fissure with post contrast discal  enhancement. These changes result in bilateral lateral recess stenosis and moderate neural foraminal encroachment.  Discussed I now recommend a lumbar MIRNA targeting the L4-5 level in effort to provide her with some relief discussed that we will also take a left paramedial approach in effort to provide her with relief of her left lower extremity pain, patient verbalized understanding and agree.  Patient is requesting a short-term prescription of tramadol to last until she has a procedure discussed I will provide her with this  as a 1 time prescription.        Plan:  No procedures at this time   One time prescription of tramadol 50 mg to use prior to her lumbar MIRNA only  Continue  Lyrica to 75 mg p.o. TID  Patient was not a candidate for surgery following Neurosurgery recommendation.  I encouraged the patient to maintain a home exercise regimen that includes daily, moderate cardiovascular exercise lasting at least 30 minutes.  This may include yoga, mitzi chi, walking, swimming, aqua aerobics, or other exercises that maintain a heart rate of 50-70% of the calculated maximum heart rate.  I also encouraged light, daily stretching focused on the target area.  The above plan and management options were discussed at length with patient. Patient is in agreement with the above and verbalized understanding. Dr. Syed was consulted on this patient  and agrees with this plan.      RTC in 2 3 weeks following injection.    Reuben Diaz, AMBIKA-C  Interventional Pain Management       Disclaimer: This note was partly generated using dictation software which may occasionally result in transcription errors.

## 2020-08-10 NOTE — PROGRESS NOTES
" [facility] Established Patient Evaluation      Referred by: Anel Parikh MD  Reason for referral: Sciatica, unspecified laterality    CC:   post op -  6 days s/p -Injection-steroid-epidural-lumbar-- Interlaminar L5-S1    Last 3 PDI Scores 8/10/2020 6/10/2020 5/13/2020   Pain Disability Index (PDI) 63 [phone] 49     Interval Update:     8/10/20 - Ms. Murray returns to clinic for follow up visit reporting  continued low back and sciatic pain left greater than right. Pain intensity is currently 9/10.  Recent lumbar MRI done patient like to discuss in further detail.    6/10/20 - Ms. Murray returns to clinic for follow up visit reporting worse back and left leg  pain.  Patient is s/p Lumbar MIRNA on 5/7/20 with 0% relief.  Pain intensity is currently 7/10.    Ms Murray continues to c/o  medial LLE paresthesias that radiate to ipsilateral hip and are worst in the toes. She additionally complains of a 'squeezing' pain in the left foot and intermittent episodes of blanching of the left foot. She continues to have intermittent radicular pain in bilateral legs  Reports numbness in left hip and left foot.  Patient is currently being evaluated by vascular and endovascular surgery/Dr. Harris to rule out further pathology with her history of PAD he has  ordered a CTA for further evaluation. Dr Harris believes her pain is orginating in her back but wants to make sure.   Pain intensity is currently 7/10.       4/21/20 - Mrs. Murray returns to clinic for follow up visit reporting stable back and leg pain.  Numbness, tightness, and pain are very unpleasant in the ankle and foot bilaterally, but the left is "way worse".  Patient is here for medication refill of Tramadol 50 mg and Lyrica 50 mg.  She reports 30-50% relief with the current medication regimen.  Pain intensity is currently 5/10 ranging 5-8/10.    3/30/20 - The clinic visit for Mrs. Murray has been converted to a telephone encounter because he/she has declined a " telemedicine encounter due to technical difficulty or preference.  She is reporting acutely increased shooting pains originating in her back and traveling into the legs since having vascular surgery of her leg.  She was evaluated by her vascular surgery team and they believe her symptoms are not related to the recent surgery but are most likely to be an exacerbation of her pre-existing lumbar radiculopathy.  She states that her pain is severe and rates it 10/10 at this time.  She states inability to sleep comfortably at night and that her current medication regimen has failed to provide her significant relief.  She reports no relief with tramadol 50 mg and very little relief with a recent prescription of [place] 5-325 she received from her PCP.    2/6/2020 - Mrs. Murray returns to clinic for follow up visit reporting improved back pain.  Patient is s/p Lumbar MIRNA on 1/16/20 with 95% continued relief. She reports beginning PT and having to stop due to  developing a swollen, erythematous, painful left foot.  she has recently seen Cardiology and has  been diagnosed with severe PAD (L>R). CTA with segmental occlusion of the left common and proximal SFA and occlusion of the left tibioperoneal trunk as well as occlusion of the left anterior tibial artery in its proximal 3rd. Moderate degree of calcific and hypodense plaque in the abdominal aorta and iliac arteries bilaterally.  She is now scheduled for a LLE iliac to femoral bypass creation with bilateral iliac stents on 2/17/2020   Pain intensity is currently 2/10.      Background / HPI:   Rachel Murray is a 70 y.o. female who complains of Low back pain with Sciatica.  She reports pain that starts in the left side of her low back and travels through the buttocks, posterolateral thigh, anterior shin, and into the top of her foot.  Alleviating and exacerbating factors are variable but prolonged sitting and walking for more than 5 min appear to drastically increase her pain.  She also reports insomnia due to pain and has been using a recliner to sleep for approximately 90 min at a time. She reports dealing with low back pain for several years but noticed an acute increase in March 2019 which subsided somewhat.  In October 2019 her pain increased drastically and has not subsided since that time. Her history is positive for cervical disc herniation which was previously treated with oral medications and physical therapy.  She also notes associated numbness in the left lower extremity and a sensation of weakness in the left leg.  She denies falls or saddle anesthesia.    Location: Low back   Onset: 10/2019  Current Pain Score: 8/10  Daily Pain of Range: 5-8/10  Quality: Burning, Throbbing, Grabbing, Tingling, Numb, Sharp, Electric, Hot and Cold  Radiation: Radiates down left side to foot.  Worsened by: extension, lying down, sitting and walking for more than 5 minutes  Improved by: nothing    Previous Therapies:  PT/OT: Denies  HEP: Denies  Interventions: Denies  Surgery:Denies  Medications:   - NSAIDS:   - MSK Relaxants:   - TCAs:   - SNRIs:   - Topicals:   - Anticonvulsants:  - Opioids:     Current Pain Medications:  1. Aleve   2. Tramadol 50 mg TID PRN  3. Lyrica 50 TID    Review of Systems:  Review of Systems   Constitutional: Negative for chills and fever.   HENT: Negative for nosebleeds.    Eyes: Negative for blurred vision and pain.   Respiratory: Negative for hemoptysis.    Cardiovascular: Negative for chest pain and palpitations.   Gastrointestinal: Negative for heartburn, nausea and vomiting.   Genitourinary: Negative for dysuria and hematuria.   Musculoskeletal: Positive for back pain. Negative for myalgias.   Skin: Negative for rash.   Neurological: Positive for tingling, sensory change and focal weakness (LLE). Negative for seizures and loss of consciousness.   Endo/Heme/Allergies: Does not bruise/bleed easily.   Psychiatric/Behavioral: Negative for hallucinations. The patient  has insomnia (2/2 pain).        History:    Current Outpatient Medications:     acetaminophen (TYLENOL) 500 MG tablet, Take 2 tablets (1,000 mg total) by mouth 3 (three) times daily., Disp: , Rfl: 0    aspirin (ECOTRIN) 81 MG EC tablet, Take 1 tablet (81 mg total) by mouth once daily. (Patient taking differently: Take 81 mg by mouth every morning. ), Disp: 360 tablet, Rfl: 0    atorvastatin (LIPITOR) 40 MG tablet, Take 1 tablet (40 mg total) by mouth once daily. (Patient taking differently: Take 40 mg by mouth every morning. ), Disp: 90 tablet, Rfl: 3    candesartan-hydrochlorothiazide (ATACAND HCT) 16-12.5 mg per tablet, Take 1 tablet by mouth once daily., Disp: 30 tablet, Rfl: 3    oxyCODONE-acetaminophen (PERCOCET) 5-325 mg per tablet, Take 1 tablet by mouth every 4 (four) hours as needed for Pain., Disp: 18 tablet, Rfl: 0    pregabalin (LYRICA) 75 MG capsule, Take 1 capsule (75 mg total) by mouth 3 (three) times daily., Disp: 90 capsule, Rfl: 2    clopidogreL (PLAVIX) 75 mg tablet, Take 1 tablet (75 mg total) by mouth once daily. (Patient taking differently: Take 75 mg by mouth every morning. ), Disp: 30 tablet, Rfl: 6  No current facility-administered medications for this visit.     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 iliac stent placement, LLE angiogram  17.4 min  837.37 mGy  172.81 Gycm2  37 ml contrast    AORTOGRAPHY N/A  2020    Procedure: AORTOGRAM;  Surgeon: Olayinka Harris MD;  Location: [facility] OR 2ND FLR;  Service: Peripheral Vascular;  Laterality: N/A;    APPENDECTOMY      BREAST BIOPSY       SECTION, CLASSIC      COLONOSCOPY      COLONOSCOPY N/A 2017    Procedure: COLONOSCOPY;  Surgeon: Gabriel Rose MD;  Location: [facility] ENDO (4TH FLR);  Service: Endoscopy;  Laterality: N/A;  Miralax prep per Dr. Rose    DENTAL SURGERY  2017    ENDARTERECTOMY OF FEMORAL ARTERY Left 2020    Procedure: ENDARTERECTOMY, FEMORAL;  Surgeon: Olayinka Harris MD;  Location: [facility] OR 2ND FLR;  Service: Peripheral Vascular;  Laterality: Left;  SFA endarterectomy  fluoro time: 9.2 min mGy: 2336.86    EPIDURAL STEROID INJECTION INTO LUMBAR SPINE N/A 2020    Procedure: Injection-steroid-epidural-lumbar--Review MRI- L5-S1 after MRI review;  Surgeon: Ely Syed Jr., MD;  Location: [facility] MGT;  Service: Pain Management;  Laterality: N/A;    EPIDURAL STEROID INJECTION INTO LUMBAR SPINE N/A 2020    Procedure: Injection-steroid-epidural-lumbar-- Interlaminar L5-S1/ pt aware she can continue taking ASA per Dr. Harris and hold plavix.;  Surgeon: Ely Syed Jr., MD;  Location: [facility]GT;  Service: Pain Management;  Laterality: N/A;  Will sign consent at DOS.    gyn surgery      ex laparotomy    HYSTERECTOMY         Family History   Problem Relation Age of Onset    Hypertension Mother     Heart disease Mother 40        CABG x 5    Seizures Mother     Cancer Father         prostate    Stroke Father     Cancer Brother         colon    Diabetes Maternal Aunt     Breast cancer Maternal Aunt     Diabetes Maternal Uncle     Stroke Maternal Grandmother     Stroke Paternal Grandmother     Diabetes Cousin     Breast cancer Cousin        Social History     Socioeconomic History    Marital status: Single     Spouse name: Not on file    Number of children: Not on file     Years of education: Not on file    Highest education level: Not on file   Occupational History     Employer: [employer]   Social Needs    Financial resource strain: Not on file    Food insecurity     Worry: Not on file     Inability: Not on file    Transportation needs     Medical: Not on file     Non-medical: Not on file   Tobacco Use    Smoking status: Current Every Day Smoker     Packs/day: 0.50     Years: 40.00     Pack years: 20.00     Types: Cigarettes    Smokeless tobacco: Never Used    Tobacco comment: occasionally   Substance and Sexual Activity    Alcohol use: Yes     Alcohol/week: 0.0 standard drinks     Comment: rarely    Drug use: No    Sexual activity: Never   Lifestyle    Physical activity     Days per week: Not on file     Minutes per session: Not on file    Stress: Not on file   Relationships    Social connections     Talks on phone: Not on file     Gets together: Not on file     Attends [employer] service: Not on file     Active member of club or organization: Not on file     Attends meetings of clubs or organizations: Not on file     Relationship status: Not on file   Other Topics Concern    Not on file   Social History Narrative    Not on file       Review of patient's allergies indicates:   Allergen Reactions    Adhesive Blisters    Augmentin [amoxicillin-pot clavulanate]      Vomiting    Latex, natural rubber Blisters    Ciprofloxacin Hives       Physical Exam:  Vitals:    08/10/20 1040   BP: (!) 112/59   Pulse: 79   Weight: 74.8 kg (164 lb 14.5 oz)   PainSc:   9     General    Nursing note and vitals reviewed.  Constitutional: She is oriented to person, place, and time. She appears well-developed and well-nourished. No distress.   HENT:   Head: Normocephalic and atraumatic.   Nose: Nose normal.   Eyes: Conjunctivae and EOM are normal. Pupils are equal, round, and reactive to light. Right eye exhibits no discharge. Left eye exhibits no discharge. No scleral icterus.    Neck: No JVD present.   Cardiovascular: Intact distal pulses.    Pulmonary/Chest: Effort normal. No respiratory distress.   Abdominal: She exhibits no distension.   Neurological: She is alert and oriented to person, place, and time. Coordination normal.   Psychiatric: She has a normal mood and affect. Her behavior is normal. Judgment and thought content normal.     General Musculoskeletal Exam   Gait: normal     Back (L-Spine & T-Spine) / Neck (C-Spine) Exam     Tenderness Right paramedian tenderness of the Lower L-Spine. Left paramedian tenderness of the Lower L-Spine.     Back (L-Spine & T-Spine) Range of Motion   Back extension: facet loading is positive and exacerabtes/reproduces the patient's typical low back pain    Back flexion: limited ROM but partial relief of low back pain noted.     Spinal Sensation   Right Side Sensation  L-Spine Level: normal  Left Side Sensation  L-Spine Level: normal    Other She has no scoliosis .    Comments:  Left Leg + SLR      Muscle Strength   Right Lower Extremity   Hip Flexion: 5/5   Hip Extensors: 5/5  Quadriceps:  5/5   Hamstrin/5   Gastrocsoleus:  5/5/5  Left Lower Extremity   Hip Flexion: 5/5   Hip Extensors: 5/5  Quadriceps:  5/5   Hamstrin/5   Gastrocsoleus:  5/5/5    Reflexes     Left Side  Quadriceps:  2+  Achilles:  2+    Right Side   Quadriceps:  2+  Achilles:  2+      Imaging:  None pertinent to review.    Labs:  BMP  Lab Results   Component Value Date     2020    K 3.7 2020     2020    CO2 23 2020    BUN 18 2020    CREATININE 0.8 2020    CALCIUM 9.7 2020    ANIONGAP 10 2020    ESTGFRAFRICA >60.0 2020    EGFRNONAA >60.0 2020     Lab Results   Component Value Date    ALT 19 2020    AST 16 2020    ALKPHOS 97 2020    BILITOT 0.3 2020       Assessment:  Problem List Items Addressed This Visit        Neuro    Lumbar radiculopathy - Primary    Chronic pain    Spinal  stenosis of lumbar region with neurogenic claudication       Orthopedic    Chronic bilateral low back pain with left-sided sciatica    Spondylolisthesis      Other Visit Diagnoses     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 female with chronic low back pain radiating into the left lower extremity consistent with radiculopathy at L5.  I will order an MRI of her lumbar spine to confirm this diagnosis as she has been dealing with the symptoms for the better part of 2019.  I have also ordered several medications to help address her pain and symptoms in addition to physical therapy.  I have also proposed a lumbar epidural steroid injection to be scheduled after completion of the MRI.  I would anticipate performing this injection at L5-S1; however, this location may change based on the results of the MRI.  She was also provided a letter for work to park closer to the building.    02/06/20205140-02-kwft-old female presents for a follow-up status post and L5-S1 lumbar MIRNA with reported 95% relief her pain score today is 2/10 patient reports feeling much better following the injection patient states she started physical therapy however 2 days into PT she began developing a swollen, erythematous, painful left foot.  She  has recently seen Cardiology and has  been diagnosed with severe PAD (L>R). CTA with segmental occlusion of the left common and proximal SFA and occlusion of the left tibioperoneal trunk as well as occlusion of the left anterior tibial artery in its proximal 3rd. Moderate degree of calcific and hypodense plaque in the abdominal aorta and iliac arteries bilaterally.  She is now scheduled for a LLE iliac to femoral bypass creation with bilateral iliac stents on 2/17/2020 with Dr Harris/cardiology.  She endorses feeling much better following her pain procedure and that the pain she originally came in for is much better, discussed that we could repeat as  needed.    3/30/2020 - I reviewed her most recent MRI lumbar spine from 01/10/2020 which shows significant lateral recess and neural foraminal stenosis at L3-4 and L4-5 consistent with radiating pattern she is experiencing at this time.  Patient would be an excellent candidate for repeating the lumbar epidural steroid injection at L5-S1 as this provided her 90-95% relief.  Patient was advised that low back pain and radiculopathy can increase after surgical procedure due to immobility and reduced physical activity postoperatively.  In addition to the epidural steroid injection, she will also likely need to be evaluated by Neurosurgery in the symptoms remain refractory to the medications I will prescribe today and then epidural steroid injection.  Epidural steroid injections on hold pending resolution of the Coronavirus crisis.    4/21/2020 - Pain symptoms remain unchanged but appear better managed with the combination of Tramadol and Lyrica.  Goal is to wean off of opioids through PT, injections, and other non-opioids, but those efforts are limited until the Coronavirus restrictions pass.  Cont and reduce tramadol to her current usage level of TID PRN; cont and increase Lyrica.  Wait list for MIRNA.     5/13/2020- 68 y/o female s/p L5-S1 IESI with reported 0% relief, she continues to have low back and left leg pain radiating to her left foot.  optimized her Lyrica on her previous CV to be taking Lyrica 75 mg TID however she was only taking it BID. Also I recommended that we need to give the injection more time to work. She will began her correct Lyrica dose and give the injection more time to be effective.  Previous imaging was reviewed and discussed with the patient today.     06/10/9357-73-sqrx-old female presents with continued low back and sciatic pain left greater than right, pain is medial LLE paresthesias that radiate to ipsilateral hip and are worst in the toes.  She is status post lumbar L5-S1  interlaminar MIRNA with 0% relief this was her 2nd injection with minimal to no relief.  Of considering her symptoms I recommended a referral to Neurosurgery patient is currently being evaluated by her vascular surgeon in effort to rule out advanced PAD, patient stated that she would like to follow up with her vascular surgeon and discussed her recent CTA A/P results once obtained.  She states she would like to know if it is her PAD that is getting worse before she see's [facility].  Patient did report that her Lyrica 75 mg t.i.d. has helped her symptoms she  was previously on Lyrica 75 mg b.i.d.    08/10/6116-50-nuzr-old female with a history HTN, active smoker (1/2 ppd) and PAD s/p L EIA, L CFA endarterectomy, JESU balloon angioplasty and L profundaplasty on 02/18/2020, presents with continued low back and sciatic pain left greater than right pain is medial left lower extremity paresthesia that radiates to the ipsilateral hip and is worse in her toes.  She previously received a L5-S1 interlaminar MIRNA with 0% relief which was her 2nd injection.  Her lumbar MRI shows Worse pathology is at the L4-5 level where she has moderate facet arthropathy with thickening ligamentum flavum.  There is also degenerative disc disease with a vacuum disc phenomena, mild loss of disc height, degenerative endplate marginal osteophyte formation and diffuse disc bulging.  Is also an annular fissure with post contrast discal  enhancement. These changes result in bilateral lateral recess stenosis and moderate neural foraminal encroachment.  Discussed I now recommend a lumbar MIRNA targeting the L4-5 level in effort to provide her with some relief discussed that we will also take a left paramedial approach in effort to provide her with relief of her left lower extremity pain, patient verbalized understanding and agree.  Patient is requesting a short-term prescription of tramadol to last until she has a procedure discussed I will provide her with this  as a 1 time prescription.        Plan:  No procedures at this time   One time prescription of tramadol 50 mg to use prior to her lumbar MIRNA only  Continue  Lyrica to 75 mg p.o. TID  Patient was not a candidate for surgery following Neurosurgery recommendation.  I encouraged the patient to maintain a home exercise regimen that includes daily, moderate cardiovascular exercise lasting at least 30 minutes.  This may include yoga, mitzi chi, walking, swimming, aqua aerobics, or other exercises that maintain a heart rate of 50-70% of the calculated maximum heart rate.  I also encouraged light, daily stretching focused on the target area.  The above plan and management options were discussed at length with patient. Patient is in agreement with the above and verbalized understanding. Dr. Syed was consulted on this patient  and agrees with this plan.      RTC in 2 3 weeks following injection.    Reuben Diaz, AMBIKA-C  Interventional Pain Management       Disclaimer: This note was partly generated using dictation software which may occasionally result in transcription errors.

## 2020-08-11 ENCOUNTER — TELEPHONE (OUTPATIENT)
Dept: PAIN MEDICINE | Facility: CLINIC | Age: 70
End: 2020-08-11

## 2020-08-11 DIAGNOSIS — M54.16 LUMBAR RADICULOPATHY: Primary | ICD-10-CM

## 2020-08-11 NOTE — TELEPHONE ENCOUNTER
Pt scheduled for 8/20/20 at 0900 am for IBAN. Pt aware to check in at registration desk on the first floor of the hospital for 0800 am. Pt is taking Plavix and ASA. Sent med clearance request to Dr. Harris. Pt aware we will contact her to advise her when to hold medications. Pt denied having a pacemaker/ICD.

## 2020-08-11 NOTE — TELEPHONE ENCOUNTER
Pt scheduled for Lumbar Epidural Steroid Injection on 8/20/20 with Dr. Syed. Requesting medication clearance to hold Plavix 7 days and ASA 3 days prior to procedure. Please advise.

## 2020-08-13 ENCOUNTER — TELEPHONE (OUTPATIENT)
Dept: INTERNAL MEDICINE | Facility: CLINIC | Age: 70
End: 2020-08-13

## 2020-08-13 ENCOUNTER — OFFICE VISIT (OUTPATIENT)
Dept: VASCULAR SURGERY | Facility: CLINIC | Age: 70
End: 2020-08-13
Attending: SURGERY
Payer: COMMERCIAL

## 2020-08-13 ENCOUNTER — HOSPITAL ENCOUNTER (OUTPATIENT)
Dept: VASCULAR SURGERY | Facility: CLINIC | Age: 70
Discharge: HOME | End: 2020-08-13
Attending: SURGERY
Payer: COMMERCIAL

## 2020-08-13 VITALS — TEMPERATURE: 98 F

## 2020-08-13 DIAGNOSIS — M53.86 SCIATICA ASSOCIATED WITH DISORDER OF LUMBAR SPINE: ICD-10-CM

## 2020-08-13 DIAGNOSIS — I73.9 PAD (PERIPHERAL ARTERY DISEASE): ICD-10-CM

## 2020-08-13 DIAGNOSIS — I73.9 PAD (PERIPHERAL ARTERY DISEASE): Primary | ICD-10-CM

## 2020-08-13 DIAGNOSIS — I70.202 FEMORAL ARTERY OCCLUSION, LEFT: ICD-10-CM

## 2020-08-13 DIAGNOSIS — I82.409 DEEP VENOUS THROMBOSIS OF LOWER EXTREMITY: ICD-10-CM

## 2020-08-13 DIAGNOSIS — I77.1 ILIAC ARTERY STENOSIS, BILATERAL: ICD-10-CM

## 2020-08-13 DIAGNOSIS — M79.89 RIGHT LEG SWELLING: ICD-10-CM

## 2020-08-13 PROCEDURE — 1125F PR PAIN SEVERITY QUANTIFIED, PAIN PRESENT: ICD-10-PCS | Mod: S$GLB,,, | Performed by: SURGERY

## 2020-08-13 PROCEDURE — 1159F PR MEDICATION LIST DOCUMENTED IN MEDICAL RECORD: ICD-10-PCS | Mod: S$GLB,,, | Performed by: SURGERY

## 2020-08-13 PROCEDURE — 1101F PT FALLS ASSESS-DOCD LE1/YR: CPT | Mod: CPTII,S$GLB,, | Performed by: SURGERY

## 2020-08-13 PROCEDURE — 1159F MED LIST DOCD IN RCRD: CPT | Mod: S$GLB,,, | Performed by: SURGERY

## 2020-08-13 PROCEDURE — 1101F PR PT FALLS ASSESS DOC 0-1 FALLS W/OUT INJ PAST YR: ICD-10-PCS | Mod: CPTII,S$GLB,, | Performed by: SURGERY

## 2020-08-13 PROCEDURE — 1125F AMNT PAIN NOTED PAIN PRSNT: CPT | Mod: S$GLB,,, | Performed by: SURGERY

## 2020-08-13 PROCEDURE — 99214 OFFICE O/P EST MOD 30 MIN: CPT | Mod: S$GLB,,, | Performed by: SURGERY

## 2020-08-13 PROCEDURE — 99999 PR PBB SHADOW E&M-EST. PATIENT-LVL II: CPT | Mod: PBBFAC,,, | Performed by: SURGERY

## 2020-08-13 PROCEDURE — 93970 PR US DUPLEX, UPPER OR LOWER EXT VENOUS,COMPLETE BILAT: ICD-10-PCS | Mod: S$GLB,,, | Performed by: SURGERY

## 2020-08-13 PROCEDURE — 99214 PR OFFICE/OUTPT VISIT, EST, LEVL IV, 30-39 MIN: ICD-10-PCS | Mod: S$GLB,,, | Performed by: SURGERY

## 2020-08-13 PROCEDURE — 93970 EXTREMITY STUDY: CPT | Mod: S$GLB,,, | Performed by: SURGERY

## 2020-08-13 PROCEDURE — 99999 PR PBB SHADOW E&M-EST. PATIENT-LVL II: ICD-10-PCS | Mod: PBBFAC,,, | Performed by: SURGERY

## 2020-08-13 NOTE — PROGRESS NOTES
Rachel Murray  08/13/2020    HPI:  Patient is a 70 y.o. female with a h/o lumbar radiculopathy and PAD who is here today for follow-up appointment after recent bilateral external iliac artery stenting 7/21/2020. Since her procedure she has had significant, new swelling bilaterally in both lower legs R>L, accompanied by excruciating pain. Her skin is sensitive to touch. Her pain is worse on the left limb, although the swelling is worse on the right leg. She has new numbness on the dorsal side of her right foot.     She is in severe back pain, seen by neurosurgery yesterday after obtaining MRI and she is on the schedule for pain injections next week. Today, she still complains of pain, swelling, and redness to her left leg. DVT US from last week was negative for DVT. She wears compression stockings but this worsens her pain.        Prior:  She reports of medial LLE paresthesias that radiate to ipsilateral hip and are worst in the toes. She additionally complains of a 'squeezing' pain in the left foot and intermittent episodes of blanching of the left foot. She continues to have intermittent radicular pain in bilateral legs, for which she received an L5-S1 lumbar epidural steroid injection on 05/07/2020 that the patient reports was minimally helpful. The patient takes Lyrica and Tramadol for her leg pain with modest pain relief, rated 5/10 at the time of history taking.   Ms. Murray reports a small, persistent defect in her left groin incision, which is not painful or erythematous and has not been productive of discharge. She continues to dress the incision with a dry gauze. She continues to smoke cigarettes but has decreased her usage from 1/2 pack per day, down from 1 pack daily.     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 iliac stent placement, LLE angiogram  17.4 min  837.37 mGy  172.81 Gycm2  37 ml contrast    AORTOGRAPHY N/A 2020    Procedure: AORTOGRAM;  Surgeon: Olayinka Harris MD;  Location: [facility] OR 2ND FLR;  Service: Peripheral Vascular;  Laterality: N/A;    APPENDECTOMY      BREAST BIOPSY       SECTION, CLASSIC      COLONOSCOPY      COLONOSCOPY N/A 2017    Procedure: COLONOSCOPY;  Surgeon: Gabriel Rose MD;  Location: [facility] (4TH FLR);  Service: Endoscopy;  Laterality: N/A;  Miralax prep per Dr. Rose    DENTAL SURGERY  2017    ENDARTERECTOMY OF FEMORAL ARTERY Left 2020    Procedure: ENDARTERECTOMY, FEMORAL;  Surgeon: Olayinka Harris MD;  [facility];  Service: Peripheral Vascular;  Laterality: Left;  SFA endarterectomy  fluoro time: 9.2 min mGy: 2336.86    EPIDURAL STEROID INJECTION INTO LUMBAR SPINE N/A 2020    Procedure: Injection-steroid-epidural-lumbar--Review MRI- L5-S1 after MRI review;  Surgeon: Ely Syed Jr., MD;  Location: [facility] PAIN MGT;  Service: Pain Management;  Laterality: N/A;    EPIDURAL STEROID INJECTION INTO LUMBAR SPINE N/A 2020    Procedure: Injection-steroid-epidural-lumbar-- Interlaminar L5-S1/ pt aware she can continue taking ASA per Dr. Harris and hold plavix.;  Surgeon: Ely Syed Jr., MD;  Location: [facility] PAIN MGT;  Service: Pain Management;  Laterality: N/A;  Will sign consent at DOS.    gyn surgery      ex laparotomy    HYSTERECTOMY       Family History   Problem Relation Age of Onset    Hypertension Mother     Heart disease Mother 40        CABG x 5    Seizures  Mother     Cancer Father         prostate    Stroke Father     Cancer Brother         colon    Diabetes Maternal Aunt     Breast cancer Maternal Aunt     Diabetes Maternal Uncle     Stroke Maternal Grandmother     Stroke Paternal Grandmother     Diabetes Cousin     Breast cancer Cousin      Social History     Socioeconomic History    Marital status: Single     Spouse name: Not on file    Number of children: Not on file    Years of education: Not on file    Highest education level: Not on file   Occupational History     Employer: [employer]   Social Needs    Financial resource strain: Not on file    Food insecurity     Worry: Not on file     Inability: Not on file    Transportation needs     Medical: Not on file     Non-medical: Not on file   Tobacco Use    Smoking status: Current Every Day Smoker     Packs/day: 0.50     Years: 40.00     Pack years: 20.00     Types: Cigarettes    Smokeless tobacco: Never Used    Tobacco comment: occasionally   Substance and Sexual Activity    Alcohol use: Yes     Alcohol/week: 0.0 standard drinks     Comment: rarely    Drug use: No    Sexual activity: Never   Lifestyle    Physical activity     Days per week: Not on file     Minutes per session: Not on file    Stress: Not on file   Relationships    Social connections     Talks on phone: Not on file     Gets together: Not on file     Attends Islam service: Not on file     Active member of club or organization: Not on file     Attends meetings of clubs or organizations: Not on file     Relationship status: Not on file   Other Topics Concern    Not on file   Social History Narrative    Not on file       Current Outpatient Medications:     acetaminophen (TYLENOL) 500 MG tablet, Take 2 tablets (1,000 mg total) by mouth 3 (three) times daily., Disp: , Rfl: 0    aspirin (ECOTRIN) 81 MG EC tablet, Take 1 tablet (81 mg total) by mouth once daily. (Patient taking differently: Take 81 mg by mouth every  morning. ), Disp: 360 tablet, Rfl: 0    atorvastatin (LIPITOR) 40 MG tablet, Take 1 tablet (40 mg total) by mouth once daily. (Patient taking differently: Take 40 mg by mouth every morning. ), Disp: 90 tablet, Rfl: 3    candesartan-hydrochlorothiazide (ATACAND HCT) 16-12.5 mg per tablet, Take 1 tablet by mouth once daily., Disp: 30 tablet, Rfl: 3    clopidogreL (PLAVIX) 75 mg tablet, Take 1 tablet (75 mg total) by mouth once daily. (Patient taking differently: Take 75 mg by mouth every morning. ), Disp: 30 tablet, Rfl: 6    oxyCODONE-acetaminophen (PERCOCET) 5-325 mg per tablet, Take 1 tablet by mouth every 4 (four) hours as needed for Pain., Disp: 18 tablet, Rfl: 0    pregabalin (LYRICA) 75 MG capsule, Take 1 capsule (75 mg total) by mouth 3 (three) times daily., Disp: 90 capsule, Rfl: 2    traMADoL (ULTRAM) 50 mg tablet, Take 1 tablet (50 mg total) by mouth every 24 hours as needed for Pain., Disp: 12 tablet, Rfl: 0  No current facility-administered medications for this visit.     Facility-Administered Medications Ordered in Other Visits:     sodium chloride 0.9% flush 10 mL, 10 mL, Intravenous, PRN, Reyna Toledo MD    REVIEW OF SYSTEMS:  General: negative; ENT: negative; Allergy and Immunology: negative; Hematological and Lymphatic: Negative; Endocrine: negative; Respiratory: no cough, shortness of breath, or wheezing; Cardiovascular: no chest pain or dyspnea on exertion; Gastrointestinal: no abdominal pain/back, change in bowel habits, or bloody stools; Genito-Urinary: no dysuria, trouble voiding, or hematuria; Musculoskeletal: Bilateral (L >>R) radicular pain and paresthesias  Neurological: no TIA or stroke symptoms; Psychiatric: no nervousness, anxiety or depression.    PHYSICAL EXAM:   Right Arm BP - Sittin/78 (20 1141)  Left Arm BP - Sittin/56 (20 1141)     Temp: 98.4 °F (36.9 °C)      General appearance:  Alert, well-appearing, and in no distress.  Oriented to person, place,  and time   Neurological: Normal speech, no focal findings noted; CN II - XII grossly intact           Musculoskeletal: Digits/nail without cyanosis/clubbing.  Normal muscle strength/tone.                 Neck: Supple, no significant adenopathy; thyroid is not enlarged                  No carotid bruit can be auscultated                Chest:  Clear to auscultation, no wheezes, rales or rhonchi, symmetric air entry     No use of accessory muscles             Cardiac: Normal rate and regular rhythm, S1 and S2 normal; PMI non-displaced          Abdomen: Soft, nontender, nondistended, no masses or organomegaly     No rebound tenderness noted; bowel sounds normal     Pulsatile aortic mass is not palpable.     No groin adenopathy      Extremities:   2+ femoral pulses bilaterally     Left groin incision healed with palpable fibrotic scarring beneath skin     Pedal pulses nonpalpable.     Significant 2+ pitting pre-tibial edema R>L      Hyperalgesia over lower legs and feet      Warmth over RLE, blanching present with movement.      No ulcerations    LAB RESULTS:  Lab Results   Component Value Date    K 3.7 07/09/2020    K 4.5 02/25/2020    K 4.2 02/24/2020    CREATININE 0.8 07/09/2020    CREATININE 0.8 06/04/2020    CREATININE 0.8 02/25/2020     Lab Results   Component Value Date    WBC 10.84 07/09/2020    WBC 7.05 02/25/2020    WBC 8.42 02/24/2020    HCT 33.5 (L) 07/09/2020    HCT 34.9 (L) 02/25/2020    HCT 33.3 (L) 02/24/2020     (H) 07/09/2020     (H) 02/25/2020     02/24/2020     No results found for: HGBA1C  IMAGING:  Lower Extremity Segmental WALT 08/05/2020  R WALT: 0.95  L WALT: 0.44    Lower Extremity Segmental WALT 05/28/2020  R WALT: 0.54  L WALT: 0.48    Vein US 08/13/20 without DVT or reflux.      CTA:  1. Postoperative changes at the left groin, presumably a short bypass graft at the level of the common femoral artery.  2. Greater than 70% stenosis of the external iliac artery with  short-segment occlusion of the proximal femoral arteries.  3. Greater than 70% stenosis of the right external iliac artery.    IMP/PLAN:  70 y.o. female with HTN, active smoker (1/2 ppd) and PAD s/p L EIA, L CFA endarterectomy, JESU balloon angioplasty and L profundaplasty on 02/18/2020, who presents today for follow-up s/p bilateral external iliac artery stenting 7/21/2020. She was seen last week with severe swelling and pain with hyperemia to left leg and was ruled out for DVT. She returns today for follow up US which again shows no DVT or reflux disease. This could be related to her neurological problems pertaining to her back with significant sympathetic overload causing swelling and pain to the leg.     Plan:  -follow up lumbar spine injections.   -will see patient in 3 weeks to discuss further workup of PAD and possible angiography    Olayinka Harris MD  Vascular & Endovascular Surgery

## 2020-08-13 NOTE — TELEPHONE ENCOUNTER
Called and spoke with the patient and advised that the forms are here and we can call in the morning to complete the forms

## 2020-08-13 NOTE — TELEPHONE ENCOUNTER
----- Message from Karo Epperson sent at 8/13/2020  3:15 PM CDT -----  Contact: Pt [phone]--  Patient Returning Call from [facility]    Who Left Message for Patient:--Per pt Leigh--    Communication Preference:--Rachel--[phone]--    Additional Information:Per pt returning a missed call.

## 2020-08-17 ENCOUNTER — TELEPHONE (OUTPATIENT)
Dept: INTERNAL MEDICINE | Facility: CLINIC | Age: 70
End: 2020-08-17

## 2020-08-18 ENCOUNTER — TELEPHONE (OUTPATIENT)
Dept: INTERNAL MEDICINE | Facility: CLINIC | Age: 70
End: 2020-08-18

## 2020-08-18 ENCOUNTER — OFFICE VISIT (OUTPATIENT)
Dept: INTERNAL MEDICINE | Facility: CLINIC | Age: 70
End: 2020-08-18
Payer: COMMERCIAL

## 2020-08-18 DIAGNOSIS — M54.16 LUMBAR RADICULOPATHY: ICD-10-CM

## 2020-08-18 DIAGNOSIS — I73.9 PAD (PERIPHERAL ARTERY DISEASE): Primary | ICD-10-CM

## 2020-08-18 PROCEDURE — 99213 OFFICE O/P EST LOW 20 MIN: CPT | Mod: 95,,, | Performed by: INTERNAL MEDICINE

## 2020-08-18 PROCEDURE — 99213 PR OFFICE/OUTPT VISIT, EST, LEVL III, 20-29 MIN: ICD-10-PCS | Mod: 95,,, | Performed by: INTERNAL MEDICINE

## 2020-08-18 NOTE — PROGRESS NOTES
Established Patient - Audio Only Telehealth Visit     The patient location is:  Home  The chief complaint leading to consultation is:  Follow-up of leg pain  Visit type: Virtual visit with audio only (telephone)  Total time spent with patient:  16 min       The reason for the audio only service rather than synchronous audio and video virtual visit was related to technical difficulties or patient preference/necessity.     Each patient to whom I provide medical services by telemedicine is:  (1) informed of the relationship between the physician and patient and the respective role of any other health care provider with respect to management of the patient; and (2) notified that they may decline to receive medical services by telemedicine and may withdraw from such care at any time. Patient verbally consented to receive this service via voice-only telephone call.       CC: followup of leg pain  HPI:  The patient is a 70 y.o. year old female who complains of continued leg pain.  The pain starts in her back and radiates down her legs to her feet.  She is scheduled for an injection on Thursday.  Pain is constant shooting pain with spasms lasting 20-30 minutes in her legs.  She went to the emergency department twice secondary to swelling in her legs and feet.  She has been wearing compression stocking which helped somewhat with edema.  She has been unable to flex her left ankle secondary to edema.  Her range of motion is better on the right.  She also reports left foot numbness, and has difficulty walking, standing or sitting for prolonged periods.  She also reports she has difficulty driving.  She is only able to drive to locations less than 5 min from her house.  The patient reports she may need another angiogram.  However, her vascular surgeon is awaiting disposition on her back pain.      PAST MEDICAL HISTORY:  Past Medical History:   Diagnosis Date    anal cancer     Squamous cell carcinoma of the anal canal with  radiation and chemotherapy    Arthritis     Colon polyp     Hypertension 9/10/2012       SURGICAL HISTORY:  Past Surgical History:   Procedure Laterality Date    ANGIOGRAPHY OF LOWER EXTREMITY Bilateral 2020    Procedure: Angiogram Extremity Unilateral;  Surgeon: Olaynika Harris MD;  [facility];  Service: Peripheral Vascular;  Laterality: Bilateral;  tabatha. iliac stent placement, LLE angiogram  17.4 min  837.37 mGy  172.81 Gycm2  37 ml contrast    AORTOGRAPHY N/A 2020    Procedure: AORTOGRAM;  Surgeon: Olayinka Harris MD;  Location: [facility] OR 2ND FLR;  Service: Peripheral Vascular;  Laterality: N/A;    APPENDECTOMY      BREAST BIOPSY       SECTION, CLASSIC      COLONOSCOPY      COLONOSCOPY N/A 2017    Procedure: COLONOSCOPY;  Surgeon: Gabriel Rose MD;  Location: [facility] (4TH FLR);  Service: Endoscopy;  Laterality: N/A;  Miralax prep per Dr. Rose    DENTAL SURGERY  2017    ENDARTERECTOMY OF FEMORAL ARTERY Left 2020    Procedure: ENDARTERECTOMY, FEMORAL;  Surgeon: Olayinka Harris MD;  Location: [facility] OR 2ND FLR;  Service: Peripheral Vascular;  Laterality: Left;  SFA endarterectomy  fluoro time: 9.2 min mGy: 2336.86    EPIDURAL STEROID INJECTION INTO LUMBAR SPINE N/A 2020    Procedure: Injection-steroid-epidural-lumbar--Review MRI- L5-S1 after MRI review;  Surgeon: Ely Syed Jr., MD;  Location: [facility] PAIN T;  Service: Pain Management;  Laterality: N/A;    EPIDURAL STEROID INJECTION INTO LUMBAR SPINE N/A 2020    Procedure: Injection-steroid-epidural-lumbar-- Interlaminar L5-S1/ pt aware she can continue taking ASA per Dr. Harris and hold plavix.;  Surgeon: Ely Syed Jr., MD;  Location: [facility] PAIN T;  Service: Pain Management;  Laterality: N/A;  Will sign consent at DOS.    gyn surgery      ex laparotomy    HYSTERECTOMY         MEDS:  Medcard reviewed and updated    ALLERGIES: Allergy Card reviewed and  updated    SOCIAL HISTORY:   The patient is a nonsmoker.    PE:   APPEARANCE:  Sounds to be in no acute distress.    Audio only  PSYCHIATRIC: The patient is oriented to person, place, and time and has a pleasant affect.        ASSESSMENT/PLAN:  Diagnoses and all orders for this visit:    PAD (peripheral artery disease)  -     followed by vascular surgery  -     patient remains unable to work secondary to pain    Lumbar radiculopathy  -     followed by pain medicine  -     await epidural steroid injection  -     patient unable to perform ADLs secondary to pain  -     patient remains unable to work secondary to pain                         This service was not originating from a related E/M service provided within the previous 7 days nor will  to an E/M service or procedure within the next 24 hours or my soonest available appointment.  Prevailing standard of care was able to be met in this audio-only visit.

## 2020-08-19 NOTE — DISCHARGE INSTRUCTIONS
Home Care Instructions Pain Management:    1.  DIET:    You may resume your normal diet today.    2.  BATHING:    You may shower with luke warm water.    3.  DRESSING:    You may remove your bandage today.    4.  ACTIVITY LEVEL:      You may resume your normal activities 24 hours after your procedure.    5.  MEDICATIONS:    You may resume your normal medications today.    6.  SPECIAL INSTRUCTIONS:    No heat to the injection site for 24 hours including bath or shower, heating pad, moist heat or hot tubs.    Use an ice pack to the injection site for any pain or discomfort.  Apply ice packs for 20 minute intervals as needed.    If you have received any sedatives by mouth today, you can not drive for 12 hours.    If you have received sedation through an IV, you can not drive for 24 hours.    PLEASE CALL YOUR DOCTOR FOR THE FOLLOWIN.  Redness or swelling around the injection site.  2.  Fever of 101 degrees.  3.  Drainage (pus) from the injection site.  4.  For any continuous bleeding (some dried blood over the incision is normal.)    FOR EMERGENCIES:    If any unusual problems or difficulties occur during clinic hours, call [phone] or dial [phone].    Follow up with with your physician in 2-3 weeks.

## 2020-08-20 ENCOUNTER — HOSPITAL ENCOUNTER (OUTPATIENT)
Facility: HOSPITAL | Age: 70
Discharge: HOME | End: 2020-08-20
Attending: PAIN MEDICINE | Admitting: PAIN MEDICINE
Payer: COMMERCIAL

## 2020-08-20 VITALS
WEIGHT: 165 LBS | SYSTOLIC BLOOD PRESSURE: 121 MMHG | OXYGEN SATURATION: 96 % | HEIGHT: 62 IN | RESPIRATION RATE: 16 BRPM | HEART RATE: 87 BPM | DIASTOLIC BLOOD PRESSURE: 61 MMHG | TEMPERATURE: 98 F | BODY MASS INDEX: 30.36 KG/M2

## 2020-08-20 DIAGNOSIS — M54.16 LUMBAR RADICULOPATHY: Primary | ICD-10-CM

## 2020-08-20 DIAGNOSIS — G89.29 CHRONIC PAIN: ICD-10-CM

## 2020-08-20 PROCEDURE — 25000003 PHARM REV CODE 250: Performed by: PAIN MEDICINE

## 2020-08-20 PROCEDURE — 62323 NJX INTERLAMINAR LMBR/SAC: CPT | Performed by: PAIN MEDICINE

## 2020-08-20 PROCEDURE — 62323 NJX INTERLAMINAR LMBR/SAC: CPT | Mod: ,,, | Performed by: PAIN MEDICINE

## 2020-08-20 PROCEDURE — 62323 PR INJ LUMBAR/SACRAL, W/IMAGING GUIDANCE: ICD-10-PCS | Mod: ,,, | Performed by: PAIN MEDICINE

## 2020-08-20 PROCEDURE — 25500020 PHARM REV CODE 255: Performed by: PAIN MEDICINE

## 2020-08-20 PROCEDURE — 63600175 PHARM REV CODE 636 W HCPCS: Performed by: PAIN MEDICINE

## 2020-08-20 RX ORDER — LIDOCAINE HYDROCHLORIDE 10 MG/ML
INJECTION INFILTRATION; PERINEURAL
Status: DISCONTINUED | OUTPATIENT
Start: 2020-08-20 | End: 2020-08-20 | Stop reason: HOSPADM

## 2020-08-20 RX ORDER — DEXAMETHASONE SODIUM PHOSPHATE 10 MG/ML
INJECTION INTRAMUSCULAR; INTRAVENOUS
Status: DISCONTINUED | OUTPATIENT
Start: 2020-08-20 | End: 2020-08-20 | Stop reason: HOSPADM

## 2020-08-20 RX ORDER — ALPRAZOLAM 0.5 MG/1
0.5 TABLET, ORALLY DISINTEGRATING ORAL ONCE AS NEEDED
Status: ACTIVE | OUTPATIENT
Start: 2020-08-20 | End: 2032-01-16

## 2020-08-20 RX ORDER — BUPIVACAINE HYDROCHLORIDE 2.5 MG/ML
INJECTION, SOLUTION EPIDURAL; INFILTRATION; INTRACAUDAL
Status: DISCONTINUED | OUTPATIENT
Start: 2020-08-20 | End: 2020-08-20 | Stop reason: HOSPADM

## 2020-08-20 NOTE — OP NOTE
"Procedure Note    Pre-operative Diagnosis: Lumbar Radiculopathy  Post-operative Diagnosis: Lumbar Radiculopathy  Procedure Date: 08/20/2020  Procedure:  (1) Lumbar Epidural Steroid Injection at L4-5    (2) Intraoperative fluoroscopy          Anesthesia: Local    Indications: To alleviate pain and suffering, and reduce functional impairment.    Procedure in Detail:   The patients history and physical exam were reviewed. The risks, benefits and alternatives to the procedure were discussed, and all questions were answered to the patients satisfaction. The patient agreed to proceed, and written informed consent was verified.    The patient was brought into the procedure room and placed in the prone position on the fluoroscopy table. The area of the lumbar spine was prepped with Chloraprep and draped in a sterile manner. The L4-5 interspace was identified and marked under AP fluoroscopy. The skin and subcutaneous tissues overlying the targeted interspace were anesthetized with 3-5 mL of 1% lidocaine using a 25G, 1.5" needle. A 20G, 3.5" Tuohy epidural needle was directed toward the interspace under fluoroscopic guidance until the ligamentum flavum was engaged. From this point, a loss of resistance technique with a glass syringe and saline was used to identify entrance of the needle into the epidural space. Once loss of resistance was observed, up to 1 mL of contrast solution was injected. An appropriate epidurogram was noted.    A 5 mL mixture consisting of PF saline (2 mL), MPF Bupivacaine 0.25% (2 mL), and Dexamethasone 10 mg (1 mL) was injected slowly and without resistance.  The needle was removed and a bandage applied to puncture site.    Blood Loss: nil    Disposition: The patient tolerated the procedure well, and there were no apparent complications. Vital signs remained stable throughout the procedure. The patient was taken to the recovery area where written discharge instructions for the procedure were given. "     Follow-up: RTC as scheduled      Ely Syed Jr, MD  Interventional Pain Medicine / Anesthesiology

## 2020-08-20 NOTE — DISCHARGE SUMMARY
[facility]  Discharge Note  Short Stay     Admit Date: 8/20/2020    Discharge Date: 8/20/2020     Attending Physician: Ely Syed Jr, MD    Diagnoses:  Active Hospital Problems    Diagnosis  POA    Chronic pain [G89.29]  Yes      Resolved Hospital Problems   No resolved problems to display.     Discharged Condition: Good     Hospital Course: Patient was admitted for an outpatient interventional pain management procedure and tolerated the procedure well with no complications.     Final Diagnoses: Same as principal problem.     Disposition: Home or Self Care     Follow up/Patient Instructions:    Follow-up Information     Ely Syed Jr, MD. Go in 2 weeks.    Specialty: Pain Medicine  Why: Post-procedural Follow Up As Scheduled, Call to make an appointment if you do not have one  Contact information:  [address]  [address]  [place] [id number] [phone]                   Reconciled Medications:     Medication List      CHANGE how you take these medications    aspirin 81 MG EC tablet  Commonly known as: ECOTRIN  Take 1 tablet (81 mg total) by mouth once daily.  What changed: when to take this     atorvastatin 40 MG tablet  Commonly known as: LIPITOR  Take 1 tablet (40 mg total) by mouth once daily.  What changed: when to take this     clopidogreL 75 mg tablet  Commonly known as: PLAVIX  Take 1 tablet (75 mg total) by mouth once daily.  What changed: when to take this        CONTINUE taking these medications    acetaminophen 500 MG tablet  Commonly known as: TYLENOL  Take 2 tablets (1,000 mg total) by mouth 3 (three) times daily.     candesartan-hydrochlorothiazide 16-12.5 mg per tablet  Commonly known as: ATACAND HCT  Take 1 tablet by mouth once daily.     oxyCODONE-acetaminophen 5-325 mg per tablet  Commonly known as: PERCOCET  Take 1 tablet by mouth every 4 (four) hours as needed for Pain.     pregabalin 75 MG capsule  Commonly known as: LYRICA  Take 1 capsule (75 mg total) by mouth  3 (three) times daily.     traMADoL 50 mg tablet  Commonly known as: ULTRAM  Take 1 tablet (50 mg total) by mouth every 24 hours as needed for Pain.        ASK your doctor about these medications    zolpidem 5 MG Tab  Commonly known as: AMBIEN  Take 1 tablet (5 mg total) by mouth nightly as needed.           Discharge Procedure Orders (must include Diet, Follow-up, Activity)   Call MD for:  temperature >100.4     Call MD for:  severe uncontrolled pain     Call MD for:  redness, tenderness, or signs of infection (pain, swelling, redness, odor or green/yellow discharge around incision site)     Call MD for:  difficulty breathing or increased cough     Call MD for:  severe persistent headache     Call MD for:  worsening rash     Remove dressing in 24 hours       Ely Syed Jr, MD  Interventional Pain Medicine / Anesthesiology

## 2020-08-20 NOTE — PROGRESS NOTES
Patients vital signs normal. D/C instructions reviewed with patient. Patient verbalized understanding.

## 2020-08-20 NOTE — INTERVAL H&P NOTE
No significant changes were noted from the H&P or last clinic note.    The risks and benefits of this intervention, and alternative therapies were discussed with the patient.  The discussion of risks included infection, bleeding, need for additional procedures or surgery, nerve damage, paralysis, adverse medication reaction(s), stroke, and/or death.  Questions regarding the procedure, risks, expected outcome, and possible side effects were solicited and answered to the patient's satisfaction.  Rachel Murray wishes to proceed with the injection or procedure.  Written consent was obtained.    Ely Syed Jr, MD  Interventional Pain Medicine / Anesthesiology

## 2020-08-21 ENCOUNTER — TELEPHONE (OUTPATIENT)
Dept: PAIN MEDICINE | Facility: CLINIC | Age: 70
End: 2020-08-21

## 2020-08-21 NOTE — TELEPHONE ENCOUNTER
----- Message from Christel Paul sent at 8/21/2020  2:38 PM CDT -----  Regarding: Returning the nurse call  Pt returning the nurse Mayi call about her appt and medication tramadol.    Please give pt a call back at [phone]      Thank you!

## 2020-08-21 NOTE — TELEPHONE ENCOUNTER
Spoke with pt regarding a refill for tramadol. I informed pt she just had her procedure and it will take a few days for the pain to subside. I advised pt to keep her f/u with Dr. Syed. Pt verbalized understanding.

## 2020-08-21 NOTE — TELEPHONE ENCOUNTER
----- Message from Nona Becktes sent at 8/21/2020 10:28 AM CDT -----  Contact: [phone]  Type:  Needs Medical Advice    Who Called: PT  Reason for call; regarding a refill on her medication for traMADoL (ULTRAM) 50 mg tablet and about her appt next week  Pharmacy name and phone #:  [id number] - [place] - [address] AT [facility] [address]  Would the patient rather a call back or a response via [facility]?  Call back  Best Call Back Number: [phone]

## 2020-08-27 ENCOUNTER — OFFICE VISIT (OUTPATIENT)
Dept: PAIN MEDICINE | Facility: CLINIC | Age: 70
End: 2020-08-27
Payer: COMMERCIAL

## 2020-08-27 VITALS
SYSTOLIC BLOOD PRESSURE: 123 MMHG | BODY MASS INDEX: 30.18 KG/M2 | HEART RATE: 89 BPM | DIASTOLIC BLOOD PRESSURE: 72 MMHG | WEIGHT: 165 LBS

## 2020-08-27 DIAGNOSIS — M54.16 LUMBAR RADICULOPATHY: Primary | ICD-10-CM

## 2020-08-27 DIAGNOSIS — M47.819 ARTHROPATHY OF FACET JOINTS AT MULTIPLE LEVELS: ICD-10-CM

## 2020-08-27 DIAGNOSIS — G89.29 CHRONIC BILATERAL LOW BACK PAIN WITH LEFT-SIDED SCIATICA: ICD-10-CM

## 2020-08-27 DIAGNOSIS — M54.42 CHRONIC BILATERAL LOW BACK PAIN WITH LEFT-SIDED SCIATICA: ICD-10-CM

## 2020-08-27 DIAGNOSIS — M43.10 SPONDYLOLISTHESIS, UNSPECIFIED SPINAL REGION: ICD-10-CM

## 2020-08-27 DIAGNOSIS — M48.062 SPINAL STENOSIS OF LUMBAR REGION WITH NEUROGENIC CLAUDICATION: ICD-10-CM

## 2020-08-27 PROCEDURE — 99213 PR OFFICE/OUTPT VISIT, EST, LEVL III, 20-29 MIN: ICD-10-PCS | Mod: S$GLB,,, | Performed by: NURSE PRACTITIONER

## 2020-08-27 PROCEDURE — 1101F PR PT FALLS ASSESS DOC 0-1 FALLS W/OUT INJ PAST YR: ICD-10-PCS | Mod: CPTII,S$GLB,, | Performed by: NURSE PRACTITIONER

## 2020-08-27 PROCEDURE — 3008F BODY MASS INDEX DOCD: CPT | Mod: CPTII,S$GLB,, | Performed by: NURSE PRACTITIONER

## 2020-08-27 PROCEDURE — 1159F PR MEDICATION LIST DOCUMENTED IN MEDICAL RECORD: ICD-10-PCS | Mod: S$GLB,,, | Performed by: NURSE PRACTITIONER

## 2020-08-27 PROCEDURE — 3078F DIAST BP <80 MM HG: CPT | Mod: CPTII,S$GLB,, | Performed by: NURSE PRACTITIONER

## 2020-08-27 PROCEDURE — 1101F PT FALLS ASSESS-DOCD LE1/YR: CPT | Mod: CPTII,S$GLB,, | Performed by: NURSE PRACTITIONER

## 2020-08-27 PROCEDURE — 1159F MED LIST DOCD IN RCRD: CPT | Mod: S$GLB,,, | Performed by: NURSE PRACTITIONER

## 2020-08-27 PROCEDURE — 3008F PR BODY MASS INDEX (BMI) DOCUMENTED: ICD-10-PCS | Mod: CPTII,S$GLB,, | Performed by: NURSE PRACTITIONER

## 2020-08-27 PROCEDURE — 99999 PR PBB SHADOW E&M-EST. PATIENT-LVL IV: CPT | Mod: PBBFAC,,, | Performed by: NURSE PRACTITIONER

## 2020-08-27 PROCEDURE — 1125F PR PAIN SEVERITY QUANTIFIED, PAIN PRESENT: ICD-10-PCS | Mod: S$GLB,,, | Performed by: NURSE PRACTITIONER

## 2020-08-27 PROCEDURE — 3074F SYST BP LT 130 MM HG: CPT | Mod: CPTII,S$GLB,, | Performed by: NURSE PRACTITIONER

## 2020-08-27 PROCEDURE — 3074F PR MOST RECENT SYSTOLIC BLOOD PRESSURE < 130 MM HG: ICD-10-PCS | Mod: CPTII,S$GLB,, | Performed by: NURSE PRACTITIONER

## 2020-08-27 PROCEDURE — 1125F AMNT PAIN NOTED PAIN PRSNT: CPT | Mod: S$GLB,,, | Performed by: NURSE PRACTITIONER

## 2020-08-27 PROCEDURE — 99999 PR PBB SHADOW E&M-EST. PATIENT-LVL IV: ICD-10-PCS | Mod: PBBFAC,,, | Performed by: NURSE PRACTITIONER

## 2020-08-27 PROCEDURE — 99213 OFFICE O/P EST LOW 20 MIN: CPT | Mod: S$GLB,,, | Performed by: NURSE PRACTITIONER

## 2020-08-27 PROCEDURE — 3078F PR MOST RECENT DIASTOLIC BLOOD PRESSURE < 80 MM HG: ICD-10-PCS | Mod: CPTII,S$GLB,, | Performed by: NURSE PRACTITIONER

## 2020-08-27 NOTE — PROGRESS NOTES
[facility] Established Patient Evaluation      Referred by: Anel Parikh MD  Reason for referral: Sciatica, unspecified laterality    CC:   post op -  6 days s/p -Injection-steroid-epidural-lumbar-- Interlaminar L5-S1    Last 3 PDI Scores 8/27/2020 8/10/2020 6/10/2020   Pain Disability Index (PDI) 48 [phone] 49     Interval Update:     08/27/2020 - Ms. Murray returns to clinic for follow up visit reporting stable back pain.  Patient is s/p LESI L4-5 on 08/20/20 with 30% relief specifically in the  center of her back, left buttock, hip and left lateral leg all of these areas have gotten mildly better however patient continues to have left leg radicular pain.  She continues to have problems laying flat as this causes pain in the lower extremity.  Pain intensity is currently 8/10.        8/10/20 - Ms. Murray returns to clinic for follow up visit reporting  continued low back and sciatic pain left greater than right. Pain intensity is currently 9/10.  Recent lumbar MRI done patient like to discuss in further detail.    6/10/20 - Ms. Murray returns to clinic for follow up visit reporting worse back and left leg  pain.  Patient is s/p Lumbar MIRNA on 5/7/20 with 0% relief.  Pain intensity is currently 7/10.    Ms Murray continues to c/o  medial LLE paresthesias that radiate to ipsilateral hip and are worst in the toes. She additionally complains of a 'squeezing' pain in the left foot and intermittent episodes of blanching of the left foot. She continues to have intermittent radicular pain in bilateral legs  Reports numbness in left hip and left foot.  Patient is currently being evaluated by vascular and endovascular surgery/Dr. Harris to rule out further pathology with her history of PAD he has  ordered a CTA for further evaluation. Dr Harris believes her pain is orginating in her back but wants to make sure.   Pain intensity is currently 7/10.       4/21/20 - Mrs. Murray returns to clinic for follow up visit  "reporting stable back and leg pain.  Numbness, tightness, and pain are very unpleasant in the ankle and foot bilaterally, but the left is "way worse".  Patient is here for medication refill of Tramadol 50 mg and Lyrica 50 mg.  She reports 30-50% relief with the current medication regimen.  Pain intensity is currently 5/10 ranging 5-8/10.    3/30/20 - The clinic visit for Mrs. Murray has been converted to a telephone encounter because he/she has declined a telemedicine encounter due to technical difficulty or preference.  She is reporting acutely increased shooting pains originating in her back and traveling into the legs since having vascular surgery of her leg.  She was evaluated by her vascular surgery team and they believe her symptoms are not related to the recent surgery but are most likely to be an exacerbation of her pre-existing lumbar radiculopathy.  She states that her pain is severe and rates it 10/10 at this time.  She states inability to sleep comfortably at night and that her current medication regimen has failed to provide her significant relief.  She reports no relief with tramadol 50 mg and very little relief with a recent prescription of [place] 5-325 she received from her PCP.    2/6/2020 - Mrs. Murray returns to clinic for follow up visit reporting improved back pain.  Patient is s/p Lumbar MIRNA on 1/16/20 with 95% continued relief. She reports beginning PT and having to stop due to  developing a swollen, erythematous, painful left foot.  she has recently seen Cardiology and has  been diagnosed with severe PAD (L>R). CTA with segmental occlusion of the left common and proximal SFA and occlusion of the left tibioperoneal trunk as well as occlusion of the left anterior tibial artery in its proximal 3rd. Moderate degree of calcific and hypodense plaque in the abdominal aorta and iliac arteries bilaterally.  She is now scheduled for a LLE iliac to femoral bypass creation with bilateral iliac stents on " 2/17/2020   Pain intensity is currently 2/10.      Background / HPI:   Rachel Murray is a 70 y.o. female who complains of Low back pain with Sciatica.  She reports pain that starts in the left side of her low back and travels through the buttocks, posterolateral thigh, anterior shin, and into the top of her foot.  Alleviating and exacerbating factors are variable but prolonged sitting and walking for more than 5 min appear to drastically increase her pain. She also reports insomnia due to pain and has been using a recliner to sleep for approximately 90 min at a time. She reports dealing with low back pain for several years but noticed an acute increase in March 2019 which subsided somewhat.  In October 2019 her pain increased drastically and has not subsided since that time. Her history is positive for cervical disc herniation which was previously treated with oral medications and physical therapy.  She also notes associated numbness in the left lower extremity and a sensation of weakness in the left leg.  She denies falls or saddle anesthesia.    Location: Low back   Onset: 10/2019  Current Pain Score: 8/10  Daily Pain of Range: 5-8/10  Quality: Burning, Throbbing, Grabbing, Tingling, Numb, Sharp, Electric, Hot and Cold  Radiation: Radiates down left side to foot.  Worsened by: extension, lying down, sitting and walking for more than 5 minutes  Improved by: nothing    Previous Therapies:  PT/OT: Denies  HEP: Denies  Interventions: Denies  Surgery:Denies  Medications:   - NSAIDS:   - MSK Relaxants:   - TCAs:   - SNRIs:   - Topicals:   - Anticonvulsants:  - Opioids:     Current Pain Medications:  1. Aleve   2. Tramadol 50 mg TID PRN  3. Lyrica 50 TID    Review of Systems:  Review of Systems   Constitutional: Negative for chills and fever.   HENT: Negative for nosebleeds.    Eyes: Negative for blurred vision and pain.   Respiratory: Negative for hemoptysis.    Cardiovascular: Negative for chest pain and palpitations.    Gastrointestinal: Negative for heartburn, nausea and vomiting.   Genitourinary: Negative for dysuria and hematuria.   Musculoskeletal: Positive for back pain. Negative for myalgias.   Skin: Negative for rash.   Neurological: Positive for tingling, sensory change and focal weakness (LLE). Negative for seizures and loss of consciousness.   Endo/Heme/Allergies: Does not bruise/bleed easily.   Psychiatric/Behavioral: Negative for hallucinations. The patient has insomnia (2/2 pain).        History:    Current Outpatient Medications:     acetaminophen (TYLENOL) 500 MG tablet, Take 2 tablets (1,000 mg total) by mouth 3 (three) times daily., Disp: , Rfl: 0    aspirin (ECOTRIN) 81 MG EC tablet, Take 1 tablet (81 mg total) by mouth once daily. (Patient taking differently: Take 81 mg by mouth every morning. ), Disp: 360 tablet, Rfl: 0    atorvastatin (LIPITOR) 40 MG tablet, Take 1 tablet (40 mg total) by mouth once daily. (Patient taking differently: Take 40 mg by mouth every morning. ), Disp: 90 tablet, Rfl: 3    candesartan-hydrochlorothiazide (ATACAND HCT) 16-12.5 mg per tablet, Take 1 tablet by mouth once daily., Disp: 30 tablet, Rfl: 3    oxyCODONE-acetaminophen (PERCOCET) 5-325 mg per tablet, Take 1 tablet by mouth every 4 (four) hours as needed for Pain., Disp: 18 tablet, Rfl: 0    pregabalin (LYRICA) 75 MG capsule, Take 1 capsule (75 mg total) by mouth 3 (three) times daily., Disp: 90 capsule, Rfl: 2    traMADoL (ULTRAM) 50 mg tablet, Take 1 tablet (50 mg total) by mouth every 24 hours as needed for Pain., Disp: 12 tablet, Rfl: 0    clopidogreL (PLAVIX) 75 mg tablet, Take 1 tablet (75 mg total) by mouth once daily. (Patient taking differently: Take 75 mg by mouth every morning. ), Disp: 30 tablet, Rfl: 6  No current facility-administered medications for this visit.     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 iliac stent placement, LLE angiogram  17.4 min  837.37 mGy  172.81 Gycm2  37 ml contrast    AORTOGRAPHY N/A 2020    Procedure: AORTOGRAM;  Surgeon: Olayinka Harris MD;  [facility] 2ND FLR;  Service: Peripheral Vascular;  Laterality: N/A;    APPENDECTOMY      BREAST BIOPSY       SECTION, CLASSIC      COLONOSCOPY      COLONOSCOPY N/A 2017    Procedure: COLONOSCOPY;  Surgeon: Gabriel Rose MD;  Location: [facility]O (4TH FLR);  Service: Endoscopy;  Laterality: N/A;  Miralax prep per Dr. Rose    DENTAL SURGERY  2017    ENDARTERECTOMY OF FEMORAL ARTERY Left 2020    Procedure: ENDARTERECTOMY, FEMORAL;  Surgeon: Olayinka Harris MD;  Location: [facility] 2[facility] FLR;  Service: Peripheral Vascular;  Laterality: Left;  SFA endarterectomy  fluoro time: 9.2 min mGy: 2336.86    EPIDURAL STEROID INJECTION INTO LUMBAR SPINE N/A 2020    Procedure: Injection-steroid-epidural-lumbar--Review MRI- L5-S1 after MRI review;  Surgeon: Ely Syed Jr., MD;  Location: [facility] PAIN MGT;  Service: Pain Management;  Laterality: N/A;    EPIDURAL STEROID INJECTION INTO LUMBAR SPINE N/A 2020    Procedure: Injection-steroid-epidural-lumbar-- Interlaminar L5-S1/ pt aware she can continue taking ASA per Dr. Harris and hold plavix.;  Surgeon: Ely Syed Jr., MD;  Location: [facility] PAIN [facility]GT;  Service: Pain  Management;  Laterality: N/A;  Will sign consent at DOS.    EPIDURAL STEROID INJECTION INTO LUMBAR SPINE N/A 8/20/2020    Procedure: Injection-steroid-epidural-lumbar--L4-5;  Surgeon: Ely Syed Jr., MD;  Location: [facility];  Service: Pain Management;  Laterality: N/A;  Oral sedation    gyn surgery      ex laparotomy    HYSTERECTOMY         Family History   Problem Relation Age of Onset    Hypertension Mother     Heart disease Mother 40        CABG x 5    Seizures Mother     Cancer Father         prostate    Stroke Father     Cancer Brother         colon    Diabetes Maternal Aunt     Breast cancer Maternal Aunt     Diabetes Maternal Uncle     Stroke Maternal Grandmother     Stroke Paternal Grandmother     Diabetes Cousin     Breast cancer Cousin        Social History     Socioeconomic History    Marital status: Single     Spouse name: Not on file    Number of children: Not on file    Years of education: Not on file    Highest education level: Not on file   Occupational History     Employer: [employer]   Social Needs    Financial resource strain: Not on file    Food insecurity     Worry: Not on file     Inability: Not on file    Transportation needs     Medical: Not on file     Non-medical: Not on file   Tobacco Use    Smoking status: Current Every Day Smoker     Packs/day: 0.50     Years: 40.00     Pack years: 20.00     Types: Cigarettes    Smokeless tobacco: Never Used    Tobacco comment: occasionally   Substance and Sexual Activity    Alcohol use: Yes     Alcohol/week: 0.0 standard drinks     Comment: rarely    Drug use: No    Sexual activity: Never   Lifestyle    Physical activity     Days per week: Not on file     Minutes per session: Not on file    Stress: Not on file   Relationships    Social connections     Talks on phone: Not on file     Gets together: Not on file     Attends Restorationism service: Not on file     Active member of club or organization: Not on  file     Attends meetings of clubs or organizations: Not on file     Relationship status: Not on file   Other Topics Concern    Not on file   Social History Narrative    Not on file       Review of patient's allergies indicates:   Allergen Reactions    Adhesive Blisters    Augmentin [amoxicillin-pot clavulanate]      Vomiting    Latex, natural rubber Blisters    Ciprofloxacin Hives       Physical Exam:  Vitals:    08/27/20 1034   BP: 123/72   Pulse: 89   Weight: 74.8 kg (165 lb)   PainSc:   8     General    Nursing note and vitals reviewed.  Constitutional: She is oriented to person, place, and time. She appears well-developed and well-nourished. No distress.   HENT:   Head: Normocephalic and atraumatic.   Nose: Nose normal.   Eyes: Conjunctivae and EOM are normal. Pupils are equal, round, and reactive to light. Right eye exhibits no discharge. Left eye exhibits no discharge. No scleral icterus.   Neck: No JVD present.   Cardiovascular: Intact distal pulses.    Pulmonary/Chest: Effort normal. No respiratory distress.   Abdominal: She exhibits no distension.   Neurological: She is alert and oriented to person, place, and time. Coordination normal.   Psychiatric: She has a normal mood and affect. Her behavior is normal. Judgment and thought content normal.     General Musculoskeletal Exam   Gait: normal     Back (L-Spine & T-Spine) / Neck (C-Spine) Exam     Tenderness Right paramedian tenderness of the Lower L-Spine. Left paramedian tenderness of the Lower L-Spine.     Back (L-Spine & T-Spine) Range of Motion   Back extension: facet loading is positive and exacerabtes/reproduces the patient's typical low back pain    Back flexion: limited ROM but partial relief of low back pain noted.     Spinal Sensation   Right Side Sensation  L-Spine Level: normal  Left Side Sensation  L-Spine Level: normal    Other She has no scoliosis .    Comments:  Left Leg + SLR      Muscle Strength   Right Lower Extremity   Hip Flexion:  5/5   Hip Extensors: 5/5  Quadriceps:  5/5   Hamstrin/5   Gastrocsoleus:  5/5/5  Left Lower Extremity   Hip Flexion: 5/5   Hip Extensors: 5/5  Quadriceps:  5/5   Hamstrin/5   Gastrocsoleus:  5/5/5    Reflexes     Left Side  Achilles:  2+  Quadriceps:  2+    Right Side   Achilles:  2+  Quadriceps:  2+      Imaging:  None pertinent to review.    Labs:  BMP  Lab Results   Component Value Date     2020    K 3.7 2020     2020    CO2 23 2020    BUN 18 2020    CREATININE 0.8 2020    CALCIUM 9.7 2020    ANIONGAP 10 2020    ESTGFRAFRICA >60.0 2020    EGFRNONAA >60.0 2020     Lab Results   Component Value Date    ALT 19 2020    AST 16 2020    ALKPHOS 97 2020    BILITOT 0.3 2020       Assessment:  Problem List Items Addressed This Visit        Neuro    Lumbar radiculopathy - Primary    Relevant Orders    Ambulatory referral/consult to Physical/Occupational Therapy    Spinal stenosis of lumbar region with neurogenic claudication    Relevant Orders    Ambulatory referral/consult to Physical/Occupational Therapy       Orthopedic    Chronic bilateral low back pain with left-sided sciatica    Spondylolisthesis      Other Visit Diagnoses     Arthropathy of facet joints at multiple levels        Relevant Orders    Ambulatory referral/consult to Physical/Occupational Therapy          2020 - Rachel Murray is a 70 y.o. female with chronic low back pain radiating into the left lower extremity consistent with radiculopathy at L5.  I will order an MRI of her lumbar spine to confirm this diagnosis as she has been dealing with the symptoms for the better part of 2019.  I have also ordered several medications to help address her pain and symptoms in addition to physical therapy.  I have also proposed a lumbar epidural steroid injection to be scheduled after completion of the MRI.  I would anticipate performing this injection at L5-S1;  however, this location may change based on the results of the MRI.  She was also provided a letter for work to park closer to the building.    02/06/20202945-79-wdmx-old female presents for a follow-up status post and L5-S1 lumbar MIRNA with reported 95% relief her pain score today is 2/10 patient reports feeling much better following the injection patient states she started physical therapy however 2 days into PT she began developing a swollen, erythematous, painful left foot.  She  has recently seen Cardiology and has  been diagnosed with severe PAD (L>R). CTA with segmental occlusion of the left common and proximal SFA and occlusion of the left tibioperoneal trunk as well as occlusion of the left anterior tibial artery in its proximal 3rd. Moderate degree of calcific and hypodense plaque in the abdominal aorta and iliac arteries bilaterally.  She is now scheduled for a LLE iliac to femoral bypass creation with bilateral iliac stents on 2/17/2020 with Dr Harris/cardiology.  She endorses feeling much better following her pain procedure and that the pain she originally came in for is much better, discussed that we could repeat as needed.    3/30/2020 - I reviewed her most recent MRI lumbar spine from 01/10/2020 which shows significant lateral recess and neural foraminal stenosis at L3-4 and L4-5 consistent with radiating pattern she is experiencing at this time.  Patient would be an excellent candidate for repeating the lumbar epidural steroid injection at L5-S1 as this provided her 90-95% relief.  Patient was advised that low back pain and radiculopathy can increase after surgical procedure due to immobility and reduced physical activity postoperatively.  In addition to the epidural steroid injection, she will also likely need to be evaluated by Neurosurgery in the symptoms remain refractory to the medications I will prescribe today and then epidural steroid injection.  Epidural steroid injections on hold pending  resolution of the Coronavirus crisis.    4/21/2020 - Pain symptoms remain unchanged but appear better managed with the combination of Tramadol and Lyrica.  Goal is to wean off of opioids through PT, injections, and other non-opioids, but those efforts are limited until the Coronavirus restrictions pass.  Cont and reduce tramadol to her current usage level of TID PRN; cont and increase Lyrica.  Wait list for MIRNA.     5/13/2020- 68 y/o female s/p L5-S1 IESI with reported 0% relief, she continues to have low back and left leg pain radiating to her left foot.  optimized her Lyrica on her previous CV to be taking Lyrica 75 mg TID however she was only taking it BID. Also I recommended that we need to give the injection more time to work. She will began her correct Lyrica dose and give the injection more time to be effective.  Previous imaging was reviewed and discussed with the patient today.     06/10/2876-64-bkaj-old female presents with continued low back and sciatic pain left greater than right, pain is medial LLE paresthesias that radiate to ipsilateral hip and are worst in the toes.  She is status post lumbar L5-S1 interlaminar MIRNA with 0% relief this was her 2nd injection with minimal to no relief.  Of considering her symptoms I recommended a referral to Neurosurgery patient is currently being evaluated by her vascular surgeon in effort to rule out advanced PAD, patient stated that she would like to follow up with her vascular surgeon and discussed her recent CTA A/P results once obtained.  She states she would like to know if it is her PAD that is getting worse before she see's NSYG.  Patient did report that her Lyrica 75 mg t.i.d. has helped her symptoms she  was previously on Lyrica 75 mg b.i.d.    08/10/4561-80-gexm-old female with a history HTN, active smoker (1/2 ppd) and PAD s/p L EIA, L CFA endarterectomy, JESU balloon angioplasty and L profundaplasty on 02/18/2020, presents with continued low  back and sciatic pain left greater than right pain is medial left lower extremity paresthesia that radiates to the ipsilateral hip and is worse in her toes.  She previously received a L5-S1 interlaminar MIRNA with 0% relief which was her 2nd injection.  Her lumbar MRI shows Worse pathology is at the L4-5 level where she has moderate facet arthropathy with thickening ligamentum flavum.  There is also degenerative disc disease with a vacuum disc phenomena, mild loss of disc height, degenerative endplate marginal osteophyte formation and diffuse disc bulging.  Is also an annular fissure with post contrast discal  enhancement. These changes result in bilateral lateral recess stenosis and moderate neural foraminal encroachment.  Discussed I now recommend a lumbar MIRAN targeting the L4-5 level in effort to provide her with some relief discussed that we will also take a left paramedial approach in effort to provide her with relief of her left lower extremity pain, patient verbalized understanding and agree.  Patient is requesting a short-term prescription of tramadol to last until she has a procedure discussed I will provide her with this as a 1 time prescription.    8/27/2020- 71 y/o female presents s/p Lumbar Epidural Steroid Injection at L4-5 on 08/20/2020 reporting 30% relief specifically in the  center of her back, left buttock, hip and left lateral leg all of these areas have gotten mildly better however patient continues to have left leg radicular pain.  The patient this point I recommend physical therapy in effort to provide her with lumbar stabilization, muscular strengthening increased stability.        Plan:  No procedures at this time   Continue  Lyrica to 75 mg p.o. TID  Patient was not a candidate for surgery following Neurosurgery recommendation.  -Referral today for PT   I encouraged the patient to maintain a home exercise regimen that includes daily, moderate cardiovascular exercise lasting at least 30  minutes.  This may include yoga, mitzi chi, walking, swimming, aqua aerobics, or other exercises that maintain a heart rate of 50-70% of the calculated maximum heart rate.  I also encouraged light, daily stretching focused on the target area.  The above plan and management options were discussed at length with patient. Patient is in agreement with the above and verbalized understanding. Dr. Syed was consulted on this patient  and agrees with this plan.      RTC in 3 months    TAYLOR Mueller  Interventional Pain Management       Disclaimer: This note was partly generated using dictation software which may occasionally result in transcription errors.

## 2020-09-21 ENCOUNTER — LAB VISIT (OUTPATIENT)
Dept: LAB | Facility: HOSPITAL | Age: 70
End: 2020-09-21
Attending: INTERNAL MEDICINE
Payer: COMMERCIAL

## 2020-09-21 DIAGNOSIS — E78.5 HYPERLIPIDEMIA, UNSPECIFIED HYPERLIPIDEMIA TYPE: ICD-10-CM

## 2020-09-21 LAB
ALBUMIN SERPL BCP-MCNC: 3.8 G/DL (ref 3.5–5.2)
ALP SERPL-CCNC: 103 U/L (ref 55–135)
ALT SERPL W/O P-5'-P-CCNC: 20 U/L (ref 10–44)
ANION GAP SERPL CALC-SCNC: 9 MMOL/L (ref 8–16)
AST SERPL-CCNC: 17 U/L (ref 10–40)
BILIRUB SERPL-MCNC: 0.3 MG/DL (ref 0.1–1)
BUN SERPL-MCNC: 36 MG/DL (ref 8–23)
CALCIUM SERPL-MCNC: 9.6 MG/DL (ref 8.7–10.5)
CHLORIDE SERPL-SCNC: 104 MMOL/L (ref 95–110)
CHOLEST SERPL-MCNC: 142 MG/DL (ref 120–199)
CHOLEST/HDLC SERPL: 4.1 {RATIO} (ref 2–5)
CO2 SERPL-SCNC: 24 MMOL/L (ref 23–29)
CREAT SERPL-MCNC: 0.8 MG/DL (ref 0.5–1.4)
EST. GFR  (AFRICAN AMERICAN): >60 ML/MIN/1.73 M^2
EST. GFR  (NON AFRICAN AMERICAN): >60 ML/MIN/1.73 M^2
GLUCOSE SERPL-MCNC: 92 MG/DL (ref 70–110)
HDLC SERPL-MCNC: 35 MG/DL (ref 40–75)
HDLC SERPL: 24.6 % (ref 20–50)
LDLC SERPL CALC-MCNC: 72.4 MG/DL (ref 63–159)
NONHDLC SERPL-MCNC: 107 MG/DL
POTASSIUM SERPL-SCNC: 4.2 MMOL/L (ref 3.5–5.1)
PROT SERPL-MCNC: 7.2 G/DL (ref 6–8.4)
SODIUM SERPL-SCNC: 137 MMOL/L (ref 136–145)
TRIGL SERPL-MCNC: 173 MG/DL (ref 30–150)

## 2020-09-21 PROCEDURE — 80061 LIPID PANEL: CPT

## 2020-09-21 PROCEDURE — 36415 COLL VENOUS BLD VENIPUNCTURE: CPT | Mod: PO

## 2020-09-21 PROCEDURE — 80053 COMPREHEN METABOLIC PANEL: CPT

## 2020-09-30 ENCOUNTER — OFFICE VISIT (OUTPATIENT)
Dept: INTERNAL MEDICINE | Facility: CLINIC | Age: 70
End: 2020-09-30
Payer: COMMERCIAL

## 2020-09-30 VITALS
HEART RATE: 79 BPM | HEIGHT: 62 IN | WEIGHT: 169.75 LBS | OXYGEN SATURATION: 99 % | BODY MASS INDEX: 31.24 KG/M2 | DIASTOLIC BLOOD PRESSURE: 78 MMHG | TEMPERATURE: 98 F | SYSTOLIC BLOOD PRESSURE: 138 MMHG

## 2020-09-30 DIAGNOSIS — M54.16 LUMBAR RADICULOPATHY: ICD-10-CM

## 2020-09-30 DIAGNOSIS — Z23 NEED FOR 23-POLYVALENT PNEUMOCOCCAL POLYSACCHARIDE VACCINE: ICD-10-CM

## 2020-09-30 DIAGNOSIS — I73.9 PAD (PERIPHERAL ARTERY DISEASE): Primary | ICD-10-CM

## 2020-09-30 DIAGNOSIS — I10 ESSENTIAL HYPERTENSION: ICD-10-CM

## 2020-09-30 DIAGNOSIS — E78.5 HYPERLIPIDEMIA, UNSPECIFIED HYPERLIPIDEMIA TYPE: ICD-10-CM

## 2020-09-30 DIAGNOSIS — L98.9 SKIN LESION: ICD-10-CM

## 2020-09-30 PROCEDURE — 90694 FLU VACCINE - QUADRIVALENT - ADJUVANTED: ICD-10-PCS | Mod: S$GLB,,, | Performed by: INTERNAL MEDICINE

## 2020-09-30 PROCEDURE — 99999 PR PBB SHADOW E&M-EST. PATIENT-LVL V: CPT | Mod: PBBFAC,,, | Performed by: INTERNAL MEDICINE

## 2020-09-30 PROCEDURE — 99999 PR PBB SHADOW E&M-EST. PATIENT-LVL V: ICD-10-PCS | Mod: PBBFAC,,, | Performed by: INTERNAL MEDICINE

## 2020-09-30 PROCEDURE — 3075F SYST BP GE 130 - 139MM HG: CPT | Mod: CPTII,S$GLB,, | Performed by: INTERNAL MEDICINE

## 2020-09-30 PROCEDURE — 1125F AMNT PAIN NOTED PAIN PRSNT: CPT | Mod: S$GLB,,, | Performed by: INTERNAL MEDICINE

## 2020-09-30 PROCEDURE — 3078F PR MOST RECENT DIASTOLIC BLOOD PRESSURE < 80 MM HG: ICD-10-PCS | Mod: CPTII,S$GLB,, | Performed by: INTERNAL MEDICINE

## 2020-09-30 PROCEDURE — 1101F PR PT FALLS ASSESS DOC 0-1 FALLS W/OUT INJ PAST YR: ICD-10-PCS | Mod: CPTII,S$GLB,, | Performed by: INTERNAL MEDICINE

## 2020-09-30 PROCEDURE — 99214 PR OFFICE/OUTPT VISIT, EST, LEVL IV, 30-39 MIN: ICD-10-PCS | Mod: 25,S$GLB,, | Performed by: INTERNAL MEDICINE

## 2020-09-30 PROCEDURE — 3078F DIAST BP <80 MM HG: CPT | Mod: CPTII,S$GLB,, | Performed by: INTERNAL MEDICINE

## 2020-09-30 PROCEDURE — 3008F PR BODY MASS INDEX (BMI) DOCUMENTED: ICD-10-PCS | Mod: CPTII,S$GLB,, | Performed by: INTERNAL MEDICINE

## 2020-09-30 PROCEDURE — 90732 PNEUMOCOCCAL POLYSACCHARIDE VACCINE 23-VALENT =>2YO SQ IM: ICD-10-PCS | Mod: S$GLB,,, | Performed by: INTERNAL MEDICINE

## 2020-09-30 PROCEDURE — 90471 FLU VACCINE - QUADRIVALENT - ADJUVANTED: ICD-10-PCS | Mod: S$GLB,,, | Performed by: INTERNAL MEDICINE

## 2020-09-30 PROCEDURE — 99214 OFFICE O/P EST MOD 30 MIN: CPT | Mod: 25,S$GLB,, | Performed by: INTERNAL MEDICINE

## 2020-09-30 PROCEDURE — 1159F PR MEDICATION LIST DOCUMENTED IN MEDICAL RECORD: ICD-10-PCS | Mod: S$GLB,,, | Performed by: INTERNAL MEDICINE

## 2020-09-30 PROCEDURE — 1159F MED LIST DOCD IN RCRD: CPT | Mod: S$GLB,,, | Performed by: INTERNAL MEDICINE

## 2020-09-30 PROCEDURE — 90472 PNEUMOCOCCAL POLYSACCHARIDE VACCINE 23-VALENT =>2YO SQ IM: ICD-10-PCS | Mod: S$GLB,,, | Performed by: INTERNAL MEDICINE

## 2020-09-30 PROCEDURE — 90471 IMMUNIZATION ADMIN: CPT | Mod: S$GLB,,, | Performed by: INTERNAL MEDICINE

## 2020-09-30 PROCEDURE — 90732 PPSV23 VACC 2 YRS+ SUBQ/IM: CPT | Mod: S$GLB,,, | Performed by: INTERNAL MEDICINE

## 2020-09-30 PROCEDURE — 3008F BODY MASS INDEX DOCD: CPT | Mod: CPTII,S$GLB,, | Performed by: INTERNAL MEDICINE

## 2020-09-30 PROCEDURE — 1125F PR PAIN SEVERITY QUANTIFIED, PAIN PRESENT: ICD-10-PCS | Mod: S$GLB,,, | Performed by: INTERNAL MEDICINE

## 2020-09-30 PROCEDURE — 3075F PR MOST RECENT SYSTOLIC BLOOD PRESS GE 130-139MM HG: ICD-10-PCS | Mod: CPTII,S$GLB,, | Performed by: INTERNAL MEDICINE

## 2020-09-30 PROCEDURE — 90694 VACC AIIV4 NO PRSRV 0.5ML IM: CPT | Mod: S$GLB,,, | Performed by: INTERNAL MEDICINE

## 2020-09-30 PROCEDURE — 90472 IMMUNIZATION ADMIN EACH ADD: CPT | Mod: S$GLB,,, | Performed by: INTERNAL MEDICINE

## 2020-09-30 PROCEDURE — 1101F PT FALLS ASSESS-DOCD LE1/YR: CPT | Mod: CPTII,S$GLB,, | Performed by: INTERNAL MEDICINE

## 2020-09-30 RX ORDER — DULOXETIN HYDROCHLORIDE 30 MG/1
30 CAPSULE, DELAYED RELEASE ORAL DAILY
Qty: 30 CAPSULE | Refills: 6 | Status: SHIPPED | OUTPATIENT
Start: 2020-09-30 | End: 2020-11-02 | Stop reason: DRUGHIGH

## 2020-09-30 NOTE — PROGRESS NOTES
CC: followup of hypertension and hyperlipidemia  HPI:  The patient is a 70 y.o. year old female who presents to the office for followup of hypertension and hyperlipidemia.  The patient denies any chest pain, shortness of breath, headache, blurred vision, nausea or vomiting.  She complains of fatigue due to inability to sleep.  Patient reports leg pain keeps her up at night.  Review of labs reveals improved cholesterol.  She complains of continued leg spasms, left leg is worse than right.  Magnesium has helped somewhat.  She reports pain is constant.  She reports ankle swelling has worsened.  Walking exacerbates pain.  She also reports enlarging lesion on her forehead.    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 iliac stent placement, LLE angiogram  17.4 min  837.37 mGy  172.81 Gycm2  37 ml contrast    AORTOGRAPHY N/A 2020    Procedure: AORTOGRAM;  Surgeon: Olayinka Harris MD;  [facility];  Service: Peripheral Vascular;  Laterality: N/A;    APPENDECTOMY      BREAST BIOPSY       SECTION, CLASSIC      COLONOSCOPY      COLONOSCOPY N/A 2017    Procedure: COLONOSCOPY;  Surgeon: Gabriel Rose MD;  Location: [facility] ENDO (4TH FLR);  Service: Endoscopy;  Laterality: N/A;  Miralax prep per Dr. Rose    DENTAL SURGERY  2017    ENDARTERECTOMY OF FEMORAL ARTERY Left 2020    Procedure: ENDARTERECTOMY, FEMORAL;  Surgeon: Olayinka Harris MD;  Location: [facility] OR [address] [place] [address];  Service: Peripheral  Vascular;  Laterality: Left;  SFA endarterectomy  fluoro time: 9.2 min mGy: 2336.86    EPIDURAL STEROID INJECTION INTO LUMBAR SPINE N/A 1/16/2020    Procedure: Injection-steroid-epidural-lumbar--Review MRI- L5-S1 after MRI review;  Surgeon: Ely Syed Jr., MD;  Location: [facility] PAIN T;  Service: Pain Management;  Laterality: N/A;    EPIDURAL STEROID INJECTION INTO LUMBAR SPINE N/A 5/7/2020    Procedure: Injection-steroid-epidural-lumbar-- Interlaminar L5-S1/ pt aware she can continue taking ASA per Dr. Harris and hold plavix.;  Surgeon: Ely Syed Jr., MD;  Location: [facility] PAIN [facility];  Service: Pain Management;  Laterality: N/A;  Will sign consent at [facility].    EPIDURAL STEROID INJECTION INTO LUMBAR SPINE N/A 8/20/2020    Procedure: Injection-steroid-epidural-lumbar--L4-5;  Surgeon: Eyl Syed Jr., MD;  Location: [facility] PAIN [facility];  Service: Pain Management;  Laterality: N/A;  Oral sedation    gyn surgery      ex laparotomy    HYSTERECTOMY         MEDS:  Medcard reviewed and updated    ALLERGIES: Allergy Card reviewed and updated    SOCIAL HISTORY:   The patient is a nonsmoker.    PE:   APPEARANCE: Well nourished, well developed, in no acute distress.    CHEST: Lungs clear to auscultation with unlabored respirations.  CARDIOVASCULAR: Normal S1, S2. No murmurs. No carotid bruits. Positive pedal edema.  ABDOMEN: Bowel sounds normal. Not distended. Soft. No tenderness or masses.   MUSCULOSKELETAL:  Abnormal gait, ambulates with a cane.   SKIN: Hyperpigment papule on left side of forehead.  PSYCHIATRIC: The patient is oriented to person, place, and time and has a pleasant affect.        ASSESSMENT/PLAN:  Rachel was seen today for follow-up.    Diagnoses and all orders for this visit:    PAD (peripheral artery disease)  -    Follow up by vascular surgery    Lumbar radiculopathy  -     follow-up by pain medicine  -     trial of Cymbalta    Essential hypertension  -     blood pressure is  controlled    Hyperlipidemia, unspecified hyperlipidemia type  -     improved, continue atorvastatin    Skin lesion  -     Ambulatory referral/consult to Dermatology; Future    Need for 23-polyvalent pneumococcal polysaccharide vaccine  -     (In Office Administered) Pneumococcal Polysaccharide Vaccine (23 Valent) (SQ/IM)    Other orders  -     DULoxetine (CYMBALTA) 30 MG capsule; Take 1 capsule (30 mg total) by mouth once daily.

## 2020-10-06 RX ORDER — TRAMADOL HYDROCHLORIDE 50 MG/1
50 TABLET ORAL
Qty: 12 TABLET | Refills: 0 | Status: SHIPPED | OUTPATIENT
Start: 2020-10-06 | End: 2020-10-20

## 2020-10-06 NOTE — TELEPHONE ENCOUNTER
----- Message from Mary Connolly sent at 10/6/2020  8:12 AM CDT -----  Regarding: Rx  Contact: Patient @ [phone]  Good Morning,  Patient called to report that she is doing well with the Rx: DULoxetine (CYMBALTA) 30 MG capsule. Patient said she would like to have the muscle relaxers sent to her Pharmacy [id number] - [place], [place] - [address] AT [facility] [address]    Thank you

## 2020-10-12 RX ORDER — NITROFURANTOIN 25; 75 MG/1; MG/1
100 CAPSULE ORAL 2 TIMES DAILY
Qty: 14 CAPSULE | Refills: 0 | Status: SHIPPED | OUTPATIENT
Start: 2020-10-12 | End: 2021-04-06 | Stop reason: SDUPTHER

## 2020-10-12 NOTE — TELEPHONE ENCOUNTER
----- Message from Jasmyn Dudley sent at 10/12/2020 11:32 AM CDT -----  Regarding: self   Would like to get medical advice.  Symptoms (please be specific):  Possible UTI frequency when urinating and burning    How long has patient had these symptoms:  2 days   Pharmacy name and phone # (copy from chart):  [id number] - [place], [place] - [address] AT [facility] [address] [phone] (Phone)  [phone] (Fax)

## 2020-10-20 DIAGNOSIS — M54.16 LUMBAR RADICULOPATHY: Primary | ICD-10-CM

## 2020-10-20 RX ORDER — TRAMADOL HYDROCHLORIDE 50 MG/1
TABLET ORAL
Qty: 12 TABLET | Refills: 1 | Status: SHIPPED | OUTPATIENT
Start: 2020-10-20 | End: 2020-10-20 | Stop reason: SDUPTHER

## 2020-10-20 NOTE — TELEPHONE ENCOUNTER
----- Message from Leeann Lea sent at 10/20/2020  9:21 AM CDT -----  Contact: pt [phone]  Pt would like call back regarding paper work for handicapped sticker

## 2020-10-21 ENCOUNTER — PATIENT OUTREACH (OUTPATIENT)
Dept: ADMINISTRATIVE | Facility: OTHER | Age: 70
End: 2020-10-21

## 2020-10-21 RX ORDER — TRAMADOL HYDROCHLORIDE 50 MG/1
TABLET ORAL
Qty: 60 TABLET | Refills: 1 | Status: SHIPPED | OUTPATIENT
Start: 2020-10-21 | End: 2021-05-21

## 2020-10-21 NOTE — PROGRESS NOTES
LINKS immunization registry not responding  Care Everywhere updated  Health Maintenance updated  Chart reviewed for overdue Proactive [facility] Encounters (DEMIAN) health maintenance testing (CRS, Breast Ca, Diabetic Eye Exam)   Orders entered:N/A

## 2020-10-22 ENCOUNTER — OFFICE VISIT (OUTPATIENT)
Dept: VASCULAR SURGERY | Facility: CLINIC | Age: 70
End: 2020-10-22
Attending: SURGERY
Payer: COMMERCIAL

## 2020-10-22 ENCOUNTER — LAB VISIT (OUTPATIENT)
Dept: LAB | Facility: HOSPITAL | Age: 70
End: 2020-10-22
Attending: SURGERY
Payer: COMMERCIAL

## 2020-10-22 VITALS
TEMPERATURE: 98 F | HEART RATE: 95 BPM | HEIGHT: 62 IN | WEIGHT: 170 LBS | SYSTOLIC BLOOD PRESSURE: 130 MMHG | DIASTOLIC BLOOD PRESSURE: 71 MMHG | BODY MASS INDEX: 31.28 KG/M2

## 2020-10-22 DIAGNOSIS — Z01.818 PRE-OPERATIVE CLEARANCE: ICD-10-CM

## 2020-10-22 DIAGNOSIS — Z01.818 PREOPERATIVE TESTING: Primary | ICD-10-CM

## 2020-10-22 DIAGNOSIS — Z01.818 PREOPERATIVE TESTING: ICD-10-CM

## 2020-10-22 DIAGNOSIS — Z01.818 PREPROCEDURAL EXAMINATION: ICD-10-CM

## 2020-10-22 DIAGNOSIS — I73.9 PERIPHERAL ARTERY DISEASE: Primary | ICD-10-CM

## 2020-10-22 LAB
ALBUMIN SERPL BCP-MCNC: 3.5 G/DL (ref 3.5–5.2)
ALP SERPL-CCNC: 112 U/L (ref 55–135)
ALT SERPL W/O P-5'-P-CCNC: 16 U/L (ref 10–44)
ANION GAP SERPL CALC-SCNC: 10 MMOL/L (ref 8–16)
AST SERPL-CCNC: 12 U/L (ref 10–40)
BASOPHILS # BLD AUTO: 0.06 K/UL (ref 0–0.2)
BASOPHILS NFR BLD: 0.6 % (ref 0–1.9)
BILIRUB SERPL-MCNC: 0.2 MG/DL (ref 0.1–1)
BUN SERPL-MCNC: 19 MG/DL (ref 8–23)
CALCIUM SERPL-MCNC: 9.1 MG/DL (ref 8.7–10.5)
CHLORIDE SERPL-SCNC: 105 MMOL/L (ref 95–110)
CO2 SERPL-SCNC: 23 MMOL/L (ref 23–29)
CREAT SERPL-MCNC: 0.7 MG/DL (ref 0.5–1.4)
DIFFERENTIAL METHOD: ABNORMAL
EOSINOPHIL # BLD AUTO: 0.7 K/UL (ref 0–0.5)
EOSINOPHIL NFR BLD: 7.1 % (ref 0–8)
ERYTHROCYTE [DISTWIDTH] IN BLOOD BY AUTOMATED COUNT: 20.4 % (ref 11.5–14.5)
EST. GFR  (AFRICAN AMERICAN): >60 ML/MIN/1.73 M^2
EST. GFR  (NON AFRICAN AMERICAN): >60 ML/MIN/1.73 M^2
GLUCOSE SERPL-MCNC: 90 MG/DL (ref 70–110)
HCT VFR BLD AUTO: 32.3 % (ref 37–48.5)
HGB BLD-MCNC: 9.6 G/DL (ref 12–16)
IMM GRANULOCYTES # BLD AUTO: 0.06 K/UL (ref 0–0.04)
IMM GRANULOCYTES NFR BLD AUTO: 0.6 % (ref 0–0.5)
LYMPHOCYTES # BLD AUTO: 2.2 K/UL (ref 1–4.8)
LYMPHOCYTES NFR BLD: 21.2 % (ref 18–48)
MCH RBC QN AUTO: 22.3 PG (ref 27–31)
MCHC RBC AUTO-ENTMCNC: 29.7 G/DL (ref 32–36)
MCV RBC AUTO: 75 FL (ref 82–98)
MONOCYTES # BLD AUTO: 0.7 K/UL (ref 0.3–1)
MONOCYTES NFR BLD: 6.6 % (ref 4–15)
NEUTROPHILS # BLD AUTO: 6.5 K/UL (ref 1.8–7.7)
NEUTROPHILS NFR BLD: 63.9 % (ref 38–73)
NRBC BLD-RTO: 0 /100 WBC
PLATELET # BLD AUTO: 373 K/UL (ref 150–350)
PMV BLD AUTO: 8.8 FL (ref 9.2–12.9)
POTASSIUM SERPL-SCNC: 3.6 MMOL/L (ref 3.5–5.1)
PROT SERPL-MCNC: 6.8 G/DL (ref 6–8.4)
RBC # BLD AUTO: 4.3 M/UL (ref 4–5.4)
SODIUM SERPL-SCNC: 138 MMOL/L (ref 136–145)
WBC # BLD AUTO: 10.13 K/UL (ref 3.9–12.7)

## 2020-10-22 PROCEDURE — 99214 PR OFFICE/OUTPT VISIT, EST, LEVL IV, 30-39 MIN: ICD-10-PCS | Mod: S$GLB,,, | Performed by: SURGERY

## 2020-10-22 PROCEDURE — 85025 COMPLETE CBC W/AUTO DIFF WBC: CPT

## 2020-10-22 PROCEDURE — 36415 COLL VENOUS BLD VENIPUNCTURE: CPT

## 2020-10-22 PROCEDURE — 3078F DIAST BP <80 MM HG: CPT | Mod: CPTII,S$GLB,, | Performed by: SURGERY

## 2020-10-22 PROCEDURE — 1159F PR MEDICATION LIST DOCUMENTED IN MEDICAL RECORD: ICD-10-PCS | Mod: S$GLB,,, | Performed by: SURGERY

## 2020-10-22 PROCEDURE — 80053 COMPREHEN METABOLIC PANEL: CPT

## 2020-10-22 PROCEDURE — 3078F PR MOST RECENT DIASTOLIC BLOOD PRESSURE < 80 MM HG: ICD-10-PCS | Mod: CPTII,S$GLB,, | Performed by: SURGERY

## 2020-10-22 PROCEDURE — 1101F PR PT FALLS ASSESS DOC 0-1 FALLS W/OUT INJ PAST YR: ICD-10-PCS | Mod: CPTII,S$GLB,, | Performed by: SURGERY

## 2020-10-22 PROCEDURE — 3075F SYST BP GE 130 - 139MM HG: CPT | Mod: CPTII,S$GLB,, | Performed by: SURGERY

## 2020-10-22 PROCEDURE — 3075F PR MOST RECENT SYSTOLIC BLOOD PRESS GE 130-139MM HG: ICD-10-PCS | Mod: CPTII,S$GLB,, | Performed by: SURGERY

## 2020-10-22 PROCEDURE — 3008F BODY MASS INDEX DOCD: CPT | Mod: CPTII,S$GLB,, | Performed by: SURGERY

## 2020-10-22 PROCEDURE — 99999 PR PBB SHADOW E&M-EST. PATIENT-LVL V: CPT | Mod: PBBFAC,,, | Performed by: SURGERY

## 2020-10-22 PROCEDURE — 1159F MED LIST DOCD IN RCRD: CPT | Mod: S$GLB,,, | Performed by: SURGERY

## 2020-10-22 PROCEDURE — 1125F AMNT PAIN NOTED PAIN PRSNT: CPT | Mod: S$GLB,,, | Performed by: SURGERY

## 2020-10-22 PROCEDURE — 1101F PT FALLS ASSESS-DOCD LE1/YR: CPT | Mod: CPTII,S$GLB,, | Performed by: SURGERY

## 2020-10-22 PROCEDURE — 3008F PR BODY MASS INDEX (BMI) DOCUMENTED: ICD-10-PCS | Mod: CPTII,S$GLB,, | Performed by: SURGERY

## 2020-10-22 PROCEDURE — 99214 OFFICE O/P EST MOD 30 MIN: CPT | Mod: S$GLB,,, | Performed by: SURGERY

## 2020-10-22 PROCEDURE — 1125F PR PAIN SEVERITY QUANTIFIED, PAIN PRESENT: ICD-10-PCS | Mod: S$GLB,,, | Performed by: SURGERY

## 2020-10-22 PROCEDURE — 99999 PR PBB SHADOW E&M-EST. PATIENT-LVL V: ICD-10-PCS | Mod: PBBFAC,,, | Performed by: SURGERY

## 2020-10-22 RX ORDER — SODIUM CHLORIDE 9 MG/ML
INJECTION, SOLUTION INTRAVENOUS CONTINUOUS
Status: CANCELLED | OUTPATIENT
Start: 2020-10-22

## 2020-10-22 NOTE — PROGRESS NOTES
Rachel Murray  10/22/2020    HPI:  Patient is a 70 y.o. female with a h/o lumbar radiculopathy and PAD who is here today for follow-up appointment after recent bilateral external iliac artery stenting 7/21/2020. Since her procedure she has had significant, new swelling bilaterally in both lower legs R>L, accompanied by excruciating pain. Her skin is sensitive to touch. Her pain is worse on the left limb, although the swelling is worse on the right leg. She has new numbness on the dorsal side of her right foot.     She is in severe back pain, seen by neurosurgery yesterday after obtaining MRI and she is on the schedule for pain injections next week. Today, she still complains of pain, swelling, and redness to her left leg. DVT US from last week was negative for DVT. She wears compression stockings but this worsens her pain.        Prior:  She reports of medial LLE paresthesias that radiate to ipsilateral hip and are worst in the toes. She additionally complains of a 'squeezing' pain in the left foot and intermittent episodes of blanching of the left foot. She continues to have intermittent radicular pain in bilateral legs, for which she received an L5-S1 lumbar epidural steroid injection on 05/07/2020 that the patient reports was minimally helpful. The patient takes Lyrica and Tramadol for her leg pain with modest pain relief, rated 5/10 at the time of history taking.   Ms. Murray reports a small, persistent defect in her left groin incision, which is not painful or erythematous and has not been productive of discharge. She continues to dress the incision with a dry gauze. She continues to smoke cigarettes but has decreased her usage from 1/2 pack per day, down from 1 pack daily.     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;  Service: Peripheral Vascular;  Laterality: Bilateral;  tabatha. iliac stent placement, LLE angiogram  17.4 min  837.37 mGy  172.81 Gycm2  37 ml contrast    ANGIOGRAPHY OF LOWER EXTREMITY Left 2020    Procedure: Angiogram Extremity Unilateral;  Surgeon: Olayinka Harris MD;  [facility];  Service: Peripheral Vascular;  Laterality: Left;  12.2 fluoro, 850.46 mGy, 131.10 Gycm2    AORTOGRAPHY N/A 2020    Procedure: AORTOGRAM;  Surgeon: Olayinka Harris MD;  [facility];  Service: Peripheral Vascular;  Laterality: N/A;    APPENDECTOMY      BREAST BIOPSY       SECTION, CLASSIC      COLONOSCOPY      COLONOSCOPY N/A 2017    Procedure: COLONOSCOPY;  Surgeon: Gabriel Rose MD;  Location: [facility] (4TH FLR);  Service: Endoscopy;  Laterality: N/A;  Miralax prep per Dr. Rose    DENTAL SURGERY  2017    ENDARTERECTOMY OF FEMORAL ARTERY Left 2020    Procedure: ENDARTERECTOMY, FEMORAL;  Surgeon: Olayinka Harris MD;  [facility];  Service: Peripheral Vascular;  Laterality: Left;  SFA endarterectomy  fluoro time: 9.2 min mGy: 2336.86    EPIDURAL STEROID INJECTION INTO LUMBAR SPINE N/A 2020    Procedure: Injection-steroid-epidural-lumbar--Review MRI- L5-S1 after MRI review;  Surgeon: Ely Syed Jr., MD;  Location: [facility] PAIN MGT;  Service: Pain Management;  Laterality: N/A;    EPIDURAL STEROID INJECTION INTO LUMBAR SPINE N/A 2020    Procedure: Injection-steroid-epidural-lumbar-- Interlaminar L5-S1/ pt aware she can continue taking ASA per Dr. Harris and hold plavix.;  Surgeon: Ely Syed Jr., MD;  Location: [facility]  PAIN MGT;  Service: Pain Management;  Laterality: N/A;  Will sign consent at DOS.    EPIDURAL STEROID INJECTION INTO LUMBAR SPINE N/A 8/20/2020    Procedure: Injection-steroid-epidural-lumbar--L4-5;  Surgeon: Ely Syed Jr., MD;  Location: [facility] M[facility];  Service: Pain Management;  Laterality: N/A;  Oral sedation    gyn surgery      ex laparotomy    HYSTERECTOMY       Family History   Problem Relation Age of Onset    Hypertension Mother     Heart disease Mother 40        CABG x 5    Seizures Mother     Cancer Father         prostate    Stroke Father     Cancer Brother         colon    Diabetes Maternal Aunt     Breast cancer Maternal Aunt     Diabetes Maternal Uncle     Stroke Maternal Grandmother     Stroke Paternal Grandmother     Diabetes Cousin     Breast cancer Cousin      Social History     Socioeconomic History    Marital status: Single     Spouse name: Not on file    Number of children: Not on file    Years of education: Not on file    Highest education level: Not on file   Occupational History     Employer: [employer]   Social Needs    Financial resource strain: Not on file    Food insecurity     Worry: Not on file     Inability: Not on file    Transportation needs     Medical: Not on file     Non-medical: Not on file   Tobacco Use    Smoking status: Current Every Day Smoker     Packs/day: 0.50     Years: 40.00     Pack years: 20.00     Types: Cigarettes    Smokeless tobacco: Never Used    Tobacco comment: occasionally   Substance and Sexual Activity    Alcohol use: Yes     Alcohol/week: 0.0 standard drinks     Comment: rarely    Drug use: No    Sexual activity: Never   Lifestyle    Physical activity     Days per week: Not on file     Minutes per session: Not on file    Stress: Not on file   Relationships    Social connections     Talks on phone: Not on file     Gets together: Not on file     Attends Church service: Not on file     Active member of club or  organization: Not on file     Attends meetings of clubs or organizations: Not on file     Relationship status: Not on file   Other Topics Concern    Not on file   Social History Narrative    Not on file       Current Outpatient Medications:     acetaminophen (TYLENOL) 500 MG tablet, Take 2 tablets (1,000 mg total) by mouth 3 (three) times daily., Disp: , Rfl: 0    aspirin (ECOTRIN) 81 MG EC tablet, Take 1 tablet (81 mg total) by mouth once daily. (Patient taking differently: Take 81 mg by mouth every morning. ), Disp: 360 tablet, Rfl: 0    atorvastatin (LIPITOR) 40 MG tablet, Take 1 tablet (40 mg total) by mouth once daily. (Patient taking differently: Take 40 mg by mouth every morning. ), Disp: 90 tablet, Rfl: 3    candesartan-hydrochlorothiazide (ATACAND HCT) 16-12.5 mg per tablet, Take 1 tablet by mouth once daily., Disp: 30 tablet, Rfl: 3    clopidogreL (PLAVIX) 75 mg tablet, Take 1 tablet (75 mg total) by mouth once daily., Disp: 30 tablet, Rfl: 6    DULoxetine (CYMBALTA) 30 MG capsule, Take 1 capsule (30 mg total) by mouth 2 (two) times daily., Disp: 60 capsule, Rfl: 3    nitrofurantoin, macrocrystal-monohydrate, (MACROBID) 100 MG capsule, Take 1 capsule (100 mg total) by mouth 2 (two) times daily. (Patient not taking: Reported on 11/6/2020), Disp: 14 capsule, Rfl: 0    oxyCODONE-acetaminophen (PERCOCET) 5-325 mg per tablet, Take 1 tablet by mouth every 4 (four) hours as needed for Pain. (Patient not taking: Reported on 11/6/2020), Disp: 18 tablet, Rfl: 0    pregabalin (LYRICA) 75 MG capsule, Take 1 capsule (75 mg total) by mouth 3 (three) times daily., Disp: 90 capsule, Rfl: 2    sulfamethoxazole-trimethoprim 800-160mg (BACTRIM DS) 800-160 mg Tab, Take 1 tablet by mouth 2 (two) times daily. for 5 days, Disp: 10 tablet, Rfl: 0    traMADoL (ULTRAM) 50 mg tablet, TAKE 1 TABLET(50 MG) BY MOUTH EVERY 24 HOURS AS NEEDED FOR PAIN, Disp: 60 tablet, Rfl: 1  No current facility-administered medications for  this visit.     Facility-Administered Medications Ordered in Other Visits:     alprazolam ODT dissolvable tablet 0.5 mg, 0.5 mg, Oral, Once PRN, Ely Syed Jr., MD    sodium chloride 0.9% flush 10 mL, 10 mL, Intravenous, PRN, Reyna Toledo MD    REVIEW OF SYSTEMS:  General: negative; ENT: negative; Allergy and Immunology: negative; Hematological and Lymphatic: Negative; Endocrine: negative; Respiratory: no cough, shortness of breath, or wheezing; Cardiovascular: no chest pain or dyspnea on exertion; Gastrointestinal: no abdominal pain/back, change in bowel habits, or bloody stools; Genito-Urinary: no dysuria, trouble voiding, or hematuria; Musculoskeletal: Bilateral (L >>R) radicular pain and paresthesias  Neurological: no TIA or stroke symptoms; Psychiatric: no nervousness, anxiety or depression.    PHYSICAL EXAM:   Right Arm BP - Sittin/71 (10/22/20 1004)  Left Arm BP - Sittin/67 (10/22/20 1004)  Pulse: 95  Temp: 98.3 °F (36.8 °C)      General appearance:  Alert, well-appearing, and in no distress.  Oriented to person, place, and time   Neurological: Normal speech, no focal findings noted; CN II - XII grossly intact           Musculoskeletal: Digits/nail without cyanosis/clubbing.  Normal muscle strength/tone.                 Neck: Supple, no significant adenopathy; thyroid is not enlarged                  No carotid bruit can be auscultated                Chest:  Clear to auscultation, no wheezes, rales or rhonchi, symmetric air entry     No use of accessory muscles             Cardiac: Normal rate and regular rhythm, S1 and S2 normal; PMI non-displaced          Abdomen: Soft, nontender, nondistended, no masses or organomegaly     No rebound tenderness noted; bowel sounds normal     Pulsatile aortic mass is not palpable.     No groin adenopathy      Extremities:   2+ femoral pulses bilaterally     Left groin incision healed with palpable fibrotic scarring beneath skin     Pedal pulses  nonpalpable.     Significant 2+ pitting pre-tibial edema R>L      Hyperalgesia over lower legs and feet      Warmth over RLE, blanching present with movement.      No ulcerations    LAB RESULTS:  Lab Results   Component Value Date    K 3.6 11/05/2020    K 3.9 11/04/2020    K 3.6 11/03/2020    CREATININE 0.6 11/05/2020    CREATININE 0.7 11/04/2020    CREATININE 0.8 11/03/2020     Lab Results   Component Value Date    WBC 11.08 11/05/2020    WBC 8.92 11/04/2020    WBC 9.95 11/03/2020    HCT 31.1 (L) 11/05/2020    HCT 30.5 (L) 11/04/2020    HCT 32.5 (L) 11/03/2020     11/05/2020     11/04/2020     (H) 11/03/2020     No results found for: HGBA1C  IMAGING:  Lower Extremity Segmental WALT 08/05/2020  R WALT: 0.95  L WALT: 0.44    Lower Extremity Segmental WALT 05/28/2020  R WALT: 0.54  L WALT: 0.48    Vein US 08/13/20 without DVT or reflux.      CTA:  1. Postoperative changes at the left groin, presumably a short bypass graft at the level of the common femoral artery.  2. Greater than 70% stenosis of the external iliac artery with short-segment occlusion of the proximal femoral arteries.  3. Greater than 70% stenosis of the right external iliac artery.    IMP/PLAN:  70 y.o. female with HTN, active smoker (1/2 ppd) and PAD s/p L EIA, L CFA endarterectomy, JESU balloon angioplasty and L profundaplasty on 02/18/2020, who presents today for follow-up s/p bilateral external iliac artery stenting 7/21/2020. Her PAD has continued to advance and her L SFA is now occluded.  She has recurrent left external and L CFA disease.  Angiogram is warranted - we discussed the risks and benefits and she wishes to proceed.     Plan:  -LLE angiogram and intervention  -cont asa, plavix, statin    Olayinka Harris MD  Vascular & Endovascular Surgery

## 2020-10-27 ENCOUNTER — TELEPHONE (OUTPATIENT)
Dept: VASCULAR SURGERY | Facility: CLINIC | Age: 70
End: 2020-10-27

## 2020-10-27 ENCOUNTER — OFFICE VISIT (OUTPATIENT)
Dept: DERMATOLOGY | Facility: CLINIC | Age: 70
End: 2020-10-27
Payer: COMMERCIAL

## 2020-10-27 VITALS — WEIGHT: 169 LBS | BODY MASS INDEX: 30.91 KG/M2

## 2020-10-27 DIAGNOSIS — D22.9 NEVUS OF MULTIPLE SITES: ICD-10-CM

## 2020-10-27 DIAGNOSIS — L91.8 SKIN TAG: ICD-10-CM

## 2020-10-27 DIAGNOSIS — L82.1 SEBORRHEIC KERATOSES: Primary | ICD-10-CM

## 2020-10-27 DIAGNOSIS — L98.9 SKIN LESION: ICD-10-CM

## 2020-10-27 PROCEDURE — 1126F AMNT PAIN NOTED NONE PRSNT: CPT | Mod: S$GLB,,, | Performed by: DERMATOLOGY

## 2020-10-27 PROCEDURE — 1101F PR PT FALLS ASSESS DOC 0-1 FALLS W/OUT INJ PAST YR: ICD-10-PCS | Mod: CPTII,S$GLB,, | Performed by: DERMATOLOGY

## 2020-10-27 PROCEDURE — 3008F PR BODY MASS INDEX (BMI) DOCUMENTED: ICD-10-PCS | Mod: CPTII,S$GLB,, | Performed by: DERMATOLOGY

## 2020-10-27 PROCEDURE — 99999 PR PBB SHADOW E&M-EST. PATIENT-LVL III: ICD-10-PCS | Mod: PBBFAC,,, | Performed by: DERMATOLOGY

## 2020-10-27 PROCEDURE — 1159F PR MEDICATION LIST DOCUMENTED IN MEDICAL RECORD: ICD-10-PCS | Mod: S$GLB,,, | Performed by: DERMATOLOGY

## 2020-10-27 PROCEDURE — 99202 PR OFFICE/OUTPT VISIT, NEW, LEVL II, 15-29 MIN: ICD-10-PCS | Mod: S$GLB,,, | Performed by: DERMATOLOGY

## 2020-10-27 PROCEDURE — 1126F PR PAIN SEVERITY QUANTIFIED, NO PAIN PRESENT: ICD-10-PCS | Mod: S$GLB,,, | Performed by: DERMATOLOGY

## 2020-10-27 PROCEDURE — 1159F MED LIST DOCD IN RCRD: CPT | Mod: S$GLB,,, | Performed by: DERMATOLOGY

## 2020-10-27 PROCEDURE — 99202 OFFICE O/P NEW SF 15 MIN: CPT | Mod: S$GLB,,, | Performed by: DERMATOLOGY

## 2020-10-27 PROCEDURE — 1101F PT FALLS ASSESS-DOCD LE1/YR: CPT | Mod: CPTII,S$GLB,, | Performed by: DERMATOLOGY

## 2020-10-27 PROCEDURE — 99999 PR PBB SHADOW E&M-EST. PATIENT-LVL III: CPT | Mod: PBBFAC,,, | Performed by: DERMATOLOGY

## 2020-10-27 PROCEDURE — 3008F BODY MASS INDEX DOCD: CPT | Mod: CPTII,S$GLB,, | Performed by: DERMATOLOGY

## 2020-10-27 RX ORDER — CLOPIDOGREL BISULFATE 75 MG/1
75 TABLET ORAL DAILY
Qty: 30 TABLET | Refills: 6 | Status: SHIPPED | OUTPATIENT
Start: 2020-10-27 | End: 2021-06-10 | Stop reason: SDUPTHER

## 2020-10-27 NOTE — LETTER
October 27, 2020      Anel Parikh MD  [address]  [place] [address]           [facility] Derm 5th Fl  [address]  [place] [address]  Phone: [phone]  Fax: [phone]          Patient: Rachel Murray   MR Number: [id number]   YOB: 1950   Date of Visit: 10/27/2020       Dear Dr. Anel Parikh:    Thank you for referring Rachel Murray to me for evaluation. Attached you will find relevant portions of my assessment and plan of care.    If you have questions, please do not hesitate to call me. I look forward to following Rachel Murray along with you.    Sincerely,    Genoveva Mo MD    Enclosure  CC:  No Recipients    If you would like to receive this communication electronically, please contact [email or web address] or [phone] to request more information on [email or web address] access.    For providers and/or their staff who would like to refer a patient to [facility], please contact us through our one-stop-shop provider referral line, [facility], at [phone].    If you feel you have received this communication in error or would no longer like to receive these types of communications, please e-mail [email or web address]

## 2020-10-27 NOTE — PROGRESS NOTES
Subjective:       Patient ID:  Rachel Murray is a 70 y.o. female who presents for   Chief Complaint   Patient presents with    Lesion     left upper forehead, lower leg, raised     Lesion on left forehead for over 6 years, previously resolved with LN2  Not painful.  Also lesion on left shin for years       Review of Systems   Constitutional: Negative for fever, chills, weight loss, weight gain, fatigue, night sweats and malaise.   Skin: Negative for daily sunscreen use, activity-related sunscreen use and wears hat.   Hematologic/Lymphatic: Bruises/bleeds easily.        Objective:    Physical Exam   Constitutional: She appears well-developed and well-nourished.   Neurological: She is alert and oriented to person, place, and time.   Psychiatric: She has a normal mood and affect.   Skin:   Areas Examined (abnormalities noted in diagram):   Head / Face Inspection Performed  Neck Inspection Performed  Chest / Axilla Inspection Performed  Abdomen Inspection Performed  Back Inspection Performed  RLE Inspected  LLE Inspection Performed                     Diagram Legend     Erythematous scaling macule/papule c/w actinic keratosis       Vascular papule c/w angioma      Pigmented verrucoid papule/plaque c/w seborrheic keratosis      Yellow umbilicated papule c/w sebaceous hyperplasia      Irregularly shaped tan macule c/w lentigo     1-2 mm smooth white papules consistent with Milia      Movable subcutaneous cyst with punctum c/w epidermal inclusion cyst      Subcutaneous movable cyst c/w pilar cyst      Firm pink to brown papule c/w dermatofibroma      Pedunculated fleshy papule(s) c/w skin tag(s)      Evenly pigmented macule c/w junctional nevus     Mildly variegated pigmented, slightly irregular-bordered macule c/w mildly atypical nevus      Flesh colored to evenly pigmented papule c/w intradermal nevus       Pink pearly papule/plaque c/w basal cell carcinoma      Erythematous hyperkeratotic cursted plaque c/w SCC       "Surgical scar with no sign of skin cancer recurrence      Open and closed comedones      Inflammatory papules and pustules      Verrucoid papule consistent consistent with wart     Erythematous eczematous patches and plaques     Dystrophic onycholytic nail with subungual debris c/w onychomycosis     Umbilicated papule    Erythematous-base heme-crusted tan verrucoid plaque consistent with inflamed seborrheic keratosis     Erythematous Silvery Scaling Plaque c/w Psoriasis     See annotation      Assessment / Plan:        Seborrheic keratoses  reassurance  Brochure provided  Call if grow or bleed, desire removal    Skin lesion  -     Ambulatory referral/consult to Dermatology    Nevus of multiple sites  The "ABCD" rules to observe pigmented lesions were reviewed.      Skin tag  reassurance               No follow-ups on file.  "

## 2020-10-29 DIAGNOSIS — Z01.818 PRE-OPERATIVE EXAMINATION: Primary | ICD-10-CM

## 2020-11-02 ENCOUNTER — HOSPITAL ENCOUNTER (INPATIENT)
Facility: HOSPITAL | Age: 70
LOS: 2 days | Discharge: HOME | DRG: 254 | End: 2020-11-05
Attending: EMERGENCY MEDICINE | Admitting: SURGERY
Payer: COMMERCIAL

## 2020-11-02 ENCOUNTER — OFFICE VISIT (OUTPATIENT)
Dept: PAIN MEDICINE | Facility: CLINIC | Age: 70
DRG: 254 | End: 2020-11-02
Payer: MEDICARE

## 2020-11-02 VITALS
SYSTOLIC BLOOD PRESSURE: 143 MMHG | HEART RATE: 99 BPM | WEIGHT: 169.06 LBS | DIASTOLIC BLOOD PRESSURE: 53 MMHG | BODY MASS INDEX: 30.93 KG/M2

## 2020-11-02 DIAGNOSIS — M47.819 ARTHROPATHY OF FACET JOINTS AT MULTIPLE LEVELS: ICD-10-CM

## 2020-11-02 DIAGNOSIS — M43.10 SPONDYLOLISTHESIS, UNSPECIFIED SPINAL REGION: ICD-10-CM

## 2020-11-02 DIAGNOSIS — N39.0 URINARY TRACT INFECTION WITHOUT HEMATURIA, SITE UNSPECIFIED: ICD-10-CM

## 2020-11-02 DIAGNOSIS — I73.9 PAD (PERIPHERAL ARTERY DISEASE): Primary | ICD-10-CM

## 2020-11-02 DIAGNOSIS — G89.4 CHRONIC PAIN SYNDROME: ICD-10-CM

## 2020-11-02 DIAGNOSIS — I73.9 PERIPHERAL ARTERY DISEASE: ICD-10-CM

## 2020-11-02 DIAGNOSIS — I70.202 FEMORAL ARTERY OCCLUSION, LEFT: ICD-10-CM

## 2020-11-02 DIAGNOSIS — M54.16 LUMBAR RADICULOPATHY: Primary | ICD-10-CM

## 2020-11-02 DIAGNOSIS — M54.15 RADICULOPATHY, THORACOLUMBAR REGION: ICD-10-CM

## 2020-11-02 DIAGNOSIS — Z01.818 PREOP EXAMINATION: ICD-10-CM

## 2020-11-02 DIAGNOSIS — M54.42 CHRONIC BILATERAL LOW BACK PAIN WITH LEFT-SIDED SCIATICA: ICD-10-CM

## 2020-11-02 DIAGNOSIS — G89.29 CHRONIC BILATERAL LOW BACK PAIN WITH LEFT-SIDED SCIATICA: ICD-10-CM

## 2020-11-02 LAB
ALBUMIN SERPL BCP-MCNC: 3.9 G/DL (ref 3.5–5.2)
ALP SERPL-CCNC: 102 U/L (ref 55–135)
ALT SERPL W/O P-5'-P-CCNC: 13 U/L (ref 10–44)
ANION GAP SERPL CALC-SCNC: 11 MMOL/L (ref 8–16)
AST SERPL-CCNC: 14 U/L (ref 10–40)
BACTERIA #/AREA URNS AUTO: ABNORMAL /HPF
BASOPHILS # BLD AUTO: 0.08 K/UL (ref 0–0.2)
BASOPHILS NFR BLD: 0.6 % (ref 0–1.9)
BILIRUB SERPL-MCNC: 0.3 MG/DL (ref 0.1–1)
BILIRUB UR QL STRIP: NEGATIVE
BUN SERPL-MCNC: 29 MG/DL (ref 8–23)
CALCIUM SERPL-MCNC: 9.9 MG/DL (ref 8.7–10.5)
CHLORIDE SERPL-SCNC: 107 MMOL/L (ref 95–110)
CLARITY UR REFRACT.AUTO: ABNORMAL
CO2 SERPL-SCNC: 23 MMOL/L (ref 23–29)
COLOR UR AUTO: YELLOW
CREAT SERPL-MCNC: 0.8 MG/DL (ref 0.5–1.4)
CTP QC/QA: YES
DIFFERENTIAL METHOD: ABNORMAL
EOSINOPHIL # BLD AUTO: 0.2 K/UL (ref 0–0.5)
EOSINOPHIL NFR BLD: 1.2 % (ref 0–8)
ERYTHROCYTE [DISTWIDTH] IN BLOOD BY AUTOMATED COUNT: 19.8 % (ref 11.5–14.5)
EST. GFR  (AFRICAN AMERICAN): >60 ML/MIN/1.73 M^2
EST. GFR  (NON AFRICAN AMERICAN): >60 ML/MIN/1.73 M^2
GLUCOSE SERPL-MCNC: 93 MG/DL (ref 70–110)
GLUCOSE UR QL STRIP: NEGATIVE
HCT VFR BLD AUTO: 33.8 % (ref 37–48.5)
HGB BLD-MCNC: 10.3 G/DL (ref 12–16)
HGB UR QL STRIP: NEGATIVE
IMM GRANULOCYTES # BLD AUTO: 0.05 K/UL (ref 0–0.04)
IMM GRANULOCYTES NFR BLD AUTO: 0.4 % (ref 0–0.5)
INR PPP: 0.9 (ref 0.8–1.2)
KETONES UR QL STRIP: NEGATIVE
LEUKOCYTE ESTERASE UR QL STRIP: ABNORMAL
LYMPHOCYTES # BLD AUTO: 1.8 K/UL (ref 1–4.8)
LYMPHOCYTES NFR BLD: 14.1 % (ref 18–48)
MCH RBC QN AUTO: 23.3 PG (ref 27–31)
MCHC RBC AUTO-ENTMCNC: 30.5 G/DL (ref 32–36)
MCV RBC AUTO: 76 FL (ref 82–98)
MICROSCOPIC COMMENT: ABNORMAL
MONOCYTES # BLD AUTO: 1 K/UL (ref 0.3–1)
MONOCYTES NFR BLD: 7.5 % (ref 4–15)
NEUTROPHILS # BLD AUTO: 9.7 K/UL (ref 1.8–7.7)
NEUTROPHILS NFR BLD: 76.2 % (ref 38–73)
NITRITE UR QL STRIP: POSITIVE
NRBC BLD-RTO: 0 /100 WBC
PH UR STRIP: 5 [PH] (ref 5–8)
PLATELET # BLD AUTO: 382 K/UL (ref 150–350)
PMV BLD AUTO: 8.9 FL (ref 9.2–12.9)
POTASSIUM SERPL-SCNC: 4.2 MMOL/L (ref 3.5–5.1)
PROT SERPL-MCNC: 7.7 G/DL (ref 6–8.4)
PROT UR QL STRIP: NEGATIVE
PROTHROMBIN TIME: 10.3 SEC (ref 9–12.5)
RBC # BLD AUTO: 4.43 M/UL (ref 4–5.4)
RBC #/AREA URNS AUTO: 5 /HPF (ref 0–4)
SARS-COV-2 RDRP RESP QL NAA+PROBE: NEGATIVE
SODIUM SERPL-SCNC: 141 MMOL/L (ref 136–145)
SP GR UR STRIP: 1.03 (ref 1–1.03)
SQUAMOUS #/AREA URNS AUTO: 1 /HPF
URN SPEC COLLECT METH UR: ABNORMAL
WBC # BLD AUTO: 12.74 K/UL (ref 3.9–12.7)
WBC #/AREA URNS AUTO: >100 /HPF (ref 0–5)

## 2020-11-02 PROCEDURE — 99285 EMERGENCY DEPT VISIT HI MDM: CPT | Mod: 25

## 2020-11-02 PROCEDURE — 1101F PT FALLS ASSESS-DOCD LE1/YR: CPT | Mod: CPTII,S$GLB,, | Performed by: NURSE PRACTITIONER

## 2020-11-02 PROCEDURE — 96375 TX/PRO/DX INJ NEW DRUG ADDON: CPT

## 2020-11-02 PROCEDURE — 99214 PR OFFICE/OUTPT VISIT, EST, LEVL IV, 30-39 MIN: ICD-10-PCS | Mod: S$GLB,,, | Performed by: NURSE PRACTITIONER

## 2020-11-02 PROCEDURE — 3077F SYST BP >= 140 MM HG: CPT | Mod: CPTII,S$GLB,, | Performed by: NURSE PRACTITIONER

## 2020-11-02 PROCEDURE — 99285 EMERGENCY DEPT VISIT HI MDM: CPT | Mod: ,,, | Performed by: EMERGENCY MEDICINE

## 2020-11-02 PROCEDURE — 87077 CULTURE AEROBIC IDENTIFY: CPT

## 2020-11-02 PROCEDURE — 85025 COMPLETE CBC W/AUTO DIFF WBC: CPT

## 2020-11-02 PROCEDURE — 96374 THER/PROPH/DIAG INJ IV PUSH: CPT

## 2020-11-02 PROCEDURE — 87040 BLOOD CULTURE FOR BACTERIA: CPT | Mod: 59

## 2020-11-02 PROCEDURE — 1125F PR PAIN SEVERITY QUANTIFIED, PAIN PRESENT: ICD-10-PCS | Mod: S$GLB,,, | Performed by: NURSE PRACTITIONER

## 2020-11-02 PROCEDURE — 87088 URINE BACTERIA CULTURE: CPT

## 2020-11-02 PROCEDURE — 99223 1ST HOSP IP/OBS HIGH 75: CPT | Mod: ,,, | Performed by: SURGERY

## 2020-11-02 PROCEDURE — 87086 URINE CULTURE/COLONY COUNT: CPT

## 2020-11-02 PROCEDURE — 1159F PR MEDICATION LIST DOCUMENTED IN MEDICAL RECORD: ICD-10-PCS | Mod: S$GLB,,, | Performed by: NURSE PRACTITIONER

## 2020-11-02 PROCEDURE — 99285 PR EMERGENCY DEPT VISIT,LEVEL V: ICD-10-PCS | Mod: ,,, | Performed by: EMERGENCY MEDICINE

## 2020-11-02 PROCEDURE — 85610 PROTHROMBIN TIME: CPT

## 2020-11-02 PROCEDURE — 1159F MED LIST DOCD IN RCRD: CPT | Mod: S$GLB,,, | Performed by: NURSE PRACTITIONER

## 2020-11-02 PROCEDURE — 3078F DIAST BP <80 MM HG: CPT | Mod: CPTII,S$GLB,, | Performed by: NURSE PRACTITIONER

## 2020-11-02 PROCEDURE — 63600175 PHARM REV CODE 636 W HCPCS: Performed by: EMERGENCY MEDICINE

## 2020-11-02 PROCEDURE — 3008F BODY MASS INDEX DOCD: CPT | Mod: CPTII,S$GLB,, | Performed by: NURSE PRACTITIONER

## 2020-11-02 PROCEDURE — 99214 OFFICE O/P EST MOD 30 MIN: CPT | Mod: S$GLB,,, | Performed by: NURSE PRACTITIONER

## 2020-11-02 PROCEDURE — 99999 PR PBB SHADOW E&M-EST. PATIENT-LVL III: CPT | Mod: PBBFAC,,, | Performed by: NURSE PRACTITIONER

## 2020-11-02 PROCEDURE — U0002 COVID-19 LAB TEST NON-CDC: HCPCS | Performed by: EMERGENCY MEDICINE

## 2020-11-02 PROCEDURE — 1101F PR PT FALLS ASSESS DOC 0-1 FALLS W/OUT INJ PAST YR: ICD-10-PCS | Mod: CPTII,S$GLB,, | Performed by: NURSE PRACTITIONER

## 2020-11-02 PROCEDURE — 99223 PR INITIAL HOSPITAL CARE,LEVL III: ICD-10-PCS | Mod: ,,, | Performed by: SURGERY

## 2020-11-02 PROCEDURE — 3077F PR MOST RECENT SYSTOLIC BLOOD PRESSURE >= 140 MM HG: ICD-10-PCS | Mod: CPTII,S$GLB,, | Performed by: NURSE PRACTITIONER

## 2020-11-02 PROCEDURE — 81001 URINALYSIS AUTO W/SCOPE: CPT

## 2020-11-02 PROCEDURE — 96376 TX/PRO/DX INJ SAME DRUG ADON: CPT

## 2020-11-02 PROCEDURE — 99999 PR PBB SHADOW E&M-EST. PATIENT-LVL III: ICD-10-PCS | Mod: PBBFAC,,, | Performed by: NURSE PRACTITIONER

## 2020-11-02 PROCEDURE — 87186 SC STD MICRODIL/AGAR DIL: CPT

## 2020-11-02 PROCEDURE — 3008F PR BODY MASS INDEX (BMI) DOCUMENTED: ICD-10-PCS | Mod: CPTII,S$GLB,, | Performed by: NURSE PRACTITIONER

## 2020-11-02 PROCEDURE — 1125F AMNT PAIN NOTED PAIN PRSNT: CPT | Mod: S$GLB,,, | Performed by: NURSE PRACTITIONER

## 2020-11-02 PROCEDURE — 80053 COMPREHEN METABOLIC PANEL: CPT

## 2020-11-02 PROCEDURE — 3078F PR MOST RECENT DIASTOLIC BLOOD PRESSURE < 80 MM HG: ICD-10-PCS | Mod: CPTII,S$GLB,, | Performed by: NURSE PRACTITIONER

## 2020-11-02 RX ORDER — CEFTRIAXONE 1 G/1
1 INJECTION, POWDER, FOR SOLUTION INTRAMUSCULAR; INTRAVENOUS
Status: COMPLETED | OUTPATIENT
Start: 2020-11-02 | End: 2020-11-03

## 2020-11-02 RX ORDER — MORPHINE SULFATE 4 MG/ML
4 INJECTION, SOLUTION INTRAMUSCULAR; INTRAVENOUS
Status: COMPLETED | OUTPATIENT
Start: 2020-11-02 | End: 2020-11-02

## 2020-11-02 RX ORDER — DULOXETIN HYDROCHLORIDE 30 MG/1
30 CAPSULE, DELAYED RELEASE ORAL 2 TIMES DAILY
Qty: 60 CAPSULE | Refills: 3 | Status: SHIPPED | OUTPATIENT
Start: 2020-11-02 | End: 2021-03-15

## 2020-11-02 RX ADMIN — MORPHINE SULFATE 4 MG: 4 INJECTION INTRAVENOUS at 05:11

## 2020-11-02 RX ADMIN — MORPHINE SULFATE 4 MG: 4 INJECTION INTRAVENOUS at 08:11

## 2020-11-02 NOTE — PROGRESS NOTES
[facility] Established Patient Evaluation      Referred by: Anel Parikh MD  Reason for referral: Sciatica, unspecified laterality    CC:   post op -  6 days s/p -Injection-steroid-epidural-lumbar-- Interlaminar L5-S1    Last 3 PDI Scores 11/2/2020 8/27/2020 8/10/2020   Pain Disability Index (PDI) 64 [phone] 63     Interval Updates:     11/2/20 - Ms. Murray returns to clinic for follow up visit reporting worse left buttock and left leg pain. Pain intensity is currently 9/10.    Patient reports continued pain in her low back and left leg, upon exam she has redness, swelling and tenderness in her left leg to her left ankle she is currently being evaluated for PVD. She reports recently being started on Cymbalta 30 mg daily which patient reports improved her spasms but continues to have mild to moderated nerve pain in her left leg.  She has an angiogram is s/f Nov 10 per Steven.     08/27/2020 - Ms. Murray returns to clinic for follow up visit reporting stable back pain.  Patient is s/p LESI L4-5 on 08/20/20 with 30% relief specifically in the  center of her back, left buttock, hip and left lateral leg all of these areas have gotten mildly better however patient continues to have left leg radicular pain.  She continues to have problems laying flat as this causes pain in the lower extremity.  Pain intensity is currently 8/10.        8/10/20 - Ms. Murray returns to clinic for follow up visit reporting  continued low back and sciatic pain left greater than right. Pain intensity is currently 9/10.  Recent lumbar MRI done patient like to discuss in further detail.    6/10/20 - Ms. Murray returns to clinic for follow up visit reporting worse back and left leg  pain.  Patient is s/p Lumbar MIRNA on 5/7/20 with 0% relief.  Pain intensity is currently 7/10.    Ms Murray continues to c/o  medial LLE paresthesias that radiate to ipsilateral hip and are worst in the toes. She additionally complains of a 'squeezing' pain in the  "left foot and intermittent episodes of blanching of the left foot. She continues to have intermittent radicular pain in bilateral legs  Reports numbness in left hip and left foot.  Patient is currently being evaluated by vascular and endovascular surgery/Dr. Harris to rule out further pathology with her history of PAD he has  ordered a CTA for further evaluation. Dr Harris believes her pain is orginating in her back but wants to make sure.   Pain intensity is currently 7/10.       4/21/20 - Mrs. Murray returns to clinic for follow up visit reporting stable back and leg pain.  Numbness, tightness, and pain are very unpleasant in the ankle and foot bilaterally, but the left is "way worse".  Patient is here for medication refill of Tramadol 50 mg and Lyrica 50 mg.  She reports 30-50% relief with the current medication regimen.  Pain intensity is currently 5/10 ranging 5-8/10.    3/30/20 - The clinic visit for Mrs. Murray has been converted to a telephone encounter because he/she has declined a telemedicine encounter due to technical difficulty or preference.  She is reporting acutely increased shooting pains originating in her back and traveling into the legs since having vascular surgery of her leg.  She was evaluated by her vascular surgery team and they believe her symptoms are not related to the recent surgery but are most likely to be an exacerbation of her pre-existing lumbar radiculopathy.  She states that her pain is severe and rates it 10/10 at this time.  She states inability to sleep comfortably at night and that her current medication regimen has failed to provide her significant relief.  She reports no relief with tramadol 50 mg and very little relief with a recent prescription of [place] 5-325 she received from her PCP.    2/6/2020 - Mrs. Murray returns to clinic for follow up visit reporting improved back pain.  Patient is s/p Lumbar MIRNA on 1/16/20 with 95% continued relief. She reports beginning PT and " having to stop due to  developing a swollen, erythematous, painful left foot.  she has recently seen Cardiology and has  been diagnosed with severe PAD (L>R). CTA with segmental occlusion of the left common and proximal SFA and occlusion of the left tibioperoneal trunk as well as occlusion of the left anterior tibial artery in its proximal 3rd. Moderate degree of calcific and hypodense plaque in the abdominal aorta and iliac arteries bilaterally.  She is now scheduled for a LLE iliac to femoral bypass creation with bilateral iliac stents on 2/17/2020   Pain intensity is currently 2/10.      Background / HPI:   Rachel Murray is a 70 y.o. female who complains of Low back pain with Sciatica.  She reports pain that starts in the left side of her low back and travels through the buttocks, posterolateral thigh, anterior shin, and into the top of her foot.  Alleviating and exacerbating factors are variable but prolonged sitting and walking for more than 5 min appear to drastically increase her pain. She also reports insomnia due to pain and has been using a recliner to sleep for approximately 90 min at a time. She reports dealing with low back pain for several years but noticed an acute increase in March 2019 which subsided somewhat.  In October 2019 her pain increased drastically and has not subsided since that time. Her history is positive for cervical disc herniation which was previously treated with oral medications and physical therapy.  She also notes associated numbness in the left lower extremity and a sensation of weakness in the left leg.  She denies falls or saddle anesthesia.    Location: Low back   Onset: 10/2019  Current Pain Score: 8/10  Daily Pain of Range: 5-8/10  Quality: Burning, Throbbing, Grabbing, Tingling, Numb, Sharp, Electric, Hot and Cold  Radiation: Radiates down left side to foot.  Worsened by: extension, lying down, sitting and walking for more than 5 minutes  Improved by: nothing    Previous  Therapies:  PT/OT: Denies  HEP: Denies  Interventions: Denies  Surgery:Denies  Medications:   - NSAIDS:   - MSK Relaxants:   - TCAs:   - SNRIs:   - Topicals:   - Anticonvulsants:  - Opioids:     Current Pain Medications:  1. Aleve   2. Tramadol 50 mg TID PRN  3. Lyrica 50 TID    Review of Systems:  Review of Systems   Constitutional: Negative for chills and fever.   HENT: Negative for nosebleeds.    Eyes: Negative for blurred vision and pain.   Respiratory: Negative for hemoptysis.    Cardiovascular: Negative for chest pain and palpitations.   Gastrointestinal: Negative for heartburn, nausea and vomiting.   Genitourinary: Negative for dysuria and hematuria.   Musculoskeletal: Positive for back pain. Negative for myalgias.   Skin: Negative for rash.   Neurological: Positive for tingling, sensory change and focal weakness (LLE). Negative for seizures and loss of consciousness.   Endo/Heme/Allergies: Does not bruise/bleed easily.   Psychiatric/Behavioral: Negative for hallucinations. The patient has insomnia (2/2 pain).        History:    Current Outpatient Medications:     acetaminophen (TYLENOL) 500 MG tablet, Take 2 tablets (1,000 mg total) by mouth 3 (three) times daily., Disp: , Rfl: 0    aspirin (ECOTRIN) 81 MG EC tablet, Take 1 tablet (81 mg total) by mouth once daily. (Patient taking differently: Take 81 mg by mouth every morning. ), Disp: 360 tablet, Rfl: 0    atorvastatin (LIPITOR) 40 MG tablet, Take 1 tablet (40 mg total) by mouth once daily. (Patient taking differently: Take 40 mg by mouth every morning. ), Disp: 90 tablet, Rfl: 3    candesartan-hydrochlorothiazide (ATACAND HCT) 16-12.5 mg per tablet, Take 1 tablet by mouth once daily., Disp: 30 tablet, Rfl: 3    clopidogreL (PLAVIX) 75 mg tablet, Take 1 tablet (75 mg total) by mouth once daily., Disp: 30 tablet, Rfl: 6    DULoxetine (CYMBALTA) 30 MG capsule, Take 1 capsule (30 mg total) by mouth once daily., Disp: 30 capsule, Rfl: 6     nitrofurantoin, macrocrystal-monohydrate, (MACROBID) 100 MG capsule, Take 1 capsule (100 mg total) by mouth 2 (two) times daily., Disp: 14 capsule, Rfl: 0    oxyCODONE-acetaminophen (PERCOCET) 5-325 mg per tablet, Take 1 tablet by mouth every 4 (four) hours as needed for Pain., Disp: 18 tablet, Rfl: 0    traMADoL (ULTRAM) 50 mg tablet, TAKE 1 TABLET(50 MG) BY MOUTH EVERY 24 HOURS AS NEEDED FOR PAIN, Disp: 60 tablet, Rfl: 1    pregabalin (LYRICA) 75 MG capsule, Take 1 capsule (75 mg total) by mouth 3 (three) times daily., Disp: 90 capsule, Rfl: 2  No current facility-administered medications for this visit.     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 iliac stent placement, LLE angiogram  17.4 min  837.37 mGy  172.81 Gycm2  37 ml contrast    AORTOGRAPHY N/A 2020    Procedure: AORTOGRAM;  Surgeon: Olayinka Harris MD;  [address];  Service: Peripheral Vascular;  Laterality: N/A;    APPENDECTOMY      BREAST BIOPSY       SECTION, CLASSIC      COLONOSCOPY      COLONOSCOPY N/A 2017    Procedure: COLONOSCOPY;  Surgeon: Gabriel Rose MD;  [facility] FLR);  Service: Endoscopy;  Laterality: N/A;  Miralax prep per Dr. Rose    DENTAL SURGERY  2017    ENDARTERECTOMY OF  FEMORAL ARTERY Left 2/17/2020    Procedure: ENDARTERECTOMY, FEMORAL;  Surgeon: Olayinka Harris MD;  [facility];  Service: Peripheral Vascular;  Laterality: Left;  SFA endarterectomy  fluoro time: 9.2 min mGy: 2336.86    EPIDURAL STEROID INJECTION INTO LUMBAR SPINE N/A 1/16/2020    Procedure: Injection-steroid-epidural-lumbar--Review MRI- L5-S1 after MRI review;  Surgeon: Ely Syed Jr., MD;  Location: [facility] PAIN MGT;  Service: Pain Management;  Laterality: N/A;    EPIDURAL STEROID INJECTION INTO LUMBAR SPINE N/A 5/7/2020    Procedure: Injection-steroid-epidural-lumbar-- Interlaminar L5-S1/ pt aware she can continue taking ASA per Dr. Harris and hold plavix.;  Surgeon: Ely Syed Jr., MD;  Location: [facility] PAIN MGT;  Service: Pain Management;  Laterality: N/A;  Will sign consent at [facility].    EPIDURAL STEROID INJECTION INTO LUMBAR SPINE N/A 8/20/2020    Procedure: Injection-steroid-epidural-lumbar--L4-5;  Surgeon: Ely Syed Jr., MD;  Location: [facility] PAIN MGT;  Service: Pain Management;  Laterality: N/A;  Oral sedation    gyn surgery      ex laparotomy    HYSTERECTOMY         Family History   Problem Relation Age of Onset    Hypertension Mother     Heart disease Mother 40        CABG x 5    Seizures Mother     Cancer Father         prostate    Stroke Father     Cancer Brother         colon    Diabetes Maternal Aunt     Breast cancer Maternal Aunt     Diabetes Maternal Uncle     Stroke Maternal Grandmother     Stroke Paternal Grandmother     Diabetes Cousin     Breast cancer Cousin        Social History     Socioeconomic History    Marital status: Single     Spouse name: Not on file    Number of children: Not on file    Years of education: Not on file    Highest education level: Not on file   Occupational History     Employer: [employer]   Social Needs    Financial resource strain: Not on file    Food insecurity     Worry: Not on file     Inability:  Not on file    Transportation needs     Medical: Not on file     Non-medical: Not on file   Tobacco Use    Smoking status: Current Every Day Smoker     Packs/day: 0.50     Years: 40.00     Pack years: 20.00     Types: Cigarettes    Smokeless tobacco: Never Used    Tobacco comment: occasionally   Substance and Sexual Activity    Alcohol use: Yes     Alcohol/week: 0.0 standard drinks     Comment: rarely    Drug use: No    Sexual activity: Never   Lifestyle    Physical activity     Days per week: Not on file     Minutes per session: Not on file    Stress: Not on file   Relationships    Social connections     Talks on phone: Not on file     Gets together: Not on file     Attends Christian service: Not on file     Active member of club or organization: Not on file     Attends meetings of clubs or organizations: Not on file     Relationship status: Not on file   Other Topics Concern    Not on file   Social History Narrative    Not on file       Review of patient's allergies indicates:   Allergen Reactions    Adhesive Blisters    Augmentin [amoxicillin-pot clavulanate]      Vomiting    Latex, natural rubber Blisters    Ciprofloxacin Hives       Physical Exam:  Vitals:    11/02/20 0936   BP: (!) 143/53   Pulse: 99   Weight: 76.7 kg (169 lb 1.5 oz)   PainSc:   9     General    Nursing note and vitals reviewed.  Constitutional: She is oriented to person, place, and time. She appears well-developed and well-nourished. No distress.   HENT:   Head: Normocephalic and atraumatic.   Nose: Nose normal.   Eyes: Conjunctivae and EOM are normal. Pupils are equal, round, and reactive to light. Right eye exhibits no discharge. Left eye exhibits no discharge. No scleral icterus.   Neck: No JVD present.   Cardiovascular: Intact distal pulses.    Pulmonary/Chest: Effort normal. No respiratory distress.   Abdominal: She exhibits no distension.   Neurological: She is alert and oriented to person, place, and time. Coordination  normal.   Psychiatric: She has a normal mood and affect. Her behavior is normal. Judgment and thought content normal.     General Musculoskeletal Exam   Gait: normal     Back (L-Spine & T-Spine) / Neck (C-Spine) Exam     Tenderness Right paramedian tenderness of the Lower L-Spine. Left paramedian tenderness of the Lower L-Spine.     Back (L-Spine & T-Spine) Range of Motion   Back extension: facet loading is positive and exacerabtes/reproduces the patient's typical low back pain    Back flexion: limited ROM but partial relief of low back pain noted.     Spinal Sensation   Right Side Sensation  L-Spine Level: normal  Left Side Sensation  L-Spine Level: normal    Other She has no scoliosis .    Comments:  Left Leg + SLR      Muscle Strength   Right Lower Extremity   Hip Flexion: 5/5   Hip Extensors: 5/5  Quadriceps:  5/5   Hamstrin/5   Gastrocsoleus:  5/5   Left Lower Extremity   Hip Flexion: 5/5   Hip Extensors: 5/5  Quadriceps:  5/5   Hamstrin/5   Gastrocsoleus:  5/5     Reflexes     Left Side  Achilles:  2+  Quadriceps:  2+    Right Side   Achilles:  2+  Quadriceps:  2+      Imaging:  None pertinent to review.    Labs:  BMP  Lab Results   Component Value Date     10/22/2020    K 3.6 10/22/2020     10/22/2020    CO2 23 10/22/2020    BUN 19 10/22/2020    CREATININE 0.7 10/22/2020    CALCIUM 9.1 10/22/2020    ANIONGAP 10 10/22/2020    ESTGFRAFRICA >60.0 10/22/2020    EGFRNONAA >60.0 10/22/2020     Lab Results   Component Value Date    ALT 16 10/22/2020    AST 12 10/22/2020    ALKPHOS 112 10/22/2020    BILITOT 0.2 10/22/2020       Assessment:  Problem List Items Addressed This Visit        Neuro    Lumbar radiculopathy - Primary    Relevant Medications    DULoxetine (CYMBALTA) 30 MG capsule    Chronic pain       Orthopedic    Chronic bilateral low back pain with left-sided sciatica    Relevant Medications    DULoxetine (CYMBALTA) 30 MG capsule    Spondylolisthesis      Other Visit Diagnoses      Arthropathy of facet joints at multiple levels        Relevant Medications    DULoxetine (CYMBALTA) 30 MG capsule    Radiculopathy, thoracolumbar region              1/6/2020 - Rachel Murray is a 70 y.o. female with chronic low back pain radiating into the left lower extremity consistent with radiculopathy at L5.  I will order an MRI of her lumbar spine to confirm this diagnosis as she has been dealing with the symptoms for the better part of 2019.  I have also ordered several medications to help address her pain and symptoms in addition to physical therapy.  I have also proposed a lumbar epidural steroid injection to be scheduled after completion of the MRI.  I would anticipate performing this injection at L5-S1; however, this location may change based on the results of the MRI.  She was also provided a letter for work to park closer to the building.    02/06/20204120-44-jfhu-old female presents for a follow-up status post and L5-S1 lumbar MIRNA with reported 95% relief her pain score today is 2/10 patient reports feeling much better following the injection patient states she started physical therapy however 2 days into PT she began developing a swollen, erythematous, painful left foot.  She  has recently seen Cardiology and has  been diagnosed with severe PAD (L>R). CTA with segmental occlusion of the left common and proximal SFA and occlusion of the left tibioperoneal trunk as well as occlusion of the left anterior tibial artery in its proximal 3rd. Moderate degree of calcific and hypodense plaque in the abdominal aorta and iliac arteries bilaterally.  She is now scheduled for a LLE iliac to femoral bypass creation with bilateral iliac stents on 2/17/2020 with Dr Harris/cardiology.  She endorses feeling much better following her pain procedure and that the pain she originally came in for is much better, discussed that we could repeat as needed.    3/30/2020 - I reviewed her most recent MRI lumbar spine from  01/10/2020 which shows significant lateral recess and neural foraminal stenosis at L3-4 and L4-5 consistent with radiating pattern she is experiencing at this time.  Patient would be an excellent candidate for repeating the lumbar epidural steroid injection at L5-S1 as this provided her 90-95% relief.  Patient was advised that low back pain and radiculopathy can increase after surgical procedure due to immobility and reduced physical activity postoperatively.  In addition to the epidural steroid injection, she will also likely need to be evaluated by Neurosurgery in the symptoms remain refractory to the medications I will prescribe today and then epidural steroid injection.  Epidural steroid injections on hold pending resolution of the Coronavirus crisis.    4/21/2020 - Pain symptoms remain unchanged but appear better managed with the combination of Tramadol and Lyrica.  Goal is to wean off of opioids through PT, injections, and other non-opioids, but those efforts are limited until the Coronavirus restrictions pass.  Cont and reduce tramadol to her current usage level of TID PRN; cont and increase Lyrica.  Wait list for MIRNA.     5/13/2020- 70 y/o female s/p L5-S1 IESI with reported 0% relief, she continues to have low back and left leg pain radiating to her left foot.  optimized her Lyrica on her previous CV to be taking Lyrica 75 mg TID however she was only taking it BID. Also I recommended that we need to give the injection more time to work. She will began her correct Lyrica dose and give the injection more time to be effective.  Previous imaging was reviewed and discussed with the patient today.     06/10/6692-79-lwua-old female presents with continued low back and sciatic pain left greater than right, pain is medial LLE paresthesias that radiate to ipsilateral hip and are worst in the toes.  She is status post lumbar L5-S1 interlaminar MIRNA with 0% relief this was her 2nd injection with minimal to  no relief.  Of considering her symptoms I recommended a referral to Neurosurgery patient is currently being evaluated by her vascular surgeon in effort to rule out advanced PAD, patient stated that she would like to follow up with her vascular surgeon and discussed her recent CTA A/P results once obtained.  She states she would like to know if it is her PAD that is getting worse before she see's NSYG.  Patient did report that her Lyrica 75 mg t.i.d. has helped her symptoms she  was previously on Lyrica 75 mg b.i.d.    08/10/4843-15-knjx-old female with a history HTN, active smoker (1/2 ppd) and PAD s/p L EIA, L CFA endarterectomy, JESU balloon angioplasty and L profundaplasty on 02/18/2020, presents with continued low back and sciatic pain left greater than right pain is medial left lower extremity paresthesia that radiates to the ipsilateral hip and is worse in her toes.  She previously received a L5-S1 interlaminar MIRNA with 0% relief which was her 2nd injection.  Her lumbar MRI shows Worse pathology is at the L4-5 level where she has moderate facet arthropathy with thickening ligamentum flavum.  There is also degenerative disc disease with a vacuum disc phenomena, mild loss of disc height, degenerative endplate marginal osteophyte formation and diffuse disc bulging.  Is also an annular fissure with post contrast discal  enhancement. These changes result in bilateral lateral recess stenosis and moderate neural foraminal encroachment.  Discussed I now recommend a lumbar MIRNA targeting the L4-5 level in effort to provide her with some relief discussed that we will also take a left paramedial approach in effort to provide her with relief of her left lower extremity pain, patient verbalized understanding and agree.  Patient is requesting a short-term prescription of tramadol to last until she has a procedure discussed I will provide her with this as a 1 time prescription.    8/27/2020- 69 y/o female presents s/p Lumbar  Epidural Steroid Injection at L4-5 on 08/20/2020 reporting 30% relief specifically in the  center of her back, left buttock, hip and left lateral leg all of these areas have gotten mildly better however patient continues to have left leg radicular pain.  The patient this point I recommend physical therapy in effort to provide her with lumbar stabilization, muscular strengthening increased stability.    11/2/2020- 69 y/o female with Hx history HTN, active smoker (1/2 ppd) and PAD s/p L EIA, L CFA endarterectomy, JESU balloon angioplasty and L profundaplasty on 02/18/2020,pt has  chronic low back and left leg radicular symptoms, she is currently being evaluated for PAD she is s/f a angiogram on 11/10 with Dr Harris. He MRI is sig for At L4-5 moderate facet arthropathy with thickening ligamentum flavum she has degenerative disc disease with vacuum disc phenomenon, mild disc height loss, degenerative endplate marginal osteophyte formation and diffuse disc bulging at this level.  She also has an annular fissure.  These changes result in bilateral lateral recess stenosis and moderate neural foraminal encroachment.  And at L5-S1 she has severe hypertrophic facet arthropathy with thickened ligamentum flavum.  This results in anterior listhesis with unroofing of the disc.  I do believe that her low back and left leg pain is due to disc disease in her lower spine she has had x3 lumbar epidurals at various levels L4-5 and L5-S1 discussed with her that we may consider a left L4-5 and L5-S1 in the future however I would like her to get her angiogram better scheduled for 11/10/2020 to rule out peripheral vascular disease.  Discussed with her that I will recommend we increase the Cymbalta from 30 mg to 30 mg b.i.d.to help with neuropathic symptoms.  Of note patient has been evaluated by Neurosurgery and at this point surgical interventions have not been recommended.        Plan/Recommendations:  No procedures at this time  consider a Left 4-5 and L5-S1 TFESI in the future following her angiogram  Continue  Lyrica to 75 mg p.o. TID  Increase  Cymbalta 30 mg daily to 30 mg BID today  Patient was not a candidate for surgery following Neurosurgery recommendation.  -PT has done 2 weeks but had to discontinue due to muscle tightness and discoloration of left medial leg.   - I encouraged the patient to maintain a home exercise regimen that includes daily, moderate cardiovascular exercise lasting at least 30 minutes.  This may include yoga, mitzi chi, walking, swimming, aqua aerobics, or other exercises that maintain a heart rate of 50-70% of the calculated maximum heart rate.  I also encouraged light, daily stretching focused on the target area.    RTC in 4-6 weeks     Reuben Diaz NP-C  Interventional Pain Management       Disclaimer: This note was partly generated using dictation software which may occasionally result in transcription errors.
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
## 2020-11-03 ENCOUNTER — ANESTHESIA EVENT (OUTPATIENT)
Dept: SURGERY | Facility: HOSPITAL | Age: 70
DRG: 254 | End: 2020-11-03
Payer: MEDICARE

## 2020-11-03 PROBLEM — I70.202 FEMORAL ARTERY OCCLUSION, LEFT: Status: ACTIVE | Noted: 2020-11-03

## 2020-11-03 LAB
ANION GAP SERPL CALC-SCNC: 11 MMOL/L (ref 8–16)
BASOPHILS # BLD AUTO: 0.07 K/UL (ref 0–0.2)
BASOPHILS NFR BLD: 0.7 % (ref 0–1.9)
BUN SERPL-MCNC: 25 MG/DL (ref 8–23)
CALCIUM SERPL-MCNC: 9.1 MG/DL (ref 8.7–10.5)
CHLORIDE SERPL-SCNC: 107 MMOL/L (ref 95–110)
CO2 SERPL-SCNC: 21 MMOL/L (ref 23–29)
CREAT SERPL-MCNC: 0.8 MG/DL (ref 0.5–1.4)
DIFFERENTIAL METHOD: ABNORMAL
EOSINOPHIL # BLD AUTO: 0.2 K/UL (ref 0–0.5)
EOSINOPHIL NFR BLD: 1.5 % (ref 0–8)
ERYTHROCYTE [DISTWIDTH] IN BLOOD BY AUTOMATED COUNT: 20 % (ref 11.5–14.5)
EST. GFR  (AFRICAN AMERICAN): >60 ML/MIN/1.73 M^2
EST. GFR  (NON AFRICAN AMERICAN): >60 ML/MIN/1.73 M^2
GLUCOSE SERPL-MCNC: 111 MG/DL (ref 70–110)
HCT VFR BLD AUTO: 32.5 % (ref 37–48.5)
HGB BLD-MCNC: 9.3 G/DL (ref 12–16)
IMM GRANULOCYTES # BLD AUTO: 0.03 K/UL (ref 0–0.04)
IMM GRANULOCYTES NFR BLD AUTO: 0.3 % (ref 0–0.5)
LYMPHOCYTES # BLD AUTO: 1.7 K/UL (ref 1–4.8)
LYMPHOCYTES NFR BLD: 17.1 % (ref 18–48)
MAGNESIUM SERPL-MCNC: 1.8 MG/DL (ref 1.6–2.6)
MCH RBC QN AUTO: 22 PG (ref 27–31)
MCHC RBC AUTO-ENTMCNC: 28.6 G/DL (ref 32–36)
MCV RBC AUTO: 77 FL (ref 82–98)
MONOCYTES # BLD AUTO: 0.8 K/UL (ref 0.3–1)
MONOCYTES NFR BLD: 7.9 % (ref 4–15)
NEUTROPHILS # BLD AUTO: 7.2 K/UL (ref 1.8–7.7)
NEUTROPHILS NFR BLD: 72.5 % (ref 38–73)
NRBC BLD-RTO: 0 /100 WBC
PHOSPHATE SERPL-MCNC: 3.2 MG/DL (ref 2.7–4.5)
PLATELET # BLD AUTO: 374 K/UL (ref 150–350)
PMV BLD AUTO: 8.7 FL (ref 9.2–12.9)
POTASSIUM SERPL-SCNC: 3.6 MMOL/L (ref 3.5–5.1)
RBC # BLD AUTO: 4.23 M/UL (ref 4–5.4)
SODIUM SERPL-SCNC: 139 MMOL/L (ref 136–145)
WBC # BLD AUTO: 9.95 K/UL (ref 3.9–12.7)

## 2020-11-03 PROCEDURE — 63600175 PHARM REV CODE 636 W HCPCS: Performed by: STUDENT IN AN ORGANIZED HEALTH CARE EDUCATION/TRAINING PROGRAM

## 2020-11-03 PROCEDURE — 25000003 PHARM REV CODE 250: Performed by: STUDENT IN AN ORGANIZED HEALTH CARE EDUCATION/TRAINING PROGRAM

## 2020-11-03 PROCEDURE — 20600001 HC STEP DOWN PRIVATE ROOM

## 2020-11-03 PROCEDURE — 25000003 PHARM REV CODE 250: Performed by: EMERGENCY MEDICINE

## 2020-11-03 PROCEDURE — 96374 THER/PROPH/DIAG INJ IV PUSH: CPT

## 2020-11-03 PROCEDURE — 83735 ASSAY OF MAGNESIUM: CPT

## 2020-11-03 PROCEDURE — 63600175 PHARM REV CODE 636 W HCPCS: Performed by: EMERGENCY MEDICINE

## 2020-11-03 PROCEDURE — 80048 BASIC METABOLIC PNL TOTAL CA: CPT

## 2020-11-03 PROCEDURE — 84100 ASSAY OF PHOSPHORUS: CPT

## 2020-11-03 PROCEDURE — 85025 COMPLETE CBC W/AUTO DIFF WBC: CPT

## 2020-11-03 PROCEDURE — 96361 HYDRATE IV INFUSION ADD-ON: CPT

## 2020-11-03 RX ORDER — OXYCODONE HYDROCHLORIDE 5 MG/1
5 TABLET ORAL EVERY 4 HOURS PRN
Status: DISCONTINUED | OUTPATIENT
Start: 2020-11-03 | End: 2020-11-05 | Stop reason: HOSPADM

## 2020-11-03 RX ORDER — CLOPIDOGREL BISULFATE 75 MG/1
75 TABLET ORAL DAILY
Status: DISCONTINUED | OUTPATIENT
Start: 2020-11-03 | End: 2020-11-05 | Stop reason: HOSPADM

## 2020-11-03 RX ORDER — ATORVASTATIN CALCIUM 20 MG/1
40 TABLET, FILM COATED ORAL DAILY
Status: DISCONTINUED | OUTPATIENT
Start: 2020-11-03 | End: 2020-11-05 | Stop reason: HOSPADM

## 2020-11-03 RX ORDER — ONDANSETRON 2 MG/ML
4 INJECTION INTRAMUSCULAR; INTRAVENOUS EVERY 12 HOURS PRN
Status: DISCONTINUED | OUTPATIENT
Start: 2020-11-03 | End: 2020-11-05 | Stop reason: HOSPADM

## 2020-11-03 RX ORDER — MORPHINE SULFATE 15 MG/1
15 TABLET ORAL EVERY 4 HOURS PRN
Status: DISCONTINUED | OUTPATIENT
Start: 2020-11-03 | End: 2020-11-03

## 2020-11-03 RX ORDER — MORPHINE SULFATE 4 MG/ML
4 INJECTION, SOLUTION INTRAMUSCULAR; INTRAVENOUS
Status: COMPLETED | OUTPATIENT
Start: 2020-11-03 | End: 2020-11-03

## 2020-11-03 RX ORDER — SODIUM CHLORIDE, SODIUM LACTATE, POTASSIUM CHLORIDE, CALCIUM CHLORIDE 600; 310; 30; 20 MG/100ML; MG/100ML; MG/100ML; MG/100ML
INJECTION, SOLUTION INTRAVENOUS CONTINUOUS
Status: DISCONTINUED | OUTPATIENT
Start: 2020-11-03 | End: 2020-11-05

## 2020-11-03 RX ORDER — CEPHALEXIN 500 MG/1
500 CAPSULE ORAL EVERY 12 HOURS
Status: DISCONTINUED | OUTPATIENT
Start: 2020-11-03 | End: 2020-11-05

## 2020-11-03 RX ORDER — OXYCODONE HYDROCHLORIDE 10 MG/1
10 TABLET ORAL EVERY 4 HOURS PRN
Status: DISCONTINUED | OUTPATIENT
Start: 2020-11-03 | End: 2020-11-05 | Stop reason: HOSPADM

## 2020-11-03 RX ORDER — ASPIRIN 81 MG/1
81 TABLET ORAL DAILY
Status: DISCONTINUED | OUTPATIENT
Start: 2020-11-03 | End: 2020-11-05 | Stop reason: HOSPADM

## 2020-11-03 RX ORDER — DULOXETIN HYDROCHLORIDE 30 MG/1
30 CAPSULE, DELAYED RELEASE ORAL 2 TIMES DAILY
Status: DISCONTINUED | OUTPATIENT
Start: 2020-11-03 | End: 2020-11-05 | Stop reason: HOSPADM

## 2020-11-03 RX ORDER — ENOXAPARIN SODIUM 100 MG/ML
40 INJECTION SUBCUTANEOUS EVERY 24 HOURS
Status: DISCONTINUED | OUTPATIENT
Start: 2020-11-03 | End: 2020-11-05 | Stop reason: HOSPADM

## 2020-11-03 RX ORDER — HYDROMORPHONE HYDROCHLORIDE 1 MG/ML
1 INJECTION, SOLUTION INTRAMUSCULAR; INTRAVENOUS; SUBCUTANEOUS EVERY 6 HOURS PRN
Status: DISCONTINUED | OUTPATIENT
Start: 2020-11-03 | End: 2020-11-04

## 2020-11-03 RX ORDER — SODIUM CHLORIDE 0.9 % (FLUSH) 0.9 %
10 SYRINGE (ML) INJECTION
Status: DISCONTINUED | OUTPATIENT
Start: 2020-11-03 | End: 2020-11-05 | Stop reason: HOSPADM

## 2020-11-03 RX ORDER — ACETAMINOPHEN 325 MG/1
650 TABLET ORAL EVERY 8 HOURS PRN
Status: DISCONTINUED | OUTPATIENT
Start: 2020-11-03 | End: 2020-11-05 | Stop reason: HOSPADM

## 2020-11-03 RX ORDER — PREGABALIN 75 MG/1
75 CAPSULE ORAL 3 TIMES DAILY
Status: DISCONTINUED | OUTPATIENT
Start: 2020-11-03 | End: 2020-11-05 | Stop reason: HOSPADM

## 2020-11-03 RX ADMIN — CEPHALEXIN 500 MG: 500 CAPSULE ORAL at 10:11

## 2020-11-03 RX ADMIN — DULOXETINE HYDROCHLORIDE 30 MG: 30 CAPSULE, DELAYED RELEASE ORAL at 08:11

## 2020-11-03 RX ADMIN — ENOXAPARIN SODIUM 40 MG: 40 INJECTION SUBCUTANEOUS at 05:11

## 2020-11-03 RX ADMIN — ASPIRIN 81 MG: 81 TABLET, COATED ORAL at 10:11

## 2020-11-03 RX ADMIN — PREGABALIN 75 MG: 75 CAPSULE ORAL at 02:11

## 2020-11-03 RX ADMIN — HYDROMORPHONE HYDROCHLORIDE 1 MG: 1 INJECTION, SOLUTION INTRAMUSCULAR; INTRAVENOUS; SUBCUTANEOUS at 05:11

## 2020-11-03 RX ADMIN — MORPHINE SULFATE 4 MG: 4 INJECTION INTRAVENOUS at 01:11

## 2020-11-03 RX ADMIN — HYDROMORPHONE HYDROCHLORIDE 1 MG: 1 INJECTION, SOLUTION INTRAMUSCULAR; INTRAVENOUS; SUBCUTANEOUS at 12:11

## 2020-11-03 RX ADMIN — OXYCODONE HYDROCHLORIDE 10 MG: 10 TABLET ORAL at 08:11

## 2020-11-03 RX ADMIN — CEPHALEXIN 500 MG: 500 CAPSULE ORAL at 08:11

## 2020-11-03 RX ADMIN — HYDROMORPHONE HYDROCHLORIDE 1 MG: 1 INJECTION, SOLUTION INTRAMUSCULAR; INTRAVENOUS; SUBCUTANEOUS at 11:11

## 2020-11-03 RX ADMIN — OXYCODONE HYDROCHLORIDE 10 MG: 10 TABLET ORAL at 05:11

## 2020-11-03 RX ADMIN — DULOXETINE HYDROCHLORIDE 30 MG: 30 CAPSULE, DELAYED RELEASE ORAL at 10:11

## 2020-11-03 RX ADMIN — ATORVASTATIN CALCIUM 40 MG: 20 TABLET, FILM COATED ORAL at 10:11

## 2020-11-03 RX ADMIN — SODIUM CHLORIDE, SODIUM LACTATE, POTASSIUM CHLORIDE, AND CALCIUM CHLORIDE: .6; .31; .03; .02 INJECTION, SOLUTION INTRAVENOUS at 03:11

## 2020-11-03 RX ADMIN — CLOPIDOGREL 75 MG: 75 TABLET, FILM COATED ORAL at 10:11

## 2020-11-03 RX ADMIN — SODIUM CHLORIDE, SODIUM LACTATE, POTASSIUM CHLORIDE, AND CALCIUM CHLORIDE 1000 ML: .6; .31; .03; .02 INJECTION, SOLUTION INTRAVENOUS at 01:11

## 2020-11-03 RX ADMIN — OXYCODONE HYDROCHLORIDE 10 MG: 10 TABLET ORAL at 03:11

## 2020-11-03 RX ADMIN — PREGABALIN 75 MG: 75 CAPSULE ORAL at 08:11

## 2020-11-03 RX ADMIN — SODIUM CHLORIDE, SODIUM LACTATE, POTASSIUM CHLORIDE, AND CALCIUM CHLORIDE: .6; .31; .03; .02 INJECTION, SOLUTION INTRAVENOUS at 09:11

## 2020-11-03 RX ADMIN — SODIUM CHLORIDE, SODIUM LACTATE, POTASSIUM CHLORIDE, AND CALCIUM CHLORIDE: .6; .31; .03; .02 INJECTION, SOLUTION INTRAVENOUS at 02:11

## 2020-11-03 RX ADMIN — PREGABALIN 75 MG: 75 CAPSULE ORAL at 10:11

## 2020-11-03 RX ADMIN — CEFTRIAXONE SODIUM 1 G: 1 INJECTION, POWDER, FOR SOLUTION INTRAMUSCULAR; INTRAVENOUS at 12:11

## 2020-11-03 NOTE — ANESTHESIA PREPROCEDURE EVALUATION
[facility]  Anesthesia Pre-Operative Evaluation         Patient Name: Rachel Murray  YOB: 1950  MRN: [id number]    SUBJECTIVE:     Pre-operative evaluation for Procedure(s) (LRB):  Angiogram Extremity Unilateral (Left)     11/03/2020    Rachel Murray is a 70 y.o. female w/ a significant PMHx of HTN, HLD, arthritis, and PAD (s/p iliac artery stenting 7/21/2020, left EIA and CFA endarterectomy and JESU balloon angioplasty and left profundaplasty on 2/18/2020) who presents with worsening pain and erythema of the left leg. Occlusion of LLE arteries noted on US.    Patient now presents for the above procedure(s).    LLE US 11/2/20:  -Occlusion of the left middle and distal superficial femoral artery.  Reconstitution of flow distally via collaterals is visualized.  Findings similar to CTA 06/10/2020.  -Hemodynamically significant stenosis (greater than 50%) within the left common femoral artery.  -Monophasic waveforms consistent with peripheral arterial disease in the bilateral lower extremities.  -Ankle-brachial index of 0.5 on the right consistent with severe peripheral arterial disease.  Left ankle brachial index was unable to be measured.    LDA:        Peripheral IV - Single Lumen 11/03/20 0047 20 G Left Antecubital (Active)   Site Assessment Clean;Dry;Intact 11/03/20 0530   Line Status Infusing 11/03/20 0530   Dressing Status Clean;Dry;Intact 11/03/20 0530   Number of days: 0            Peripheral IV - Single Lumen 11/03/20 1300 20 G Anterior;Left Forearm (Active)   Site Assessment Clean;Dry;Intact;No redness;No swelling 11/03/20 1300   Line Status Blood return noted;Infusing;Flushed 11/03/20 1300   Dressing Status Clean;Dry;Intact 11/03/20 1300   Site Change Due 11/07/20 11/03/20 1300   Reason Not Rotated Not due 11/03/20 1300   Number of days: 0       Female External Urinary Catheter 11/03/20 0830 (Active)   Number of days: 0       Prev airway:   Intubation:     Induction:   Intravenous    Intubated:  Postinduction    Mask Ventilation:  Easy mask    Attempts:  1    Attempted By:  Resident anesthesiologist    Method of Intubation:  Direct    Blade:  Dale 2    Laryngeal View Grade: Grade I - full view of chords      Difficult Airway Encountered?: No      Complications:  None    Airway Device:  Oral endotracheal tube    Airway Device Size:  7.5    Style/Cuff Inflation:  Cuffed    Inflation Amount (mL):  10    Tube secured:  22    Secured at:  The lips    Placement Verified By:  Capnometry    Complicating Factors:  None    Findings Post-Intubation:  BS equal bilateral    Drips:    lactated ringers 125 mL/hr at 11/03/20 1400       Patient Active Problem List   Diagnosis    Hypertension    History of rectal or anal cancer    Special screening for malignant neoplasms, colon    Lumbar radiculopathy    Chronic bilateral low back pain with left-sided sciatica    Chronic pain    PAD (peripheral artery disease)    Spinal stenosis of lumbar region with neurogenic claudication    Spondylolisthesis    Peripheral artery disease    Dorsalgia, unspecified    Sciatic nerve pain, left    Leg swelling    Deep venous thrombosis of lower extremity       Review of patient's allergies indicates:   Allergen Reactions    Adhesive Blisters    Augmentin [amoxicillin-pot clavulanate]      Vomiting    Latex, natural rubber Blisters    Ciprofloxacin Hives       Current Inpatient Medications:   aspirin  81 mg Oral Daily    atorvastatin  40 mg Oral Daily    cephALEXin  500 mg Oral Q12H    clopidogreL  75 mg Oral Daily    DULoxetine  30 mg Oral BID    enoxaparin  40 mg Subcutaneous Q24H    pregabalin  75 mg Oral TID       Current Facility-Administered Medications on File Prior to Encounter   Medication Dose Route Frequency Provider Last Rate Last Dose    alprazolam ODT dissolvable tablet 0.5 mg  0.5 mg Oral Once PRN Ely Syed Jr., MD        sodium chloride 0.9% flush 10 mL  10 mL  Intravenous PRN Reyna Toledo MD         Current Outpatient Medications on File Prior to Encounter   Medication Sig Dispense Refill    acetaminophen (TYLENOL) 500 MG tablet Take 2 tablets (1,000 mg total) by mouth 3 (three) times daily.  0    aspirin (ECOTRIN) 81 MG EC tablet Take 1 tablet (81 mg total) by mouth once daily. (Patient taking differently: Take 81 mg by mouth every morning. ) 360 tablet 0    atorvastatin (LIPITOR) 40 MG tablet Take 1 tablet (40 mg total) by mouth once daily. (Patient taking differently: Take 40 mg by mouth every morning. ) 90 tablet 3    candesartan-hydrochlorothiazide (ATACAND HCT) 16-12.5 mg per tablet Take 1 tablet by mouth once daily. 30 tablet 3    clopidogreL (PLAVIX) 75 mg tablet Take 1 tablet (75 mg total) by mouth once daily. 30 tablet 6    DULoxetine (CYMBALTA) 30 MG capsule Take 1 capsule (30 mg total) by mouth 2 (two) times daily. 60 capsule 3    nitrofurantoin, macrocrystal-monohydrate, (MACROBID) 100 MG capsule Take 1 capsule (100 mg total) by mouth 2 (two) times daily. 14 capsule 0    oxyCODONE-acetaminophen (PERCOCET) 5-325 mg per tablet Take 1 tablet by mouth every 4 (four) hours as needed for Pain. 18 tablet 0    pregabalin (LYRICA) 75 MG capsule Take 1 capsule (75 mg total) by mouth 3 (three) times daily. 90 capsule 2    traMADoL (ULTRAM) 50 mg tablet TAKE 1 TABLET(50 MG) BY MOUTH EVERY 24 HOURS AS NEEDED FOR PAIN 60 tablet 1    [DISCONTINUED] zolpidem (AMBIEN) 5 MG Tab Take 1 tablet (5 mg total) by mouth nightly as needed. 7 tablet 0       Past Surgical History:   Procedure Laterality Date    ANGIOGRAPHY OF LOWER EXTREMITY Bilateral 7/21/2020    Procedure: Angiogram Extremity Unilateral;  Surgeon: Olayinka Harris MD;  Location: [facility] OR [address] [place] [address];  Service: Peripheral Vascular;  Laterality: Bilateral;  tabatha. iliac stent placement, LLE angiogram  17.4 min  837.37 mGy  172.81 Gycm2  37 ml contrast    AORTOGRAPHY N/A 2/17/2020    Procedure: AORTOGRAM;   Surgeon: Olayinka Harris MD;  Location: [facility] OR 2ND FLR;  Service: Peripheral Vascular;  Laterality: N/A;    APPENDECTOMY      BREAST BIOPSY       SECTION, CLASSIC      COLONOSCOPY      COLONOSCOPY N/A 2017    Procedure: COLONOSCOPY;  Surgeon: Gabriel Rose MD;  Location: [facility] ENDO (4TH FLR);  Service: Endoscopy;  Laterality: N/A;  Miralax prep per Dr. Rose    DENTAL SURGERY  2017    ENDARTERECTOMY OF FEMORAL ARTERY Left 2020    Procedure: ENDARTERECTOMY, FEMORAL;  Surgeon: Olayinka Harris MD;  Location: [facility] OR 2ND FLR;  Service: Peripheral Vascular;  Laterality: Left;  SFA endarterectomy  fluoro time: 9.2 min mGy: 2336.86    EPIDURAL STEROID INJECTION INTO LUMBAR SPINE N/A 2020    Procedure: Injection-steroid-epidural-lumbar--Review MRI- L5-S1 after MRI review;  Surgeon: Ely Syed Jr., MD;  Location: [facility] PAIN T;  Service: Pain Management;  Laterality: N/A;    EPIDURAL STEROID INJECTION INTO LUMBAR SPINE N/A 2020    Procedure: Injection-steroid-epidural-lumbar-- Interlaminar L5-S1/ pt aware she can continue taking ASA per Dr. Harris and hold plavix.;  Surgeon: Ely Syed Jr., MD;  Location: [facility] PAIN T;  Service: Pain Management;  Laterality: N/A;  Will sign consent at [facility].    EPIDURAL STEROID INJECTION INTO LUMBAR SPINE N/A 2020    Procedure: Injection-steroid-epidural-lumbar--L4-5;  Surgeon: Ely Syed Jr., MD;  Location: [facility] PAIN MGT;  Service: Pain Management;  Laterality: N/A;  Oral sedation    gyn surgery      ex laparotomy    HYSTERECTOMY         Social History     Socioeconomic History    Marital status: Single     Spouse name: Not on file    Number of children: Not on file    Years of education: Not on file    Highest education level: Not on file   Occupational History     Employer: [employer]   Social Needs    Financial resource strain: Not on file    Food insecurity     Worry: Not on file      Inability: Not on file    Transportation needs     Medical: Not on file     Non-medical: Not on file   Tobacco Use    Smoking status: Current Every Day Smoker     Packs/day: 0.50     Years: 40.00     Pack years: 20.00     Types: Cigarettes    Smokeless tobacco: Never Used    Tobacco comment: occasionally   Substance and Sexual Activity    Alcohol use: Yes     Alcohol/week: 0.0 standard drinks     Comment: rarely    Drug use: No    Sexual activity: Never   Lifestyle    Physical activity     Days per week: Not on file     Minutes per session: Not on file    Stress: Not on file   Relationships    Social connections     Talks on phone: Not on file     Gets together: Not on file     Attends Cheondoism service: Not on file     Active member of club or organization: Not on file     Attends meetings of clubs or organizations: Not on file     Relationship status: Not on file   Other Topics Concern    Not on file   Social History Narrative    Not on file       OBJECTIVE:     Vital Signs Range (Last 24H):  Temp:  [36.3 °C (97.4 °F)-37.2 °C (98.9 °F)]   Pulse:  []   Resp:  [16-22]   BP: (100-168)/()   SpO2:  [92 %-98 %]       Significant Labs:  Lab Results   Component Value Date    WBC 9.95 11/03/2020    HGB 9.3 (L) 11/03/2020    HCT 32.5 (L) 11/03/2020     (H) 11/03/2020    CHOL 142 09/21/2020    TRIG 173 (H) 09/21/2020    HDL 35 (L) 09/21/2020    ALT 13 11/02/2020    AST 14 11/02/2020     11/03/2020    K 3.6 11/03/2020     11/03/2020    CREATININE 0.8 11/03/2020    BUN 25 (H) 11/03/2020    CO2 21 (L) 11/03/2020    TSH 3.288 12/29/2018    INR 0.9 11/02/2020       Diagnostic Studies: No relevant studies.    EKG:   Results for orders placed or performed in visit on 07/25/20   EKG 12-lead    Collection Time: 07/25/20 11:38 AM    Narrative    Test Reason :     Vent. Rate : 079 BPM     Atrial Rate : 079 BPM     P-R Int : 150 ms          QRS Dur : 076 ms      QT Int : 382 ms       P-R-T  Axes : 046 054 057 degrees     QTc Int : 438 ms    Normal sinus rhythm  Normal ECG  When compared with ECG of 10-FEB-2020 07:56,  No significant change was found  Confirmed by Charlee Gregory MD ([id number]) on 7/26/2020 1:12:28 PM    Referred By: BART   SELF           Confirmed By:Charlee Gregory MD       2D ECHO:  TTE:  No results found for this or any previous visit.    DOUGLAS:  No results found for this or any previous visit.    ASSESSMENT/PLAN:         Anesthesia Evaluation    I have reviewed the Patient Summary Reports.    I have reviewed the Nursing Notes. I have reviewed the NPO Status.   I have reviewed the Medications.     Review of Systems  Anesthesia Hx:  No problems with previous Anesthesia Denies Hx of Anesthetic complications  History of prior surgery of interest to airway management or planning: Denies Family Hx of Anesthesia complications.   Denies Personal Hx of Anesthesia complications.   Hematology/Oncology:  Hematology Normal   Oncology Normal   Oncology Comments: [facility] ANUS chemo and radiation, [id number], ALFONZO now     EENT/Dental:EENT/Dental Normal   Cardiovascular:   Hypertension, well controlled Denies MI.  CABG/stent (Iliac artery stent)   Denies Angina. hyperlipidemia Walking limited by sever leg sciatica.   Pulmonary:  Pulmonary Normal  Denies COPD.  Denies Asthma.  Denies Shortness of breath.  Denies Sleep Apnea.    Renal/:   Denies Chronic Renal Disease.     Hepatic/GI:   Denies GERD. Denies Liver Disease.    Musculoskeletal:   Denies Arthritis.     Neurological:   Denies TIA. Denies CVA. Neuromuscular Disease,  Denies Seizures.   Peripheral Neuropathy (severe sciatica)    Endocrine:   Denies Diabetes.    Psych:  Psychiatric Normal           Physical Exam  General:  Well nourished    Airway/Jaw/Neck:  Airway Findings: Mouth Opening: Normal Tongue: Normal  General Airway Assessment: Adult, Average  Mallampati: II  TM Distance: Normal, at least 6 cm  Jaw/Neck Findings:  Neck ROM: Normal ROM             Mental Status:  Mental Status Findings:  Cooperative, Alert and Oriented  No teeth       Anesthesia Plan  Type of Anesthesia, risks & benefits discussed:  Anesthesia Type:  general, MAC  Patient's Preference:   Intra-op Monitoring Plan: standard ASA monitors  Intra-op Monitoring Plan Comments:   Post Op Pain Control Plan: multimodal analgesia, IV/PO Opioids PRN and per primary service following discharge from PACU  Post Op Pain Control Plan Comments: As per surgeon's plan  Induction:   IV  Beta Blocker:  Patient is not currently on a Beta-Blocker (No further documentation required).       Informed Consent: Patient understands risks and agrees with Anesthesia plan.  Questions answered. Anesthesia consent signed with patient.  ASA Score: 3     Day of Surgery Review of History & Physical:    H&P update referred to the surgeon.         Ready For Surgery From Anesthesia Perspective.

## 2020-11-03 NOTE — PROGRESS NOTES
"VASCULAR SURGERY  Afternoon Progress Note    Assessment/Plan:  70 y.o. female with a history of PAD s/p left EIA and CFA endarterectomy with bilateral iliac stenting. She now has worsening left leg pain and erythema  - Plan for angiography tomorrow   - NPO at midnight  - COVID testing  - Will obtain consent tonight    Subjective:  Seen on PM rounds, pain the same as before. Left foot colder than right foot with discoloration to toes. Imaging of lower extremities shows severe peripheral arterial disease    Objective:  BP (!) 142/63 (BP Location: Right arm, Patient Position: Lying)   Pulse 103   Temp 98.5 °F (36.9 °C) (Oral)   Resp 18   Ht 5' 2" (1.575 m)   Wt 76.7 kg (169 lb)   SpO2 (!) 93%   Breastfeeding No   BMI 30.91 kg/m²   Dopplerable monophasic signals on the left AT. No doppler signals to left PT.     Ralf Chambers MD  General Surgery, PGY-1      "

## 2020-11-03 NOTE — ED NOTES
Telemetry Verification   Patient placed on Telemetry Box  Box # [id number]       Rate 68   Rhythm NSR

## 2020-11-03 NOTE — PROGRESS NOTES
Around 1220, left foot pedal pulses DP and PT cannot be found even with Doppler, left foot is cooler compared to it was this morning. Right foot pulses strong with Doppler, right foot warm. Pt has been having numbness and tingling on both feet as her baseline. Cap. refill 2 seconds.  notified and came to assess pt at bedside.

## 2020-11-03 NOTE — PROGRESS NOTES
Patient arrived to floor from ED via stretcher. Patient is awake, alert, and oriented. Skin is warm and dry. Pulses are present in all extremities. Neurovascularly intact. Patient walks with a cane due to recurrent sciatica. Inner areas of bilateral legs are reddened and painful to touch.No actual skin breakdown noted. Some crackles noted to lung fields. Telemetry monitor in place. Abdomen is soft and nontender with hypoactive bowel sounds. ambulates with a can and voids with assistance. ivf's infusing without difficulty. Complains of discomfort to lower extremities. Oriented to environment. Will continue to monitor.

## 2020-11-03 NOTE — PLAN OF CARE
CM MET WITH PT IN ROOM TO DISCUSS POST ACUTE DISCHARGE PT STATES THAT SHE LIVES ALONE  IN A ONE STORY HOUSE    11/03/20 1426   Discharge Assessment   Assessment Type Discharge Planning Assessment   Confirmed/corrected address and phone number on facesheet? Yes   Assessment information obtained from? Patient   Expected Length of Stay (days) 1   Communicated expected length of stay with patient/caregiver no   Prior to hospitilization cognitive status: No Deficits   Prior to hospitalization functional status: Assistive Equipment   Current cognitive status: No Deficits   Current Functional Status: Assistive Equipment   Lives With alone   Able to Return to Prior Arrangements yes   Is patient able to care for self after discharge? Yes   Patient's perception of discharge disposition home or selfcare   Readmission Within the Last 30 Days no previous admission in last 30 days   Patient currently being followed by outpatient case management? No   Patient currently receives any other outside agency services? No   Equipment Currently Used at Home 3-in-1 commode;cane, straight;walker, rolling   Do you have any problems affording any of your prescribed medications? No   Is the patient taking medications as prescribed? yes   Does the patient have transportation home? Yes   Discharge Plan A Home   Discharge Plan B Home with family;Home Health   DME Needed Upon Discharge  none   Patient/Family in Agreement with Plan unable to assess   HER SON ILANA VELASQUEZ IS HER  [phone]  [facility] HOME [facility]   PHARMACY [place] [id number] [place] [phone]  PCP OSMANI LEACH

## 2020-11-03 NOTE — CONSULTS
"[facility]  General Surgery  Consult Note    Inpatient consult to Vascular Surgery  Consult performed by: Chuck Vaca MD  Consult ordered by: Prem Estes MD  Reason for consult: "sent in for angiogram/angioplasty"        Subjective:     Chief Complaint/Reason for Admission: Left leg pain    History of Present Illness: Ms. Murray is our 69yo female with HTN, HLD, arthritis, and PAD (s/p iliac artery stenting 7/21/2020, left EIA and CFA endarterectomy and JESU balloon angioplasty and left profundaplasty on 2/18/2020) who presents with worsening pain of the left leg.      She states that she has had a chronic pain of her left leg however it has gotten worse over the last couple of weeks.  Over the last week or so she has also noticed erythema of the left lower extremity that is hot to the touch.  She states her pain is a dull pressure and burning pain that is constant and unrelenting.  She states that trying to walk makes it worse and nothing has made it better.  She denies any fevers or chills.  She endorses that she has sensation to her left lower extremity.  She endorses chronic swelling of both her lower extremities.    Current Facility-Administered Medications on File Prior to Encounter   Medication    alprazolam ODT dissolvable tablet 0.5 mg    sodium chloride 0.9% flush 10 mL     Current Outpatient Medications on File Prior to Encounter   Medication Sig    acetaminophen (TYLENOL) 500 MG tablet Take 2 tablets (1,000 mg total) by mouth 3 (three) times daily.    aspirin (ECOTRIN) 81 MG EC tablet Take 1 tablet (81 mg total) by mouth once daily. (Patient taking differently: Take 81 mg by mouth every morning. )    atorvastatin (LIPITOR) 40 MG tablet Take 1 tablet (40 mg total) by mouth once daily. (Patient taking differently: Take 40 mg by mouth every morning. )    candesartan-hydrochlorothiazide (ATACAND HCT) 16-12.5 mg per tablet Take 1 tablet by mouth once daily.    clopidogreL " (PLAVIX) 75 mg tablet Take 1 tablet (75 mg total) by mouth once daily.    DULoxetine (CYMBALTA) 30 MG capsule Take 1 capsule (30 mg total) by mouth 2 (two) times daily.    nitrofurantoin, macrocrystal-monohydrate, (MACROBID) 100 MG capsule Take 1 capsule (100 mg total) by mouth 2 (two) times daily.    oxyCODONE-acetaminophen (PERCOCET) 5-325 mg per tablet Take 1 tablet by mouth every 4 (four) hours as needed for Pain.    pregabalin (LYRICA) 75 MG capsule Take 1 capsule (75 mg total) by mouth 3 (three) times daily.    traMADoL (ULTRAM) 50 mg tablet TAKE 1 TABLET(50 MG) BY MOUTH EVERY 24 HOURS AS NEEDED FOR PAIN    [DISCONTINUED] DULoxetine (CYMBALTA) 30 MG capsule Take 1 capsule (30 mg total) by mouth once daily.    [DISCONTINUED] zolpidem (AMBIEN) 5 MG Tab Take 1 tablet (5 mg total) by mouth nightly as needed.       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 iliac stent placement, LLE angiogram  17.4 min  837.37 mGy  172.81 Gycm2  37 ml contrast    AORTOGRAPHY N/A 2020    Procedure: AORTOGRAM;  Surgeon: Olayinka Harris MD;  Location: [facility] OR [address] [place] [address];  Service: Peripheral Vascular;  Laterality: N/A;    APPENDECTOMY      BREAST BIOPSY       SECTION, CLASSIC      COLONOSCOPY      COLONOSCOPY N/A 2017    Procedure: COLONOSCOPY;  Surgeon:  Gabriel Rose MD;  Location: [facility] ENDO (4TH FLR);  Service: Endoscopy;  Laterality: N/A;  Miralax prep per Dr. Rose    DENTAL SURGERY  11/06/2017    ENDARTERECTOMY OF FEMORAL ARTERY Left 2/17/2020    Procedure: ENDARTERECTOMY, FEMORAL;  Surgeon: Olayinka Harris MD;  Location: [facility] OR 2ND FLR;  Service: Peripheral Vascular;  Laterality: Left;  SFA endarterectomy  fluoro time: 9.2 min mGy: 2336.86    EPIDURAL STEROID INJECTION INTO LUMBAR SPINE N/A 1/16/2020    Procedure: Injection-steroid-epidural-lumbar--Review MRI- L5-S1 after MRI review;  Surgeon: Ely Syed Jr., MD;  Location: [facility] PAIN MGT;  Service: Pain Management;  Laterality: N/A;    EPIDURAL STEROID INJECTION INTO LUMBAR SPINE N/A 5/7/2020    Procedure: Injection-steroid-epidural-lumbar-- Interlaminar L5-S1/ pt aware she can continue taking ASA per Dr. Harris and hold plavix.;  Surgeon: Ely Syed Jr., MD;  Location: [facility] PAIN MGT;  Service: Pain Management;  Laterality: N/A;  Will sign consent at [facility].    EPIDURAL STEROID INJECTION INTO LUMBAR SPINE N/A 8/20/2020    Procedure: Injection-steroid-epidural-lumbar--L4-5;  Surgeon: Ely Syed Jr., MD;  Location: [facility] PAIN MGT;  Service: Pain Management;  Laterality: N/A;  Oral sedation    gyn surgery      ex laparotomy    HYSTERECTOMY       Family History     Problem Relation (Age of Onset)    Breast cancer Maternal Aunt, Cousin    Cancer Father, Brother    Diabetes Maternal Aunt, Maternal Uncle, Cousin    Heart disease Mother (40)    Hypertension Mother    Seizures Mother    Stroke Father, Maternal Grandmother, Paternal Grandmother        Tobacco Use    Smoking status: Current Every Day Smoker     Packs/day: 0.50     Years: 40.00     Pack years: 20.00     Types: Cigarettes    Smokeless tobacco: Never Used    Tobacco comment: occasionally   Substance and Sexual Activity    Alcohol use: Yes     Alcohol/week: 0.0 standard drinks     Comment: rarely    Drug use: No     Sexual activity: Never     Review of Systems   Constitutional: Negative for chills and fever.   Respiratory: Negative for cough, chest tightness and shortness of breath.    Cardiovascular: Positive for leg swelling. Negative for chest pain.   Gastrointestinal: Negative for abdominal pain, constipation and diarrhea.   Genitourinary: Positive for dysuria.   Musculoskeletal: Positive for back pain and gait problem. Negative for myalgias.   Skin: Positive for color change (left lower extremity). Negative for wound.   Neurological: Negative for syncope and numbness.   Psychiatric/Behavioral: Negative for confusion.     Objective:     Vital Signs (Most Recent):  Temp: 98.1 °F (36.7 °C) (11/02/20 1729)  Pulse: 89 (11/02/20 1729)  Resp: 18 (11/02/20 2009)  BP: (!) 161/70 (11/02/20 1739)  SpO2: 98 % (11/02/20 1729) Vital Signs (24h Range):  Temp:  [97.6 °F (36.4 °C)-98.1 °F (36.7 °C)] 98.1 °F (36.7 °C)  Pulse:  [89-99] 89  Resp:  [18] 18  SpO2:  [96 %-98 %] 98 %  BP: (100-168)/() 161/70     Weight: 76.7 kg (169 lb)  Body mass index is 30.91 kg/m².    No intake or output data in the 24 hours ending 11/02/20 2042    Physical Exam  Constitutional:       Appearance: Normal appearance.   HENT:      Head: Atraumatic.   Eyes:      Extraocular Movements: Extraocular movements intact.   Neck:      Musculoskeletal: Normal range of motion.   Cardiovascular:      Rate and Rhythm: Normal rate and regular rhythm.      Comments: Monophasic signals to left DP/PT  1+ femoral pulses bilaterally  1+ popliteal pulses bilaterally  Pulmonary:      Effort: Pulmonary effort is normal. No respiratory distress.   Abdominal:      General: There is no distension.      Palpations: Abdomen is soft.   Musculoskeletal:         General: Tenderness (left lower extremity) present.      Right lower leg: Edema present.      Left lower leg: Edema present.      Comments: Bilateral edema right worse than left   Skin:     General: Skin is warm and dry.       Findings: Erythema (left lower extremity on posterior thight down to posterior calf) present.   Neurological:      General: No focal deficit present.      Mental Status: She is alert and oriented to person, place, and time.         Significant Labs:  ABGs: No results for input(s): PH, PCO2, PO2, HCO3, POCSATURATED, BE in the last 48 hours.  BMP:   Recent Labs   Lab 11/02/20  1701   GLU 93      K 4.2      CO2 23   BUN 29*   CREATININE 0.8   CALCIUM 9.9     Cardiac markers: No results for input(s): CKMB, CPKMB, TROPONINT, TROPONINI, MYOGLOBIN in the last 48 hours.  CBC:   Recent Labs   Lab 11/02/20  1701   WBC 12.74*   RBC 4.43   HGB 10.3*   HCT 33.8*   *   MCV 76*   MCH 23.3*   MCHC 30.5*     CMP:   Recent Labs   Lab 11/02/20  1701   GLU 93   CALCIUM 9.9   ALBUMIN 3.9   PROT 7.7      K 4.2   CO2 23      BUN 29*   CREATININE 0.8   ALKPHOS 102   ALT 13   AST 14   BILITOT 0.3       Significant Diagnostics:  I have reviewed all pertinent imaging results/findings within the past 24 hours.    Assessment/Plan:     There are no hospital problems to display for this patient.    Ms. Murray is our 69yo female who has a history of PAD s/p left EIA and CFA endarterectomy and bilateral iliac stenting who presents with worsening left leg pain and erythema.    Will plan to obtain vascular US imaging of the arterial and venous system of both legs.  Pending imaging results will follow up on if a revascularization procedure is warranted.  Patient may require a CTA with runoff vs. angiogram pending US imaging results.    Patient to continue ASA and statin therapy.    Thank you for your consult. I will follow-up with patient. Please contact us if you have any additional questions.    Chuck Vaca MD  General Surgery  [facility]    Vascular Attending  Agree with above  Plan as above and f/u imaging today  Will d/w Dr Harris who knows her well    Sacha Khan MD [facility] FACS    Vascular/Endovascular Surgery

## 2020-11-03 NOTE — PROVIDER PROGRESS NOTES - EMERGENCY DEPT.
Encounter Date: 11/2/2020    ED Physician Progress Notes           ED Attending Sign-out Progress Note:  Patient signed out to me at shift change by Dr. Estes to f/u US, vasc sgy dispo and overall disposition.     Ultrasound venous structures negative for DVT.  Arterial ultrasound with occlusions present.    Ordered Keflex b.i.d. for patient for UTI.  Repeat dose of morphine given as well.    Discussed with vascular surgery who placed in observation.    ED Clinical Impression:    ICD-10-CM ICD-9-CM   1. Femoral artery occlusion, left  I70.202 444.22   2. PAD (peripheral artery disease)  I73.9 443.9   3. Urinary tract infection without hematuria, site unspecified  N39.0 599.0

## 2020-11-04 ENCOUNTER — ANESTHESIA (OUTPATIENT)
Dept: SURGERY | Facility: HOSPITAL | Age: 70
DRG: 254 | End: 2020-11-04
Payer: COMMERCIAL

## 2020-11-04 LAB
ANION GAP SERPL CALC-SCNC: 11 MMOL/L (ref 8–16)
BACTERIA UR CULT: ABNORMAL
BASOPHILS # BLD AUTO: 0.08 K/UL (ref 0–0.2)
BASOPHILS NFR BLD: 0.9 % (ref 0–1.9)
BUN SERPL-MCNC: 22 MG/DL (ref 8–23)
CALCIUM SERPL-MCNC: 8.8 MG/DL (ref 8.7–10.5)
CHLORIDE SERPL-SCNC: 107 MMOL/L (ref 95–110)
CO2 SERPL-SCNC: 21 MMOL/L (ref 23–29)
CREAT SERPL-MCNC: 0.7 MG/DL (ref 0.5–1.4)
DIFFERENTIAL METHOD: ABNORMAL
EOSINOPHIL # BLD AUTO: 0.4 K/UL (ref 0–0.5)
EOSINOPHIL NFR BLD: 4.8 % (ref 0–8)
ERYTHROCYTE [DISTWIDTH] IN BLOOD BY AUTOMATED COUNT: 20 % (ref 11.5–14.5)
EST. GFR  (AFRICAN AMERICAN): >60 ML/MIN/1.73 M^2
EST. GFR  (NON AFRICAN AMERICAN): >60 ML/MIN/1.73 M^2
GLUCOSE SERPL-MCNC: 105 MG/DL (ref 70–110)
HCT VFR BLD AUTO: 30.5 % (ref 37–48.5)
HGB BLD-MCNC: 8.6 G/DL (ref 12–16)
IMM GRANULOCYTES # BLD AUTO: 0.04 K/UL (ref 0–0.04)
IMM GRANULOCYTES NFR BLD AUTO: 0.4 % (ref 0–0.5)
LYMPHOCYTES # BLD AUTO: 1.7 K/UL (ref 1–4.8)
LYMPHOCYTES NFR BLD: 18.9 % (ref 18–48)
MAGNESIUM SERPL-MCNC: 1.8 MG/DL (ref 1.6–2.6)
MCH RBC QN AUTO: 22.2 PG (ref 27–31)
MCHC RBC AUTO-ENTMCNC: 28.2 G/DL (ref 32–36)
MCV RBC AUTO: 79 FL (ref 82–98)
MONOCYTES # BLD AUTO: 1.1 K/UL (ref 0.3–1)
MONOCYTES NFR BLD: 11.8 % (ref 4–15)
NEUTROPHILS # BLD AUTO: 5.6 K/UL (ref 1.8–7.7)
NEUTROPHILS NFR BLD: 63.2 % (ref 38–73)
NRBC BLD-RTO: 0 /100 WBC
PHOSPHATE SERPL-MCNC: 3.9 MG/DL (ref 2.7–4.5)
PLATELET # BLD AUTO: 331 K/UL (ref 150–350)
PMV BLD AUTO: 8.9 FL (ref 9.2–12.9)
POTASSIUM SERPL-SCNC: 3.9 MMOL/L (ref 3.5–5.1)
RBC # BLD AUTO: 3.88 M/UL (ref 4–5.4)
SODIUM SERPL-SCNC: 139 MMOL/L (ref 136–145)
WBC # BLD AUTO: 8.92 K/UL (ref 3.9–12.7)

## 2020-11-04 PROCEDURE — 25000003 PHARM REV CODE 250: Performed by: STUDENT IN AN ORGANIZED HEALTH CARE EDUCATION/TRAINING PROGRAM

## 2020-11-04 PROCEDURE — 36415 COLL VENOUS BLD VENIPUNCTURE: CPT

## 2020-11-04 PROCEDURE — 63600175 PHARM REV CODE 636 W HCPCS: Performed by: STUDENT IN AN ORGANIZED HEALTH CARE EDUCATION/TRAINING PROGRAM

## 2020-11-04 PROCEDURE — 25000242 PHARM REV CODE 250 ALT 637 W/ HCPCS: Performed by: SURGERY

## 2020-11-04 PROCEDURE — 25500020 PHARM REV CODE 255: Performed by: SURGERY

## 2020-11-04 PROCEDURE — 83735 ASSAY OF MAGNESIUM: CPT

## 2020-11-04 PROCEDURE — 27201423 OPTIME MED/SURG SUP & DEVICES STERILE SUPPLY: Performed by: SURGERY

## 2020-11-04 PROCEDURE — C1760 CLOSURE DEV, VASC: HCPCS | Performed by: SURGERY

## 2020-11-04 PROCEDURE — 63600175 PHARM REV CODE 636 W HCPCS: Performed by: SURGERY

## 2020-11-04 PROCEDURE — 20600001 HC STEP DOWN PRIVATE ROOM

## 2020-11-04 PROCEDURE — 37000009 HC ANESTHESIA EA ADD 15 MINS: Performed by: SURGERY

## 2020-11-04 PROCEDURE — 37000008 HC ANESTHESIA 1ST 15 MINUTES: Performed by: SURGERY

## 2020-11-04 PROCEDURE — 93970 EXTREMITY STUDY: CPT | Mod: 52 | Performed by: SURGERY

## 2020-11-04 PROCEDURE — 71000039 HC RECOVERY, EACH ADD'L HOUR: Performed by: SURGERY

## 2020-11-04 PROCEDURE — C1769 GUIDE WIRE: HCPCS | Performed by: SURGERY

## 2020-11-04 PROCEDURE — D9220A PRA ANESTHESIA: Mod: ,,, | Performed by: ANESTHESIOLOGY

## 2020-11-04 PROCEDURE — 37222 PR REVASCULARIZE ILIAC ARTERY,ANGIOPLASTY, EA ADD VESSEL: CPT | Mod: LT,,, | Performed by: SURGERY

## 2020-11-04 PROCEDURE — 37220 PR REVASCULARIZE ILIAC ARTERY,ANGIOPLASTY, INITIAL VESSEL: ICD-10-PCS | Mod: 51,LT,, | Performed by: SURGERY

## 2020-11-04 PROCEDURE — 25000003 PHARM REV CODE 250: Performed by: SURGERY

## 2020-11-04 PROCEDURE — 80048 BASIC METABOLIC PNL TOTAL CA: CPT

## 2020-11-04 PROCEDURE — 94640 AIRWAY INHALATION TREATMENT: CPT

## 2020-11-04 PROCEDURE — 85025 COMPLETE CBC W/AUTO DIFF WBC: CPT

## 2020-11-04 PROCEDURE — 36000706: Performed by: SURGERY

## 2020-11-04 PROCEDURE — C1887 CATHETER, GUIDING: HCPCS | Performed by: SURGERY

## 2020-11-04 PROCEDURE — C1725 CATH, TRANSLUMIN NON-LASER: HCPCS | Performed by: SURGERY

## 2020-11-04 PROCEDURE — 84100 ASSAY OF PHOSPHORUS: CPT

## 2020-11-04 PROCEDURE — 37224 PR FEM/POPL REVAS W/TLA: ICD-10-PCS | Mod: LT,,, | Performed by: SURGERY

## 2020-11-04 PROCEDURE — 36000707: Performed by: SURGERY

## 2020-11-04 PROCEDURE — 37220 PR REVASCULARIZE ILIAC ARTERY,ANGIOPLASTY, INITIAL VESSEL: CPT | Mod: 51,LT,, | Performed by: SURGERY

## 2020-11-04 PROCEDURE — 37224 PR FEM/POPL REVAS W/TLA: CPT | Mod: LT,,, | Performed by: SURGERY

## 2020-11-04 PROCEDURE — 37222 PR REVASCULARIZE ILIAC ARTERY,ANGIOPLASTY, EA ADD VESSEL: ICD-10-PCS | Mod: LT,,, | Performed by: SURGERY

## 2020-11-04 PROCEDURE — C1894 INTRO/SHEATH, NON-LASER: HCPCS | Performed by: SURGERY

## 2020-11-04 PROCEDURE — D9220A PRA ANESTHESIA: ICD-10-PCS | Mod: ,,, | Performed by: ANESTHESIOLOGY

## 2020-11-04 PROCEDURE — 71000033 HC RECOVERY, INTIAL HOUR: Performed by: SURGERY

## 2020-11-04 PROCEDURE — 25000003 PHARM REV CODE 250: Performed by: EMERGENCY MEDICINE

## 2020-11-04 RX ORDER — IPRATROPIUM BROMIDE AND ALBUTEROL SULFATE 2.5; .5 MG/3ML; MG/3ML
3 SOLUTION RESPIRATORY (INHALATION) EVERY 4 HOURS
Status: DISCONTINUED | OUTPATIENT
Start: 2020-11-04 | End: 2020-11-04

## 2020-11-04 RX ORDER — PROTAMINE SULFATE 10 MG/ML
INJECTION, SOLUTION INTRAVENOUS
Status: DISCONTINUED | OUTPATIENT
Start: 2020-11-04 | End: 2020-11-04

## 2020-11-04 RX ORDER — HEPARIN SODIUM 1000 [USP'U]/ML
INJECTION, SOLUTION INTRAVENOUS; SUBCUTANEOUS
Status: DISCONTINUED | OUTPATIENT
Start: 2020-11-04 | End: 2020-11-04 | Stop reason: HOSPADM

## 2020-11-04 RX ORDER — HYDROMORPHONE HYDROCHLORIDE 1 MG/ML
0.2 INJECTION, SOLUTION INTRAMUSCULAR; INTRAVENOUS; SUBCUTANEOUS EVERY 5 MIN PRN
Status: DISCONTINUED | OUTPATIENT
Start: 2020-11-04 | End: 2020-11-04 | Stop reason: HOSPADM

## 2020-11-04 RX ORDER — SODIUM CHLORIDE 9 MG/ML
INJECTION, SOLUTION INTRAVENOUS CONTINUOUS
Status: DISCONTINUED | OUTPATIENT
Start: 2020-11-04 | End: 2020-11-05

## 2020-11-04 RX ORDER — HEPARIN SODIUM 1000 [USP'U]/ML
INJECTION, SOLUTION INTRAVENOUS; SUBCUTANEOUS
Status: DISCONTINUED | OUTPATIENT
Start: 2020-11-04 | End: 2020-11-04

## 2020-11-04 RX ORDER — LIDOCAINE HCL/PF 100 MG/5ML
SYRINGE (ML) INTRAVENOUS
Status: DISCONTINUED | OUTPATIENT
Start: 2020-11-04 | End: 2020-11-04

## 2020-11-04 RX ORDER — PROPOFOL 10 MG/ML
VIAL (ML) INTRAVENOUS CONTINUOUS PRN
Status: DISCONTINUED | OUTPATIENT
Start: 2020-11-04 | End: 2020-11-04

## 2020-11-04 RX ORDER — SODIUM CHLORIDE 0.9 % (FLUSH) 0.9 %
10 SYRINGE (ML) INJECTION
Status: DISCONTINUED | OUTPATIENT
Start: 2020-11-04 | End: 2020-11-04 | Stop reason: HOSPADM

## 2020-11-04 RX ORDER — HYDROMORPHONE HYDROCHLORIDE 1 MG/ML
1 INJECTION, SOLUTION INTRAMUSCULAR; INTRAVENOUS; SUBCUTANEOUS EVERY 4 HOURS PRN
Status: DISCONTINUED | OUTPATIENT
Start: 2020-11-04 | End: 2020-11-05 | Stop reason: HOSPADM

## 2020-11-04 RX ORDER — LIDOCAINE HYDROCHLORIDE 10 MG/ML
INJECTION, SOLUTION EPIDURAL; INFILTRATION; INTRACAUDAL; PERINEURAL
Status: DISCONTINUED | OUTPATIENT
Start: 2020-11-04 | End: 2020-11-04 | Stop reason: HOSPADM

## 2020-11-04 RX ORDER — IODIXANOL 320 MG/ML
INJECTION, SOLUTION INTRAVASCULAR
Status: DISCONTINUED | OUTPATIENT
Start: 2020-11-04 | End: 2020-11-04 | Stop reason: HOSPADM

## 2020-11-04 RX ORDER — PROPOFOL 10 MG/ML
VIAL (ML) INTRAVENOUS
Status: DISCONTINUED | OUTPATIENT
Start: 2020-11-04 | End: 2020-11-04

## 2020-11-04 RX ORDER — MIDAZOLAM HYDROCHLORIDE 1 MG/ML
INJECTION, SOLUTION INTRAMUSCULAR; INTRAVENOUS
Status: DISCONTINUED | OUTPATIENT
Start: 2020-11-04 | End: 2020-11-04

## 2020-11-04 RX ORDER — HYDROMORPHONE HYDROCHLORIDE 1 MG/ML
0.5 INJECTION, SOLUTION INTRAMUSCULAR; INTRAVENOUS; SUBCUTANEOUS ONCE
Status: COMPLETED | OUTPATIENT
Start: 2020-11-04 | End: 2020-11-04

## 2020-11-04 RX ORDER — FENTANYL CITRATE 50 UG/ML
INJECTION, SOLUTION INTRAMUSCULAR; INTRAVENOUS
Status: DISCONTINUED | OUTPATIENT
Start: 2020-11-04 | End: 2020-11-04

## 2020-11-04 RX ORDER — ONDANSETRON 8 MG/1
8 TABLET, ORALLY DISINTEGRATING ORAL EVERY 8 HOURS PRN
Status: DISCONTINUED | OUTPATIENT
Start: 2020-11-04 | End: 2020-11-05 | Stop reason: HOSPADM

## 2020-11-04 RX ORDER — SODIUM CHLORIDE 9 MG/ML
INJECTION, SOLUTION INTRAVENOUS CONTINUOUS PRN
Status: DISCONTINUED | OUTPATIENT
Start: 2020-11-04 | End: 2020-11-04

## 2020-11-04 RX ORDER — ONDANSETRON 2 MG/ML
4 INJECTION INTRAMUSCULAR; INTRAVENOUS DAILY PRN
Status: DISCONTINUED | OUTPATIENT
Start: 2020-11-04 | End: 2020-11-04 | Stop reason: HOSPADM

## 2020-11-04 RX ORDER — IPRATROPIUM BROMIDE AND ALBUTEROL SULFATE 2.5; .5 MG/3ML; MG/3ML
3 SOLUTION RESPIRATORY (INHALATION) EVERY 4 HOURS
Status: DISCONTINUED | OUTPATIENT
Start: 2020-11-04 | End: 2020-11-05 | Stop reason: HOSPADM

## 2020-11-04 RX ADMIN — PROPOFOL 50 MCG/KG/MIN: 10 INJECTION, EMULSION INTRAVENOUS at 12:11

## 2020-11-04 RX ADMIN — OXYCODONE HYDROCHLORIDE 10 MG: 10 TABLET ORAL at 07:11

## 2020-11-04 RX ADMIN — CEPHALEXIN 500 MG: 500 CAPSULE ORAL at 08:11

## 2020-11-04 RX ADMIN — FENTANYL CITRATE 50 MCG: 50 INJECTION, SOLUTION INTRAMUSCULAR; INTRAVENOUS at 02:11

## 2020-11-04 RX ADMIN — FENTANYL CITRATE 50 MCG: 50 INJECTION, SOLUTION INTRAMUSCULAR; INTRAVENOUS at 01:11

## 2020-11-04 RX ADMIN — HYDROMORPHONE HYDROCHLORIDE 0.2 MG: 1 INJECTION, SOLUTION INTRAMUSCULAR; INTRAVENOUS; SUBCUTANEOUS at 03:11

## 2020-11-04 RX ADMIN — PROTAMINE SULFATE 35 MG: 10 INJECTION, SOLUTION INTRAVENOUS at 01:11

## 2020-11-04 RX ADMIN — IPRATROPIUM BROMIDE AND ALBUTEROL SULFATE 3 ML: .5; 2.5 SOLUTION RESPIRATORY (INHALATION) at 08:11

## 2020-11-04 RX ADMIN — HEPARIN SODIUM 7000 UNITS: 1000 INJECTION INTRAVENOUS; SUBCUTANEOUS at 01:11

## 2020-11-04 RX ADMIN — LIDOCAINE HYDROCHLORIDE 60 MG: 20 INJECTION, SOLUTION INTRAVENOUS at 12:11

## 2020-11-04 RX ADMIN — FENTANYL CITRATE 25 MCG: 50 INJECTION, SOLUTION INTRAMUSCULAR; INTRAVENOUS at 01:11

## 2020-11-04 RX ADMIN — OXYCODONE HYDROCHLORIDE 10 MG: 10 TABLET ORAL at 03:11

## 2020-11-04 RX ADMIN — DULOXETINE HYDROCHLORIDE 30 MG: 30 CAPSULE, DELAYED RELEASE ORAL at 08:11

## 2020-11-04 RX ADMIN — ENOXAPARIN SODIUM 40 MG: 40 INJECTION SUBCUTANEOUS at 05:11

## 2020-11-04 RX ADMIN — PREGABALIN 75 MG: 75 CAPSULE ORAL at 08:11

## 2020-11-04 RX ADMIN — SODIUM CHLORIDE, SODIUM LACTATE, POTASSIUM CHLORIDE, AND CALCIUM CHLORIDE: .6; .31; .03; .02 INJECTION, SOLUTION INTRAVENOUS at 07:11

## 2020-11-04 RX ADMIN — PREGABALIN 75 MG: 75 CAPSULE ORAL at 03:11

## 2020-11-04 RX ADMIN — PROPOFOL 20 MG: 10 INJECTION, EMULSION INTRAVENOUS at 01:11

## 2020-11-04 RX ADMIN — ASPIRIN 81 MG: 81 TABLET, COATED ORAL at 08:11

## 2020-11-04 RX ADMIN — SODIUM CHLORIDE, SODIUM LACTATE, POTASSIUM CHLORIDE, AND CALCIUM CHLORIDE: .6; .31; .03; .02 INJECTION, SOLUTION INTRAVENOUS at 03:11

## 2020-11-04 RX ADMIN — HYDROMORPHONE HYDROCHLORIDE 1 MG: 1 INJECTION, SOLUTION INTRAMUSCULAR; INTRAVENOUS; SUBCUTANEOUS at 06:11

## 2020-11-04 RX ADMIN — PROPOFOL 20 MG: 10 INJECTION, EMULSION INTRAVENOUS at 12:11

## 2020-11-04 RX ADMIN — FENTANYL CITRATE 25 MCG: 50 INJECTION, SOLUTION INTRAMUSCULAR; INTRAVENOUS at 12:11

## 2020-11-04 RX ADMIN — ATORVASTATIN CALCIUM 40 MG: 20 TABLET, FILM COATED ORAL at 08:11

## 2020-11-04 RX ADMIN — SODIUM CHLORIDE: 0.9 INJECTION, SOLUTION INTRAVENOUS at 12:11

## 2020-11-04 RX ADMIN — MIDAZOLAM HYDROCHLORIDE 2 MG: 1 INJECTION, SOLUTION INTRAMUSCULAR; INTRAVENOUS at 12:11

## 2020-11-04 RX ADMIN — HYDROMORPHONE HYDROCHLORIDE 1 MG: 1 INJECTION, SOLUTION INTRAMUSCULAR; INTRAVENOUS; SUBCUTANEOUS at 11:11

## 2020-11-04 RX ADMIN — PROTAMINE SULFATE 5 MG: 10 INJECTION, SOLUTION INTRAVENOUS at 01:11

## 2020-11-04 RX ADMIN — CLOPIDOGREL 75 MG: 75 TABLET, FILM COATED ORAL at 08:11

## 2020-11-04 RX ADMIN — HYDROMORPHONE HYDROCHLORIDE 0.5 MG: 1 INJECTION, SOLUTION INTRAMUSCULAR; INTRAVENOUS; SUBCUTANEOUS at 04:11

## 2020-11-04 RX ADMIN — OXYCODONE HYDROCHLORIDE 10 MG: 10 TABLET ORAL at 08:11

## 2020-11-04 NOTE — BRIEF OP NOTE
[facility]  Brief Operative Note    SUMMARY     Surgery Date: 11/4/2020     Surgeon(s) and Role:     * Olayinka Harris MD - Primary     * Chuck Vaca MD - Resident - Assisting     * Ted Maravilla MD - Fellow        Pre-op Diagnosis:  PAD (peripheral artery disease) [I73.9]    Post-op Diagnosis:  Post-Op Diagnosis Codes:     * PAD (peripheral artery disease) [I73.9]    Procedure(s) (LRB):  -Angiography aorto-iliac and left lower extremity  -PTA L profunda (4x100 ultraverse)  -PTA L common femoral (4x100 ultraverse)  -PTA L SU (6x40 ultraverse)  -PTA R external iliac (6x40 ultraverse)    Anesthesia: Local MAC    Description of Procedure:   L CFA US guided access (small calcified vessels, very deep)  PTA as above    Description of the findings of the procedure:   -Heavily calcified aortoiliac system, distal aortic stenosis >50%, Focal R ext iliac stenosis >70% just before origin of pre-existing stent (PTA with < 10% residula stensosis, >50% L SU stenosis (PTA with < 10% residual stenosis), Near occlusion of L CFA (PTA with < 30% residual stenosis), >90% stenosis of L profunda origin (PTA with <10% residual stenosis)  -Reconstitution of SFA  Via collaterals  -Multifocal above knee popliteal stenosis  -AT and PT runoff, peroneal occludes distal to origin      Estimated Blood Loss: 10ml    Estimated Blood Loss has been documented.         Specimens:   Specimen (12h ago, onward)    None          [id number]

## 2020-11-04 NOTE — PROGRESS NOTES
Report given to the pre-op nurse. Pt AAOx4, on RA, VS stable. LLE red, pianful, tender to touch, no pedal lulses on left foot (baseline). LR infusing at 125. SCD on right leg. Personal belonging keeps in the room. Waiting for transport.

## 2020-11-04 NOTE — PLAN OF CARE
POC reviewed w/ pt, verbalized understanding. Pt AAOx4. VSS on RA, NS on tele. LLE is red from thigh to toes; no pulses, even w/ doppler, cool, painful to touch, numbness and tingling, and swollen. RLE is WDL except has some numbness and tingling, pulses are +2 w/ doppler. Pain managed w/ PRN pain meds. Pt voids per purewick w/ adequate UOP overnight. Pt NPO at midnight for procedure today. Pt denies any N/V overnight. Pt slept b/w care. Frequent rounds made for pt safety. Call light in reach. Bed in lowest position and locked. WCTM.

## 2020-11-04 NOTE — PROGRESS NOTES
Pt came back from PACU, still drowsy but responded well to voice, wheezing noticed, SpO2 only at 88-89% on 2L via NC, then pumped up to 3L O2, SpO2 at 91-92%. No compalints SOB, no signs of distress.  notified, Albuterol ordered.    Per Dr. Chambers ordered, keep pt flat and not to use SCD on her legs

## 2020-11-04 NOTE — SUBJECTIVE & OBJECTIVE
Medications:  Continuous Infusions:   lactated ringers 125 mL/hr at 11/04/20 0722     Scheduled Meds:   aspirin  81 mg Oral Daily    atorvastatin  40 mg Oral Daily    cephALEXin  500 mg Oral Q12H    clopidogreL  75 mg Oral Daily    DULoxetine  30 mg Oral BID    enoxaparin  40 mg Subcutaneous Q24H    pregabalin  75 mg Oral TID     PRN Meds:acetaminophen, HYDROmorphone, ondansetron, oxyCODONE, oxyCODONE, sodium chloride 0.9%     Objective:     Vital Signs (Most Recent):  Temp: 97.2 °F (36.2 °C) (11/04/20 0726)  Pulse: 70 (11/04/20 0729)  Resp: 18 (11/04/20 0726)  BP: (!) 144/65 (11/04/20 0726)  SpO2: (!) 93 % (11/04/20 0729) Vital Signs (24h Range):  Temp:  [97.2 °F (36.2 °C)-98.5 °F (36.9 °C)] 97.2 °F (36.2 °C)  Pulse:  [] 70  Resp:  [18] 18  SpO2:  [91 %-93 %] 93 %  BP: (108-147)/(57-67) 144/65         Physical Exam  Constitutional:       General: She is not in acute distress.     Appearance: Normal appearance.   HENT:      Head: Normocephalic and atraumatic.      Mouth/Throat:      Mouth: Mucous membranes are moist.      Pharynx: Oropharynx is clear.   Eyes:      General: No scleral icterus.        Right eye: No discharge.         Left eye: No discharge.      Extraocular Movements: Extraocular movements intact.   Cardiovascular:      Rate and Rhythm: Normal rate and regular rhythm.      Pulses: Normal pulses.      Comments: Weak monophasic signals to left AT  1+ femoral pulses bilaterally  1+ popliteal pulses bilaterally  Pulmonary:      Effort: Pulmonary effort is normal. No respiratory distress.      Breath sounds: Normal breath sounds.   Chest:      Chest wall: No tenderness.   Abdominal:      General: Abdomen is flat. There is no distension.      Palpations: Abdomen is soft.      Tenderness: There is no abdominal tenderness.   Musculoskeletal:         General: Tenderness present. No deformity.      Right lower leg: Edema present.      Left lower leg: Edema present.      Comments: Bilateral edema  right worse than left. Tenderness of left lower extremity. Left lower extremity is colder than the right lower extremity. Mild discoloration of toes on left foot   Skin:     General: Skin is warm and dry.      Capillary Refill: Capillary refill takes less than 2 seconds.      Comments: Erythema (left lower extremity on posterior thight down to posterior calf) present.    Neurological:      General: No focal deficit present.      Mental Status: She is alert and oriented to person, place, and time.   Psychiatric:         Behavior: Behavior normal.         Thought Content: Thought content normal.       Significant Labs:  CBC:   Recent Labs   Lab 11/04/20  0455   WBC 8.92   RBC 3.88*   HGB 8.6*   HCT 30.5*      MCV 79*   MCH 22.2*   MCHC 28.2*     CMP:   Recent Labs   Lab 11/02/20  1701  11/04/20  0455   GLU 93   < > 105   CALCIUM 9.9   < > 8.8   ALBUMIN 3.9  --   --    PROT 7.7  --   --       < > 139   K 4.2   < > 3.9   CO2 23   < > 21*      < > 107   BUN 29*   < > 22   CREATININE 0.8   < > 0.7   ALKPHOS 102  --   --    ALT 13  --   --    AST 14  --   --    BILITOT 0.3  --   --     < > = values in this interval not displayed.       Significant Diagnostics:  I have reviewed and interpreted all pertinent imaging results/findings within the past 24 hours.

## 2020-11-04 NOTE — ASSESSMENT & PLAN NOTE
Ms. Murray is our 69yo female who has a history of PAD s/p left EIA and CFA endarterectomy and bilateral iliac stenting who presents with worsening left leg pain and erythema.    - To OR today with Dr. Harris for angiogram of LLE with possible intervention  - NPO since midnight, COVID test completed, consent is in chart

## 2020-11-04 NOTE — NURSING TRANSFER
Nursing Transfer Note      11/4/2020     Transfer To: 1022    Transfer via bed    Transfer with 2lpm nc  to O2, cardiac monitoring via central tele    Transported by Transport    [facility]    Chart send with patient: Yes    Notified: N/A    Patient reassessed at: 11/4/20 at 1619

## 2020-11-04 NOTE — OP NOTE
[facility]  Surgery Department  Operative Note    SUMMARY     Surgery Date: 11/4/2020      Surgeon(s) and Role:     * Olayinka Harris MD - Primary     * Chuck Vaca MD - Resident - Assisting     * Ted Maravilla MD - Fellow           Pre-op Diagnosis:  PAD (peripheral artery disease) [I73.9]     Post-op Diagnosis:  Post-Op Diagnosis Codes:     * PAD (peripheral artery disease) [I73.9]     Procedure(s) (LRB):  -Angiography aorto-iliac and left lower extremity  -PTA L profunda (4x100 ultraverse)  -PTA L common femoral (4x100 ultraverse)  -PTA L SU (6x40 ultraverse)  -PTA R external iliac (6x40 ultraverse)     Anesthesia: Local MAC     Description of Procedure:   L CFA US guided access (small calcified vessels, very deep)  PTA as above    Indication for procedure:  The patient is a 70-year-old female with past medical history significant for peripheral vascular disease status post left common femoral endarterectomy, status post bilateral external iliac stenting who presents with increasing left foot rest pain and swelling.  She is here for angiogram and possible angioplasty left lower extremity.     Description of the findings of the procedure:   -Heavily calcified aortoiliac system, distal aortic stenosis >50%, Focal R ext iliac stenosis >70% just before origin of pre-existing stent (PTA with < 10% residula stensosis, >50% L SU stenosis (PTA with < 10% residual stenosis), Near occlusion of L CFA (PTA with < 30% residual stenosis), >90% stenosis of L profunda origin (PTA with <10% residual stenosis)  -Large excellent profunda flow with collaterals throughout the thigh  -Reconstitution of SFA  Via collaterals  -Multifocal above knee popliteal stenosis  -AT and PT runoff, peroneal occludes distal to origin    Detailed procedure:  After informed consent was obtained patient was brought to the hybrid operating room laid supine on the operating table.  Moderate sedation was provided by  anesthesia.  Bilateral groins were prepped and draped in standard fashion with chlorhexidine.  Time-out was performed prior to beginning procedure.    Utilizing ultrasound guidance her right common femoral artery was accessed.  This was difficult to her body habitus and overall small arteries.  Wire passed easily and this was confirmed with fluoroscopy and angiogram to be in the common femoral artery.  In the J-wire was then introduced into the sheath there was some resistance met the proximal portion of her right external iliac stent.  The 4 Grenadian micro sheath was exchanged for a 5 Grenadian sheath.  Sheathogram was performed showing a stenosis in the area between the previous stent and the hypogastric artery.  A floppy angled Glidewire was utilized cross this lesion and gain wire access to the aorta.  Omni catheter was advanced into the distal aorta and a hand injection aortogram was performed with the above findings.  The bifurcation was crossed and the stiff angled glidewire was parked in a branch of the profunda.      A 6 Bengali destination sheath was exchanged in and the tip was placed at the end of the external iliac. We then utilized a [id number] ultraverse balloon to angioplasty the origin of the profunda and the common femoral artery.  This resulted in excellent outflow to the profunda without radiographic evidence of stenosis.      We then performed serial runoff exams with results noted above. We pulled our 6f sheath back into the right common iliac artery.    We then exchanged balloons to a 6x40mm ultraverse balloon to angioplasty the distal left external iliac and the left common iliac artery at the aortic bifurcation with < 10% residual stenosis at either location.      We exchanged our long 6f sheath for a regular 6F sheath.  We additionally angioplasties the stenosis in the proximal right external iliac artery with good result.    We utilized a 5F minx for closure of the arteriotomy without evidence of  hematoma.      Complications: No    Estimated Blood Loss (EBL): * No values recorded between 11/4/2020  1:00 PM and 11/4/2020  2:03 PM *           Implants: * No implants in log *    Specimens:   Specimen (12h ago, onward)    None                  Condition: Good    Disposition: PACU - hemodynamically stable.

## 2020-11-04 NOTE — PROGRESS NOTES
[facility]  Vascular Surgery  Progress Note    Patient Name: Rachel Murray  MRN: [id number]  Admission Date: 11/2/2020  Primary Care Provider: Anel Parikh MD    Subjective:     Interval History: No acute events overnight. Complaints of pain in left lower extremity. Bilateral femorals 1+, right side harder to palpate compared to left.     Post-Op Info:  Procedure(s) (LRB):  Angiogram Extremity Unilateral (Left)   Day of Surgery       Medications:  Continuous Infusions:   lactated ringers 125 mL/hr at 11/04/20 0722     Scheduled Meds:   aspirin  81 mg Oral Daily    atorvastatin  40 mg Oral Daily    cephALEXin  500 mg Oral Q12H    clopidogreL  75 mg Oral Daily    DULoxetine  30 mg Oral BID    enoxaparin  40 mg Subcutaneous Q24H    pregabalin  75 mg Oral TID     PRN Meds:acetaminophen, HYDROmorphone, ondansetron, oxyCODONE, oxyCODONE, sodium chloride 0.9%     Objective:     Vital Signs (Most Recent):  Temp: 97.2 °F (36.2 °C) (11/04/20 0726)  Pulse: 70 (11/04/20 0729)  Resp: 18 (11/04/20 0726)  BP: (!) 144/65 (11/04/20 0726)  SpO2: (!) 93 % (11/04/20 0729) Vital Signs (24h Range):  Temp:  [97.2 °F (36.2 °C)-98.5 °F (36.9 °C)] 97.2 °F (36.2 °C)  Pulse:  [] 70  Resp:  [18] 18  SpO2:  [91 %-93 %] 93 %  BP: (108-147)/(57-67) 144/65         Physical Exam  Constitutional:       General: She is not in acute distress.     Appearance: Normal appearance.   HENT:      Head: Normocephalic and atraumatic.      Mouth/Throat:      Mouth: Mucous membranes are moist.      Pharynx: Oropharynx is clear.   Eyes:      General: No scleral icterus.        Right eye: No discharge.         Left eye: No discharge.      Extraocular Movements: Extraocular movements intact.   Cardiovascular:      Rate and Rhythm: Normal rate and regular rhythm.      Pulses: Normal pulses.      Comments: Weak monophasic signals to left AT  1+ femoral pulses bilaterally  1+ popliteal pulses bilaterally  Pulmonary:      Effort:  Pulmonary effort is normal. No respiratory distress.      Breath sounds: Normal breath sounds.   Chest:      Chest wall: No tenderness.   Abdominal:      General: Abdomen is flat. There is no distension.      Palpations: Abdomen is soft.      Tenderness: There is no abdominal tenderness.   Musculoskeletal:         General: Tenderness present. No deformity.      Right lower leg: Edema present.      Left lower leg: Edema present.      Comments: Bilateral edema right worse than left. Tenderness of left lower extremity. Left lower extremity is colder than the right lower extremity. Mild discoloration of toes on left foot   Skin:     General: Skin is warm and dry.      Capillary Refill: Capillary refill takes less than 2 seconds.      Comments: Erythema (left lower extremity on posterior thight down to posterior calf) present.    Neurological:      General: No focal deficit present.      Mental Status: She is alert and oriented to person, place, and time.   Psychiatric:         Behavior: Behavior normal.         Thought Content: Thought content normal.       Significant Labs:  CBC:   Recent Labs   Lab 11/04/20  0455   WBC 8.92   RBC 3.88*   HGB 8.6*   HCT 30.5*      MCV 79*   MCH 22.2*   MCHC 28.2*     CMP:   Recent Labs   Lab 11/02/20  1701  11/04/20  0455   GLU 93   < > 105   CALCIUM 9.9   < > 8.8   ALBUMIN 3.9  --   --    PROT 7.7  --   --       < > 139   K 4.2   < > 3.9   CO2 23   < > 21*      < > 107   BUN 29*   < > 22   CREATININE 0.8   < > 0.7   ALKPHOS 102  --   --    ALT 13  --   --    AST 14  --   --    BILITOT 0.3  --   --     < > = values in this interval not displayed.       Significant Diagnostics:  I have reviewed and interpreted all pertinent imaging results/findings within the past 24 hours.    Assessment/Plan:     PAD (peripheral artery disease)  Ms. Murray is our 71yo female who has a history of PAD s/p left EIA and CFA endarterectomy and bilateral iliac stenting who presents with  worsening left leg pain and erythema.    - To OR today with Dr. Harris for angiogram of LLE with possible intervention  - NPO since midnight, COVID test completed, consent is in chart        Ralf Chambers MD  Vascular Surgery  [facility]

## 2020-11-04 NOTE — PROGRESS NOTES
Looks good after angiogram, PTA left profunda, common femoral, common iliac  Will need redo left CFA endarterectomy and CFA to below-knee popliteal artery bypass incoming weeks.  Continue best medical therapy for peripheral arterial disease  Anticipate home tomorrow    Luis Enrique Glover MD PGY-7  Vascular and Endovascular Surgery Fellow  11/04/2020

## 2020-11-04 NOTE — PLAN OF CARE
Plan of care reviewed with pt: AAOx4, on RA, VS stable. LLE is red, even on the left foot and toe, no pulses is found on the left foot( see previous note), left foot cooler than the right foot and very tender to touch, Dr.Du romero. RLE with no discoloration, pulses strong with Doppler. Both feet have numbness and tingling present, pt stated that is her baseline.Cap refill < 3secs. Complained of pain on her hip and tabatha LE all the time, pain under control with Dilaudid and Oxycodone given around the clock. Refused to get out of bed today, can turn herself in bed. Purewick applied with adequate amount of yellow urine. No BM. Tolerated her diet, will be NPO @ midnight for angiogram tmr. Call light in reach. WCTM.

## 2020-11-04 NOTE — TRANSFER OF CARE
"Anesthesia Transfer of Care Note    Patient: Rachel Murray    Procedure(s) Performed: Procedure(s) (LRB):  Angiogram Extremity Unilateral (Left)  PTA, ARTERY, PERIPHERAL, Left Commmon Iliac, Left external Iliac, Left common femoral, left perfunda, right external iliac (N/A)    Patient location: PACU    Anesthesia Type: MAC    Transport from OR: Transported from OR on 6-10 L/min O2 by face mask with adequate spontaneous ventilation    Post pain: adequate analgesia    Post assessment: no apparent anesthetic complications    Post vital signs: stable    Level of consciousness: awake and responds to stimulation    Nausea/Vomiting: no nausea/vomiting    Complications: none    Transfer of care protocol was followed      Last vitals:   Visit Vitals  BP (!) 128/54   Pulse 88   Temp 36.4 °C (97.6 °F) (Axillary)   Resp (!) 22   Ht 5' 2" (1.575 m)   Wt 76.7 kg (169 lb)   SpO2 96%   Breastfeeding No   BMI 30.91 kg/m²     "

## 2020-11-05 VITALS
SYSTOLIC BLOOD PRESSURE: 127 MMHG | HEIGHT: 62 IN | WEIGHT: 169 LBS | BODY MASS INDEX: 31.1 KG/M2 | TEMPERATURE: 98 F | HEART RATE: 97 BPM | OXYGEN SATURATION: 93 % | RESPIRATION RATE: 18 BRPM | DIASTOLIC BLOOD PRESSURE: 56 MMHG

## 2020-11-05 LAB
ANION GAP SERPL CALC-SCNC: 13 MMOL/L (ref 8–16)
BASOPHILS # BLD AUTO: 0.05 K/UL (ref 0–0.2)
BASOPHILS NFR BLD: 0.5 % (ref 0–1.9)
BUN SERPL-MCNC: 15 MG/DL (ref 8–23)
CALCIUM SERPL-MCNC: 8.5 MG/DL (ref 8.7–10.5)
CHLORIDE SERPL-SCNC: 103 MMOL/L (ref 95–110)
CO2 SERPL-SCNC: 20 MMOL/L (ref 23–29)
CREAT SERPL-MCNC: 0.6 MG/DL (ref 0.5–1.4)
DIFFERENTIAL METHOD: ABNORMAL
EOSINOPHIL # BLD AUTO: 0.5 K/UL (ref 0–0.5)
EOSINOPHIL NFR BLD: 4.3 % (ref 0–8)
ERYTHROCYTE [DISTWIDTH] IN BLOOD BY AUTOMATED COUNT: 19.9 % (ref 11.5–14.5)
EST. GFR  (AFRICAN AMERICAN): >60 ML/MIN/1.73 M^2
EST. GFR  (NON AFRICAN AMERICAN): >60 ML/MIN/1.73 M^2
GLUCOSE SERPL-MCNC: 79 MG/DL (ref 70–110)
HCT VFR BLD AUTO: 31.1 % (ref 37–48.5)
HGB BLD-MCNC: 8.9 G/DL (ref 12–16)
IMM GRANULOCYTES # BLD AUTO: 0.04 K/UL (ref 0–0.04)
IMM GRANULOCYTES NFR BLD AUTO: 0.4 % (ref 0–0.5)
LYMPHOCYTES # BLD AUTO: 1.6 K/UL (ref 1–4.8)
LYMPHOCYTES NFR BLD: 14 % (ref 18–48)
MAGNESIUM SERPL-MCNC: 1.6 MG/DL (ref 1.6–2.6)
MCH RBC QN AUTO: 22.5 PG (ref 27–31)
MCHC RBC AUTO-ENTMCNC: 28.6 G/DL (ref 32–36)
MCV RBC AUTO: 79 FL (ref 82–98)
MONOCYTES # BLD AUTO: 0.8 K/UL (ref 0.3–1)
MONOCYTES NFR BLD: 7.4 % (ref 4–15)
NEUTROPHILS # BLD AUTO: 8.1 K/UL (ref 1.8–7.7)
NEUTROPHILS NFR BLD: 73.4 % (ref 38–73)
NRBC BLD-RTO: 0 /100 WBC
PHOSPHATE SERPL-MCNC: 3.2 MG/DL (ref 2.7–4.5)
PLATELET # BLD AUTO: 331 K/UL (ref 150–350)
PMV BLD AUTO: 9.4 FL (ref 9.2–12.9)
POTASSIUM SERPL-SCNC: 3.6 MMOL/L (ref 3.5–5.1)
RBC # BLD AUTO: 3.96 M/UL (ref 4–5.4)
SODIUM SERPL-SCNC: 136 MMOL/L (ref 136–145)
WBC # BLD AUTO: 11.08 K/UL (ref 3.9–12.7)

## 2020-11-05 PROCEDURE — 80048 BASIC METABOLIC PNL TOTAL CA: CPT

## 2020-11-05 PROCEDURE — 94799 UNLISTED PULMONARY SVC/PX: CPT

## 2020-11-05 PROCEDURE — 27000221 HC OXYGEN, UP TO 24 HOURS

## 2020-11-05 PROCEDURE — 94640 AIRWAY INHALATION TREATMENT: CPT

## 2020-11-05 PROCEDURE — 85025 COMPLETE CBC W/AUTO DIFF WBC: CPT

## 2020-11-05 PROCEDURE — 25000003 PHARM REV CODE 250: Performed by: STUDENT IN AN ORGANIZED HEALTH CARE EDUCATION/TRAINING PROGRAM

## 2020-11-05 PROCEDURE — 63600175 PHARM REV CODE 636 W HCPCS: Performed by: STUDENT IN AN ORGANIZED HEALTH CARE EDUCATION/TRAINING PROGRAM

## 2020-11-05 PROCEDURE — 94761 N-INVAS EAR/PLS OXIMETRY MLT: CPT

## 2020-11-05 PROCEDURE — 36415 COLL VENOUS BLD VENIPUNCTURE: CPT

## 2020-11-05 PROCEDURE — 25000242 PHARM REV CODE 250 ALT 637 W/ HCPCS: Performed by: SURGERY

## 2020-11-05 PROCEDURE — 83735 ASSAY OF MAGNESIUM: CPT

## 2020-11-05 PROCEDURE — 94664 DEMO&/EVAL PT USE INHALER: CPT

## 2020-11-05 PROCEDURE — 99900035 HC TECH TIME PER 15 MIN (STAT)

## 2020-11-05 PROCEDURE — 84100 ASSAY OF PHOSPHORUS: CPT

## 2020-11-05 RX ORDER — SULFAMETHOXAZOLE AND TRIMETHOPRIM 800; 160 MG/1; MG/1
1 TABLET ORAL 2 TIMES DAILY
Status: DISCONTINUED | OUTPATIENT
Start: 2020-11-05 | End: 2020-11-05 | Stop reason: HOSPADM

## 2020-11-05 RX ORDER — SULFAMETHOXAZOLE AND TRIMETHOPRIM 800; 160 MG/1; MG/1
1 TABLET ORAL 2 TIMES DAILY
Qty: 10 TABLET | Refills: 0 | Status: SHIPPED | OUTPATIENT
Start: 2020-11-05 | End: 2020-11-10

## 2020-11-05 RX ADMIN — CEPHALEXIN 500 MG: 500 CAPSULE ORAL at 08:11

## 2020-11-05 RX ADMIN — CLOPIDOGREL 75 MG: 75 TABLET, FILM COATED ORAL at 08:11

## 2020-11-05 RX ADMIN — PREGABALIN 75 MG: 75 CAPSULE ORAL at 08:11

## 2020-11-05 RX ADMIN — IPRATROPIUM BROMIDE AND ALBUTEROL SULFATE 3 ML: .5; 2.5 SOLUTION RESPIRATORY (INHALATION) at 04:11

## 2020-11-05 RX ADMIN — ATORVASTATIN CALCIUM 40 MG: 20 TABLET, FILM COATED ORAL at 08:11

## 2020-11-05 RX ADMIN — PREGABALIN 75 MG: 75 CAPSULE ORAL at 02:11

## 2020-11-05 RX ADMIN — DULOXETINE HYDROCHLORIDE 30 MG: 30 CAPSULE, DELAYED RELEASE ORAL at 08:11

## 2020-11-05 RX ADMIN — HYDROMORPHONE HYDROCHLORIDE 1 MG: 1 INJECTION, SOLUTION INTRAMUSCULAR; INTRAVENOUS; SUBCUTANEOUS at 09:11

## 2020-11-05 RX ADMIN — ACETAMINOPHEN 650 MG: 325 TABLET ORAL at 08:11

## 2020-11-05 RX ADMIN — IPRATROPIUM BROMIDE AND ALBUTEROL SULFATE 3 ML: .5; 2.5 SOLUTION RESPIRATORY (INHALATION) at 08:11

## 2020-11-05 RX ADMIN — HYDROMORPHONE HYDROCHLORIDE 1 MG: 1 INJECTION, SOLUTION INTRAMUSCULAR; INTRAVENOUS; SUBCUTANEOUS at 03:11

## 2020-11-05 RX ADMIN — OXYCODONE HYDROCHLORIDE 10 MG: 10 TABLET ORAL at 06:11

## 2020-11-05 RX ADMIN — OXYCODONE HYDROCHLORIDE 10 MG: 10 TABLET ORAL at 10:11

## 2020-11-05 RX ADMIN — OXYCODONE HYDROCHLORIDE 10 MG: 10 TABLET ORAL at 12:11

## 2020-11-05 RX ADMIN — ASPIRIN 81 MG: 81 TABLET, COATED ORAL at 08:11

## 2020-11-05 RX ADMIN — IPRATROPIUM BROMIDE AND ALBUTEROL SULFATE 3 ML: .5; 2.5 SOLUTION RESPIRATORY (INHALATION) at 11:11

## 2020-11-05 RX ADMIN — OXYCODONE HYDROCHLORIDE 10 MG: 10 TABLET ORAL at 02:11

## 2020-11-05 RX ADMIN — SULFAMETHOXAZOLE AND TRIMETHOPRIM 1 TABLET: 800; 160 TABLET ORAL at 08:11

## 2020-11-05 RX ADMIN — IPRATROPIUM BROMIDE AND ALBUTEROL SULFATE 3 ML: .5; 2.5 SOLUTION RESPIRATORY (INHALATION) at 12:11

## 2020-11-05 NOTE — PLAN OF CARE
Problem: Fall Injury Risk  Goal: Absence of Fall and Fall-Related Injury  Intervention: Identify and Manage Contributors to Fall Injury Risk  Flowsheets (Taken 11/5/2020 0339)  Medication Review/Management: medications reviewed  Intervention: Promote Injury-Free Environment  Flowsheets (Taken 11/5/2020 0339)  Safety Promotion/Fall Prevention: assistive device/personal item within reach  Environmental Safety Modification: assistive device/personal items within reach     Problem: Adult Inpatient Plan of Care  Goal: Absence of Hospital-Acquired Illness or Injury  Intervention: Identify and Manage Fall Risk  Flowsheets (Taken 11/5/2020 0339)  Safety Promotion/Fall Prevention: assistive device/personal item within reach  Intervention: Prevent VTE (venous thromboembolism)  Flowsheets (Taken 11/5/2020 0339)  VTE Prevention/Management: bleeding precautions maintained  Goal: Optimal Comfort and Wellbeing  Intervention: Provide Person-Centered Care  Flowsheets (Taken 11/5/2020 0339)  Trust Relationship/Rapport:   care explained   reassurance provided   choices provided   Pt in bed with no complaints voiced, no acute distress noted at this time.  Assisted pt with bedtime care.  Bed locked and at lowest position.  Call light in hand.  Frequent assessments ongoing

## 2020-11-05 NOTE — PROGRESS NOTES
[facility]  Vascular Surgery  Progress Note    Patient Name: Rachel Murray  MRN: [id number]  Admission Date: 11/2/2020  Primary Care Provider: Anel Parikh MD    Subjective:     Interval History: Tolerated angiogram yesterday which found multilevel stenosis. Today, pain is unchanged from prior to angiogram. However, cap refill is improved. Will likely need further surgery, but will need the schedule surgery today. Otherwise, no events overnight.     Post-Op Info:  Procedure(s) (LRB):  Angiogram Extremity Unilateral (Left)  PTA, ARTERY, PERIPHERAL, Left Commmon Iliac, Left external Iliac, Left common femoral, left perfunda, right external iliac (N/A)   1 Day Post-Op       Medications:  Continuous Infusions:    Scheduled Meds:   albuterol-ipratropium  3 mL Nebulization Q4H    aspirin  81 mg Oral Daily    atorvastatin  40 mg Oral Daily    cephALEXin  500 mg Oral Q12H    clopidogreL  75 mg Oral Daily    DULoxetine  30 mg Oral BID    enoxaparin  40 mg Subcutaneous Q24H    pregabalin  75 mg Oral TID    sulfamethoxazole-trimethoprim 800-160mg  1 tablet Oral BID     PRN Meds:acetaminophen, HYDROmorphone, ondansetron, ondansetron, oxyCODONE, oxyCODONE, sodium chloride 0.9%     Objective:     Vital Signs (Most Recent):  Temp: 98.2 °F (36.8 °C) (11/05/20 0635)  Pulse: 99 (11/05/20 0702)  Resp: 16 (11/05/20 0635)  BP: 130/63 (11/05/20 0635)  SpO2: 95 % (11/05/20 0635) Vital Signs (24h Range):  Temp:  [97.6 °F (36.4 °C)-98.9 °F (37.2 °C)] 98.2 °F (36.8 °C)  Pulse:  [] 99  Resp:  [12-24] 16  SpO2:  [90 %-100 %] 95 %  BP: (100-137)/(53-63) 130/63         Physical Exam  Constitutional:       General: She is not in acute distress.     Appearance: Normal appearance.   HENT:      Head: Normocephalic and atraumatic.      Mouth/Throat:      Mouth: Mucous membranes are moist.      Pharynx: Oropharynx is clear.   Eyes:      General: No scleral icterus.        Right eye: No discharge.         Left eye:  No discharge.      Extraocular Movements: Extraocular movements intact.   Cardiovascular:      Rate and Rhythm: Normal rate and regular rhythm.      Pulses: Normal pulses.      Comments: Weak monophasic signals to left AT  1+ femoral pulses bilaterally  1+ popliteal pulses bilaterally  Pulmonary:      Effort: Pulmonary effort is normal. No respiratory distress.      Breath sounds: Normal breath sounds.   Chest:      Chest wall: No tenderness.   Abdominal:      General: Abdomen is flat. There is no distension.      Palpations: Abdomen is soft.      Tenderness: There is no abdominal tenderness.   Musculoskeletal:         General: Tenderness present. No deformity.      Right lower leg: Edema present.      Left lower leg: Edema present.      Comments: Bilateral edema right worse than left. Tenderness of left lower extremity. Left lower extremity is colder than the right lower extremity. Mild discoloration of toes on left foot   Skin:     General: Skin is warm and dry.      Capillary Refill: Capillary refill takes less than 2 seconds.      Comments: Erythema (left lower extremity on posterior thight down to posterior calf) present.    Neurological:      General: No focal deficit present.      Mental Status: She is alert and oriented to person, place, and time.   Psychiatric:         Behavior: Behavior normal.         Thought Content: Thought content normal.       Significant Labs:  CBC:   Recent Labs   Lab 11/05/20  0401   WBC 11.08   RBC 3.96*   HGB 8.9*   HCT 31.1*      MCV 79*   MCH 22.5*   MCHC 28.6*     CMP:   Recent Labs   Lab 11/04/20  0455 11/05/20  0401    79   CALCIUM 8.8 8.5*    136   K 3.9 3.6   CO2 21* 20*    103   BUN 22  --    CREATININE 0.7 0.6       Significant Diagnostics:  I have reviewed and interpreted all pertinent imaging results/findings within the past 24 hours.    Assessment/Plan:     * PAD (peripheral artery disease)  Ms. Murray is our 69yo female who has a history of  PAD s/p left EIA and CFA endarterectomy and bilateral iliac stenting who presents with worsening left leg pain and erythema. She had an angiogram on 11/4/2020 that showed heavily calcified aortoiliac system and multifocal above knee popliteal stenosis.    - Will need redo left CFA endarterectomy and CFA to below-knee popliteal artery bypass incoming weeks. Will discuss timing with staff  - Likely home today and return later for the above procedures  - Will need stress test, ABIs, and new arterial duplex bilateral lower extremities        Ralf Chambers MD  Vascular Surgery  [facility]

## 2020-11-05 NOTE — ASSESSMENT & PLAN NOTE
Ms. Murray is our 69yo female who has a history of PAD s/p left EIA and CFA endarterectomy and bilateral iliac stenting who presents with worsening left leg pain and erythema. She had an angiogram on 11/4/2020 that showed heavily calcified aortoiliac system and multifocal above knee popliteal stenosis.    - Will need redo left CFA endarterectomy and CFA to below-knee popliteal artery bypass incoming weeks. Will discuss timing with staff  - Likely home today and return later for the above procedures  - Will need stress test, ABIs, and new arterial duplex bilateral lower extremities

## 2020-11-05 NOTE — SUBJECTIVE & OBJECTIVE
Medications:  Continuous Infusions:    Scheduled Meds:   albuterol-ipratropium  3 mL Nebulization Q4H    aspirin  81 mg Oral Daily    atorvastatin  40 mg Oral Daily    cephALEXin  500 mg Oral Q12H    clopidogreL  75 mg Oral Daily    DULoxetine  30 mg Oral BID    enoxaparin  40 mg Subcutaneous Q24H    pregabalin  75 mg Oral TID    sulfamethoxazole-trimethoprim 800-160mg  1 tablet Oral BID     PRN Meds:acetaminophen, HYDROmorphone, ondansetron, ondansetron, oxyCODONE, oxyCODONE, sodium chloride 0.9%     Objective:     Vital Signs (Most Recent):  Temp: 98.2 °F (36.8 °C) (11/05/20 0635)  Pulse: 99 (11/05/20 0702)  Resp: 16 (11/05/20 0635)  BP: 130/63 (11/05/20 0635)  SpO2: 95 % (11/05/20 0635) Vital Signs (24h Range):  Temp:  [97.6 °F (36.4 °C)-98.9 °F (37.2 °C)] 98.2 °F (36.8 °C)  Pulse:  [] 99  Resp:  [12-24] 16  SpO2:  [90 %-100 %] 95 %  BP: (100-137)/(53-63) 130/63         Physical Exam  Constitutional:       General: She is not in acute distress.     Appearance: Normal appearance.   HENT:      Head: Normocephalic and atraumatic.      Mouth/Throat:      Mouth: Mucous membranes are moist.      Pharynx: Oropharynx is clear.   Eyes:      General: No scleral icterus.        Right eye: No discharge.         Left eye: No discharge.      Extraocular Movements: Extraocular movements intact.   Cardiovascular:      Rate and Rhythm: Normal rate and regular rhythm.      Pulses: Normal pulses.      Comments: Weak monophasic signals to left AT  1+ femoral pulses bilaterally  1+ popliteal pulses bilaterally  Pulmonary:      Effort: Pulmonary effort is normal. No respiratory distress.      Breath sounds: Normal breath sounds.   Chest:      Chest wall: No tenderness.   Abdominal:      General: Abdomen is flat. There is no distension.      Palpations: Abdomen is soft.      Tenderness: There is no abdominal tenderness.   Musculoskeletal:         General: Tenderness present. No deformity.      Right lower leg: Edema  present.      Left lower leg: Edema present.      Comments: Bilateral edema right worse than left. Tenderness of left lower extremity. Left lower extremity is colder than the right lower extremity. Mild discoloration of toes on left foot   Skin:     General: Skin is warm and dry.      Capillary Refill: Capillary refill takes less than 2 seconds.      Comments: Erythema (left lower extremity on posterior thight down to posterior calf) present.    Neurological:      General: No focal deficit present.      Mental Status: She is alert and oriented to person, place, and time.   Psychiatric:         Behavior: Behavior normal.         Thought Content: Thought content normal.       Significant Labs:  CBC:   Recent Labs   Lab 11/05/20  0401   WBC 11.08   RBC 3.96*   HGB 8.9*   HCT 31.1*      MCV 79*   MCH 22.5*   MCHC 28.6*     CMP:   Recent Labs   Lab 11/04/20  0455 11/05/20  0401    79   CALCIUM 8.8 8.5*    136   K 3.9 3.6   CO2 21* 20*    103   BUN 22  --    CREATININE 0.7 0.6       Significant Diagnostics:  I have reviewed and interpreted all pertinent imaging results/findings within the past 24 hours.

## 2020-11-05 NOTE — PLAN OF CARE
Patient discharged home today with no needs.    Future Appointments   Date Time Provider Department Center   11/7/2020 10:00 AM COVID TESTING, [facility] PRE-ADMIT [facility] P[facility]   11/12/2020 11:00 AM VASCULAR, LAB [facility] DOYLE Jansen [facility]   11/12/2020  1:00 PM VASCULAR, LAB [facility] DOYLE [facility]   11/12/2020  2:15 PM Olayinka Harris MD [facility] CHRISTY [facility]   12/7/2020  9:00 AM CELIO Hernandez [facility] PAINM[facility]   1/8/2021  9:30 AM Anel Parkih MD [facility]        11/05/20 3106   Final Note   Assessment Type Final Discharge Note   Anticipated Discharge Disposition Home   Hospital Follow Up  Appt(s) scheduled? Yes   Right Care Referral Info   Post Acute Recommendation No Care   Post-Acute Status   Post-Acute Authorization Other   Other Status No Post-Acute Service Needs

## 2020-11-05 NOTE — PLAN OF CARE
Plan of care reviewed with pt: back from PACU around 1630,  AAOx4, on 2L now with SpO2 at 93%, VS stable. Right groin incision with gauze and Tegaderm CDI. LLE still red, painful and tender to touch, DP and PT is +2 with Doppler. Both feet still have numbness and tingling as her baseline. RLE neurovascular checked WDL. Changed from NPO to regular diet. Voided per harry with adequate amount of yellow urine.  Per Dr. Chambers, pt is ok to sit up now. NO SCD.  Call light in reach. WCTM

## 2020-11-05 NOTE — NURSING
Patient is ready for discharge. Patient stable alert and oriented. IVs and telemetry removed. No complaints of pain. Discussed discharge plan. Reviewed medications and side effects, appointments, and answered questions with patient and friend. 1 RX given to patient at bedside.

## 2020-11-05 NOTE — DISCHARGE SUMMARY
[facility]  General Surgery  Discharge Summary      Patient Name: Rachel Murray  MRN: [id number]  Admission Date: 11/2/2020  Hospital Length of Stay: 2 days  Discharge Date and Time: 11/5/2020  3:33 PM  Attending Physician: Olayinka Harris MD   Discharging Provider: Chuck Vaca MD  Primary Care Provider: Anel Parikh MD     HPI: Ms. Murray is our 69yo female with HTN, HLD, arthritis, and PAD (s/p iliac artery stenting 7/21/2020, left EIA and CFA endarterectomy and JESU balloon angioplasty and left profundaplasty on 2/18/2020) who presents with worsening pain of the left leg.       She states that she has had a chronic pain of her left leg however it has gotten worse over the last couple of weeks.  Over the last week or so she has also noticed erythema of the left lower extremity that is hot to the touch.  She states her pain is a dull pressure and burning pain that is constant and unrelenting.  She states that trying to walk makes it worse and nothing has made it better.  She denies any fevers or chills.  She endorses that she has sensation to her left lower extremity.  She endorses chronic swelling of both her lower extremities.    Procedure(s) (LRB):  Angiogram Extremity Unilateral (Left)  PTA, ARTERY, PERIPHERAL, Left Commmon Iliac, Left external Iliac, Left common femoral, left perfunda, right external iliac (N/A)     Hospital Course: Patient went to the OR on 11/4/2020 for an angiogram of her aorto-iliac and left lower extremity secondary to PAD.  The patient tolerated the procedure well and was transferred to the PACU in stable condition.  Their post op course was [id number].  Voiding without issue with adequate urine output. Passing gas and stool.  Tolerating diet without nausea or vomiting.  Incision site is clean, dry, and intact.  Discharged on POD 1 in good condition.    Consults:   Consults (From admission, onward)        Status Ordering Provider     Inpatient consult to  Vascular Surgery  Once     Provider:  (Not yet assigned)    Completed SMILEY EWING          Significant Diagnostic Studies: Labs: BMP: No results for input(s): GLU, NA, K, CL, CO2, BUN, CREATININE, CALCIUM, MG in the last 48 hours., CMP No results for input(s): NA, K, CL, CO2, GLU, BUN, CREATININE, CALCIUM, PROT, ALBUMIN, BILITOT, ALKPHOS, AST, ALT, ANIONGAP, ESTGFRAFRICA, EGFRNONAA in the last 48 hours. and CBC No results for input(s): WBC, HGB, HCT, PLT in the last 48 hours.  Radiology:   Narrative & Impression     EXAMINATION:  US ARTERIAL LOWER EXTREMITY BILAT WITH WALT (XPD)     CLINICAL HISTORY:  PAD and worsening left leg pain;     TECHNIQUE:  Bilateral lower extremity arterial duplex ultrasound examination performed. Multiple gray scale and color doppler images were obtained in addition to waveform analysis.  Ankle-brachial indices were calculated.     COMPARISON:  CTA 06/10/2020, 02/04/2020     FINDINGS:  The ankle brachial index on the right is 0.5 and on the left is unobtainable.     The peak systolic velocities on the right are as follows, in centimeters/second:     Right iliac stent velocity: 189     Common femoral artery: 158     Superficial femoral artery, proximal: 213     Superficial femoral artery, mid portion: 137     Superficial femoral artery, distal: 123     Popliteal artery: 97     Posterior tibial artery: 35     Anterior tibial artery: 40     Peroneal artery: 49     The peak systolic velocities on the left are as follows, in centimeters/second:     Left iliac stent not visualized.     There is a stenosis within the common femoral artery with pre stenotic velocity measuring 110 cm/s, mid stenosis velocity measuring 557 cm/s, and poststenotic velocity measuring 265.     Superficial femoral artery, proximal: 69     Superficial femoral artery, mid portion: Occluded     Superficial femoral artery, distal: Occluded     Popliteal artery: 33     Posterior tibial artery: 16     Anterior tibial  artery: Not visualized     Peroneal artery: 11     Monophasic waveforms are present throughout both lower extremities consistent with peripheral arterial disease.     Impression:     Occlusion of the left middle and distal superficial femoral artery.  Reconstitution of flow distally via collaterals is visualized.  Findings similar to CTA 06/10/2020.     Hemodynamically significant stenosis (greater than 50%) within the left common femoral artery.     Monophasic waveforms consistent with peripheral arterial disease in the bilateral lower extremities.     Ankle-brachial index of 0.5 on the right consistent with severe peripheral arterial disease.  Left ankle brachial index was unable to be measured.        Narrative & Impression     EXAMINATION:  US LOWER EXTREMITY VEINS BILATERAL     CLINICAL HISTORY:  left leg pain and discolouration;     TECHNIQUE:  Duplex and color flow Doppler and dynamic compression was performed of the bilateral lower extremity veins was performed.     COMPARISON:  Ultrasound 08/06/2020     FINDINGS:  Right thigh veins: The common femoral, femoral, popliteal, upper greater saphenous, and deep femoral veins are patent and free of thrombus. The veins are normally compressible and have normal phasic flow and augmentation response.     Right calf veins: The visualized calf veins are patent.     Left thigh veins: The common femoral, femoral, popliteal, upper greater saphenous, and deep femoral veins are patent and free of thrombus. The veins are normally compressible and have normal phasic flow and augmentation response.     Left calf veins: The visualized calf veins are patent.     Miscellaneous: None     Impression:     No evidence of deep venous thrombosis in either lower extremity.       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    Specialty: Vascular Surgery  Why: To discuss bypass operation.  Needs arterial duplex imaging and ABIs.  Contact information:  [address]  [facility] [id number]  [phone]                 Patient Instructions:      VAS US Ankle Brachial Indices Resting   Standing Status: Future Standing Exp. Date: 11/05/21     VAS US Arterial Legs Bilateral   Standing Status: Future Standing Exp. Date: 11/05/21     Diet diabetic     Notify your health care provider if you experience any of the following:  temperature >100.4     Notify your health care provider if you experience any of the following:  severe uncontrolled pain     Notify your health care provider if you experience any of the following:  redness, tenderness, or signs of infection (pain, swelling, redness, odor or green/yellow discharge around incision site)     Notify your health care provider if you experience any of the following:  persistent dizziness, light-headedness, or visual disturbances     No dressing needed     Activity as tolerated     Medications:  Reconciled Home Medications:      Medication List      START taking these medications    sulfamethoxazole-trimethoprim 800-160mg 800-160 mg Tab  Commonly known as: BACTRIM DS  Take 1 tablet by mouth 2 (two) times daily. for 5 days        CHANGE how you take these medications    aspirin 81 MG EC tablet  Commonly known as: ECOTRIN  Take 1 tablet (81 mg total) by mouth once daily.  What changed: when to take this     atorvastatin 40 MG tablet  Commonly known as: LIPITOR  Take 1 tablet (40 mg total) by mouth once daily.  What changed: when to take this        CONTINUE taking these medications     acetaminophen 500 MG tablet  Commonly known as: TYLENOL  Take 2 tablets (1,000 mg total) by mouth 3 (three) times daily.     candesartan-hydrochlorothiazide 16-12.5 mg per tablet  Commonly known as: ATACAND HCT  Take 1 tablet by mouth once daily.     clopidogreL 75 mg tablet  Commonly known as: PLAVIX  Take 1 tablet (75 mg total) by mouth once daily.     DULoxetine 30 MG capsule  Commonly known as: CYMBALTA  Take 1 capsule (30 mg total) by mouth 2 (two) times daily.     nitrofurantoin (macrocrystal-monohydrate) 100 MG capsule  Commonly known as: MACROBID  Take 1 capsule (100 mg total) by mouth 2 (two) times daily.     oxyCODONE-acetaminophen 5-325 mg per tablet  Commonly known as: PERCOCET  Take 1 tablet by mouth every 4 (four) hours as needed for Pain.     pregabalin 75 MG capsule  Commonly known as: LYRICA  Take 1 capsule (75 mg total) by mouth 3 (three) times daily.     traMADoL 50 mg tablet  Commonly known as: ULTRAM  TAKE 1 TABLET(50 MG) BY MOUTH EVERY 24 HOURS AS NEEDED FOR PAIN        STOP taking these medications    zolpidem 5 MG Tab  Commonly known as: CELSA Vaca MD  General Surgery  [facility]

## 2020-11-06 ENCOUNTER — PATIENT OUTREACH (OUTPATIENT)
Dept: ADMINISTRATIVE | Facility: CLINIC | Age: 70
End: 2020-11-06

## 2020-11-06 ENCOUNTER — TELEPHONE (OUTPATIENT)
Dept: VASCULAR SURGERY | Facility: CLINIC | Age: 70
End: 2020-11-06

## 2020-11-06 NOTE — PROGRESS NOTES
Spoke with patient, states that she is scheduled for Covid testing tomorrow and a procedure on Monday. She states that the procedure that was scheduled for Monday was done while she was in the hospital but was told more surgery would be needed. Patient would like to know if she should keep the appt for Covid testing and if she should show up for procedure Monday.Message sent to Dr Harris.

## 2020-11-06 NOTE — TELEPHONE ENCOUNTER
----- Message from Mechelle Raya LPN sent at 11/6/2020  2:19 PM CST -----  Spoke with patient, states that she is scheduled for Covid testing tomorrow and a procedure on Monday. She states that the procedure that was scheduled for Monday was done while she was in the hospital but was told more surgery would be needed. Patient would like to know if she should keep the appt for Covid testing and if she should show up for procedure Monday. Please contact patient to discuss as soon as possible.    Respectfully,  Mechelle Raya LPN  [facility]    [email or web address]       Please do not reply to this message, as this inbox is not routinely monitored.

## 2020-11-06 NOTE — PATIENT INSTRUCTIONS
Peripheral Artery Disease (PAD)     Peripheral artery disease (PAD) occurs when the arteries that carry blood to the limbs are narrowed or blocked. This is usually due to a buildup of a fatty substance called plaque in the walls of the arteries.  PAD most often affects the arteries in the legs. When these arteries are narrowed or blocked, blood flow to the legs is reduced. This can cause leg and foot pain and other symptoms. If severe enough, reduced blood flow to the legs can lead to tissue death (gangrene) and the loss of a toe, foot, or leg. Having PAD also makes it more likely that arteries in other body areas are blocked. For instance, arteries that carry blood to the heart or brain may be affected. This raises the chances of heart attack and stroke.  Risk factors  Certain factors can make PAD more likely. They include:  · Smoking  · Diabetes  · High blood pressure  · Unhealthy cholesterol levels  · Obesity  · Inactive lifestyle  · Older age  · Family history of PAD  Symptoms  Many people with PAD have no symptoms. If symptoms do occur, they can include:  · Pain in the muscles of the calves, thighs or hips that gets worse with activity and better with rest (intermittent claudication)  · Achy, tired, or heavy feeling in the legs  · Weakness, numbness, tingling, or loss of feeling in the legs  · Changes in skin color of the legs  · Sores on the legs and feet  · Cold leg, feet, or toes  · Pain the feet or toes even when lying down (rest pain)  Home care  PAD is a chronic (lifelong) condition. Treatment is focused on managing your condition and lowering your health risks. This may include doing the following:  · If you smoke, quit. This helps prevent further damage to your arteries and lowers your health risks. Ask your provider about medicines or products that can help you quit smoking. Also consider joining a stop-smoking program or support group.  · Be more active. This helps you lose weight and manage  problems such as high blood pressure and unhealthy cholesterol levels. Start a walking program if advised to by your provider. Your provider may also help you form a safe exercise program that is right for your needs.  · Make healthy eating changes. This includes eating less fat, salt, and sugar.  · Take medicines for high blood pressure, unhealthy cholesterol levels, and diabetes as directed.  · Have your blood pressure and cholesterol levels checked as often as directed.  · If you have diabetes, try to keep your blood sugar well controlled. Test your blood sugar as directed.  · If you are overweight, talk to your provider about forming a weight-loss plan.  · Watch for cuts, scrapes, or open sores on your feet. Poor blood flow to the feet may slow healing and increase the risk of infection from these problems.   Follow-up care  Follow up with your healthcare provider, or as advised. If imaging tests such as ultrasound were done, they will be reviewed by a doctor. You will be told the results and any new findings that may affect your care.  When to seek medical advice   Call your healthcare provider right away if any of these occur:  · Sudden severe pain in the legs or feet  · Sudden cold, pale or blue color in the legs or feet  · Weakness or numbness in the legs or feet that worsens  · Any sore or wound in the legs or feet that wont heal  · Weak pulse in your legs or feet  Know the Signs of Heart Attack and Stroke  People with PAD are at high risk for heart attack and stroke. Knowing the signs of these problems can help you protect your health and get help when you need it. Call 911 right away if you have any of the following:  Signs of a Heart Attack  · Chest discomfort, such as pain, aching, tightness, or pressure that lasts more than a few minutes, or that comes and goes  · Pain or discomfort in the arms, back, shoulders, neck, or jaw  · Shortness of breath  · Sweating (often a cold, clammy  sweat)  · Nausea  · Lightheadedness  Signs of a Stroke  · Sudden numbness or weakness of the face, arms, or legs, especially on one side  · Sudden confusion or trouble speaking or understanding  · Sudden trouble seeing in one or both eyes  · Sudden trouble walking, dizziness, or loss of balance  · Sudden, severe headache with no known cause   Date Last Reviewed: 9/21/2015  © 2226-9127 [employer], "[employer] [address] [place] [address], [place] [id number]. All rights reserved. This information is not intended as a substitute for professional medical care. Always follow your healthcare professional's instructions.         Referring Physician (Optional): ANNALISE

## 2020-11-07 LAB
BACTERIA BLD CULT: NORMAL
BACTERIA BLD CULT: NORMAL

## 2020-11-12 ENCOUNTER — HOSPITAL ENCOUNTER (OUTPATIENT)
Dept: VASCULAR SURGERY | Facility: CLINIC | Age: 70
Discharge: HOME | End: 2020-11-12
Payer: COMMERCIAL

## 2020-11-12 ENCOUNTER — OFFICE VISIT (OUTPATIENT)
Dept: VASCULAR SURGERY | Facility: CLINIC | Age: 70
End: 2020-11-12
Attending: SURGERY
Payer: COMMERCIAL

## 2020-11-12 VITALS
BODY MASS INDEX: 31.11 KG/M2 | DIASTOLIC BLOOD PRESSURE: 66 MMHG | TEMPERATURE: 99 F | WEIGHT: 169.06 LBS | HEART RATE: 77 BPM | SYSTOLIC BLOOD PRESSURE: 146 MMHG | HEIGHT: 62 IN

## 2020-11-12 DIAGNOSIS — Z01.818 PREOP EXAMINATION: ICD-10-CM

## 2020-11-12 DIAGNOSIS — I73.9 PAD (PERIPHERAL ARTERY DISEASE): ICD-10-CM

## 2020-11-12 DIAGNOSIS — I77.1 ILIAC ARTERY STENOSIS, BILATERAL: ICD-10-CM

## 2020-11-12 DIAGNOSIS — L97.329 VENOUS ULCER OF ANKLE, LEFT: ICD-10-CM

## 2020-11-12 DIAGNOSIS — I70.202 FEMORAL ARTERY OCCLUSION, LEFT: ICD-10-CM

## 2020-11-12 DIAGNOSIS — M79.89 LEFT LEG SWELLING: ICD-10-CM

## 2020-11-12 DIAGNOSIS — I83.023 VENOUS ULCER OF ANKLE, LEFT: ICD-10-CM

## 2020-11-12 DIAGNOSIS — I73.9 PAD (PERIPHERAL ARTERY DISEASE): Primary | ICD-10-CM

## 2020-11-12 PROCEDURE — 1101F PR PT FALLS ASSESS DOC 0-1 FALLS W/OUT INJ PAST YR: ICD-10-PCS | Mod: CPTII,S$GLB,, | Performed by: SURGERY

## 2020-11-12 PROCEDURE — 93925 PR DUPLEX LO EXTREM ART BILAT: ICD-10-PCS | Mod: S$GLB,,, | Performed by: SURGERY

## 2020-11-12 PROCEDURE — 3008F PR BODY MASS INDEX (BMI) DOCUMENTED: ICD-10-PCS | Mod: CPTII,S$GLB,, | Performed by: SURGERY

## 2020-11-12 PROCEDURE — 99999 PR PBB SHADOW E&M-EST. PATIENT-LVL IV: CPT | Mod: PBBFAC,,, | Performed by: SURGERY

## 2020-11-12 PROCEDURE — 3077F SYST BP >= 140 MM HG: CPT | Mod: CPTII,S$GLB,, | Performed by: SURGERY

## 2020-11-12 PROCEDURE — 1159F MED LIST DOCD IN RCRD: CPT | Mod: S$GLB,,, | Performed by: SURGERY

## 2020-11-12 PROCEDURE — 3078F DIAST BP <80 MM HG: CPT | Mod: CPTII,S$GLB,, | Performed by: SURGERY

## 2020-11-12 PROCEDURE — 3008F BODY MASS INDEX DOCD: CPT | Mod: CPTII,S$GLB,, | Performed by: SURGERY

## 2020-11-12 PROCEDURE — 99214 PR OFFICE/OUTPT VISIT, EST, LEVL IV, 30-39 MIN: ICD-10-PCS | Mod: 25,S$GLB,, | Performed by: SURGERY

## 2020-11-12 PROCEDURE — 1101F PT FALLS ASSESS-DOCD LE1/YR: CPT | Mod: CPTII,S$GLB,, | Performed by: SURGERY

## 2020-11-12 PROCEDURE — 29580 STRAPPING UNNA BOOT: CPT | Mod: LT,S$GLB,, | Performed by: SURGERY

## 2020-11-12 PROCEDURE — 3077F PR MOST RECENT SYSTOLIC BLOOD PRESSURE >= 140 MM HG: ICD-10-PCS | Mod: CPTII,S$GLB,, | Performed by: SURGERY

## 2020-11-12 PROCEDURE — 1159F PR MEDICATION LIST DOCUMENTED IN MEDICAL RECORD: ICD-10-PCS | Mod: S$GLB,,, | Performed by: SURGERY

## 2020-11-12 PROCEDURE — 3078F PR MOST RECENT DIASTOLIC BLOOD PRESSURE < 80 MM HG: ICD-10-PCS | Mod: CPTII,S$GLB,, | Performed by: SURGERY

## 2020-11-12 PROCEDURE — 99999 PR PBB SHADOW E&M-EST. PATIENT-LVL IV: ICD-10-PCS | Mod: PBBFAC,,, | Performed by: SURGERY

## 2020-11-12 PROCEDURE — 93923 PR NON-INVASIVE PHYSIOLOGIC STUDY EXTREMITY 3 LEVELS: ICD-10-PCS | Mod: S$GLB,,, | Performed by: SURGERY

## 2020-11-12 PROCEDURE — 93923 UPR/LXTR ART STDY 3+ LVLS: CPT | Mod: S$GLB,,, | Performed by: SURGERY

## 2020-11-12 PROCEDURE — 3288F PR FALLS RISK ASSESSMENT DOCUMENTED: ICD-10-PCS | Mod: CPTII,S$GLB,, | Performed by: SURGERY

## 2020-11-12 PROCEDURE — 1125F AMNT PAIN NOTED PAIN PRSNT: CPT | Mod: S$GLB,,, | Performed by: SURGERY

## 2020-11-12 PROCEDURE — 93925 LOWER EXTREMITY STUDY: CPT | Mod: S$GLB,,, | Performed by: SURGERY

## 2020-11-12 PROCEDURE — 1125F PR PAIN SEVERITY QUANTIFIED, PAIN PRESENT: ICD-10-PCS | Mod: S$GLB,,, | Performed by: SURGERY

## 2020-11-12 PROCEDURE — 29580 PR STRAPPING UNNA BOOT: ICD-10-PCS | Mod: LT,S$GLB,, | Performed by: SURGERY

## 2020-11-12 PROCEDURE — 3288F FALL RISK ASSESSMENT DOCD: CPT | Mod: CPTII,S$GLB,, | Performed by: SURGERY

## 2020-11-12 PROCEDURE — 99214 OFFICE O/P EST MOD 30 MIN: CPT | Mod: 25,S$GLB,, | Performed by: SURGERY

## 2020-11-12 RX ORDER — LIDOCAINE HYDROCHLORIDE 10 MG/ML
1 INJECTION, SOLUTION EPIDURAL; INFILTRATION; INTRACAUDAL; PERINEURAL ONCE
Status: CANCELLED | OUTPATIENT
Start: 2020-11-12 | End: 2020-11-12

## 2020-11-12 RX ORDER — MUPIROCIN 20 MG/G
OINTMENT TOPICAL
Status: CANCELLED | OUTPATIENT
Start: 2020-11-12

## 2020-11-12 RX ORDER — ONDANSETRON 2 MG/ML
4 INJECTION INTRAMUSCULAR; INTRAVENOUS EVERY 12 HOURS PRN
Status: CANCELLED | OUTPATIENT
Start: 2020-11-12

## 2020-11-12 RX ORDER — SODIUM CHLORIDE 9 MG/ML
INJECTION, SOLUTION INTRAVENOUS CONTINUOUS
Status: CANCELLED | OUTPATIENT
Start: 2020-11-12

## 2020-11-12 NOTE — PROGRESS NOTES
Rachel Murray  11/12/2020    Interval History:  She was admitted to the hospital on 11/10/2020 for worsening left leg pain.  She underwent an angiogram with angioplasty of the left common femoral, profunda, and SU.  She comes in today with continued pain and discomfort of her left lower extremity.  Endorses worsening swelling and new blisters.  Had bilateral vein mapping performed along with repeat arterial duplex imaging.    HPI:  Patient is a 70 y.o. female with a h/o lumbar radiculopathy and PAD who is here today for follow-up appointment after recent bilateral external iliac artery stenting 7/21/2020. Since her procedure she has had significant, new swelling bilaterally in both lower legs R>L, accompanied by excruciating pain. Her skin is sensitive to touch. Her pain is worse on the left limb, although the swelling is worse on the right leg. She has new numbness on the dorsal side of her right foot.     She is in severe back pain, seen by neurosurgery yesterday after obtaining MRI and she is on the schedule for pain injections next week. Today, she still complains of pain, swelling, and redness to her left leg. DVT US from last week was negative for DVT. She wears compression stockings but this worsens her pain.        Prior:  She reports of medial LLE paresthesias that radiate to ipsilateral hip and are worst in the toes. She additionally complains of a 'squeezing' pain in the left foot and intermittent episodes of blanching of the left foot. She continues to have intermittent radicular pain in bilateral legs, for which she received an L5-S1 lumbar epidural steroid injection on 05/07/2020 that the patient reports was minimally helpful. The patient takes Lyrica and Tramadol for her leg pain with modest pain relief, rated 5/10 at the time of history taking.   Ms. Murray reports a small, persistent defect in her left groin incision, which is not painful or erythematous and has not been productive of discharge. She  continues to dress the incision with a dry gauze. She continues to smoke cigarettes but has decreased her usage from 1/2 pack per day, down from 1 pack daily.     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 iliac stent placement, LLE angiogram  17.4 min  837.37 mGy  172.81 Gycm2  37 ml contrast    ANGIOGRAPHY OF LOWER EXTREMITY Left 2020    Procedure: Angiogram Extremity Unilateral;  Surgeon: Olayinka Harris MD;  [facility];  Service: Peripheral Vascular;  Laterality: Left;  12.2 fluoro, 850.46 mGy, 131.10 Gycm2    AORTOGRAPHY N/A 2020    Procedure: AORTOGRAM;  Surgeon: Olayinka Harris MD;  [facility];  Service: Peripheral Vascular;  Laterality: N/A;    APPENDECTOMY      BREAST BIOPSY       SECTION, CLASSIC      COLONOSCOPY      COLONOSCOPY N/A 2017    Procedure: COLONOSCOPY;  Surgeon: Gabriel Rose MD;  Location: [facility] (4TH FLR);  Service: Endoscopy;  Laterality: N/A;  Miralax prep per Dr. Rose    DENTAL SURGERY  2017    ENDARTERECTOMY OF FEMORAL ARTERY Left 2020    Procedure: ENDARTERECTOMY, FEMORAL;  Surgeon: Olayinka Harris MD;  Location: [facility] OR [address] [place] [address];  Service: Peripheral Vascular;  Laterality: Left;  SFA endarterectomy  fluoro time: 9.2 min mGy: 2336.86    EPIDURAL STEROID INJECTION INTO LUMBAR SPINE N/A 2020    Procedure: Injection-steroid-epidural-lumbar--Review  MRI- L5-S1 after MRI review;  Surgeon: Ely Syed Jr., MD;  Location: [facility];  Service: Pain Management;  Laterality: N/A;    EPIDURAL STEROID INJECTION INTO LUMBAR SPINE N/A 5/7/2020    Procedure: Injection-steroid-epidural-lumbar-- Interlaminar L5-S1/ pt aware she can continue taking ASA per Dr. Harris and hold plavix.;  Surgeon: Ely Syed Jr., MD;  Location: [facility];  Service: Pain Management;  Laterality: N/A;  Will sign consent at DOS.    EPIDURAL STEROID INJECTION INTO LUMBAR SPINE N/A 8/20/2020    Procedure: Injection-steroid-epidural-lumbar--L4-5;  Surgeon: Ely Syed Jr., MD;  Location: [facility];  Service: Pain Management;  Laterality: N/A;  Oral sedation    gyn surgery      ex laparotomy    HYSTERECTOMY       Family History   Problem Relation Age of Onset    Hypertension Mother     Heart disease Mother 40        CABG x 5    Seizures Mother     Cancer Father         prostate    Stroke Father     Cancer Brother         colon    Diabetes Maternal Aunt     Breast cancer Maternal Aunt     Diabetes Maternal Uncle     Stroke Maternal Grandmother     Stroke Paternal Grandmother     Diabetes Cousin     Breast cancer Cousin      Social History     Socioeconomic History    Marital status: Single     Spouse name: Not on file    Number of children: Not on file    Years of education: Not on file    Highest education level: Not on file   Occupational History     Employer: [employer]   Social Needs    Financial resource strain: Not on file    Food insecurity     Worry: Not on file     Inability: Not on file    Transportation needs     Medical: Not on file     Non-medical: Not on file   Tobacco Use    Smoking status: Current Every Day Smoker     Packs/day: 0.50     Years: 40.00     Pack years: 20.00     Types: Cigarettes    Smokeless tobacco: Never Used    Tobacco comment: occasionally   Substance and Sexual Activity    Alcohol use: Yes      Alcohol/week: 0.0 standard drinks     Comment: rarely    Drug use: No    Sexual activity: Never   Lifestyle    Physical activity     Days per week: Not on file     Minutes per session: Not on file    Stress: Not on file   Relationships    Social connections     Talks on phone: Not on file     Gets together: Not on file     Attends Mosque service: Not on file     Active member of club or organization: Not on file     Attends meetings of clubs or organizations: Not on file     Relationship status: Not on file   Other Topics Concern    Not on file   Social History Narrative    Not on file       Current Outpatient Medications:     acetaminophen (TYLENOL) 500 MG tablet, Take 2 tablets (1,000 mg total) by mouth 3 (three) times daily., Disp: , Rfl: 0    aspirin (ECOTRIN) 81 MG EC tablet, Take 1 tablet (81 mg total) by mouth once daily. (Patient taking differently: Take 81 mg by mouth every morning. ), Disp: 360 tablet, Rfl: 0    atorvastatin (LIPITOR) 40 MG tablet, Take 1 tablet (40 mg total) by mouth once daily. (Patient taking differently: Take 40 mg by mouth every morning. ), Disp: 90 tablet, Rfl: 3    candesartan-hydrochlorothiazide (ATACAND HCT) 16-12.5 mg per tablet, Take 1 tablet by mouth once daily., Disp: 30 tablet, Rfl: 3    clopidogreL (PLAVIX) 75 mg tablet, Take 1 tablet (75 mg total) by mouth once daily., Disp: 30 tablet, Rfl: 6    DULoxetine (CYMBALTA) 30 MG capsule, Take 1 capsule (30 mg total) by mouth 2 (two) times daily., Disp: 60 capsule, Rfl: 3    nitrofurantoin, macrocrystal-monohydrate, (MACROBID) 100 MG capsule, Take 1 capsule (100 mg total) by mouth 2 (two) times daily., Disp: 14 capsule, Rfl: 0    oxyCODONE-acetaminophen (PERCOCET) 5-325 mg per tablet, Take 1 tablet by mouth every 4 (four) hours as needed for Pain., Disp: 18 tablet, Rfl: 0    traMADoL (ULTRAM) 50 mg tablet, TAKE 1 TABLET(50 MG) BY MOUTH EVERY 24 HOURS AS NEEDED FOR PAIN, Disp: 60 tablet, Rfl: 1    pregabalin  (LYRICA) 75 MG capsule, Take 1 capsule (75 mg total) by mouth 3 (three) times daily., Disp: 90 capsule, Rfl: 2  No current facility-administered medications for this visit.     Facility-Administered Medications Ordered in Other Visits:     alprazolam ODT dissolvable tablet 0.5 mg, 0.5 mg, Oral, Once PRN, Ely Syed Jr., MD    sodium chloride 0.9% flush 10 mL, 10 mL, Intravenous, PRN, Reyna Toledo MD    REVIEW OF SYSTEMS:  General: negative; ENT: negative; Allergy and Immunology: negative; Hematological and Lymphatic: Negative; Endocrine: negative; Respiratory: no cough, shortness of breath, or wheezing; Cardiovascular: no chest pain or dyspnea on exertion; Gastrointestinal: no abdominal pain/back, change in bowel habits, or bloody stools; Genito-Urinary: no dysuria, trouble voiding, or hematuria; Musculoskeletal: Bilateral (L >>R) radicular pain and paresthesias  Neurological: no TIA or stroke symptoms; Psychiatric: no nervousness, anxiety or depression.    PHYSICAL EXAM:   Right Arm BP - Sittin/66 (20 1341)  Left Arm BP - Sittin/50 (20 1341)  Pulse: 77  Temp: 99.3 °F (37.4 °C)      General appearance:  Alert, well-appearing, and in no distress.  Oriented to person, place, and time   Neurological: Normal speech, no focal findings noted; CN II - XII grossly intact           Musculoskeletal: Digits/nail without cyanosis/clubbing.  Normal muscle strength/tone.                 Neck: Supple, no significant adenopathy; thyroid is not enlarged                  No carotid bruit can be auscultated                Chest:  Clear to auscultation, no wheezes, rales or rhonchi, symmetric air entry     No use of accessory muscles             Cardiac: Normal rate and regular rhythm, S1 and S2 normal; PMI non-displaced          Abdomen: Soft, nontender, nondistended, no masses or organomegaly     No rebound tenderness noted; bowel sounds normal     Pulsatile aortic mass is not palpable.     No groin  adenopathy      Extremities:   2+ femoral pulses bilaterally     Left groin incision healed with palpable fibrotic scarring beneath skin     Pedal pulses nonpalpable.     Significant 2+ pitting pre-tibial edema R>L      Hyperalgesia over lower legs and feet      Warmth over RLE, blanching present with movement.      No ulcerations     Left leg erythematous     Blisters around ankle of left lower extremity with weeping fluid.    LAB RESULTS:  Lab Results   Component Value Date    K 3.6 11/05/2020    K 3.9 11/04/2020    K 3.6 11/03/2020    CREATININE 0.6 11/05/2020    CREATININE 0.7 11/04/2020    CREATININE 0.8 11/03/2020     Lab Results   Component Value Date    WBC 11.08 11/05/2020    WBC 8.92 11/04/2020    WBC 9.95 11/03/2020    HCT 31.1 (L) 11/05/2020    HCT 30.5 (L) 11/04/2020    HCT 32.5 (L) 11/03/2020     11/05/2020     11/04/2020     (H) 11/03/2020     No results found for: HGBA1C  IMAGING:  Indication   ========     Pre-Op Exam. PVD.     Lower Extremity Vein Mapping   =======================     Rt prox GSV thigh AP diam  5.0 mm   Rt mid GSV thigh AP diam   2.3 mm   Rt distal GSV thigh AP diam    4.1 mm   Rt GSV knee AP diam    3.4 mm   Rt prox GSV calf AP diam   2.7 mm   Rt mid GSV calf AP diam    1.4 mm   Rt GSV ankle AP diam   2.0 mm   Lt prox GSV thigh AP diam  7.2 mm   Lt mid GSV thigh AP diam   5.5 mm   Lt distal GSV thigh AP diam    4.3 mm   Lt GSV knee AP diam    5.2 mm   Lt prox GSV calf AP diam   4.1 mm   Lt mid GSV calf AP diam    2.2 mm   Lt GSV ankle AP diam   2.6 mm     Impression   =========     Mapped and measured the bilateral greater saphenous veins.     The right GSV is seen branching at the mid thigh, where the caliber changes from 2.3 mm to 4.4 mm, and then exits the fascia and   travels superficially. The right GSV re-enters the fascia at the mid calf. Right GSV branches are also noted at the prox thigh, knee   and mid calf.     The left GSV is seen branching at the  prox thigh, mid thigh, distal thigh, knee, mid calf and distal calf/ankle. The GSV exits the   fascia at the distal thigh and travels superficially.     Narrative & Impression     EXAMINATION:  US LOWER EXTREMITY VEINS BILATERAL     CLINICAL HISTORY:  left leg pain and discolouration;     TECHNIQUE:  Duplex and color flow Doppler and dynamic compression was performed of the bilateral lower extremity veins was performed.     COMPARISON:  Ultrasound 08/06/2020     FINDINGS:  Right thigh veins: The common femoral, femoral, popliteal, upper greater saphenous, and deep femoral veins are patent and free of thrombus. The veins are normally compressible and have normal phasic flow and augmentation response.     Right calf veins: The visualized calf veins are patent.     Left thigh veins: The common femoral, femoral, popliteal, upper greater saphenous, and deep femoral veins are patent and free of thrombus. The veins are normally compressible and have normal phasic flow and augmentation response.     Left calf veins: The visualized calf veins are patent.     Miscellaneous: None     Impression:     No evidence of deep venous thrombosis in either lower extremity.     Narrative & Impression     EXAMINATION:  US ARTERIAL LOWER EXTREMITY BILAT WITH WALT (XPD)     CLINICAL HISTORY:  PAD and worsening left leg pain;     TECHNIQUE:  Bilateral lower extremity arterial duplex ultrasound examination performed. Multiple gray scale and color doppler images were obtained in addition to waveform analysis.  Ankle-brachial indices were calculated.     COMPARISON:  CTA 06/10/2020, 02/04/2020     FINDINGS:  The ankle brachial index on the right is 0.5 and on the left is unobtainable.     The peak systolic velocities on the right are as follows, in centimeters/second:     Right iliac stent velocity: 189     Common femoral artery: 158     Superficial femoral artery, proximal: 213     Superficial femoral artery, mid portion: 137     Superficial  femoral artery, distal: 123     Popliteal artery: 97     Posterior tibial artery: 35     Anterior tibial artery: 40     Peroneal artery: 49     The peak systolic velocities on the left are as follows, in centimeters/second:     Left iliac stent not visualized.     There is a stenosis within the common femoral artery with pre stenotic velocity measuring 110 cm/s, mid stenosis velocity measuring 557 cm/s, and poststenotic velocity measuring 265.     Superficial femoral artery, proximal: 69     Superficial femoral artery, mid portion: Occluded     Superficial femoral artery, distal: Occluded     Popliteal artery: 33     Posterior tibial artery: 16     Anterior tibial artery: Not visualized     Peroneal artery: 11     Monophasic waveforms are present throughout both lower extremities consistent with peripheral arterial disease.     Impression:     Occlusion of the left middle and distal superficial femoral artery.  Reconstitution of flow distally via collaterals is visualized.  Findings similar to CTA 06/10/2020.     Hemodynamically significant stenosis (greater than 50%) within the left common femoral artery.     Monophasic waveforms consistent with peripheral arterial disease in the bilateral lower extremities.     Ankle-brachial index of 0.5 on the right consistent with severe peripheral arterial disease.  Left ankle brachial index was unable to be measured.        Arterial ultrasound 11/12/2020:  WALT 0.73 on right and 0.31 left    No flow seen in the left proximal or middle SFA suggestive of vessel occlusion.  The Popliteal artery, PTA and NANDINI demonstrate dampened monophasic flow.  EIA had elevated velocities of 201, 379, and 299 prox, mid, dist.   SU was 127.    LEG WRAP:    Patient was placed in the supine position on the treatment table.  The left leg was cleansed with Easi-clense sponges and dried thoroughly.  Eucerin cream was applied to the lower legs. Calmoseptine was applied to the periwound area.  Santyl  and a  hydrofiber dressing was applied to the wound.  The patient's foot was positioned at a 90 degree angle.  The coflex was applied per package instructions.  A two layered application was performed using a spiral technique beginning with the foam layer followed by the cohesive bandage avoiding creases or folds.  The wrap was started behind the first metatarsal and ended below the tibial tubercle of the knee.  There was overlap of each turn half the width of the previous turn.  The compression wrap will be changed every 7 days.        IMP/PLAN:  70 y.o. female with HTN, active smoker (1/2 ppd) and PAD s/p L EIA, L CFA endarterectomy, JESU balloon angioplasty and L profundaplasty on 02/18/2020, who presents today for follow-up s/p bilateral external iliac artery stenting 7/21/2020 and angiogram with PTA on 11/10/2020. Her PAD has continued to advance and her L SFA continues to be occluded.  She has recurrent left external and L CFA disease.  A bypass operation is now warranted.    Plan:  -Patient to have left leg compression and will need to diurese more to reduce her leg swelling before an operation is performed  -Will tentatively plan for a femoral endarterectomy with fem-pop bypass using autogenous vein on 11/23/2020.  Will also perform an angiogram with likely stenting of the external iliac artery  -Will have patient come to clinic the Friday before to evaluate for left leg swelling  -Case request placed  -Consent obtained today in clinic  -RTC one week for LLE assessment    Olayinka Harris MD  Vascular & Endovascular Surgery

## 2020-11-12 NOTE — ANESTHESIA POSTPROCEDURE EVALUATION
Anesthesia Post Evaluation    Patient: Rachel Murray    Procedure(s) Performed: Procedure(s) (LRB):  Angiogram Extremity Unilateral (Left)  PTA, ARTERY, PERIPHERAL, Left Commmon Iliac, Left external Iliac, Left common femoral, left perfunda, right external iliac (N/A)    Final Anesthesia Type: MAC    Patient location during evaluation: PACU  Patient participation: Yes- Able to Participate  Level of consciousness: awake and alert  Post-procedure vital signs: reviewed and stable  Pain management: adequate  Airway patency: patent    PONV status at discharge: No PONV  Anesthetic complications: no      Cardiovascular status: blood pressure returned to baseline  Respiratory status: unassisted, spontaneous ventilation and room air  Hydration status: euvolemic            Vitals Value   /66   Temp 37.4 °C (99.3 °F)   Pulse 77   Resp 18   SpO2 93 %         Event Time   Out of Recovery 11/04/2020 16:17:00         Pain/Montez Score: No data recorded

## 2020-11-12 NOTE — LETTER
November 28, 2020      Chuck Vaca MD  [address]  [facility] [address]           [facility] Vascular Surg 5th Fl  [address]  [facility] [address]  Phone: [phone]  Fax: [phone]          Patient: Rachel Murray   MR Number: [id number]   YOB: 1950   Date of Visit: 11/12/2020       Dear Dr. Chuck Vaca:    Thank you for referring Rachel Murray to me for evaluation. Attached you will find relevant portions of my assessment and plan of care.    If you have questions, please do not hesitate to call me. I look forward to following Rachel Murray along with you.    Sincerely,    Olayinka Harris MD    Enclosure  CC:  No Recipients    If you would like to receive this communication electronically, please contact [email or web address] or [phone] to request more information on [employer] access.    For providers and/or their staff who would like to refer a patient to [facility], please contact us through our one-stop-shop provider referral line, [facility], at [phone].    If you feel you have received this communication in error or would no longer like to receive these types of communications, please e-mail [email or web address]

## 2020-11-13 ENCOUNTER — TELEPHONE (OUTPATIENT)
Dept: INTERNAL MEDICINE | Facility: CLINIC | Age: 70
End: 2020-11-13

## 2020-11-13 RX ORDER — PREGABALIN 75 MG/1
75 CAPSULE ORAL 3 TIMES DAILY
Qty: 90 CAPSULE | Refills: 2 | Status: SHIPPED | OUTPATIENT
Start: 2020-11-17 | End: 2021-03-08 | Stop reason: SDUPTHER

## 2020-11-13 NOTE — TELEPHONE ENCOUNTER
----- Message from Leeann Lea sent at 11/13/2020  8:09 AM CST -----  Contact: [phone]  Pt would like call back regarding request for dieretic to be increased

## 2020-11-14 ENCOUNTER — TELEPHONE (OUTPATIENT)
Dept: INTERNAL MEDICINE | Facility: CLINIC | Age: 70
End: 2020-11-14

## 2020-11-14 DIAGNOSIS — I10 ESSENTIAL HYPERTENSION: Primary | ICD-10-CM

## 2020-11-14 RX ORDER — POTASSIUM CHLORIDE 750 MG/1
10 TABLET, EXTENDED RELEASE ORAL 2 TIMES DAILY
Qty: 14 TABLET | Refills: 0 | Status: SHIPPED | OUTPATIENT
Start: 2020-11-14 | End: 2020-11-23 | Stop reason: SDUPTHER

## 2020-11-14 RX ORDER — CANDESARTAN 16 MG/1
16 TABLET ORAL DAILY
Qty: 7 TABLET | Refills: 0 | Status: SHIPPED | OUTPATIENT
Start: 2020-11-14 | End: 2020-11-23 | Stop reason: SDUPTHER

## 2020-11-14 RX ORDER — FUROSEMIDE 20 MG/1
20 TABLET ORAL 2 TIMES DAILY
Qty: 14 TABLET | Refills: 0 | Status: SHIPPED | OUTPATIENT
Start: 2020-11-14 | End: 2020-11-23 | Stop reason: SDUPTHER

## 2020-11-14 NOTE — TELEPHONE ENCOUNTER
----- Message from Annel Najera MA sent at 11/13/2020  5:15 PM CST -----  Regarding: FW: Patient would like a call back from Dr. Parikh's office  Contact: [phone]    ----- Message -----  From: Jessica Perea  Sent: 11/13/2020  11:09 AM CST  To: [facility] Staff  Subject: Patient would like a call back from Dr. Cannon    Patient would like a call back from Dr. Parikh's office. Said that she had a missed call.

## 2020-11-14 NOTE — TELEPHONE ENCOUNTER
Reviewed patient's chart.  Called patient and advised her to hold Atacand HCT.  Prescriptions for Lasix, potassium and Atacand have been sent to the pharmacy electronically for 1 weeks duration, prior to anticipated surgery.  The patient reports after recent hospitalization, in which she received a lot of IV fluids, she is experiencing lower extremity swelling with weeping from her legs.  Patient verbalized understanding of instructions on how to take medication for the upcoming week.  Please contact patient to schedule BMP on Friday.  She has an appointment scheduled with her vascular surgeon at [facility] that day.

## 2020-11-14 NOTE — TELEPHONE ENCOUNTER
Spoke o pt and on yesterday about her med and responed to her vial e-mail also .  Patient was going to reach out to her neuro doctor since he was the one suggest the increase of her med

## 2020-11-14 NOTE — TELEPHONE ENCOUNTER
----- Message from Annel Najera MA sent at 11/13/2020  4:48 PM CST -----    ----- Message -----  From: Jaquelin Call MA  Sent: 11/13/2020   4:20 PM CST  To: Anel Parikh MD, [facility] Staff    Dr. Harris would like for this pt dieretics to be increased due to the swelling. Please contact Dr. Olayinka Harris for more information

## 2020-11-16 ENCOUNTER — TELEPHONE (OUTPATIENT)
Dept: VASCULAR SURGERY | Facility: CLINIC | Age: 70
End: 2020-11-16

## 2020-11-16 NOTE — TELEPHONE ENCOUNTER
----- Message from Anel Parikh MD sent at 11/14/2020  2:56 PM CST -----  Dear Dr. Harris,  I apologize for the delayed response.  I did not get to my messages until late Friday evening.  I have reviewed the chart, and called Ms. Murray today.  I instructed her to hold her Atacand HCT for now.  I have sent prescriptions for furosemide 20 mg twice a day, KCl 10 mEq twice a day and Atacand to her pharmacy for 1 week.  I instructed her how to take the medications.  She verbalized understanding.  Her last potassium was low normal.  I would like for her to repeat her BMP when she sees you on Friday to be sure she is not hypokalemic prior to surgery.  I will ask my nurse to schedule a lab appointment next week.  Please let me know if there is anything else I can do.  If needed, you can reach me on my cell phone at [phone].  Thank you,  Anel Parikh M.D.

## 2020-11-20 ENCOUNTER — LAB VISIT (OUTPATIENT)
Dept: SURGERY | Facility: CLINIC | Age: 70
End: 2020-11-20
Attending: SURGERY
Payer: COMMERCIAL

## 2020-11-20 ENCOUNTER — OFFICE VISIT (OUTPATIENT)
Dept: VASCULAR SURGERY | Facility: CLINIC | Age: 70
End: 2020-11-20
Attending: SURGERY
Payer: COMMERCIAL

## 2020-11-20 VITALS — WEIGHT: 169 LBS | TEMPERATURE: 98 F | BODY MASS INDEX: 31.1 KG/M2 | HEIGHT: 62 IN

## 2020-11-20 DIAGNOSIS — I70.202 FEMORAL ARTERY OCCLUSION, LEFT: Primary | ICD-10-CM

## 2020-11-20 DIAGNOSIS — L97.329 VENOUS ULCER OF ANKLE, LEFT: ICD-10-CM

## 2020-11-20 DIAGNOSIS — I83.023 VENOUS ULCER OF ANKLE, LEFT: ICD-10-CM

## 2020-11-20 DIAGNOSIS — Z01.818 PREOP EXAMINATION: ICD-10-CM

## 2020-11-20 PROCEDURE — 99214 PR OFFICE/OUTPT VISIT, EST, LEVL IV, 30-39 MIN: ICD-10-PCS | Mod: S$GLB,,, | Performed by: SURGERY

## 2020-11-20 PROCEDURE — 99214 OFFICE O/P EST MOD 30 MIN: CPT | Mod: S$GLB,,, | Performed by: SURGERY

## 2020-11-20 PROCEDURE — 1126F AMNT PAIN NOTED NONE PRSNT: CPT | Mod: S$GLB,,, | Performed by: SURGERY

## 2020-11-20 PROCEDURE — 99999 PR PBB SHADOW E&M-EST. PATIENT-LVL IV: ICD-10-PCS | Mod: PBBFAC,,, | Performed by: SURGERY

## 2020-11-20 PROCEDURE — 3288F FALL RISK ASSESSMENT DOCD: CPT | Mod: CPTII,S$GLB,, | Performed by: SURGERY

## 2020-11-20 PROCEDURE — U0003 INFECTIOUS AGENT DETECTION BY NUCLEIC ACID (DNA OR RNA); SEVERE ACUTE RESPIRATORY SYNDROME CORONAVIRUS 2 (SARS-COV-2) (CORONAVIRUS DISEASE [COVID-19]), AMPLIFIED PROBE TECHNIQUE, MAKING USE OF HIGH THROUGHPUT TECHNOLOGIES AS DESCRIBED BY CMS-2020-01-R: HCPCS

## 2020-11-20 PROCEDURE — 3008F BODY MASS INDEX DOCD: CPT | Mod: CPTII,S$GLB,, | Performed by: SURGERY

## 2020-11-20 PROCEDURE — 1101F PR PT FALLS ASSESS DOC 0-1 FALLS W/OUT INJ PAST YR: ICD-10-PCS | Mod: CPTII,S$GLB,, | Performed by: SURGERY

## 2020-11-20 PROCEDURE — 99999 PR PBB SHADOW E&M-EST. PATIENT-LVL IV: CPT | Mod: PBBFAC,,, | Performed by: SURGERY

## 2020-11-20 PROCEDURE — 3288F PR FALLS RISK ASSESSMENT DOCUMENTED: ICD-10-PCS | Mod: CPTII,S$GLB,, | Performed by: SURGERY

## 2020-11-20 PROCEDURE — 3008F PR BODY MASS INDEX (BMI) DOCUMENTED: ICD-10-PCS | Mod: CPTII,S$GLB,, | Performed by: SURGERY

## 2020-11-20 PROCEDURE — 1159F PR MEDICATION LIST DOCUMENTED IN MEDICAL RECORD: ICD-10-PCS | Mod: S$GLB,,, | Performed by: SURGERY

## 2020-11-20 PROCEDURE — 1126F PR PAIN SEVERITY QUANTIFIED, NO PAIN PRESENT: ICD-10-PCS | Mod: S$GLB,,, | Performed by: SURGERY

## 2020-11-20 PROCEDURE — 1101F PT FALLS ASSESS-DOCD LE1/YR: CPT | Mod: CPTII,S$GLB,, | Performed by: SURGERY

## 2020-11-20 PROCEDURE — 1159F MED LIST DOCD IN RCRD: CPT | Mod: S$GLB,,, | Performed by: SURGERY

## 2020-11-21 LAB — SARS-COV-2 RNA RESP QL NAA+PROBE: NOT DETECTED

## 2020-11-23 ENCOUNTER — TELEPHONE (OUTPATIENT)
Dept: INTERNAL MEDICINE | Facility: CLINIC | Age: 70
End: 2020-11-23

## 2020-11-23 ENCOUNTER — TELEPHONE (OUTPATIENT)
Dept: VASCULAR SURGERY | Facility: CLINIC | Age: 70
End: 2020-11-23

## 2020-11-23 ENCOUNTER — PATIENT OUTREACH (OUTPATIENT)
Dept: ADMINISTRATIVE | Facility: OTHER | Age: 70
End: 2020-11-23

## 2020-11-23 NOTE — TELEPHONE ENCOUNTER
----- Message from Miriammissael Walters sent at 2020  8:19 AM CST -----  Regarding: Pt self Mobile/Home [phone]  Requesting an RX refill or new RX.  Is this a refill or new RX: Refill  RX name and strength:potassium chloride SA (K-DUR,KLOR-CON) 10 MEQ tablet  Is this a 30 day or 90 day RX: N/A  Pharmacy name and phone # [id number] [place] - [address] AT [facility] [address]  [place]  [phone] Fax# [phone]   Comments: Patient said that she usually get a seven day supply.        Requesting an RX refill or new RX.  Is this a refill or new RX: Refill  RX name and strength:furosemide (LASIX) 20 MG tablet ()  Is this a 30 day or 90 day RX: N/A  Pharmacy name and phone #  [facility] #[id number] - [facility] [place] - [address] AT [facility] [address]  [place]  [phone] Fax# [phone]   Comments: Patient said that she usually get a seven day supply      Requesting an RX refill or new RX.  Is this a refill or new RX: Refill  RX name and strength:candesartan (ATACAND) 16 MG tablet ()  Is this a 30 day or 90 day RX: N/A  Pharmacy name and phone # [facility] #[id number] - [place] - [address] AT [facility] [address]  [place]  [phone] Fax# [phone]   Comments: Patient said that she usually get a seven day supply.

## 2020-11-23 NOTE — TELEPHONE ENCOUNTER
----- Message from Yolanda Mcfarland MA sent at 11/23/2020  3:40 PM CST -----  Contact: pt [phone] (M)  Pt is requesting the status on her 3 medications refills.  Please call to advise the pt.

## 2020-11-24 ENCOUNTER — TELEPHONE (OUTPATIENT)
Dept: INTERNAL MEDICINE | Facility: CLINIC | Age: 70
End: 2020-11-24

## 2020-11-24 RX ORDER — POTASSIUM CHLORIDE 750 MG/1
10 TABLET, EXTENDED RELEASE ORAL 2 TIMES DAILY
Qty: 28 TABLET | Refills: 0 | Status: SHIPPED | OUTPATIENT
Start: 2020-11-24 | End: 2020-12-04 | Stop reason: SDUPTHER

## 2020-11-24 RX ORDER — FUROSEMIDE 20 MG/1
20 TABLET ORAL 2 TIMES DAILY
Qty: 28 TABLET | Refills: 0 | Status: SHIPPED | OUTPATIENT
Start: 2020-11-24 | End: 2020-12-04 | Stop reason: SDUPTHER

## 2020-11-24 RX ORDER — CANDESARTAN 16 MG/1
16 TABLET ORAL DAILY
Qty: 14 TABLET | Refills: 0 | Status: SHIPPED | OUTPATIENT
Start: 2020-11-24 | End: 2020-12-04 | Stop reason: SDUPTHER

## 2020-11-24 NOTE — TELEPHONE ENCOUNTER
----- Message from Leeann Lea sent at 11/24/2020  3:15 PM CST -----  Contact: [phone]  Pt is returning call from clinic

## 2020-11-24 NOTE — TELEPHONE ENCOUNTER
Left a detailed message for the pt to advise that her medications have been sent in to her pharmacy and that she is scheduled to have labs following her appt with Dr. Harris on 11/27/20 at 11 am.

## 2020-11-24 NOTE — TELEPHONE ENCOUNTER
----- Message from Olivia Jimenez sent at 11/24/2020  8:03 AM CST -----  Contact: self [phone] (  Requesting an RX refill or new RX.  Is this a refill or new RX: refill  RX name and strength:candesartan (ATACAND) 16 MG tablet  Is this a 30 day or 90 day RX:   Pharmacy name and phone # (copy/paste from chart):  [id number] [place], [place] - [address] [place] [facility] [place] [address] [facility] [address] [phone] (Phone)  [phone] (Fax)  Comments:   Pt states she needs to take all medications as soon as possible before her appt on Friday 11/27 so that she can have fluid down out of her legs. Please call and advise. Thank you         Requesting an RX refill or new RX.  Is this a refill or new RX: refill  RX name and strength:furosemide (LASIX) 20 MG tablet  Is this a 30 day or 90 day RX:   Pharmacy name and phone # (copy/paste from chart):  [id number] [place], [place] - [address] [place] [facility] [place] [address]  [place] [phone] (Phone)  [phone] (Fax)  Comments:           Requesting an RX refill or new RX.  Is this a refill or new RX: refill  RX name and strength:potassium chloride SA (K-DUR,KLOR-CON) 10 MEQ tablet  Is this a 30 day or 90 day RX:   Pharmacy name and phone # (copy/paste from chart):  [id number] [place], [place] - [address] [place] [facility] [place] [address] [phone] (Phone)  [phone] (Fax)  Comments:

## 2020-11-27 ENCOUNTER — OFFICE VISIT (OUTPATIENT)
Dept: VASCULAR SURGERY | Facility: CLINIC | Age: 70
End: 2020-11-27
Attending: SURGERY
Payer: COMMERCIAL

## 2020-11-27 ENCOUNTER — TELEPHONE (OUTPATIENT)
Dept: INTERNAL MEDICINE | Facility: CLINIC | Age: 70
End: 2020-11-27

## 2020-11-27 VITALS
HEART RATE: 72 BPM | TEMPERATURE: 99 F | HEIGHT: 62 IN | DIASTOLIC BLOOD PRESSURE: 58 MMHG | BODY MASS INDEX: 30.44 KG/M2 | WEIGHT: 165.38 LBS | SYSTOLIC BLOOD PRESSURE: 102 MMHG

## 2020-11-27 DIAGNOSIS — M79.89 LEFT LEG SWELLING: Primary | ICD-10-CM

## 2020-11-27 DIAGNOSIS — I70.202 FEMORAL ARTERY OCCLUSION, LEFT: ICD-10-CM

## 2020-11-27 DIAGNOSIS — L97.329 VENOUS ULCER OF ANKLE, LEFT: ICD-10-CM

## 2020-11-27 DIAGNOSIS — I10 ESSENTIAL HYPERTENSION: Primary | ICD-10-CM

## 2020-11-27 DIAGNOSIS — I83.023 VENOUS ULCER OF ANKLE, LEFT: ICD-10-CM

## 2020-11-27 PROCEDURE — 3078F PR MOST RECENT DIASTOLIC BLOOD PRESSURE < 80 MM HG: ICD-10-PCS | Mod: CPTII,S$GLB,, | Performed by: SURGERY

## 2020-11-27 PROCEDURE — 3008F PR BODY MASS INDEX (BMI) DOCUMENTED: ICD-10-PCS | Mod: CPTII,S$GLB,, | Performed by: SURGERY

## 2020-11-27 PROCEDURE — 1101F PT FALLS ASSESS-DOCD LE1/YR: CPT | Mod: CPTII,S$GLB,, | Performed by: SURGERY

## 2020-11-27 PROCEDURE — 99214 OFFICE O/P EST MOD 30 MIN: CPT | Mod: S$GLB,,, | Performed by: SURGERY

## 2020-11-27 PROCEDURE — 1159F PR MEDICATION LIST DOCUMENTED IN MEDICAL RECORD: ICD-10-PCS | Mod: S$GLB,,, | Performed by: SURGERY

## 2020-11-27 PROCEDURE — 3078F DIAST BP <80 MM HG: CPT | Mod: CPTII,S$GLB,, | Performed by: SURGERY

## 2020-11-27 PROCEDURE — 1125F PR PAIN SEVERITY QUANTIFIED, PAIN PRESENT: ICD-10-PCS | Mod: S$GLB,,, | Performed by: SURGERY

## 2020-11-27 PROCEDURE — 99999 PR PBB SHADOW E&M-EST. PATIENT-LVL III: CPT | Mod: PBBFAC,,, | Performed by: SURGERY

## 2020-11-27 PROCEDURE — 1125F AMNT PAIN NOTED PAIN PRSNT: CPT | Mod: S$GLB,,, | Performed by: SURGERY

## 2020-11-27 PROCEDURE — 3074F PR MOST RECENT SYSTOLIC BLOOD PRESSURE < 130 MM HG: ICD-10-PCS | Mod: CPTII,S$GLB,, | Performed by: SURGERY

## 2020-11-27 PROCEDURE — 1159F MED LIST DOCD IN RCRD: CPT | Mod: S$GLB,,, | Performed by: SURGERY

## 2020-11-27 PROCEDURE — 99999 PR PBB SHADOW E&M-EST. PATIENT-LVL III: ICD-10-PCS | Mod: PBBFAC,,, | Performed by: SURGERY

## 2020-11-27 PROCEDURE — 3288F PR FALLS RISK ASSESSMENT DOCUMENTED: ICD-10-PCS | Mod: CPTII,S$GLB,, | Performed by: SURGERY

## 2020-11-27 PROCEDURE — 3074F SYST BP LT 130 MM HG: CPT | Mod: CPTII,S$GLB,, | Performed by: SURGERY

## 2020-11-27 PROCEDURE — 3288F FALL RISK ASSESSMENT DOCD: CPT | Mod: CPTII,S$GLB,, | Performed by: SURGERY

## 2020-11-27 PROCEDURE — 1101F PR PT FALLS ASSESS DOC 0-1 FALLS W/OUT INJ PAST YR: ICD-10-PCS | Mod: CPTII,S$GLB,, | Performed by: SURGERY

## 2020-11-27 PROCEDURE — 3008F BODY MASS INDEX DOCD: CPT | Mod: CPTII,S$GLB,, | Performed by: SURGERY

## 2020-11-27 PROCEDURE — 99214 PR OFFICE/OUTPT VISIT, EST, LEVL IV, 30-39 MIN: ICD-10-PCS | Mod: S$GLB,,, | Performed by: SURGERY

## 2020-11-27 NOTE — TELEPHONE ENCOUNTER
----- Message from Crissy Infante sent at 11/27/2020 10:55 AM CST -----  Contact: Patient [phone]  Patient status that per Dr. Frank request Rx furosemide (LASIX) 20 MG tablet needs to be increased.    Thank you

## 2020-11-27 NOTE — TELEPHONE ENCOUNTER
Spoke with pt to advise.    Verbalized understanding and missed scheduled lab appt today at 11 am at  lab.    She was agreeable to schedule on 11/30/20 at 2:15 pm, [facility].

## 2020-11-27 NOTE — TELEPHONE ENCOUNTER
Advise patient to take 2 lasix in the morning and one in the evening.  Her blood pressure is low, so we have to be careful with increasing lasix dose.  Patient also needs electrolytes checked.  Await lab results.

## 2020-11-30 ENCOUNTER — CLINICAL SUPPORT (OUTPATIENT)
Dept: INTERNAL MEDICINE | Facility: CLINIC | Age: 70
End: 2020-11-30
Payer: COMMERCIAL

## 2020-11-30 ENCOUNTER — LAB VISIT (OUTPATIENT)
Dept: LAB | Facility: HOSPITAL | Age: 70
End: 2020-11-30
Attending: INTERNAL MEDICINE
Payer: COMMERCIAL

## 2020-11-30 DIAGNOSIS — I10 ESSENTIAL HYPERTENSION: ICD-10-CM

## 2020-11-30 LAB
ANION GAP SERPL CALC-SCNC: 15 MMOL/L (ref 8–16)
BUN SERPL-MCNC: 25 MG/DL (ref 8–23)
CALCIUM SERPL-MCNC: 9.2 MG/DL (ref 8.7–10.5)
CHLORIDE SERPL-SCNC: 102 MMOL/L (ref 95–110)
CO2 SERPL-SCNC: 26 MMOL/L (ref 23–29)
CREAT SERPL-MCNC: 1 MG/DL (ref 0.5–1.4)
EST. GFR  (AFRICAN AMERICAN): >60 ML/MIN/1.73 M^2
EST. GFR  (NON AFRICAN AMERICAN): 57.2 ML/MIN/1.73 M^2
GLUCOSE SERPL-MCNC: 84 MG/DL (ref 70–110)
POTASSIUM SERPL-SCNC: 4 MMOL/L (ref 3.5–5.1)
SODIUM SERPL-SCNC: 143 MMOL/L (ref 136–145)

## 2020-11-30 PROCEDURE — 36415 COLL VENOUS BLD VENIPUNCTURE: CPT | Mod: PO

## 2020-11-30 PROCEDURE — 80048 BASIC METABOLIC PNL TOTAL CA: CPT

## 2020-12-03 ENCOUNTER — OFFICE VISIT (OUTPATIENT)
Dept: VASCULAR SURGERY | Facility: CLINIC | Age: 70
End: 2020-12-03
Attending: SURGERY
Payer: COMMERCIAL

## 2020-12-03 VITALS
WEIGHT: 163.13 LBS | DIASTOLIC BLOOD PRESSURE: 56 MMHG | SYSTOLIC BLOOD PRESSURE: 125 MMHG | BODY MASS INDEX: 30.02 KG/M2 | HEIGHT: 62 IN | HEART RATE: 83 BPM | TEMPERATURE: 99 F

## 2020-12-03 VITALS — DIASTOLIC BLOOD PRESSURE: 68 MMHG | SYSTOLIC BLOOD PRESSURE: 118 MMHG

## 2020-12-03 DIAGNOSIS — L97.329 VENOUS ULCER OF ANKLE, LEFT: ICD-10-CM

## 2020-12-03 DIAGNOSIS — I77.1 ILIAC ARTERY STENOSIS, BILATERAL: ICD-10-CM

## 2020-12-03 DIAGNOSIS — I83.023 VENOUS ULCER OF ANKLE, LEFT: ICD-10-CM

## 2020-12-03 DIAGNOSIS — I70.202 FEMORAL ARTERY OCCLUSION, LEFT: Primary | ICD-10-CM

## 2020-12-03 PROCEDURE — 99999 PR PBB SHADOW E&M-EST. PATIENT-LVL III: CPT | Mod: PBBFAC,,, | Performed by: SURGERY

## 2020-12-03 PROCEDURE — 3074F SYST BP LT 130 MM HG: CPT | Mod: CPTII,S$GLB,, | Performed by: SURGERY

## 2020-12-03 PROCEDURE — 3288F PR FALLS RISK ASSESSMENT DOCUMENTED: ICD-10-PCS | Mod: CPTII,S$GLB,, | Performed by: SURGERY

## 2020-12-03 PROCEDURE — 3008F BODY MASS INDEX DOCD: CPT | Mod: CPTII,S$GLB,, | Performed by: SURGERY

## 2020-12-03 PROCEDURE — 3008F PR BODY MASS INDEX (BMI) DOCUMENTED: ICD-10-PCS | Mod: CPTII,S$GLB,, | Performed by: SURGERY

## 2020-12-03 PROCEDURE — 3288F FALL RISK ASSESSMENT DOCD: CPT | Mod: CPTII,S$GLB,, | Performed by: SURGERY

## 2020-12-03 PROCEDURE — 1125F PR PAIN SEVERITY QUANTIFIED, PAIN PRESENT: ICD-10-PCS | Mod: S$GLB,,, | Performed by: SURGERY

## 2020-12-03 PROCEDURE — 1101F PT FALLS ASSESS-DOCD LE1/YR: CPT | Mod: CPTII,S$GLB,, | Performed by: SURGERY

## 2020-12-03 PROCEDURE — 99214 OFFICE O/P EST MOD 30 MIN: CPT | Mod: 25,S$GLB,, | Performed by: SURGERY

## 2020-12-03 PROCEDURE — 1125F AMNT PAIN NOTED PAIN PRSNT: CPT | Mod: S$GLB,,, | Performed by: SURGERY

## 2020-12-03 PROCEDURE — 1159F MED LIST DOCD IN RCRD: CPT | Mod: S$GLB,,, | Performed by: SURGERY

## 2020-12-03 PROCEDURE — 29580 STRAPPING UNNA BOOT: CPT | Mod: S$GLB,,, | Performed by: SURGERY

## 2020-12-03 PROCEDURE — 29580 PR STRAPPING UNNA BOOT: ICD-10-PCS | Mod: S$GLB,,, | Performed by: SURGERY

## 2020-12-03 PROCEDURE — 3078F PR MOST RECENT DIASTOLIC BLOOD PRESSURE < 80 MM HG: ICD-10-PCS | Mod: CPTII,S$GLB,, | Performed by: SURGERY

## 2020-12-03 PROCEDURE — 3074F PR MOST RECENT SYSTOLIC BLOOD PRESSURE < 130 MM HG: ICD-10-PCS | Mod: CPTII,S$GLB,, | Performed by: SURGERY

## 2020-12-03 PROCEDURE — 1159F PR MEDICATION LIST DOCUMENTED IN MEDICAL RECORD: ICD-10-PCS | Mod: S$GLB,,, | Performed by: SURGERY

## 2020-12-03 PROCEDURE — 3078F DIAST BP <80 MM HG: CPT | Mod: CPTII,S$GLB,, | Performed by: SURGERY

## 2020-12-03 PROCEDURE — 99214 PR OFFICE/OUTPT VISIT, EST, LEVL IV, 30-39 MIN: ICD-10-PCS | Mod: 25,S$GLB,, | Performed by: SURGERY

## 2020-12-03 PROCEDURE — 1101F PR PT FALLS ASSESS DOC 0-1 FALLS W/OUT INJ PAST YR: ICD-10-PCS | Mod: CPTII,S$GLB,, | Performed by: SURGERY

## 2020-12-03 PROCEDURE — 99999 PR PBB SHADOW E&M-EST. PATIENT-LVL III: ICD-10-PCS | Mod: PBBFAC,,, | Performed by: SURGERY

## 2020-12-03 NOTE — PROGRESS NOTES
Pt came in to the clinic today for a nurse Blood pressure check, Bp was normal 118/68, let provider know, provider stated she was good to go and let her to continue monoriting at home, and to call the office if anything felf off.

## 2020-12-03 NOTE — PROGRESS NOTES
Rachel Murray  12/3/2020     HPI:  Patient is a 70 y.o. female with a h/o lumbar radiculopathy and PAD who is here for follow-up appointment after recent bilateral external iliac artery stenting 7/21/2020. Since her procedure she has had significant, new swelling bilaterally in both lower legs R>L, accompanied by excruciating pain. Her skin is sensitive to touch. Her pain is worse on the left limb, although the swelling is worse on the right leg. She has new numbness on the dorsal side of her right foot. She was admitted to the hospital on 11/10/2020 for worsening left leg pain.  She underwent an angiogram with angioplasty of the left common femoral, profunda, and SU.      Prior:  She reports of medial LLE paresthesias that radiate to ipsilateral hip and are worst in the toes. She additionally complains of a 'squeezing' pain in the left foot and intermittent episodes of blanching of the left foot. She continues to have intermittent radicular pain in bilateral legs, for which she received an L5-S1 lumbar epidural steroid injection on 05/07/2020 that the patient reports was minimally helpful. The patient takes Lyrica and Tramadol for her leg pain with modest pain relief, rated 5/10 at the time of history taking.   Ms. Murray reports a small, persistent defect in her left groin incision, which is not painful or erythematous and has not been productive of discharge. She continues to dress the incision with a dry gauze. She continues to smoke cigarettes but has decreased her usage from 1/2 pack per day, down from 1 pack daily.     2/18/2020: s/p L EIA, L CFA endarterectomy, JESU balloon angioplasty and L profundaplasty    Interval History:  She comes in today with mildly improved swelling of left lower extremity. Open area of skin breakdown healing well. Compression dressing in place. She has had compression dressing on left leg since her last visit. Here today for wound check and pre-operative evaluation. Had bilateral  vein mapping performed along with repeat arterial duplex imaging on last visit. Imaging report below.      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 iliac stent placement, LLE angiogram  17.4 min  837.37 mGy  172.81 Gycm2  37 ml contrast    ANGIOGRAPHY OF LOWER EXTREMITY Left 2020    Procedure: Angiogram Extremity Unilateral;  Surgeon: Olayinka Harris MD;  [facility];  Service: Peripheral Vascular;  Laterality: Left;  12.2 fluoro, 850.46 mGy, 131.10 Gycm2    AORTOGRAPHY N/A 2020    Procedure: AORTOGRAM;  Surgeon: Olayinka Harris MD;  [facility];  Service: Peripheral Vascular;  Laterality: N/A;    APPENDECTOMY      BREAST BIOPSY       SECTION, CLASSIC      COLONOSCOPY      COLONOSCOPY N/A 2017    Procedure: COLONOSCOPY;  Surgeon: Gabriel Rose MD;  Location: [facility] ENDO (4TH FLR);  Service: Endoscopy;  Laterality: N/A;  Miralax prep per Dr. Rose    DENTAL SURGERY  2017    ENDARTERECTOMY OF FEMORAL ARTERY Left 2020    Procedure: ENDARTERECTOMY, FEMORAL;  Surgeon: Olayinka Harris MD;  [facility];  Service: Peripheral Vascular;  Laterality: Left;  SFA endarterectomy  fluoro time: 9.2 min mGy: 2336.86    EPIDURAL STEROID INJECTION INTO LUMBAR SPINE N/A 2020    Procedure: Injection-steroid-epidural-lumbar--Review MRI- L5-S1 after MRI review;  Surgeon: Ely Syed Jr., MD;  Location: [facility] PAIN MGT;  Service: Pain Management;  Laterality: N/A;    EPIDURAL  STEROID INJECTION INTO LUMBAR SPINE N/A 5/7/2020    Procedure: Injection-steroid-epidural-lumbar-- Interlaminar L5-S1/ pt aware she can continue taking ASA per Dr. Harris and hold plavix.;  Surgeon: Ely Syed Jr., MD;  Location: [facility] PAIN MGT;  Service: Pain Management;  Laterality: N/A;  Will sign consent at [facility].    EPIDURAL STEROID INJECTION INTO LUMBAR SPINE N/A 8/20/2020    Procedure: Injection-steroid-epidural-lumbar--L4-5;  Surgeon: Ely Syed Jr., MD;  Location: [facility] PAIN MGT;  Service: Pain Management;  Laterality: N/A;  Oral sedation    gyn surgery      ex laparotomy    HYSTERECTOMY       Family History   Problem Relation Age of Onset    Hypertension Mother     Heart disease Mother 40        CABG x 5    Seizures Mother     Cancer Father         prostate    Stroke Father     Cancer Brother         colon    Diabetes Maternal Aunt     Breast cancer Maternal Aunt     Diabetes Maternal Uncle     Stroke Maternal Grandmother     Stroke Paternal Grandmother     Diabetes Cousin     Breast cancer Cousin      Social History     Socioeconomic History    Marital status: Single     Spouse name: Not on file    Number of children: Not on file    Years of education: Not on file    Highest education level: Not on file   Occupational History     Employer: [employer]   Social Needs    Financial resource strain: Not on file    Food insecurity     Worry: Not on file     Inability: Not on file    Transportation needs     Medical: Not on file     Non-medical: Not on file   Tobacco Use    Smoking status: Current Every Day Smoker     Packs/day: 0.50     Years: 40.00     Pack years: 20.00     Types: Cigarettes    Smokeless tobacco: Never Used    Tobacco comment: occasionally   Substance and Sexual Activity    Alcohol use: Yes     Alcohol/week: 0.0 standard drinks     Comment: rarely    Drug use: No    Sexual activity: Never   Lifestyle    Physical activity     Days per week: Not  on file     Minutes per session: Not on file    Stress: Not on file   Relationships    Social connections     Talks on phone: Not on file     Gets together: Not on file     Attends Zoroastrian service: Not on file     Active member of club or organization: Not on file     Attends meetings of clubs or organizations: Not on file     Relationship status: Not on file   Other Topics Concern    Not on file   Social History Narrative    Not on file       Current Outpatient Medications:     acetaminophen (TYLENOL) 500 MG tablet, Take 2 tablets (1,000 mg total) by mouth 3 (three) times daily., Disp: , Rfl: 0    aspirin (ECOTRIN) 81 MG EC tablet, Take 1 tablet (81 mg total) by mouth once daily. (Patient taking differently: Take 81 mg by mouth every morning. ), Disp: 360 tablet, Rfl: 0    atorvastatin (LIPITOR) 40 MG tablet, Take 1 tablet (40 mg total) by mouth once daily. (Patient taking differently: Take 40 mg by mouth every morning. ), Disp: 90 tablet, Rfl: 3    candesartan (ATACAND) 16 MG tablet, Take 1 tablet (16 mg total) by mouth once daily. for 14 days, Disp: 14 tablet, Rfl: 0    candesartan-hydrochlorothiazide (ATACAND HCT) 16-12.5 mg per tablet, Take 1 tablet by mouth once daily., Disp: 30 tablet, Rfl: 3    clopidogreL (PLAVIX) 75 mg tablet, Take 1 tablet (75 mg total) by mouth once daily., Disp: 30 tablet, Rfl: 6    DULoxetine (CYMBALTA) 30 MG capsule, Take 1 capsule (30 mg total) by mouth 2 (two) times daily., Disp: 60 capsule, Rfl: 3    furosemide (LASIX) 20 MG tablet, Take 1 tablet (20 mg total) by mouth 2 (two) times daily. for 14 days, Disp: 28 tablet, Rfl: 0    nitrofurantoin, macrocrystal-monohydrate, (MACROBID) 100 MG capsule, Take 1 capsule (100 mg total) by mouth 2 (two) times daily., Disp: 14 capsule, Rfl: 0    potassium chloride SA (K-DUR,KLOR-CON) 10 MEQ tablet, Take 1 tablet (10 mEq total) by mouth 2 (two) times daily. for 14 days, Disp: 28 tablet, Rfl: 0    pregabalin (LYRICA) 75 MG  capsule, Take 1 capsule (75 mg total) by mouth 3 (three) times daily., Disp: 90 capsule, Rfl: 2    traMADoL (ULTRAM) 50 mg tablet, TAKE 1 TABLET(50 MG) BY MOUTH EVERY 24 HOURS AS NEEDED FOR PAIN, Disp: 60 tablet, Rfl: 1  No current facility-administered medications for this visit.     Facility-Administered Medications Ordered in Other Visits:     alprazolam ODT dissolvable tablet 0.5 mg, 0.5 mg, Oral, Once PRN, Ely Syed Jr., MD    sodium chloride 0.9% flush 10 mL, 10 mL, Intravenous, PRN, Reyna Toledo MD    REVIEW OF SYSTEMS:  General: negative; ENT: negative; Allergy and Immunology: negative; Hematological and Lymphatic: Negative; Endocrine: negative; Respiratory: no cough, shortness of breath, or wheezing; Cardiovascular: no chest pain or dyspnea on exertion; Gastrointestinal: no abdominal pain/back, change in bowel habits, or bloody stools; Genito-Urinary: no dysuria, trouble voiding, or hematuria; Musculoskeletal: Bilateral (L >>R) radicular pain and paresthesias  Neurological: no TIA or stroke symptoms; Psychiatric: no nervousness, anxiety or depression.    PHYSICAL EXAM:                General appearance:  Alert, well-appearing, and in no distress.  Oriented to person, place, and time   Neurological: Normal speech, no focal findings noted; CN II - XII grossly intact           Musculoskeletal: Digits/nail without cyanosis/clubbing.  Normal muscle strength/tone.                 Neck: Supple, no significant adenopathy; thyroid is not enlarged                  No carotid bruit can be auscultated                Chest:  Clear to auscultation, no wheezes, rales or rhonchi, symmetric air entry     No use of accessory muscles             Cardiac: Normal rate and regular rhythm, S1 and S2 normal; PMI non-displaced          Abdomen: Soft, nontender, nondistended, no masses or organomegaly     No rebound tenderness noted; bowel sounds normal     Pulsatile aortic mass is not palpable.     No groin  adenopathy      Extremities:   2+ femoral pulses bilaterally     Left groin incision healed with palpable fibrotic scarring beneath skin     Pedal pulses nonpalpable.     Significant 2+ pitting pre-tibial edema     Hyperalgesia over lower legs and feet. Improved from previous exam.       Warmth over LLE, blanching present with movement. Area of hyperpigmentation that tracks along the medial aspect of her left calf and thigh. She states that this has been    present for a while. Not concerning for new DVT      No ulcerations     Blisters around ankle of left lower extremity with sloughing of skin. No weeping of fluid noted. No open or exposed areas of skin.    LAB RESULTS:  Lab Results   Component Value Date    K 4.0 11/30/2020    K 3.6 11/05/2020    K 3.9 11/04/2020    CREATININE 1.0 11/30/2020    CREATININE 0.6 11/05/2020    CREATININE 0.7 11/04/2020     Lab Results   Component Value Date    WBC 11.08 11/05/2020    WBC 8.92 11/04/2020    WBC 9.95 11/03/2020    HCT 31.1 (L) 11/05/2020    HCT 30.5 (L) 11/04/2020    HCT 32.5 (L) 11/03/2020     11/05/2020     11/04/2020     (H) 11/03/2020     No results found for: HGBA1C  IMAGING:  Indication   ========     Pre-Op Exam. PVD.     Lower Extremity Vein Mapping   =======================     Rt prox GSV thigh AP diam  5.0 mm   Rt mid GSV thigh AP diam   2.3 mm   Rt distal GSV thigh AP diam    4.1 mm   Rt GSV knee AP diam    3.4 mm   Rt prox GSV calf AP diam   2.7 mm   Rt mid GSV calf AP diam    1.4 mm   Rt GSV ankle AP diam   2.0 mm   Lt prox GSV thigh AP diam  7.2 mm   Lt mid GSV thigh AP diam   5.5 mm   Lt distal GSV thigh AP diam    4.3 mm   Lt GSV knee AP diam    5.2 mm   Lt prox GSV calf AP diam   4.1 mm   Lt mid GSV calf AP diam    2.2 mm   Lt GSV ankle AP diam   2.6 mm     Impression   =========     Mapped and measured the bilateral greater saphenous veins.     The right GSV is seen branching at the mid thigh, where the caliber changes from 2.3  mm to 4.4 mm, and then exits the fascia and   travels superficially. The right GSV re-enters the fascia at the mid calf. Right GSV branches are also noted at the prox thigh, knee   and mid calf.     The left GSV is seen branching at the prox thigh, mid thigh, distal thigh, knee, mid calf and distal calf/ankle. The GSV exits the   fascia at the distal thigh and travels superficially.     Narrative & Impression     EXAMINATION:  US LOWER EXTREMITY VEINS BILATERAL     CLINICAL HISTORY:  left leg pain and discolouration;     TECHNIQUE:  Duplex and color flow Doppler and dynamic compression was performed of the bilateral lower extremity veins was performed.     COMPARISON:  Ultrasound 08/06/2020     FINDINGS:  Right thigh veins: The common femoral, femoral, popliteal, upper greater saphenous, and deep femoral veins are patent and free of thrombus. The veins are normally compressible and have normal phasic flow and augmentation response.     Right calf veins: The visualized calf veins are patent.     Left thigh veins: The common femoral, femoral, popliteal, upper greater saphenous, and deep femoral veins are patent and free of thrombus. The veins are normally compressible and have normal phasic flow and augmentation response.     Left calf veins: The visualized calf veins are patent.     Miscellaneous: None     Impression:     No evidence of deep venous thrombosis in either lower extremity.     Narrative & Impression     EXAMINATION:  US ARTERIAL LOWER EXTREMITY BILAT WITH WALT (XPD)     CLINICAL HISTORY:  PAD and worsening left leg pain;     TECHNIQUE:  Bilateral lower extremity arterial duplex ultrasound examination performed. Multiple gray scale and color doppler images were obtained in addition to waveform analysis.  Ankle-brachial indices were calculated.     COMPARISON:  CTA 06/10/2020, 02/04/2020     FINDINGS:  The ankle brachial index on the right is 0.5 and on the left is unobtainable.     The peak systolic  velocities on the right are as follows, in centimeters/second:     Right iliac stent velocity: 189     Common femoral artery: 158     Superficial femoral artery, proximal: 213     Superficial femoral artery, mid portion: 137     Superficial femoral artery, distal: 123     Popliteal artery: 97     Posterior tibial artery: 35     Anterior tibial artery: 40     Peroneal artery: 49     The peak systolic velocities on the left are as follows, in centimeters/second:     Left iliac stent not visualized.     There is a stenosis within the common femoral artery with pre stenotic velocity measuring 110 cm/s, mid stenosis velocity measuring 557 cm/s, and poststenotic velocity measuring 265.     Superficial femoral artery, proximal: 69     Superficial femoral artery, mid portion: Occluded     Superficial femoral artery, distal: Occluded     Popliteal artery: 33     Posterior tibial artery: 16     Anterior tibial artery: Not visualized     Peroneal artery: 11     Monophasic waveforms are present throughout both lower extremities consistent with peripheral arterial disease.     Impression:     Occlusion of the left middle and distal superficial femoral artery.  Reconstitution of flow distally via collaterals is visualized.  Findings similar to CTA 06/10/2020.     Hemodynamically significant stenosis (greater than 50%) within the left common femoral artery.     Monophasic waveforms consistent with peripheral arterial disease in the bilateral lower extremities.     Ankle-brachial index of 0.5 on the right consistent with severe peripheral arterial disease.  Left ankle brachial index was unable to be measured.        Arterial ultrasound 11/12/2020:  WALT 0.73 on right and 0.31 left    No flow seen in the left proximal or middle SFA suggestive of vessel occlusion.  The Popliteal artery, PTA and NANDINI demonstrate dampened monophasic flow.  EIA had elevated velocities of 201, 379, and 299 prox, mid, dist.   SU was 127.    Ms Murray seen in  the clinic room and placed in the supine position on the treatment table.  The left leg was cleansed with Easi-clense sponges and dried thoroughly.  Eucerin cream was applied to the lower legs. Calmoseptine was applied to the periwound area. A hydrofiber dressing was applied to the wound and covered with an ABD pad.  The patient's foot was positioned at a 90 degree angle.  The coflex with calamine was applied per package instructions.  A two layered application was performed using a spiral technique beginning with the foam layer followed by the cohesive bandage avoiding creases or folds.  The wrap was started behind the first metatarsal and ended below the tibial tubercle of the knee.  There was overlap of each turn half the width of the previous turn.  The compression wrap will be changed every 7 days.          IMP/PLAN:  70 y.o. female with HTN, active smoker (1/2 ppd) and PAD s/p L EIA, L CFA endarterectomy, JESU balloon angioplasty and L profundaplasty on 02/18/2020, who presents today for preoperative evaluation for left femoral endarterectomy and left fem pop bypass using autologous vein. Her PAD has continued to advance and her L SFA continues to be occluded.  She has recurrent left external and L CFA disease.  A bypass operation is now warranted.    Plan:  - Compression dressing taken down and leg/foot examined today. Compression dressing replaced. Will need to diurese more to reduce her leg swelling before an operation is performed  - Diuretic dose increased to 60mg Lasix Daily  - Femoral endarterectomy with fem-pop bypass using autogenous vein pending improvement of swelling. Will also perform an angiogram with likely stenting of the external iliac artery  - Consent obtained on last clinic visit  - Surgery to be scheduled in two weeks.  - Follow up in clinic in 1 week to assess fluid status    Olayinka Harris MD  Vascular & Endovascular Surgery

## 2020-12-04 RX ORDER — CANDESARTAN 16 MG/1
16 TABLET ORAL DAILY
Qty: 14 TABLET | Refills: 0 | Status: SHIPPED | OUTPATIENT
Start: 2020-12-04 | End: 2020-12-07 | Stop reason: SDUPTHER

## 2020-12-04 RX ORDER — POTASSIUM CHLORIDE 750 MG/1
10 TABLET, EXTENDED RELEASE ORAL 2 TIMES DAILY
Qty: 28 TABLET | Refills: 0 | Status: SHIPPED | OUTPATIENT
Start: 2020-12-04 | End: 2020-12-07 | Stop reason: SDUPTHER

## 2020-12-04 RX ORDER — FUROSEMIDE 20 MG/1
20 TABLET ORAL 2 TIMES DAILY
Qty: 28 TABLET | Refills: 0 | Status: SHIPPED | OUTPATIENT
Start: 2020-12-04 | End: 2020-12-07 | Stop reason: SDUPTHER

## 2020-12-04 NOTE — TELEPHONE ENCOUNTER
----- Message from Rachel Burnette sent at 12/4/2020  9:46 AM CST -----  Regarding: refill request  Contact: patient [phone]    1  Requesting an RX refill or new RX.  Is this a refill or new Rx: new RX needed  RX name and strength:furosemide (LASIX) 20 MG tablet  Is this a 30 day or 90 day RX: 90  Pharmacy name and phone # [id number] [place] [facility] [address] AT [facility] [address] [phone] (Phone)  Comments:   Dosage changed.  New Rx is needed for two in the morning and one I the evening for a total of 60mg    2.  Patient also needs to know if candesartan (ATACAND) 16 MG tablet  and potassium chloride SA (K-DUR,KLOR-CON) 10 MEQ tablet should be filled with an increased number of days.

## 2020-12-07 DIAGNOSIS — I10 ESSENTIAL HYPERTENSION: ICD-10-CM

## 2020-12-07 NOTE — TELEPHONE ENCOUNTER
Candesartan   Last filled:08/03/2020    Lasix  Last filled:12/04/2020    LOV:09/30/2020  RTC:01/08/2021

## 2020-12-07 NOTE — TELEPHONE ENCOUNTER
Pt called requesting Lasix, potassium and candesartan.    Pt states she is taking 40mg in the AM and 20mg in the PM. I see no record of that. pt was very adamit that's what it says.    Pt requesting two seprate refills for lasix.

## 2020-12-07 NOTE — TELEPHONE ENCOUNTER
----- Message from edwin Thomas sent at 2020  8:03 AM CST -----  Regardinnd requested  Contact: [phone]  Requesting an RX refill or new RX.  Is this a refill or new Rx: new RX needed  RX name and strength: furosemide (LASIX) 20 MG tablet  Is this a 30 day or 90 day RX: 90  Pharmacy name and phone # Patient [id number], [place] - [address] [place] [facility] [place] [address]    [phone] (Phone)  Comments:   Dosage changed.  New Rx is needed for two in the morning and one I the evening for a total of 60mg     2.  Patient also needs to know if candesartan (ATACAND) 16 MG tablet  and potassium chloride SA (K-DUR,KLOR-CON) 10 MEQ tablet should be filled with an increased number of days.  Please call and advise. Thank you

## 2020-12-08 RX ORDER — FUROSEMIDE 20 MG/1
20 TABLET ORAL 2 TIMES DAILY
Qty: 28 TABLET | Refills: 0 | Status: SHIPPED | OUTPATIENT
Start: 2020-12-08 | End: 2020-12-08

## 2020-12-08 RX ORDER — CANDESARTAN 16 MG/1
16 TABLET ORAL DAILY
Qty: 14 TABLET | Refills: 0 | Status: SHIPPED | OUTPATIENT
Start: 2020-12-08 | End: 2020-12-18 | Stop reason: SDUPTHER

## 2020-12-08 RX ORDER — POTASSIUM CHLORIDE 750 MG/1
10 TABLET, EXTENDED RELEASE ORAL 2 TIMES DAILY
Qty: 28 TABLET | Refills: 0 | Status: SHIPPED | OUTPATIENT
Start: 2020-12-08 | End: 2020-12-18 | Stop reason: SDUPTHER

## 2020-12-08 RX ORDER — CANDESARTAN CILEXETIL AND HYDROCHLOROTHIAZIDE 16; 12.5 MG/1; MG/1
TABLET ORAL
Qty: 30 TABLET | Refills: 3 | OUTPATIENT
Start: 2020-12-08

## 2020-12-08 RX ORDER — FUROSEMIDE 20 MG/1
20 TABLET ORAL EVERY 8 HOURS
Qty: 42 TABLET | Refills: 0 | Status: SHIPPED | OUTPATIENT
Start: 2020-12-08 | End: 2020-12-18 | Stop reason: SDUPTHER

## 2020-12-08 NOTE — TELEPHONE ENCOUNTER
----- Message from Jasmyn Dudley sent at 12/8/2020  8:05 AM CST -----  Regarding: self   Requesting an RX refill or new RX.  Is this a refill or new RX: refill   RX name and strength: candesartan (ATACAND) 16 MG tablet  Is this a 30 day or 90 day RX:   Pharmacy name and phone # (copy/paste from chart):    Comments:      Requesting an RX refill or new RX.  Is this a refill or new RX: refill   RX name and strength: candesartan-hydrochlorothiazide (ATACAND HCT) 16-12.5 mg per tablet 30 tablet   Is this a 30 day or 90 day RX:   Pharmacy name and phone # (copy/paste from chart):    Comments:      Requesting an RX refill or new RX.  Is this a refill or new RX:   RX name and strength: furosemide (LASIX) 20 MG tablet  Is this a 30 day or 90 day RX:   Pharmacy name and phone # (copy/paste from chart):        [id number] - [place], [place] - [address] AT [facility] OF [address] [phone] (Phone)  [phone] (Fax)      Comments: Patient would like a call back to discuss medications Requesting an RX refill or new RX.  Is this a refill or new RX: refill   RX name and strength: potassium chloride SA (K-DUR,KLOR-CON) 10 MEQ tablet  Is this a 30 day or 90 day RX:   Pharmacy name and phone # (copy/paste from chart):    Comments:

## 2020-12-10 ENCOUNTER — OFFICE VISIT (OUTPATIENT)
Dept: VASCULAR SURGERY | Facility: CLINIC | Age: 70
End: 2020-12-10
Attending: SURGERY
Payer: COMMERCIAL

## 2020-12-10 VITALS
DIASTOLIC BLOOD PRESSURE: 56 MMHG | BODY MASS INDEX: 30.44 KG/M2 | WEIGHT: 165.38 LBS | HEART RATE: 82 BPM | HEIGHT: 62 IN | SYSTOLIC BLOOD PRESSURE: 122 MMHG | TEMPERATURE: 98 F

## 2020-12-10 DIAGNOSIS — I77.1 ILIAC ARTERY STENOSIS, BILATERAL: ICD-10-CM

## 2020-12-10 DIAGNOSIS — I73.9 PAD (PERIPHERAL ARTERY DISEASE): Primary | ICD-10-CM

## 2020-12-10 DIAGNOSIS — Z01.818 PREOP EXAMINATION: ICD-10-CM

## 2020-12-10 DIAGNOSIS — I83.023 VENOUS ULCER OF ANKLE, LEFT: Primary | ICD-10-CM

## 2020-12-10 DIAGNOSIS — L97.329 VENOUS ULCER OF ANKLE, LEFT: Primary | ICD-10-CM

## 2020-12-10 DIAGNOSIS — I70.202 FEMORAL ARTERY OCCLUSION, LEFT: ICD-10-CM

## 2020-12-10 PROCEDURE — 99214 OFFICE O/P EST MOD 30 MIN: CPT | Mod: 25,S$GLB,, | Performed by: SURGERY

## 2020-12-10 PROCEDURE — 3078F DIAST BP <80 MM HG: CPT | Mod: CPTII,S$GLB,, | Performed by: SURGERY

## 2020-12-10 PROCEDURE — 3074F PR MOST RECENT SYSTOLIC BLOOD PRESSURE < 130 MM HG: ICD-10-PCS | Mod: CPTII,S$GLB,, | Performed by: SURGERY

## 2020-12-10 PROCEDURE — 3078F PR MOST RECENT DIASTOLIC BLOOD PRESSURE < 80 MM HG: ICD-10-PCS | Mod: CPTII,S$GLB,, | Performed by: SURGERY

## 2020-12-10 PROCEDURE — 3008F PR BODY MASS INDEX (BMI) DOCUMENTED: ICD-10-PCS | Mod: CPTII,S$GLB,, | Performed by: SURGERY

## 2020-12-10 PROCEDURE — 3288F FALL RISK ASSESSMENT DOCD: CPT | Mod: CPTII,S$GLB,, | Performed by: SURGERY

## 2020-12-10 PROCEDURE — 1125F PR PAIN SEVERITY QUANTIFIED, PAIN PRESENT: ICD-10-PCS | Mod: S$GLB,,, | Performed by: SURGERY

## 2020-12-10 PROCEDURE — 3008F BODY MASS INDEX DOCD: CPT | Mod: CPTII,S$GLB,, | Performed by: SURGERY

## 2020-12-10 PROCEDURE — 3288F PR FALLS RISK ASSESSMENT DOCUMENTED: ICD-10-PCS | Mod: CPTII,S$GLB,, | Performed by: SURGERY

## 2020-12-10 PROCEDURE — 1125F AMNT PAIN NOTED PAIN PRSNT: CPT | Mod: S$GLB,,, | Performed by: SURGERY

## 2020-12-10 PROCEDURE — 99214 PR OFFICE/OUTPT VISIT, EST, LEVL IV, 30-39 MIN: ICD-10-PCS | Mod: 25,S$GLB,, | Performed by: SURGERY

## 2020-12-10 PROCEDURE — 1101F PT FALLS ASSESS-DOCD LE1/YR: CPT | Mod: CPTII,S$GLB,, | Performed by: SURGERY

## 2020-12-10 PROCEDURE — 1159F PR MEDICATION LIST DOCUMENTED IN MEDICAL RECORD: ICD-10-PCS | Mod: S$GLB,,, | Performed by: SURGERY

## 2020-12-10 PROCEDURE — 29580 PR STRAPPING UNNA BOOT: ICD-10-PCS | Mod: LT,S$GLB,, | Performed by: SURGERY

## 2020-12-10 PROCEDURE — 1101F PR PT FALLS ASSESS DOC 0-1 FALLS W/OUT INJ PAST YR: ICD-10-PCS | Mod: CPTII,S$GLB,, | Performed by: SURGERY

## 2020-12-10 PROCEDURE — 29580 STRAPPING UNNA BOOT: CPT | Mod: LT,S$GLB,, | Performed by: SURGERY

## 2020-12-10 PROCEDURE — 1159F MED LIST DOCD IN RCRD: CPT | Mod: S$GLB,,, | Performed by: SURGERY

## 2020-12-10 PROCEDURE — 3074F SYST BP LT 130 MM HG: CPT | Mod: CPTII,S$GLB,, | Performed by: SURGERY

## 2020-12-10 PROCEDURE — 99999 PR PBB SHADOW E&M-EST. PATIENT-LVL III: CPT | Mod: PBBFAC,,, | Performed by: SURGERY

## 2020-12-10 PROCEDURE — 99999 PR PBB SHADOW E&M-EST. PATIENT-LVL III: ICD-10-PCS | Mod: PBBFAC,,, | Performed by: SURGERY

## 2020-12-17 ENCOUNTER — OFFICE VISIT (OUTPATIENT)
Dept: VASCULAR SURGERY | Facility: CLINIC | Age: 70
End: 2020-12-17
Attending: SURGERY
Payer: COMMERCIAL

## 2020-12-17 ENCOUNTER — HOSPITAL ENCOUNTER (OUTPATIENT)
Dept: VASCULAR SURGERY | Facility: CLINIC | Age: 70
Discharge: HOME | End: 2020-12-17
Attending: SURGERY
Payer: COMMERCIAL

## 2020-12-17 VITALS
HEART RATE: 75 BPM | WEIGHT: 163.13 LBS | BODY MASS INDEX: 30.02 KG/M2 | DIASTOLIC BLOOD PRESSURE: 60 MMHG | SYSTOLIC BLOOD PRESSURE: 132 MMHG | TEMPERATURE: 98 F | HEIGHT: 62 IN

## 2020-12-17 DIAGNOSIS — I73.9 PERIPHERAL ARTERY DISEASE: ICD-10-CM

## 2020-12-17 DIAGNOSIS — I70.202 FEMORAL ARTERY OCCLUSION, LEFT: ICD-10-CM

## 2020-12-17 DIAGNOSIS — I73.9 PAD (PERIPHERAL ARTERY DISEASE): ICD-10-CM

## 2020-12-17 DIAGNOSIS — I83.023 VENOUS ULCER OF ANKLE, LEFT: Primary | ICD-10-CM

## 2020-12-17 DIAGNOSIS — L97.329 VENOUS ULCER OF ANKLE, LEFT: Primary | ICD-10-CM

## 2020-12-17 PROCEDURE — 3078F DIAST BP <80 MM HG: CPT | Mod: CPTII,S$GLB,, | Performed by: SURGERY

## 2020-12-17 PROCEDURE — 1159F MED LIST DOCD IN RCRD: CPT | Mod: S$GLB,,, | Performed by: SURGERY

## 2020-12-17 PROCEDURE — 3008F BODY MASS INDEX DOCD: CPT | Mod: CPTII,S$GLB,, | Performed by: SURGERY

## 2020-12-17 PROCEDURE — 1101F PR PT FALLS ASSESS DOC 0-1 FALLS W/OUT INJ PAST YR: ICD-10-PCS | Mod: CPTII,S$GLB,, | Performed by: SURGERY

## 2020-12-17 PROCEDURE — 3288F FALL RISK ASSESSMENT DOCD: CPT | Mod: CPTII,S$GLB,, | Performed by: SURGERY

## 2020-12-17 PROCEDURE — 93926 PR DUPLEX LO EXTREM ART UNILAT/LTD: ICD-10-PCS | Mod: S$GLB,,, | Performed by: SURGERY

## 2020-12-17 PROCEDURE — 99999 PR PBB SHADOW E&M-EST. PATIENT-LVL III: CPT | Mod: PBBFAC,,, | Performed by: SURGERY

## 2020-12-17 PROCEDURE — 93926 LOWER EXTREMITY STUDY: CPT | Mod: S$GLB,,, | Performed by: SURGERY

## 2020-12-17 PROCEDURE — 1125F PR PAIN SEVERITY QUANTIFIED, PAIN PRESENT: ICD-10-PCS | Mod: S$GLB,,, | Performed by: SURGERY

## 2020-12-17 PROCEDURE — 93923 PR NON-INVASIVE PHYSIOLOGIC STUDY EXTREMITY 3 LEVELS: ICD-10-PCS | Mod: S$GLB,,, | Performed by: SURGERY

## 2020-12-17 PROCEDURE — 99999 PR PBB SHADOW E&M-EST. PATIENT-LVL III: ICD-10-PCS | Mod: PBBFAC,,, | Performed by: SURGERY

## 2020-12-17 PROCEDURE — 3008F PR BODY MASS INDEX (BMI) DOCUMENTED: ICD-10-PCS | Mod: CPTII,S$GLB,, | Performed by: SURGERY

## 2020-12-17 PROCEDURE — 3288F PR FALLS RISK ASSESSMENT DOCUMENTED: ICD-10-PCS | Mod: CPTII,S$GLB,, | Performed by: SURGERY

## 2020-12-17 PROCEDURE — 93923 UPR/LXTR ART STDY 3+ LVLS: CPT | Mod: S$GLB,,, | Performed by: SURGERY

## 2020-12-17 PROCEDURE — 1101F PT FALLS ASSESS-DOCD LE1/YR: CPT | Mod: CPTII,S$GLB,, | Performed by: SURGERY

## 2020-12-17 PROCEDURE — 99214 OFFICE O/P EST MOD 30 MIN: CPT | Mod: S$GLB,,, | Performed by: SURGERY

## 2020-12-17 PROCEDURE — 1159F PR MEDICATION LIST DOCUMENTED IN MEDICAL RECORD: ICD-10-PCS | Mod: S$GLB,,, | Performed by: SURGERY

## 2020-12-17 PROCEDURE — 1125F AMNT PAIN NOTED PAIN PRSNT: CPT | Mod: S$GLB,,, | Performed by: SURGERY

## 2020-12-17 PROCEDURE — 99214 PR OFFICE/OUTPT VISIT, EST, LEVL IV, 30-39 MIN: ICD-10-PCS | Mod: S$GLB,,, | Performed by: SURGERY

## 2020-12-17 PROCEDURE — 3075F PR MOST RECENT SYSTOLIC BLOOD PRESS GE 130-139MM HG: ICD-10-PCS | Mod: CPTII,S$GLB,, | Performed by: SURGERY

## 2020-12-17 PROCEDURE — 3075F SYST BP GE 130 - 139MM HG: CPT | Mod: CPTII,S$GLB,, | Performed by: SURGERY

## 2020-12-17 PROCEDURE — 3078F PR MOST RECENT DIASTOLIC BLOOD PRESSURE < 80 MM HG: ICD-10-PCS | Mod: CPTII,S$GLB,, | Performed by: SURGERY

## 2020-12-18 RX ORDER — FUROSEMIDE 20 MG/1
20 TABLET ORAL EVERY 8 HOURS
Qty: 270 TABLET | Refills: 0 | Status: SHIPPED | OUTPATIENT
Start: 2020-12-18 | End: 2021-07-13

## 2020-12-18 RX ORDER — CANDESARTAN 16 MG/1
16 TABLET ORAL DAILY
Qty: 90 TABLET | Refills: 0 | Status: SHIPPED | OUTPATIENT
Start: 2020-12-18 | End: 2020-12-23 | Stop reason: SDUPTHER

## 2020-12-18 RX ORDER — POTASSIUM CHLORIDE 750 MG/1
10 TABLET, EXTENDED RELEASE ORAL 2 TIMES DAILY
Qty: 180 TABLET | Refills: 0 | Status: SHIPPED | OUTPATIENT
Start: 2020-12-18 | End: 2021-03-18

## 2020-12-18 NOTE — TELEPHONE ENCOUNTER
is not doing pt surgery and wants pt to stay on candesartan. Pt requesting med to last longer then 14 days.    Last filled:12/08/2020  LOV:09/30/2020  RTC:01/08/2021

## 2020-12-18 NOTE — PROGRESS NOTES
Rachel Murray  12/3/2020     HPI:  Patient is a 70 y.o. female with a h/o lumbar radiculopathy and PAD who is here for follow-up appointment after recent bilateral external iliac artery stenting 7/21/2020. Since her procedure she has had significant, new swelling bilaterally in both lower legs R>L, accompanied by excruciating pain. Her skin is sensitive to touch. Her pain is worse on the left limb, although the swelling is worse on the right leg. She has new numbness on the dorsal side of her right foot. She was admitted to the hospital on 11/10/2020 for worsening left leg pain.  She underwent an angiogram with angioplasty of the left common femoral, profunda, and SU.      Prior:  She reports of medial LLE paresthesias that radiate to ipsilateral hip and are worst in the toes. She additionally complains of a 'squeezing' pain in the left foot and intermittent episodes of blanching of the left foot. She continues to have intermittent radicular pain in bilateral legs, for which she received an L5-S1 lumbar epidural steroid injection on 05/07/2020 that the patient reports was minimally helpful. The patient takes Lyrica and Tramadol for her leg pain with modest pain relief, rated 5/10 at the time of history taking.   Ms. Murray reports a small, persistent defect in her left groin incision, which is not painful or erythematous and has not been productive of discharge. She continues to dress the incision with a dry gauze. She continues to smoke cigarettes but has decreased her usage from 1/2 pack per day, down from 1 pack daily.     2/18/2020: s/p L EIA, L CFA endarterectomy, JESU balloon angioplasty and L profundaplasty    Interval History:  She comes in today with mildly improved swelling of left lower extremity. Open area of skin breakdown healing well. Compression dressing in place. She has had compression dressing on left leg since her last visit. Here today for wound check and pre-operative evaluation. Had bilateral  vein mapping performed along with repeat arterial duplex imaging on last visit. Imaging report below.      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 iliac stent placement, LLE angiogram  17.4 min  837.37 mGy  172.81 Gycm2  37 ml contrast    ANGIOGRAPHY OF LOWER EXTREMITY Left 2020    Procedure: Angiogram Extremity Unilateral;  Surgeon: Olayinka Harris MD;  [facility];  Service: Peripheral Vascular;  Laterality: Left;  12.2 fluoro, 850.46 mGy, 131.10 Gycm2    AORTOGRAPHY N/A 2020    Procedure: AORTOGRAM;  Surgeon: Olayinka Harris MD;  [facility];  Service: Peripheral Vascular;  Laterality: N/A;    APPENDECTOMY      BREAST BIOPSY       SECTION, CLASSIC      COLONOSCOPY      COLONOSCOPY N/A 2017    Procedure: COLONOSCOPY;  Surgeon: Gabriel Rose MD;  Location: [facility] ENDO (4TH FLR);  Service: Endoscopy;  Laterality: N/A;  Miralax prep per Dr. Rose    DENTAL SURGERY  2017    ENDARTERECTOMY OF FEMORAL ARTERY Left 2020    Procedure: ENDARTERECTOMY, FEMORAL;  Surgeon: Olayinka Harris MD;  [facility];  Service: Peripheral Vascular;  Laterality: Left;  SFA endarterectomy  fluoro time: 9.2 min mGy: 2336.86    EPIDURAL STEROID INJECTION INTO LUMBAR SPINE N/A 2020    Procedure: Injection-steroid-epidural-lumbar--Review MRI- L5-S1 after MRI review;  Surgeon: Ely Syed Jr., MD;  Location: [facility] PAIN MGT;  Service: Pain Management;  Laterality: N/A;    EPIDURAL  STEROID INJECTION INTO LUMBAR SPINE N/A 5/7/2020    Procedure: Injection-steroid-epidural-lumbar-- Interlaminar L5-S1/ pt aware she can continue taking ASA per Dr. Harris and hold plavix.;  Surgeon: Ely Syed Jr., MD;  Location: [facility] PAIN MGT;  Service: Pain Management;  Laterality: N/A;  Will sign consent at [facility].    EPIDURAL STEROID INJECTION INTO LUMBAR SPINE N/A 8/20/2020    Procedure: Injection-steroid-epidural-lumbar--L4-5;  Surgeon: Ely Syed Jr., MD;  Location: [facility] PAIN [facility]GT;  Service: Pain Management;  Laterality: N/A;  Oral sedation    gyn surgery      ex laparotomy    HYSTERECTOMY       Family History   Problem Relation Age of Onset    Hypertension Mother     Heart disease Mother 40        CABG x 5    Seizures Mother     Cancer Father         prostate    Stroke Father     Cancer Brother         colon    Diabetes Maternal Aunt     Breast cancer Maternal Aunt     Diabetes Maternal Uncle     Stroke Maternal Grandmother     Stroke Paternal Grandmother     Diabetes Cousin     Breast cancer Cousin      Social History     Socioeconomic History    Marital status: Single     Spouse name: Not on file    Number of children: Not on file    Years of education: Not on file    Highest education level: Not on file   Occupational History     Employer: [employer]   Social Needs    Financial resource strain: Not on file    Food insecurity     Worry: Not on file     Inability: Not on file    Transportation needs     Medical: Not on file     Non-medical: Not on file   Tobacco Use    Smoking status: Current Every Day Smoker     Packs/day: 0.50     Years: 40.00     Pack years: 20.00     Types: Cigarettes    Smokeless tobacco: Never Used    Tobacco comment: occasionally   Substance and Sexual Activity    Alcohol use: Yes     Alcohol/week: 0.0 standard drinks     Comment: rarely    Drug use: No    Sexual activity: Never   Lifestyle    Physical activity     Days per week: Not  on file     Minutes per session: Not on file    Stress: Not on file   Relationships    Social connections     Talks on phone: Not on file     Gets together: Not on file     Attends Cheondo[employer] service: Not on file     Active member of club or organization: Not on file     Attends meetings of clubs or organizations: Not on file     Relationship status: Not on file   Other Topics Concern    Not on file   Social History Narrative    Not on file       Current Outpatient Medications:     acetaminophen (TYLENOL) 500 MG tablet, Take 2 tablets (1,000 mg total) by mouth 3 (three) times daily., Disp: , Rfl: 0    aspirin (ECOTRIN) 81 MG EC tablet, Take 1 tablet (81 mg total) by mouth once daily. (Patient taking differently: Take 81 mg by mouth every morning. ), Disp: 360 tablet, Rfl: 0    atorvastatin (LIPITOR) 40 MG tablet, Take 1 tablet (40 mg total) by mouth once daily. (Patient taking differently: Take 40 mg by mouth every morning. ), Disp: 90 tablet, Rfl: 3    candesartan (ATACAND) 16 MG tablet, Take 1 tablet (16 mg total) by mouth once daily. for 14 days, Disp: 14 tablet, Rfl: 0    candesartan-hydrochlorothiazide (ATACAND HCT) 16-12.5 mg per tablet, Take 1 tablet by mouth once daily., Disp: 30 tablet, Rfl: 3    DULoxetine (CYMBALTA) 30 MG capsule, Take 1 capsule (30 mg total) by mouth 2 (two) times daily., Disp: 60 capsule, Rfl: 3    furosemide (LASIX) 20 MG tablet, Take 1 tablet (20 mg total) by mouth every 8 (eight) hours. for 14 days, Disp: 42 tablet, Rfl: 0    nitrofurantoin, macrocrystal-monohydrate, (MACROBID) 100 MG capsule, Take 1 capsule (100 mg total) by mouth 2 (two) times daily., Disp: 14 capsule, Rfl: 0    potassium chloride SA (K-DUR,KLOR-CON) 10 MEQ tablet, Take 1 tablet (10 mEq total) by mouth 2 (two) times daily. for 14 days, Disp: 28 tablet, Rfl: 0    pregabalin (LYRICA) 75 MG capsule, Take 1 capsule (75 mg total) by mouth 3 (three) times daily., Disp: 90 capsule, Rfl: 2    traMADoL  (ULTRAM) 50 mg tablet, TAKE 1 TABLET(50 MG) BY MOUTH EVERY 24 HOURS AS NEEDED FOR PAIN, Disp: 60 tablet, Rfl: 1    clopidogreL (PLAVIX) 75 mg tablet, Take 1 tablet (75 mg total) by mouth once daily., Disp: 30 tablet, Rfl: 6  No current facility-administered medications for this visit.     Facility-Administered Medications Ordered in Other Visits:     alprazolam ODT dissolvable tablet 0.5 mg, 0.5 mg, Oral, Once PRN, Ely Syed Jr., MD    sodium chloride 0.9% flush 10 mL, 10 mL, Intravenous, PRN, Reyna Toledo MD    REVIEW OF SYSTEMS:  General: negative; ENT: negative; Allergy and Immunology: negative; Hematological and Lymphatic: Negative; Endocrine: negative; Respiratory: no cough, shortness of breath, or wheezing; Cardiovascular: no chest pain or dyspnea on exertion; Gastrointestinal: no abdominal pain/back, change in bowel habits, or bloody stools; Genito-Urinary: no dysuria, trouble voiding, or hematuria; Musculoskeletal: Bilateral (L >>R) radicular pain and paresthesias  Neurological: no TIA or stroke symptoms; Psychiatric: no nervousness, anxiety or depression.    PHYSICAL EXAM:   Right Arm BP - Sittin/56 (12/10/20 1341)  Left Arm BP - Sittin/54 (12/10/20 1341)  Pulse: 82  Temp: 98.3 °F (36.8 °C)      General appearance:  Alert, well-appearing, and in no distress.  Oriented to person, place, and time   Neurological: Normal speech, no focal findings noted; CN II - XII grossly intact           Musculoskeletal: Digits/nail without cyanosis/clubbing.  Normal muscle strength/tone.                 Neck: Supple, no significant adenopathy; thyroid is not enlarged                  No carotid bruit can be auscultated                Chest:  Clear to auscultation, no wheezes, rales or rhonchi, symmetric air entry     No use of accessory muscles             Cardiac: Normal rate and regular rhythm, S1 and S2 normal; PMI non-displaced          Abdomen: Soft, nontender, nondistended, no masses or  organomegaly     No rebound tenderness noted; bowel sounds normal     Pulsatile aortic mass is not palpable.     No groin adenopathy      Extremities:   2+ femoral pulses bilaterally     Left groin incision healed with palpable fibrotic scarring beneath skin     Pedal pulses nonpalpable.     Significant 2+ pitting pre-tibial edema     Hyperalgesia over lower legs and feet. Improved from previous exam.       Warmth over LLE, blanching present with movement. Area of hyperpigmentation that tracks along the medial aspect of her left calf and thigh. She states that this has been    present for a while. Not concerning for new DVT      No ulcerations     Blisters around ankle of left lower extremity with sloughing of skin. No weeping of fluid noted. No open or exposed areas of skin.    LAB RESULTS:  Lab Results   Component Value Date    K 4.0 11/30/2020    K 3.6 11/05/2020    K 3.9 11/04/2020    CREATININE 1.0 11/30/2020    CREATININE 0.6 11/05/2020    CREATININE 0.7 11/04/2020     Lab Results   Component Value Date    WBC 11.08 11/05/2020    WBC 8.92 11/04/2020    WBC 9.95 11/03/2020    HCT 31.1 (L) 11/05/2020    HCT 30.5 (L) 11/04/2020    HCT 32.5 (L) 11/03/2020     11/05/2020     11/04/2020     (H) 11/03/2020     No results found for: HGBA1C  IMAGING:  Indication   ========     Pre-Op Exam. PVD.     Lower Extremity Vein Mapping   =======================     Rt prox GSV thigh AP diam  5.0 mm   Rt mid GSV thigh AP diam   2.3 mm   Rt distal GSV thigh AP diam    4.1 mm   Rt GSV knee AP diam    3.4 mm   Rt prox GSV calf AP diam   2.7 mm   Rt mid GSV calf AP diam    1.4 mm   Rt GSV ankle AP diam   2.0 mm   Lt prox GSV thigh AP diam  7.2 mm   Lt mid GSV thigh AP diam   5.5 mm   Lt distal GSV thigh AP diam    4.3 mm   Lt GSV knee AP diam    5.2 mm   Lt prox GSV calf AP diam   4.1 mm   Lt mid GSV calf AP diam    2.2 mm   Lt GSV ankle AP diam   2.6 mm     Impression   =========     Mapped and measured the  bilateral greater saphenous veins.     The right GSV is seen branching at the mid thigh, where the caliber changes from 2.3 mm to 4.4 mm, and then exits the fascia and   travels superficially. The right GSV re-enters the fascia at the mid calf. Right GSV branches are also noted at the prox thigh, knee   and mid calf.     The left GSV is seen branching at the prox thigh, mid thigh, distal thigh, knee, mid calf and distal calf/ankle. The GSV exits the   fascia at the distal thigh and travels superficially.     Narrative & Impression     EXAMINATION:  US LOWER EXTREMITY VEINS BILATERAL     CLINICAL HISTORY:  left leg pain and discolouration;     TECHNIQUE:  Duplex and color flow Doppler and dynamic compression was performed of the bilateral lower extremity veins was performed.     COMPARISON:  Ultrasound 08/06/2020     FINDINGS:  Right thigh veins: The common femoral, femoral, popliteal, upper greater saphenous, and deep femoral veins are patent and free of thrombus. The veins are normally compressible and have normal phasic flow and augmentation response.     Right calf veins: The visualized calf veins are patent.     Left thigh veins: The common femoral, femoral, popliteal, upper greater saphenous, and deep femoral veins are patent and free of thrombus. The veins are normally compressible and have normal phasic flow and augmentation response.     Left calf veins: The visualized calf veins are patent.     Miscellaneous: None     Impression:     No evidence of deep venous thrombosis in either lower extremity.     Narrative & Impression     EXAMINATION:  US ARTERIAL LOWER EXTREMITY BILAT WITH WALT (XPD)     CLINICAL HISTORY:  PAD and worsening left leg pain;     TECHNIQUE:  Bilateral lower extremity arterial duplex ultrasound examination performed. Multiple gray scale and color doppler images were obtained in addition to waveform analysis.  Ankle-brachial indices were calculated.     COMPARISON:  CTA 06/10/2020,  02/04/2020     FINDINGS:  The ankle brachial index on the right is 0.5 and on the left is unobtainable.     The peak systolic velocities on the right are as follows, in centimeters/second:     Right iliac stent velocity: 189     Common femoral artery: 158     Superficial femoral artery, proximal: 213     Superficial femoral artery, mid portion: 137     Superficial femoral artery, distal: 123     Popliteal artery: 97     Posterior tibial artery: 35     Anterior tibial artery: 40     Peroneal artery: 49     The peak systolic velocities on the left are as follows, in centimeters/second:     Left iliac stent not visualized.     There is a stenosis within the common femoral artery with pre stenotic velocity measuring 110 cm/s, mid stenosis velocity measuring 557 cm/s, and poststenotic velocity measuring 265.     Superficial femoral artery, proximal: 69     Superficial femoral artery, mid portion: Occluded     Superficial femoral artery, distal: Occluded     Popliteal artery: 33     Posterior tibial artery: 16     Anterior tibial artery: Not visualized     Peroneal artery: 11     Monophasic waveforms are present throughout both lower extremities consistent with peripheral arterial disease.     Impression:     Occlusion of the left middle and distal superficial femoral artery.  Reconstitution of flow distally via collaterals is visualized.  Findings similar to CTA 06/10/2020.     Hemodynamically significant stenosis (greater than 50%) within the left common femoral artery.     Monophasic waveforms consistent with peripheral arterial disease in the bilateral lower extremities.     Ankle-brachial index of 0.5 on the right consistent with severe peripheral arterial disease.  Left ankle brachial index was unable to be measured.        Arterial ultrasound 11/12/2020:  WALT 0.73 on right and 0.31 left    No flow seen in the left proximal or middle SFA suggestive of vessel occlusion.  The Popliteal artery, PTA and NANDINI demonstrate  dampened monophasic flow.  EIA had elevated velocities of 201, 379, and 299 prox, mid, dist.   SU was 127.    Ms Murray seen in the clinic room and placed in the supine position on the treatment table.  The left leg was cleansed with Easi-clense sponges and dried thoroughly.  Eucerin cream was applied to the lower legs. Calmoseptine was applied to the periwound area. A hydrofiber dressing was applied to the wound and covered with an ABD pad.  The patient's foot was positioned at a 90 degree angle.  The coflex with calamine was applied per package instructions.  A two layered application was performed using a spiral technique beginning with the foam layer followed by the cohesive bandage avoiding creases or folds.  The wrap was started behind the first metatarsal and ended below the tibial tubercle of the knee.  There was overlap of each turn half the width of the previous turn.  The compression wrap will be changed every 7 days.          IMP/PLAN:  70 y.o. female with HTN, active smoker (1/2 ppd) and PAD s/p L EIA, L CFA endarterectomy, JESU balloon angioplasty and L profundaplasty on 02/18/2020, who presents today for preoperative evaluation for left femoral endarterectomy and left fem pop bypass using autologous vein. Her PAD has continued to advance and her L SFA continues to be occluded.  She has recurrent left external and L CFA disease.      Plan:  - Compression dressing taken down and leg/foot examined today. Compression dressing replaced. Will need to diurese more to reduce her leg swelling before an operation is performed  - Diuretic dose increased to 60mg Lasix Daily  - Femoral endarterectomy with fem-pop bypass using autogenous vein pending improvement of swelling. Will also perform an angiogram with likely stenting of the external iliac artery  - Consent obtained on last clinic visit  - Surgery to be scheduled in two weeks.  - Follow up in clinic in 1 week to assess fluid status    Olayinka Harris  MD  Vascular & Endovascular Surgery

## 2020-12-18 NOTE — TELEPHONE ENCOUNTER
----- Message from Kimberli Taylor sent at 12/18/2020  8:24 AM CST -----  Contact: self   Patient states that Dr. Harris in not doing her surgery and he wants her to stay on the candesartan (ATACAND) 16 MG tablet, furosemide (LASIX) 20 MG tablet and also the potassium chloride SA (K-DUR,KLOR-CON) 10 MEQ tablet. She states that she will need new prescriptions for longer than 14 days.    Requesting an RX refill or new RX.  Is this a refill or new RX: New  RX name and strength: potassium chloride SA (K-DUR,KLOR-CON) 10 MEQ tablet  Is this a 30 day or 90 day RX: 90  Pharmacy name and phone # (copy/paste from chart):    Comments:        Requesting an RX refill or new RX.  Is this a refill or new RX: New  RX name and strength: furosemide (LASIX) 20 MG tablet  Is this a 30 day or 90 day RX: 90  Pharmacy name and phone # (copy/paste from chart):    Comments:        Requesting an RX refill or new RX.  Is this a refill or new RX: New  RX name and strength: candesartan (ATACAND) 16 MG tablet  Is this a 30 day or 90 day RX: 90  Pharmacy name and phone # (copy/paste from chart):    Comments:       [id number] - [place] - [address] [place] [facility] [address]  [address] [address]  Phone: [phone] Fax: [phone]

## 2020-12-23 RX ORDER — CANDESARTAN 16 MG/1
16 TABLET ORAL DAILY
Qty: 90 TABLET | Refills: 0 | Status: SHIPPED | OUTPATIENT
Start: 2020-12-23 | End: 2021-03-22 | Stop reason: SDUPTHER

## 2020-12-23 NOTE — TELEPHONE ENCOUNTER
----- Message from Mayi Mcfarland sent at 12/23/2020  8:07 AM CST -----  Contact: Self [phone]  Requesting an RX refill or new RX.    Is this a refill or new RX: refill    RX name and strength: candesartan (ATACAND) 16 MG tablet    Is this a 30 day or 90 day RX: 30    Pharmacy name and phone # (copy/paste from chart):    [id number] - [place] - [address] [place] [facility] [place] [address]  [address] [address]  Phone: [phone] Fax: [phone]      Comments: Calling to refill candesartan (ATACAND) 16 MG tablet. Patient states the medication was not filled with other medications. Patient is requesting a call back regarding message if any questions

## 2020-12-29 ENCOUNTER — TELEPHONE (OUTPATIENT)
Dept: INTERNAL MEDICINE | Facility: CLINIC | Age: 70
End: 2020-12-29

## 2020-12-29 NOTE — TELEPHONE ENCOUNTER
----- Message from Hannah Dodd sent at 12/29/2020  9:28 AM CST -----  Contact: Pt- [phone]  Would like to get medical advice    Symptoms (please be specific):  attending physician letter    Comments:  Pt called to verify clinic received the above document from [place]. She is requesting a call back.

## 2020-12-29 NOTE — TELEPHONE ENCOUNTER
Spoke with pt, form was not found through the faxes.pt does have an appt booked on 1/8/2021 pt will bring form with her them.

## 2021-01-08 ENCOUNTER — LAB VISIT (OUTPATIENT)
Dept: LAB | Facility: HOSPITAL | Age: 71
End: 2021-01-08
Attending: INTERNAL MEDICINE
Payer: COMMERCIAL

## 2021-01-08 ENCOUNTER — OFFICE VISIT (OUTPATIENT)
Dept: INTERNAL MEDICINE | Facility: CLINIC | Age: 71
End: 2021-01-08
Payer: COMMERCIAL

## 2021-01-08 VITALS
HEART RATE: 96 BPM | HEIGHT: 62 IN | OXYGEN SATURATION: 100 % | SYSTOLIC BLOOD PRESSURE: 108 MMHG | TEMPERATURE: 97 F | WEIGHT: 161.38 LBS | BODY MASS INDEX: 29.7 KG/M2 | DIASTOLIC BLOOD PRESSURE: 72 MMHG

## 2021-01-08 DIAGNOSIS — I10 ESSENTIAL HYPERTENSION: ICD-10-CM

## 2021-01-08 DIAGNOSIS — E78.5 HYPERLIPIDEMIA, UNSPECIFIED HYPERLIPIDEMIA TYPE: ICD-10-CM

## 2021-01-08 DIAGNOSIS — N64.4 BREAST PAIN, LEFT: ICD-10-CM

## 2021-01-08 DIAGNOSIS — I73.9 PAD (PERIPHERAL ARTERY DISEASE): ICD-10-CM

## 2021-01-08 DIAGNOSIS — M54.16 LUMBAR RADICULOPATHY: ICD-10-CM

## 2021-01-08 DIAGNOSIS — I10 ESSENTIAL HYPERTENSION: Primary | ICD-10-CM

## 2021-01-08 LAB
ALBUMIN SERPL BCP-MCNC: 3.7 G/DL (ref 3.5–5.2)
ALP SERPL-CCNC: 136 U/L (ref 55–135)
ALT SERPL W/O P-5'-P-CCNC: 14 U/L (ref 10–44)
ANION GAP SERPL CALC-SCNC: 11 MMOL/L (ref 8–16)
AST SERPL-CCNC: 15 U/L (ref 10–40)
BILIRUB SERPL-MCNC: 0.4 MG/DL (ref 0.1–1)
BUN SERPL-MCNC: 23 MG/DL (ref 8–23)
CALCIUM SERPL-MCNC: 9.6 MG/DL (ref 8.7–10.5)
CHLORIDE SERPL-SCNC: 106 MMOL/L (ref 95–110)
CO2 SERPL-SCNC: 24 MMOL/L (ref 23–29)
CREAT SERPL-MCNC: 0.8 MG/DL (ref 0.5–1.4)
EST. GFR  (AFRICAN AMERICAN): >60 ML/MIN/1.73 M^2
EST. GFR  (NON AFRICAN AMERICAN): >60 ML/MIN/1.73 M^2
GLUCOSE SERPL-MCNC: 85 MG/DL (ref 70–110)
POTASSIUM SERPL-SCNC: 4.4 MMOL/L (ref 3.5–5.1)
PROT SERPL-MCNC: 7.3 G/DL (ref 6–8.4)
SODIUM SERPL-SCNC: 141 MMOL/L (ref 136–145)

## 2021-01-08 PROCEDURE — 80053 COMPREHEN METABOLIC PANEL: CPT

## 2021-01-08 PROCEDURE — 3074F PR MOST RECENT SYSTOLIC BLOOD PRESSURE < 130 MM HG: ICD-10-PCS | Mod: CPTII,S$GLB,, | Performed by: INTERNAL MEDICINE

## 2021-01-08 PROCEDURE — 99214 PR OFFICE/OUTPT VISIT, EST, LEVL IV, 30-39 MIN: ICD-10-PCS | Mod: S$GLB,,, | Performed by: INTERNAL MEDICINE

## 2021-01-08 PROCEDURE — 1125F AMNT PAIN NOTED PAIN PRSNT: CPT | Mod: S$GLB,,, | Performed by: INTERNAL MEDICINE

## 2021-01-08 PROCEDURE — 3008F BODY MASS INDEX DOCD: CPT | Mod: CPTII,S$GLB,, | Performed by: INTERNAL MEDICINE

## 2021-01-08 PROCEDURE — 99999 PR PBB SHADOW E&M-EST. PATIENT-LVL V: CPT | Mod: PBBFAC,,, | Performed by: INTERNAL MEDICINE

## 2021-01-08 PROCEDURE — 99214 OFFICE O/P EST MOD 30 MIN: CPT | Mod: S$GLB,,, | Performed by: INTERNAL MEDICINE

## 2021-01-08 PROCEDURE — 1159F PR MEDICATION LIST DOCUMENTED IN MEDICAL RECORD: ICD-10-PCS | Mod: S$GLB,,, | Performed by: INTERNAL MEDICINE

## 2021-01-08 PROCEDURE — 3074F SYST BP LT 130 MM HG: CPT | Mod: CPTII,S$GLB,, | Performed by: INTERNAL MEDICINE

## 2021-01-08 PROCEDURE — 1159F MED LIST DOCD IN RCRD: CPT | Mod: S$GLB,,, | Performed by: INTERNAL MEDICINE

## 2021-01-08 PROCEDURE — 1125F PR PAIN SEVERITY QUANTIFIED, PAIN PRESENT: ICD-10-PCS | Mod: S$GLB,,, | Performed by: INTERNAL MEDICINE

## 2021-01-08 PROCEDURE — 3078F PR MOST RECENT DIASTOLIC BLOOD PRESSURE < 80 MM HG: ICD-10-PCS | Mod: CPTII,S$GLB,, | Performed by: INTERNAL MEDICINE

## 2021-01-08 PROCEDURE — 36415 COLL VENOUS BLD VENIPUNCTURE: CPT | Mod: PO

## 2021-01-08 PROCEDURE — 3008F PR BODY MASS INDEX (BMI) DOCUMENTED: ICD-10-PCS | Mod: CPTII,S$GLB,, | Performed by: INTERNAL MEDICINE

## 2021-01-08 PROCEDURE — 99999 PR PBB SHADOW E&M-EST. PATIENT-LVL V: ICD-10-PCS | Mod: PBBFAC,,, | Performed by: INTERNAL MEDICINE

## 2021-01-08 PROCEDURE — 3078F DIAST BP <80 MM HG: CPT | Mod: CPTII,S$GLB,, | Performed by: INTERNAL MEDICINE

## 2021-01-10 ENCOUNTER — IMMUNIZATION (OUTPATIENT)
Dept: PRIMARY CARE CLINIC | Facility: CLINIC | Age: 71
End: 2021-01-10
Payer: COMMERCIAL

## 2021-01-10 DIAGNOSIS — Z23 NEED FOR VACCINATION: ICD-10-CM

## 2021-01-10 PROCEDURE — 91300 COVID-19, MRNA, LNP-S, PF, 30 MCG/0.3 ML DOSE VACCINE: CPT | Mod: PBBFAC | Performed by: FAMILY MEDICINE

## 2021-01-11 ENCOUNTER — HOSPITAL ENCOUNTER (OUTPATIENT)
Dept: RADIOLOGY | Facility: HOSPITAL | Age: 71
Discharge: HOME | End: 2021-01-11
Attending: INTERNAL MEDICINE
Payer: COMMERCIAL

## 2021-01-11 DIAGNOSIS — N64.4 BREAST PAIN, LEFT: ICD-10-CM

## 2021-01-11 PROCEDURE — 77066 MAMMO DIGITAL DIAGNOSTIC BILAT WITH TOMO: ICD-10-PCS | Mod: 26,,, | Performed by: RADIOLOGY

## 2021-01-11 PROCEDURE — 76642 ULTRASOUND BREAST LIMITED: CPT | Mod: 26,,, | Performed by: RADIOLOGY

## 2021-01-11 PROCEDURE — 76642 ULTRASOUND BREAST LIMITED: CPT | Mod: TC,50

## 2021-01-11 PROCEDURE — 77062 BREAST TOMOSYNTHESIS BI: CPT | Mod: 26,,, | Performed by: RADIOLOGY

## 2021-01-11 PROCEDURE — 77066 DX MAMMO INCL CAD BI: CPT | Mod: 26,,, | Performed by: RADIOLOGY

## 2021-01-11 PROCEDURE — 76642 US BREAST BILATERAL LIMITED: ICD-10-PCS | Mod: 26,,, | Performed by: RADIOLOGY

## 2021-01-11 PROCEDURE — 77062 BREAST TOMOSYNTHESIS BI: CPT | Mod: TC

## 2021-01-11 PROCEDURE — 77062 MAMMO DIGITAL DIAGNOSTIC BILAT WITH TOMO: ICD-10-PCS | Mod: 26,,, | Performed by: RADIOLOGY

## 2021-01-12 ENCOUNTER — TELEPHONE (OUTPATIENT)
Dept: VASCULAR SURGERY | Facility: CLINIC | Age: 71
End: 2021-01-12

## 2021-01-21 ENCOUNTER — OFFICE VISIT (OUTPATIENT)
Dept: VASCULAR SURGERY | Facility: CLINIC | Age: 71
End: 2021-01-21
Attending: SURGERY
Payer: COMMERCIAL

## 2021-01-21 VITALS
HEART RATE: 85 BPM | SYSTOLIC BLOOD PRESSURE: 113 MMHG | HEIGHT: 62 IN | DIASTOLIC BLOOD PRESSURE: 62 MMHG | WEIGHT: 159.63 LBS | BODY MASS INDEX: 29.38 KG/M2

## 2021-01-21 DIAGNOSIS — I77.1 ILIAC ARTERY STENOSIS, BILATERAL: ICD-10-CM

## 2021-01-21 DIAGNOSIS — M53.86 SCIATICA ASSOCIATED WITH DISORDER OF LUMBAR SPINE: ICD-10-CM

## 2021-01-21 DIAGNOSIS — I70.202 FEMORAL ARTERY OCCLUSION, LEFT: Primary | ICD-10-CM

## 2021-01-21 PROCEDURE — 1101F PT FALLS ASSESS-DOCD LE1/YR: CPT | Mod: CPTII,S$GLB,, | Performed by: SURGERY

## 2021-01-21 PROCEDURE — 3078F PR MOST RECENT DIASTOLIC BLOOD PRESSURE < 80 MM HG: ICD-10-PCS | Mod: CPTII,S$GLB,, | Performed by: SURGERY

## 2021-01-21 PROCEDURE — 3078F DIAST BP <80 MM HG: CPT | Mod: CPTII,S$GLB,, | Performed by: SURGERY

## 2021-01-21 PROCEDURE — 1125F PR PAIN SEVERITY QUANTIFIED, PAIN PRESENT: ICD-10-PCS | Mod: S$GLB,,, | Performed by: SURGERY

## 2021-01-21 PROCEDURE — 99999 PR PBB SHADOW E&M-EST. PATIENT-LVL III: CPT | Mod: PBBFAC,,, | Performed by: SURGERY

## 2021-01-21 PROCEDURE — 1125F AMNT PAIN NOTED PAIN PRSNT: CPT | Mod: S$GLB,,, | Performed by: SURGERY

## 2021-01-21 PROCEDURE — 99214 PR OFFICE/OUTPT VISIT, EST, LEVL IV, 30-39 MIN: ICD-10-PCS | Mod: S$GLB,,, | Performed by: SURGERY

## 2021-01-21 PROCEDURE — 99214 OFFICE O/P EST MOD 30 MIN: CPT | Mod: S$GLB,,, | Performed by: SURGERY

## 2021-01-21 PROCEDURE — 3008F BODY MASS INDEX DOCD: CPT | Mod: CPTII,S$GLB,, | Performed by: SURGERY

## 2021-01-21 PROCEDURE — 1159F PR MEDICATION LIST DOCUMENTED IN MEDICAL RECORD: ICD-10-PCS | Mod: S$GLB,,, | Performed by: SURGERY

## 2021-01-21 PROCEDURE — 3288F FALL RISK ASSESSMENT DOCD: CPT | Mod: CPTII,S$GLB,, | Performed by: SURGERY

## 2021-01-21 PROCEDURE — 3074F PR MOST RECENT SYSTOLIC BLOOD PRESSURE < 130 MM HG: ICD-10-PCS | Mod: CPTII,S$GLB,, | Performed by: SURGERY

## 2021-01-21 PROCEDURE — 99999 PR PBB SHADOW E&M-EST. PATIENT-LVL III: ICD-10-PCS | Mod: PBBFAC,,, | Performed by: SURGERY

## 2021-01-21 PROCEDURE — 1159F MED LIST DOCD IN RCRD: CPT | Mod: S$GLB,,, | Performed by: SURGERY

## 2021-01-21 PROCEDURE — 3288F PR FALLS RISK ASSESSMENT DOCUMENTED: ICD-10-PCS | Mod: CPTII,S$GLB,, | Performed by: SURGERY

## 2021-01-21 PROCEDURE — 3074F SYST BP LT 130 MM HG: CPT | Mod: CPTII,S$GLB,, | Performed by: SURGERY

## 2021-01-21 PROCEDURE — 1101F PR PT FALLS ASSESS DOC 0-1 FALLS W/OUT INJ PAST YR: ICD-10-PCS | Mod: CPTII,S$GLB,, | Performed by: SURGERY

## 2021-01-21 PROCEDURE — 3008F PR BODY MASS INDEX (BMI) DOCUMENTED: ICD-10-PCS | Mod: CPTII,S$GLB,, | Performed by: SURGERY

## 2021-01-28 ENCOUNTER — HOSPITAL ENCOUNTER (OUTPATIENT)
Dept: RADIOLOGY | Facility: HOSPITAL | Age: 71
Discharge: HOME | End: 2021-01-28
Attending: INTERNAL MEDICINE
Payer: COMMERCIAL

## 2021-01-28 DIAGNOSIS — R92.8 ABNORMAL MAMMOGRAM: Primary | ICD-10-CM

## 2021-01-28 PROCEDURE — 88342 IMHCHEM/IMCYTCHM 1ST ANTB: CPT | Mod: 59 | Performed by: PATHOLOGY

## 2021-01-28 PROCEDURE — 88360 TUMOR IMMUNOHISTOCHEM/MANUAL: CPT | Performed by: PATHOLOGY

## 2021-01-28 PROCEDURE — 25000003 PHARM REV CODE 250: Performed by: INTERNAL MEDICINE

## 2021-01-28 PROCEDURE — 27200939 MAMMO BREAST STEREOTACTIC BREAST BIOPSY RIGHT

## 2021-01-28 PROCEDURE — 88305 TISSUE EXAM BY PATHOLOGIST: CPT | Performed by: PATHOLOGY

## 2021-01-28 PROCEDURE — 88341 IMHCHEM/IMCYTCHM EA ADD ANTB: CPT | Mod: 26,59,, | Performed by: PATHOLOGY

## 2021-01-28 PROCEDURE — 88341 IMHCHEM/IMCYTCHM EA ADD ANTB: CPT | Mod: 59 | Performed by: PATHOLOGY

## 2021-01-28 PROCEDURE — 88342 IMHCHEM/IMCYTCHM 1ST ANTB: CPT | Mod: 26,59,XU, | Performed by: PATHOLOGY

## 2021-01-28 PROCEDURE — 88360 TUMOR IMMUNOHISTOCHEM/MANUAL: CPT | Mod: 26,,, | Performed by: PATHOLOGY

## 2021-01-28 PROCEDURE — 19081 MAMMO BREAST STEREOTACTIC BREAST BIOPSY RIGHT: ICD-10-PCS | Mod: RT,,, | Performed by: RADIOLOGY

## 2021-01-28 PROCEDURE — 88341 PR IHC OR ICC EACH ADD'L SINGLE ANTIBODY  STAINPR: ICD-10-PCS | Mod: 26,59,, | Performed by: PATHOLOGY

## 2021-01-28 PROCEDURE — 88342 CHG IMMUNOCYTOCHEMISTRY: ICD-10-PCS | Mod: 26,59,XU, | Performed by: PATHOLOGY

## 2021-01-28 PROCEDURE — 19081 BX BREAST 1ST LESION STRTCTC: CPT | Mod: RT,,, | Performed by: RADIOLOGY

## 2021-01-28 PROCEDURE — 88305 TISSUE EXAM BY PATHOLOGIST: CPT | Mod: 26,,, | Performed by: PATHOLOGY

## 2021-01-28 PROCEDURE — 88360 PR  TUMOR IMMUNOHISTOCHEM/MANUAL: ICD-10-PCS | Mod: 26,,, | Performed by: PATHOLOGY

## 2021-01-28 PROCEDURE — 88305 TISSUE EXAM BY PATHOLOGIST: ICD-10-PCS | Mod: 26,,, | Performed by: PATHOLOGY

## 2021-01-28 RX ORDER — LIDOCAINE HYDROCHLORIDE AND EPINEPHRINE 20; 10 MG/ML; UG/ML
20 INJECTION, SOLUTION INFILTRATION; PERINEURAL ONCE
Status: COMPLETED | OUTPATIENT
Start: 2021-01-28 | End: 2021-01-28

## 2021-01-28 RX ORDER — LIDOCAINE HYDROCHLORIDE 10 MG/ML
2 INJECTION, SOLUTION EPIDURAL; INFILTRATION; INTRACAUDAL; PERINEURAL ONCE
Status: COMPLETED | OUTPATIENT
Start: 2021-01-28 | End: 2021-01-28

## 2021-01-28 RX ORDER — LIDOCAINE HYDROCHLORIDE 20 MG/ML
5 INJECTION, SOLUTION INFILTRATION; PERINEURAL ONCE
Status: DISCONTINUED | OUTPATIENT
Start: 2021-01-28 | End: 2021-01-29 | Stop reason: HOSPADM

## 2021-01-28 RX ADMIN — LIDOCAINE HYDROCHLORIDE 2 ML: 10 INJECTION, SOLUTION EPIDURAL; INFILTRATION; INTRACAUDAL; PERINEURAL at 10:01

## 2021-01-28 RX ADMIN — LIDOCAINE HYDROCHLORIDE,EPINEPHRINE BITARTRATE 20 ML: 20; .01 INJECTION, SOLUTION INFILTRATION; PERINEURAL at 10:01

## 2021-01-31 ENCOUNTER — IMMUNIZATION (OUTPATIENT)
Dept: PRIMARY CARE CLINIC | Facility: CLINIC | Age: 71
End: 2021-01-31
Payer: COMMERCIAL

## 2021-01-31 DIAGNOSIS — Z23 NEED FOR VACCINATION: Primary | ICD-10-CM

## 2021-01-31 PROCEDURE — 0002A COVID-19, MRNA, LNP-S, PF, 30 MCG/0.3 ML DOSE VACCINE: CPT | Mod: PBBFAC | Performed by: EMERGENCY MEDICINE

## 2021-01-31 PROCEDURE — 91300 COVID-19, MRNA, LNP-S, PF, 30 MCG/0.3 ML DOSE VACCINE: CPT | Mod: PBBFAC | Performed by: EMERGENCY MEDICINE

## 2021-02-02 LAB
FINAL PATHOLOGIC DIAGNOSIS: NORMAL
GROSS: NORMAL
Lab: NORMAL

## 2021-02-04 ENCOUNTER — TELEPHONE (OUTPATIENT)
Dept: SURGERY | Facility: CLINIC | Age: 71
End: 2021-02-04

## 2021-02-11 ENCOUNTER — OFFICE VISIT (OUTPATIENT)
Dept: SURGERY | Facility: CLINIC | Age: 71
End: 2021-02-11
Payer: COMMERCIAL

## 2021-02-11 VITALS
RESPIRATION RATE: 18 BRPM | OXYGEN SATURATION: 97 % | SYSTOLIC BLOOD PRESSURE: 95 MMHG | BODY MASS INDEX: 30.25 KG/M2 | WEIGHT: 164.38 LBS | HEIGHT: 62 IN | HEART RATE: 77 BPM | DIASTOLIC BLOOD PRESSURE: 55 MMHG

## 2021-02-11 DIAGNOSIS — C50.911 MALIGNANT NEOPLASM OF RIGHT FEMALE BREAST, UNSPECIFIED ESTROGEN RECEPTOR STATUS, UNSPECIFIED SITE OF BREAST: Primary | ICD-10-CM

## 2021-02-11 PROCEDURE — 99999 PR PBB SHADOW E&M-EST. PATIENT-LVL IV: ICD-10-PCS | Mod: PBBFAC,,, | Performed by: SURGERY

## 2021-02-11 PROCEDURE — 1159F MED LIST DOCD IN RCRD: CPT | Mod: S$GLB,,, | Performed by: SURGERY

## 2021-02-11 PROCEDURE — 3078F PR MOST RECENT DIASTOLIC BLOOD PRESSURE < 80 MM HG: ICD-10-PCS | Mod: CPTII,S$GLB,, | Performed by: SURGERY

## 2021-02-11 PROCEDURE — 1159F PR MEDICATION LIST DOCUMENTED IN MEDICAL RECORD: ICD-10-PCS | Mod: S$GLB,,, | Performed by: SURGERY

## 2021-02-11 PROCEDURE — 3074F PR MOST RECENT SYSTOLIC BLOOD PRESSURE < 130 MM HG: ICD-10-PCS | Mod: CPTII,S$GLB,, | Performed by: SURGERY

## 2021-02-11 PROCEDURE — 3078F DIAST BP <80 MM HG: CPT | Mod: CPTII,S$GLB,, | Performed by: SURGERY

## 2021-02-11 PROCEDURE — 3008F BODY MASS INDEX DOCD: CPT | Mod: CPTII,S$GLB,, | Performed by: SURGERY

## 2021-02-11 PROCEDURE — 99204 OFFICE O/P NEW MOD 45 MIN: CPT | Mod: S$GLB,,, | Performed by: SURGERY

## 2021-02-11 PROCEDURE — 99999 PR PBB SHADOW E&M-EST. PATIENT-LVL IV: CPT | Mod: PBBFAC,,, | Performed by: SURGERY

## 2021-02-11 PROCEDURE — 99204 PR OFFICE/OUTPT VISIT, NEW, LEVL IV, 45-59 MIN: ICD-10-PCS | Mod: S$GLB,,, | Performed by: SURGERY

## 2021-02-11 PROCEDURE — 1126F PR PAIN SEVERITY QUANTIFIED, NO PAIN PRESENT: ICD-10-PCS | Mod: S$GLB,,, | Performed by: SURGERY

## 2021-02-11 PROCEDURE — 3008F PR BODY MASS INDEX (BMI) DOCUMENTED: ICD-10-PCS | Mod: CPTII,S$GLB,, | Performed by: SURGERY

## 2021-02-11 PROCEDURE — 1126F AMNT PAIN NOTED NONE PRSNT: CPT | Mod: S$GLB,,, | Performed by: SURGERY

## 2021-02-11 PROCEDURE — 3074F SYST BP LT 130 MM HG: CPT | Mod: CPTII,S$GLB,, | Performed by: SURGERY

## 2021-02-12 ENCOUNTER — DOCUMENTATION ONLY (OUTPATIENT)
Dept: SURGERY | Facility: CLINIC | Age: 71
End: 2021-02-12

## 2021-02-15 DIAGNOSIS — C50.911 MALIGNANT NEOPLASM OF RIGHT FEMALE BREAST, UNSPECIFIED ESTROGEN RECEPTOR STATUS, UNSPECIFIED SITE OF BREAST: Primary | ICD-10-CM

## 2021-03-01 ENCOUNTER — HOSPITAL ENCOUNTER (OUTPATIENT)
Dept: RADIOLOGY | Facility: HOSPITAL | Age: 71
Discharge: HOME | End: 2021-03-01
Attending: SURGERY
Payer: COMMERCIAL

## 2021-03-01 ENCOUNTER — TELEPHONE (OUTPATIENT)
Dept: SURGERY | Facility: CLINIC | Age: 71
End: 2021-03-01

## 2021-03-01 DIAGNOSIS — C50.911 MALIGNANT NEOPLASM OF RIGHT FEMALE BREAST, UNSPECIFIED ESTROGEN RECEPTOR STATUS, UNSPECIFIED SITE OF BREAST: ICD-10-CM

## 2021-03-01 PROCEDURE — 19281 MAMMO BREAST RADAR REFLECTOR LOC W/MAMMO GUIDANCE, 1ST LESION, RIGHT: ICD-10-PCS | Mod: RT,,, | Performed by: RADIOLOGY

## 2021-03-01 PROCEDURE — A4648 IMPLANTABLE TISSUE MARKER: HCPCS

## 2021-03-01 PROCEDURE — 19281 PERQ DEVICE BREAST 1ST IMAG: CPT | Mod: RT,,, | Performed by: RADIOLOGY

## 2021-03-01 PROCEDURE — 25000003 PHARM REV CODE 250: Performed by: SURGERY

## 2021-03-01 RX ORDER — INDOMETHACIN 25 MG/1
1 CAPSULE ORAL ONCE
Status: DISCONTINUED | OUTPATIENT
Start: 2021-03-01 | End: 2021-03-02 | Stop reason: HOSPADM

## 2021-03-01 RX ORDER — LIDOCAINE HYDROCHLORIDE 20 MG/ML
9 INJECTION, SOLUTION INFILTRATION; PERINEURAL ONCE
Status: COMPLETED | OUTPATIENT
Start: 2021-03-01 | End: 2021-03-01

## 2021-03-01 RX ADMIN — LIDOCAINE HYDROCHLORIDE 9 ML: 20 INJECTION, SOLUTION INFILTRATION; PERINEURAL at 02:03

## 2021-03-02 ENCOUNTER — HOSPITAL ENCOUNTER (OUTPATIENT)
Dept: RADIOLOGY | Facility: HOSPITAL | Age: 71
Discharge: HOME | End: 2021-03-02
Attending: SURGERY
Payer: COMMERCIAL

## 2021-03-02 ENCOUNTER — ANESTHESIA EVENT (OUTPATIENT)
Dept: SURGERY | Facility: HOSPITAL | Age: 71
End: 2021-03-02
Payer: COMMERCIAL

## 2021-03-02 ENCOUNTER — ANESTHESIA (OUTPATIENT)
Dept: SURGERY | Facility: HOSPITAL | Age: 71
End: 2021-03-02
Payer: COMMERCIAL

## 2021-03-02 ENCOUNTER — HOSPITAL ENCOUNTER (OUTPATIENT)
Facility: HOSPITAL | Age: 71
Discharge: HOME | End: 2021-03-02
Attending: SURGERY | Admitting: SURGERY
Payer: COMMERCIAL

## 2021-03-02 VITALS
OXYGEN SATURATION: 96 % | TEMPERATURE: 98 F | BODY MASS INDEX: 30.18 KG/M2 | WEIGHT: 164 LBS | SYSTOLIC BLOOD PRESSURE: 129 MMHG | HEART RATE: 78 BPM | HEIGHT: 62 IN | DIASTOLIC BLOOD PRESSURE: 93 MMHG | RESPIRATION RATE: 20 BRPM

## 2021-03-02 DIAGNOSIS — C50.911 MALIGNANT NEOPLASM OF RIGHT FEMALE BREAST, UNSPECIFIED ESTROGEN RECEPTOR STATUS, UNSPECIFIED SITE OF BREAST: ICD-10-CM

## 2021-03-02 DIAGNOSIS — C50.919 BREAST CANCER: Primary | ICD-10-CM

## 2021-03-02 PROCEDURE — 25000003 PHARM REV CODE 250: Performed by: ANESTHESIOLOGY

## 2021-03-02 PROCEDURE — 19301 PARTIAL MASTECTOMY: CPT | Mod: RT,,, | Performed by: SURGERY

## 2021-03-02 PROCEDURE — 19301 PR MASTECTOMY, PARTIAL: ICD-10-PCS | Mod: RT,,, | Performed by: SURGERY

## 2021-03-02 PROCEDURE — 76098 X-RAY EXAM SURGICAL SPECIMEN: CPT | Mod: 26,,, | Performed by: RADIOLOGY

## 2021-03-02 PROCEDURE — D9220A PRA ANESTHESIA: Mod: ,,, | Performed by: ANESTHESIOLOGY

## 2021-03-02 PROCEDURE — 38525 PR BIOPSY/REM LYMPH NODES, AXILLARY: ICD-10-PCS | Mod: 51,RT,, | Performed by: SURGERY

## 2021-03-02 PROCEDURE — A9520 TC99 TILMANOCEPT DIAG 0.5MCI: HCPCS

## 2021-03-02 PROCEDURE — 88307 PR  SURG PATH,LEVEL V: ICD-10-PCS | Mod: 26,,, | Performed by: PATHOLOGY

## 2021-03-02 PROCEDURE — 88341 IMHCHEM/IMCYTCHM EA ADD ANTB: CPT | Mod: 26,,, | Performed by: PATHOLOGY

## 2021-03-02 PROCEDURE — 76098 X-RAY EXAM SURGICAL SPECIMEN: CPT | Mod: TC

## 2021-03-02 PROCEDURE — 63600175 PHARM REV CODE 636 W HCPCS: Mod: JG | Performed by: SURGERY

## 2021-03-02 PROCEDURE — 37000008 HC ANESTHESIA 1ST 15 MINUTES: Performed by: SURGERY

## 2021-03-02 PROCEDURE — 38525 BIOPSY/REMOVAL LYMPH NODES: CPT | Mod: 51,RT,, | Performed by: SURGERY

## 2021-03-02 PROCEDURE — 36000707: Performed by: SURGERY

## 2021-03-02 PROCEDURE — 88307 TISSUE EXAM BY PATHOLOGIST: CPT | Mod: 59 | Performed by: PATHOLOGY

## 2021-03-02 PROCEDURE — 71000015 HC POSTOP RECOV 1ST HR: Performed by: SURGERY

## 2021-03-02 PROCEDURE — 37000009 HC ANESTHESIA EA ADD 15 MINS: Performed by: SURGERY

## 2021-03-02 PROCEDURE — 88342 IMHCHEM/IMCYTCHM 1ST ANTB: CPT | Mod: 26,,, | Performed by: PATHOLOGY

## 2021-03-02 PROCEDURE — 71000044 HC DOSC ROUTINE RECOVERY FIRST HOUR: Performed by: SURGERY

## 2021-03-02 PROCEDURE — D9220A PRA ANESTHESIA: Mod: ,,, | Performed by: NURSE ANESTHETIST, CERTIFIED REGISTERED

## 2021-03-02 PROCEDURE — 88341 IMHCHEM/IMCYTCHM EA ADD ANTB: CPT | Performed by: PATHOLOGY

## 2021-03-02 PROCEDURE — 63600175 PHARM REV CODE 636 W HCPCS: Performed by: NURSE ANESTHETIST, CERTIFIED REGISTERED

## 2021-03-02 PROCEDURE — 76098 MAMMO BREAST SPECIMEN: ICD-10-PCS | Mod: 26,,, | Performed by: RADIOLOGY

## 2021-03-02 PROCEDURE — 38792 PR IDENTIFY SENTINEL 2DE: ICD-10-PCS | Mod: 59,RT,, | Performed by: SURGERY

## 2021-03-02 PROCEDURE — 38900 IO MAP OF SENT LYMPH NODE: CPT | Mod: RT,,, | Performed by: SURGERY

## 2021-03-02 PROCEDURE — 88307 TISSUE EXAM BY PATHOLOGIST: CPT | Mod: 26,,, | Performed by: PATHOLOGY

## 2021-03-02 PROCEDURE — 38900 PR INTRAOPERATIVE SENTINEL LYMPH NODE ID W DYE INJECTION: ICD-10-PCS | Mod: RT,,, | Performed by: SURGERY

## 2021-03-02 PROCEDURE — 25000003 PHARM REV CODE 250: Performed by: NURSE ANESTHETIST, CERTIFIED REGISTERED

## 2021-03-02 PROCEDURE — D9220A PRA ANESTHESIA: ICD-10-PCS | Mod: ,,, | Performed by: ANESTHESIOLOGY

## 2021-03-02 PROCEDURE — 88342 IMHCHEM/IMCYTCHM 1ST ANTB: CPT | Performed by: PATHOLOGY

## 2021-03-02 PROCEDURE — D9220A PRA ANESTHESIA: ICD-10-PCS | Mod: ,,, | Performed by: NURSE ANESTHETIST, CERTIFIED REGISTERED

## 2021-03-02 PROCEDURE — 88342 CHG IMMUNOCYTOCHEMISTRY: ICD-10-PCS | Mod: 26,,, | Performed by: PATHOLOGY

## 2021-03-02 PROCEDURE — 25000003 PHARM REV CODE 250: Performed by: STUDENT IN AN ORGANIZED HEALTH CARE EDUCATION/TRAINING PROGRAM

## 2021-03-02 PROCEDURE — 38792 RA TRACER ID OF SENTINL NODE: CPT | Mod: 59,RT,, | Performed by: SURGERY

## 2021-03-02 PROCEDURE — 71000045 HC DOSC ROUTINE RECOVERY EA ADD'L HR: Performed by: SURGERY

## 2021-03-02 PROCEDURE — 88341 PR IHC OR ICC EACH ADD'L SINGLE ANTIBODY  STAINPR: ICD-10-PCS | Mod: 26,,, | Performed by: PATHOLOGY

## 2021-03-02 PROCEDURE — 36000706: Performed by: SURGERY

## 2021-03-02 RX ORDER — FENTANYL CITRATE 50 UG/ML
25 INJECTION, SOLUTION INTRAMUSCULAR; INTRAVENOUS EVERY 5 MIN PRN
Status: DISCONTINUED | OUTPATIENT
Start: 2021-03-02 | End: 2021-03-02 | Stop reason: HOSPADM

## 2021-03-02 RX ORDER — FAMOTIDINE 10 MG/ML
INJECTION INTRAVENOUS
Status: DISCONTINUED | OUTPATIENT
Start: 2021-03-02 | End: 2021-03-02

## 2021-03-02 RX ORDER — ACETAMINOPHEN 10 MG/ML
INJECTION, SOLUTION INTRAVENOUS
Status: DISCONTINUED | OUTPATIENT
Start: 2021-03-02 | End: 2021-03-02

## 2021-03-02 RX ORDER — PHENYLEPHRINE HCL IN 0.9% NACL 1 MG/10 ML
SYRINGE (ML) INTRAVENOUS
Status: DISCONTINUED | OUTPATIENT
Start: 2021-03-02 | End: 2021-03-02

## 2021-03-02 RX ORDER — ROCURONIUM BROMIDE 10 MG/ML
INJECTION, SOLUTION INTRAVENOUS
Status: DISCONTINUED | OUTPATIENT
Start: 2021-03-02 | End: 2021-03-02

## 2021-03-02 RX ORDER — VASOPRESSIN 20 [USP'U]/ML
INJECTION, SOLUTION INTRAMUSCULAR; SUBCUTANEOUS
Status: DISCONTINUED | OUTPATIENT
Start: 2021-03-02 | End: 2021-03-02

## 2021-03-02 RX ORDER — ONDANSETRON 2 MG/ML
4 INJECTION INTRAMUSCULAR; INTRAVENOUS DAILY PRN
Status: DISCONTINUED | OUTPATIENT
Start: 2021-03-02 | End: 2021-03-02 | Stop reason: HOSPADM

## 2021-03-02 RX ORDER — LIDOCAINE HYDROCHLORIDE 20 MG/ML
INJECTION INTRAVENOUS
Status: DISCONTINUED | OUTPATIENT
Start: 2021-03-02 | End: 2021-03-02

## 2021-03-02 RX ORDER — PROPOFOL 10 MG/ML
VIAL (ML) INTRAVENOUS
Status: DISCONTINUED | OUTPATIENT
Start: 2021-03-02 | End: 2021-03-02

## 2021-03-02 RX ORDER — ISOSULFAN BLUE 50 MG/5ML
INJECTION, SOLUTION SUBCUTANEOUS
Status: DISCONTINUED | OUTPATIENT
Start: 2021-03-02 | End: 2021-03-02 | Stop reason: HOSPADM

## 2021-03-02 RX ORDER — FENTANYL CITRATE 50 UG/ML
INJECTION, SOLUTION INTRAMUSCULAR; INTRAVENOUS
Status: DISCONTINUED | OUTPATIENT
Start: 2021-03-02 | End: 2021-03-02

## 2021-03-02 RX ORDER — OXYCODONE HYDROCHLORIDE 5 MG/1
5 TABLET ORAL ONCE
Status: COMPLETED | OUTPATIENT
Start: 2021-03-02 | End: 2021-03-02

## 2021-03-02 RX ORDER — ONDANSETRON 2 MG/ML
INJECTION INTRAMUSCULAR; INTRAVENOUS
Status: DISCONTINUED | OUTPATIENT
Start: 2021-03-02 | End: 2021-03-02

## 2021-03-02 RX ORDER — OXYCODONE AND ACETAMINOPHEN 5; 325 MG/1; MG/1
1 TABLET ORAL EVERY 6 HOURS PRN
Qty: 15 TABLET | Refills: 0 | Status: SHIPPED | OUTPATIENT
Start: 2021-03-02

## 2021-03-02 RX ORDER — CEFAZOLIN SODIUM 1 G/3ML
2 INJECTION, POWDER, FOR SOLUTION INTRAMUSCULAR; INTRAVENOUS
Status: ACTIVE | OUTPATIENT
Start: 2021-03-02

## 2021-03-02 RX ORDER — DEXAMETHASONE SODIUM PHOSPHATE 4 MG/ML
INJECTION, SOLUTION INTRA-ARTICULAR; INTRALESIONAL; INTRAMUSCULAR; INTRAVENOUS; SOFT TISSUE
Status: DISCONTINUED | OUTPATIENT
Start: 2021-03-02 | End: 2021-03-02

## 2021-03-02 RX ORDER — CLINDAMYCIN PHOSPHATE 900 MG/50ML
INJECTION, SOLUTION INTRAVENOUS
Status: DISCONTINUED | OUTPATIENT
Start: 2021-03-02 | End: 2021-03-02

## 2021-03-02 RX ORDER — SODIUM CHLORIDE 9 MG/ML
INJECTION, SOLUTION INTRAVENOUS CONTINUOUS
Status: ACTIVE | OUTPATIENT
Start: 2021-03-02

## 2021-03-02 RX ADMIN — VASOPRESSIN 2 UNITS: 20 INJECTION INTRAVENOUS at 01:03

## 2021-03-02 RX ADMIN — OXYCODONE 5 MG: 5 TABLET ORAL at 02:03

## 2021-03-02 RX ADMIN — FAMOTIDINE 20 MG: 10 INJECTION, SOLUTION INTRAVENOUS at 12:03

## 2021-03-02 RX ADMIN — VASOPRESSIN 2 UNITS: 20 INJECTION INTRAVENOUS at 12:03

## 2021-03-02 RX ADMIN — Medication 100 MCG: at 02:03

## 2021-03-02 RX ADMIN — PROPOFOL 150 MG: 10 INJECTION, EMULSION INTRAVENOUS at 12:03

## 2021-03-02 RX ADMIN — ONDANSETRON 4 MG: 2 INJECTION, SOLUTION INTRAMUSCULAR; INTRAVENOUS at 12:03

## 2021-03-02 RX ADMIN — Medication 150 MCG: at 12:03

## 2021-03-02 RX ADMIN — CLINDAMYCIN IN 5 PERCENT DEXTROSE 900 MG: 18 INJECTION, SOLUTION INTRAVENOUS at 12:03

## 2021-03-02 RX ADMIN — SUGAMMADEX 200 MG: 100 INJECTION, SOLUTION INTRAVENOUS at 01:03

## 2021-03-02 RX ADMIN — VASOPRESSIN 4 UNITS: 20 INJECTION INTRAVENOUS at 01:03

## 2021-03-02 RX ADMIN — LIDOCAINE HYDROCHLORIDE 100 MG: 20 INJECTION, SOLUTION INTRAVENOUS at 12:03

## 2021-03-02 RX ADMIN — SODIUM CHLORIDE: 0.9 INJECTION, SOLUTION INTRAVENOUS at 11:03

## 2021-03-02 RX ADMIN — FENTANYL CITRATE 50 MCG: 50 INJECTION, SOLUTION INTRAMUSCULAR; INTRAVENOUS at 12:03

## 2021-03-02 RX ADMIN — ROCURONIUM BROMIDE 50 MG: 10 INJECTION, SOLUTION INTRAVENOUS at 12:03

## 2021-03-02 RX ADMIN — Medication 200 MCG: at 12:03

## 2021-03-02 RX ADMIN — GLYCOPYRROLATE 0.2 MG: 0.2 INJECTION, SOLUTION INTRAMUSCULAR; INTRAVITREAL at 01:03

## 2021-03-02 RX ADMIN — DEXAMETHASONE SODIUM PHOSPHATE 4 MG: 4 INJECTION, SOLUTION INTRAMUSCULAR; INTRAVENOUS at 12:03

## 2021-03-02 RX ADMIN — ACETAMINOPHEN 1000 MG: 10 INJECTION, SOLUTION INTRAVENOUS at 12:03

## 2021-03-03 DIAGNOSIS — I73.9 PAD (PERIPHERAL ARTERY DISEASE): Primary | ICD-10-CM

## 2021-03-08 RX ORDER — PREGABALIN 75 MG/1
75 CAPSULE ORAL 3 TIMES DAILY
Qty: 90 CAPSULE | Refills: 2 | Status: SHIPPED | OUTPATIENT
Start: 2021-03-08 | End: 2021-06-11

## 2021-03-12 LAB
COMMENT: NORMAL
FINAL PATHOLOGIC DIAGNOSIS: NORMAL
GROSS: NORMAL
Lab: NORMAL

## 2021-03-15 ENCOUNTER — DOCUMENTATION ONLY (OUTPATIENT)
Dept: SURGERY | Facility: CLINIC | Age: 71
End: 2021-03-15

## 2021-03-17 ENCOUNTER — OFFICE VISIT (OUTPATIENT)
Dept: SURGERY | Facility: CLINIC | Age: 71
End: 2021-03-17
Payer: COMMERCIAL

## 2021-03-17 VITALS
WEIGHT: 164 LBS | HEIGHT: 62 IN | BODY MASS INDEX: 30.18 KG/M2 | DIASTOLIC BLOOD PRESSURE: 85 MMHG | RESPIRATION RATE: 18 BRPM | SYSTOLIC BLOOD PRESSURE: 112 MMHG | HEART RATE: 87 BPM | OXYGEN SATURATION: 97 %

## 2021-03-17 DIAGNOSIS — C50.519 MALIGNANT NEOPLASM OF LOWER-OUTER QUADRANT OF BREAST OF FEMALE, ESTROGEN RECEPTOR POSITIVE, UNSPECIFIED LATERALITY: Primary | ICD-10-CM

## 2021-03-17 DIAGNOSIS — Z17.0 MALIGNANT NEOPLASM OF LOWER-OUTER QUADRANT OF BREAST OF FEMALE, ESTROGEN RECEPTOR POSITIVE, UNSPECIFIED LATERALITY: Primary | ICD-10-CM

## 2021-03-17 PROCEDURE — 3288F FALL RISK ASSESSMENT DOCD: CPT | Mod: CPTII,S$GLB,, | Performed by: SURGERY

## 2021-03-17 PROCEDURE — 99999 PR PBB SHADOW E&M-EST. PATIENT-LVL IV: CPT | Mod: PBBFAC,,, | Performed by: SURGERY

## 2021-03-17 PROCEDURE — 99024 POSTOP FOLLOW-UP VISIT: CPT | Mod: S$GLB,,, | Performed by: SURGERY

## 2021-03-17 PROCEDURE — 99024 PR POST-OP FOLLOW-UP VISIT: ICD-10-PCS | Mod: S$GLB,,, | Performed by: SURGERY

## 2021-03-17 PROCEDURE — 99999 PR PBB SHADOW E&M-EST. PATIENT-LVL IV: ICD-10-PCS | Mod: PBBFAC,,, | Performed by: SURGERY

## 2021-03-17 PROCEDURE — 1125F AMNT PAIN NOTED PAIN PRSNT: CPT | Mod: S$GLB,,, | Performed by: SURGERY

## 2021-03-17 PROCEDURE — 1125F PR PAIN SEVERITY QUANTIFIED, PAIN PRESENT: ICD-10-PCS | Mod: S$GLB,,, | Performed by: SURGERY

## 2021-03-17 PROCEDURE — 1101F PR PT FALLS ASSESS DOC 0-1 FALLS W/OUT INJ PAST YR: ICD-10-PCS | Mod: CPTII,S$GLB,, | Performed by: SURGERY

## 2021-03-17 PROCEDURE — 1101F PT FALLS ASSESS-DOCD LE1/YR: CPT | Mod: CPTII,S$GLB,, | Performed by: SURGERY

## 2021-03-17 PROCEDURE — 3008F PR BODY MASS INDEX (BMI) DOCUMENTED: ICD-10-PCS | Mod: CPTII,S$GLB,, | Performed by: SURGERY

## 2021-03-17 PROCEDURE — 3008F BODY MASS INDEX DOCD: CPT | Mod: CPTII,S$GLB,, | Performed by: SURGERY

## 2021-03-17 PROCEDURE — 3288F PR FALLS RISK ASSESSMENT DOCUMENTED: ICD-10-PCS | Mod: CPTII,S$GLB,, | Performed by: SURGERY

## 2021-03-23 ENCOUNTER — PATIENT OUTREACH (OUTPATIENT)
Dept: ADMINISTRATIVE | Facility: OTHER | Age: 71
End: 2021-03-23

## 2021-03-23 RX ORDER — CANDESARTAN 16 MG/1
16 TABLET ORAL DAILY
Qty: 90 TABLET | Refills: 0 | Status: SHIPPED | OUTPATIENT
Start: 2021-03-23 | End: 2021-07-13

## 2021-03-25 ENCOUNTER — OFFICE VISIT (OUTPATIENT)
Dept: SURGERY | Facility: CLINIC | Age: 71
End: 2021-03-25
Payer: MEDICARE

## 2021-03-25 ENCOUNTER — OFFICE VISIT (OUTPATIENT)
Dept: HEMATOLOGY/ONCOLOGY | Facility: CLINIC | Age: 71
End: 2021-03-25
Payer: COMMERCIAL

## 2021-03-25 VITALS
TEMPERATURE: 98 F | HEART RATE: 75 BPM | RESPIRATION RATE: 18 BRPM | DIASTOLIC BLOOD PRESSURE: 57 MMHG | WEIGHT: 160.5 LBS | SYSTOLIC BLOOD PRESSURE: 154 MMHG | OXYGEN SATURATION: 98 % | SYSTOLIC BLOOD PRESSURE: 116 MMHG | DIASTOLIC BLOOD PRESSURE: 67 MMHG | BODY MASS INDEX: 29.57 KG/M2 | BODY MASS INDEX: 29.53 KG/M2 | HEART RATE: 79 BPM | HEIGHT: 62 IN | WEIGHT: 160.69 LBS | HEIGHT: 62 IN

## 2021-03-25 DIAGNOSIS — C50.211 MALIGNANT NEOPLASM OF UPPER-INNER QUADRANT OF RIGHT BREAST IN FEMALE, ESTROGEN RECEPTOR POSITIVE: Primary | ICD-10-CM

## 2021-03-25 DIAGNOSIS — Z17.0 MALIGNANT NEOPLASM OF UPPER-INNER QUADRANT OF RIGHT BREAST IN FEMALE, ESTROGEN RECEPTOR POSITIVE: Primary | ICD-10-CM

## 2021-03-25 PROCEDURE — 1101F PR PT FALLS ASSESS DOC 0-1 FALLS W/OUT INJ PAST YR: ICD-10-PCS | Mod: CPTII,S$GLB,, | Performed by: STUDENT IN AN ORGANIZED HEALTH CARE EDUCATION/TRAINING PROGRAM

## 2021-03-25 PROCEDURE — 1125F AMNT PAIN NOTED PAIN PRSNT: CPT | Mod: S$GLB,,, | Performed by: STUDENT IN AN ORGANIZED HEALTH CARE EDUCATION/TRAINING PROGRAM

## 2021-03-25 PROCEDURE — 99999 PR PBB SHADOW E&M-EST. PATIENT-LVL IV: CPT | Mod: PBBFAC,,, | Performed by: STUDENT IN AN ORGANIZED HEALTH CARE EDUCATION/TRAINING PROGRAM

## 2021-03-25 PROCEDURE — 99205 PR OFFICE/OUTPT VISIT, NEW, LEVL V, 60-74 MIN: ICD-10-PCS | Mod: S$GLB,,, | Performed by: RADIOLOGY

## 2021-03-25 PROCEDURE — 1159F MED LIST DOCD IN RCRD: CPT | Mod: S$GLB,,, | Performed by: RADIOLOGY

## 2021-03-25 PROCEDURE — 3078F DIAST BP <80 MM HG: CPT | Mod: CPTII,S$GLB,, | Performed by: STUDENT IN AN ORGANIZED HEALTH CARE EDUCATION/TRAINING PROGRAM

## 2021-03-25 PROCEDURE — 1101F PR PT FALLS ASSESS DOC 0-1 FALLS W/OUT INJ PAST YR: ICD-10-PCS | Mod: CPTII,S$GLB,, | Performed by: RADIOLOGY

## 2021-03-25 PROCEDURE — 1125F PR PAIN SEVERITY QUANTIFIED, PAIN PRESENT: ICD-10-PCS | Mod: S$GLB,,, | Performed by: STUDENT IN AN ORGANIZED HEALTH CARE EDUCATION/TRAINING PROGRAM

## 2021-03-25 PROCEDURE — 99205 PR OFFICE/OUTPT VISIT, NEW, LEVL V, 60-74 MIN: ICD-10-PCS | Mod: S$GLB,,, | Performed by: STUDENT IN AN ORGANIZED HEALTH CARE EDUCATION/TRAINING PROGRAM

## 2021-03-25 PROCEDURE — 99999 PR PBB SHADOW E&M-EST. PATIENT-LVL I: ICD-10-PCS | Mod: PBBFAC,,, | Performed by: RADIOLOGY

## 2021-03-25 PROCEDURE — 1159F MED LIST DOCD IN RCRD: CPT | Mod: S$GLB,,, | Performed by: STUDENT IN AN ORGANIZED HEALTH CARE EDUCATION/TRAINING PROGRAM

## 2021-03-25 PROCEDURE — 99999 PR PBB SHADOW E&M-EST. PATIENT-LVL I: CPT | Mod: PBBFAC,,, | Performed by: RADIOLOGY

## 2021-03-25 PROCEDURE — 3008F PR BODY MASS INDEX (BMI) DOCUMENTED: ICD-10-PCS | Mod: CPTII,S$GLB,, | Performed by: STUDENT IN AN ORGANIZED HEALTH CARE EDUCATION/TRAINING PROGRAM

## 2021-03-25 PROCEDURE — 3288F PR FALLS RISK ASSESSMENT DOCUMENTED: ICD-10-PCS | Mod: CPTII,S$GLB,, | Performed by: RADIOLOGY

## 2021-03-25 PROCEDURE — 3074F PR MOST RECENT SYSTOLIC BLOOD PRESSURE < 130 MM HG: ICD-10-PCS | Mod: CPTII,S$GLB,, | Performed by: STUDENT IN AN ORGANIZED HEALTH CARE EDUCATION/TRAINING PROGRAM

## 2021-03-25 PROCEDURE — 3074F SYST BP LT 130 MM HG: CPT | Mod: CPTII,S$GLB,, | Performed by: STUDENT IN AN ORGANIZED HEALTH CARE EDUCATION/TRAINING PROGRAM

## 2021-03-25 PROCEDURE — 99205 OFFICE O/P NEW HI 60 MIN: CPT | Mod: S$GLB,,, | Performed by: RADIOLOGY

## 2021-03-25 PROCEDURE — 99205 OFFICE O/P NEW HI 60 MIN: CPT | Mod: S$GLB,,, | Performed by: STUDENT IN AN ORGANIZED HEALTH CARE EDUCATION/TRAINING PROGRAM

## 2021-03-25 PROCEDURE — 1159F PR MEDICATION LIST DOCUMENTED IN MEDICAL RECORD: ICD-10-PCS | Mod: S$GLB,,, | Performed by: RADIOLOGY

## 2021-03-25 PROCEDURE — 1101F PT FALLS ASSESS-DOCD LE1/YR: CPT | Mod: CPTII,S$GLB,, | Performed by: RADIOLOGY

## 2021-03-25 PROCEDURE — 3288F FALL RISK ASSESSMENT DOCD: CPT | Mod: CPTII,S$GLB,, | Performed by: STUDENT IN AN ORGANIZED HEALTH CARE EDUCATION/TRAINING PROGRAM

## 2021-03-25 PROCEDURE — 3288F FALL RISK ASSESSMENT DOCD: CPT | Mod: CPTII,S$GLB,, | Performed by: RADIOLOGY

## 2021-03-25 PROCEDURE — 3288F PR FALLS RISK ASSESSMENT DOCUMENTED: ICD-10-PCS | Mod: CPTII,S$GLB,, | Performed by: STUDENT IN AN ORGANIZED HEALTH CARE EDUCATION/TRAINING PROGRAM

## 2021-03-25 PROCEDURE — 99999 PR PBB SHADOW E&M-EST. PATIENT-LVL IV: ICD-10-PCS | Mod: PBBFAC,,, | Performed by: STUDENT IN AN ORGANIZED HEALTH CARE EDUCATION/TRAINING PROGRAM

## 2021-03-25 PROCEDURE — 1159F PR MEDICATION LIST DOCUMENTED IN MEDICAL RECORD: ICD-10-PCS | Mod: S$GLB,,, | Performed by: STUDENT IN AN ORGANIZED HEALTH CARE EDUCATION/TRAINING PROGRAM

## 2021-03-25 PROCEDURE — 3008F BODY MASS INDEX DOCD: CPT | Mod: CPTII,S$GLB,, | Performed by: STUDENT IN AN ORGANIZED HEALTH CARE EDUCATION/TRAINING PROGRAM

## 2021-03-25 PROCEDURE — 3078F PR MOST RECENT DIASTOLIC BLOOD PRESSURE < 80 MM HG: ICD-10-PCS | Mod: CPTII,S$GLB,, | Performed by: STUDENT IN AN ORGANIZED HEALTH CARE EDUCATION/TRAINING PROGRAM

## 2021-03-25 PROCEDURE — 1101F PT FALLS ASSESS-DOCD LE1/YR: CPT | Mod: CPTII,S$GLB,, | Performed by: STUDENT IN AN ORGANIZED HEALTH CARE EDUCATION/TRAINING PROGRAM

## 2021-03-29 ENCOUNTER — HOSPITAL ENCOUNTER (OUTPATIENT)
Dept: RADIATION THERAPY | Facility: HOSPITAL | Age: 71
Discharge: HOME | End: 2021-03-29
Attending: RADIOLOGY
Payer: COMMERCIAL

## 2021-03-29 PROCEDURE — 77333 RADIATION TREATMENT AID(S): CPT | Mod: 26,,, | Performed by: RADIOLOGY

## 2021-03-29 PROCEDURE — 77290 THER RAD SIMULAJ FIELD CPLX: CPT | Mod: TC | Performed by: RADIOLOGY

## 2021-03-29 PROCEDURE — 77014 HC CT GUIDANCE RADIATION THERAPY FLDS PLACEMENT: CPT | Mod: TC | Performed by: RADIOLOGY

## 2021-03-29 PROCEDURE — 77263 PR  RADIATION THERAPY PLAN COMPLEX: ICD-10-PCS | Mod: ,,, | Performed by: RADIOLOGY

## 2021-03-29 PROCEDURE — 77333 PR  RADN TREATMENT AID(S) INTERM: ICD-10-PCS | Mod: 26,,, | Performed by: RADIOLOGY

## 2021-03-29 PROCEDURE — 77333 RADIATION TREATMENT AID(S): CPT | Mod: TC | Performed by: RADIOLOGY

## 2021-03-29 PROCEDURE — 77263 THER RADIOLOGY TX PLNG CPLX: CPT | Mod: ,,, | Performed by: RADIOLOGY

## 2021-03-30 PROCEDURE — 77301 PR  INTEN MOD RADIOTHER PLAN W/DOSE VOL HIST: ICD-10-PCS | Mod: 26,,, | Performed by: RADIOLOGY

## 2021-03-30 PROCEDURE — 77301 RADIOTHERAPY DOSE PLAN IMRT: CPT | Mod: TC | Performed by: RADIOLOGY

## 2021-03-30 PROCEDURE — 77301 RADIOTHERAPY DOSE PLAN IMRT: CPT | Mod: 26,,, | Performed by: RADIOLOGY

## 2021-03-31 PROCEDURE — 77300 PR RADIATION THERAPY,DOSIMETRY PLAN: ICD-10-PCS | Mod: 26,,, | Performed by: RADIOLOGY

## 2021-03-31 PROCEDURE — 77338 DESIGN MLC DEVICE FOR IMRT: CPT | Mod: TC | Performed by: RADIOLOGY

## 2021-03-31 PROCEDURE — 77338 DESIGN MLC DEVICE FOR IMRT: CPT | Mod: 26,,, | Performed by: RADIOLOGY

## 2021-03-31 PROCEDURE — 77338 PR  MLC IMRT DESIGN & CONSTRUCTION PER IMRT PLAN: ICD-10-PCS | Mod: 26,,, | Performed by: RADIOLOGY

## 2021-03-31 PROCEDURE — 77300 RADIATION THERAPY DOSE PLAN: CPT | Mod: TC | Performed by: RADIOLOGY

## 2021-03-31 PROCEDURE — 77300 RADIATION THERAPY DOSE PLAN: CPT | Mod: 26,,, | Performed by: RADIOLOGY

## 2021-04-01 ENCOUNTER — HOSPITAL ENCOUNTER (OUTPATIENT)
Dept: RADIATION THERAPY | Facility: HOSPITAL | Age: 71
Discharge: HOME | End: 2021-04-01
Attending: RADIOLOGY
Payer: COMMERCIAL

## 2021-04-06 ENCOUNTER — TELEPHONE (OUTPATIENT)
Dept: INTERNAL MEDICINE | Facility: CLINIC | Age: 71
End: 2021-04-06

## 2021-04-06 DIAGNOSIS — J03.90 TONSILLITIS: ICD-10-CM

## 2021-04-06 RX ORDER — NITROFURANTOIN 25; 75 MG/1; MG/1
100 CAPSULE ORAL 2 TIMES DAILY
Qty: 14 CAPSULE | Refills: 0 | Status: SHIPPED | OUTPATIENT
Start: 2021-04-06

## 2021-04-08 PROCEDURE — 77385 HC IMRT, SIMPLE: CPT | Performed by: RADIOLOGY

## 2021-04-08 PROCEDURE — 77014 PR  CT GUIDANCE PLACEMENT RAD THERAPY FIELDS: CPT | Mod: 26,,, | Performed by: RADIOLOGY

## 2021-04-08 PROCEDURE — 77014 HC CT GUIDANCE RADIATION THERAPY FLDS PLACEMENT: CPT | Mod: TC | Performed by: RADIOLOGY

## 2021-04-08 PROCEDURE — 77014 PR  CT GUIDANCE PLACEMENT RAD THERAPY FIELDS: ICD-10-PCS | Mod: 26,,, | Performed by: RADIOLOGY

## 2021-04-14 PROCEDURE — 77014 PR  CT GUIDANCE PLACEMENT RAD THERAPY FIELDS: ICD-10-PCS | Mod: 26,,, | Performed by: RADIOLOGY

## 2021-04-14 PROCEDURE — 77014 HC CT GUIDANCE RADIATION THERAPY FLDS PLACEMENT: CPT | Mod: TC | Performed by: RADIOLOGY

## 2021-04-14 PROCEDURE — 77014 PR  CT GUIDANCE PLACEMENT RAD THERAPY FIELDS: CPT | Mod: 26,,, | Performed by: RADIOLOGY

## 2021-04-14 PROCEDURE — 77385 HC IMRT, SIMPLE: CPT | Performed by: RADIOLOGY

## 2021-04-16 PROCEDURE — 77014 HC CT GUIDANCE RADIATION THERAPY FLDS PLACEMENT: CPT | Mod: TC | Performed by: RADIOLOGY

## 2021-04-16 PROCEDURE — 77014 PR  CT GUIDANCE PLACEMENT RAD THERAPY FIELDS: ICD-10-PCS | Mod: 26,,, | Performed by: RADIOLOGY

## 2021-04-16 PROCEDURE — 77385 HC IMRT, SIMPLE: CPT | Performed by: RADIOLOGY

## 2021-04-16 PROCEDURE — 77014 PR  CT GUIDANCE PLACEMENT RAD THERAPY FIELDS: CPT | Mod: 26,,, | Performed by: RADIOLOGY

## 2021-04-19 PROCEDURE — 77014 PR  CT GUIDANCE PLACEMENT RAD THERAPY FIELDS: CPT | Mod: 26,,, | Performed by: RADIOLOGY

## 2021-04-19 PROCEDURE — 77014 PR  CT GUIDANCE PLACEMENT RAD THERAPY FIELDS: ICD-10-PCS | Mod: 26,,, | Performed by: RADIOLOGY

## 2021-04-19 PROCEDURE — 77385 HC IMRT, SIMPLE: CPT | Performed by: RADIOLOGY

## 2021-04-19 PROCEDURE — 77014 HC CT GUIDANCE RADIATION THERAPY FLDS PLACEMENT: CPT | Mod: TC | Performed by: RADIOLOGY

## 2021-04-21 PROCEDURE — 77014 PR  CT GUIDANCE PLACEMENT RAD THERAPY FIELDS: CPT | Mod: 26,,, | Performed by: RADIOLOGY

## 2021-04-21 PROCEDURE — 77014 PR  CT GUIDANCE PLACEMENT RAD THERAPY FIELDS: ICD-10-PCS | Mod: 26,,, | Performed by: RADIOLOGY

## 2021-04-21 PROCEDURE — 77385 HC IMRT, SIMPLE: CPT | Performed by: RADIOLOGY

## 2021-04-21 PROCEDURE — 77014 HC CT GUIDANCE RADIATION THERAPY FLDS PLACEMENT: CPT | Mod: TC | Performed by: RADIOLOGY

## 2021-04-22 ENCOUNTER — OFFICE VISIT (OUTPATIENT)
Dept: VASCULAR SURGERY | Facility: CLINIC | Age: 71
End: 2021-04-22
Attending: SURGERY
Payer: COMMERCIAL

## 2021-04-22 ENCOUNTER — HOSPITAL ENCOUNTER (OUTPATIENT)
Dept: VASCULAR SURGERY | Facility: CLINIC | Age: 71
Discharge: HOME | End: 2021-04-22
Attending: SURGERY
Payer: COMMERCIAL

## 2021-04-22 ENCOUNTER — OFFICE VISIT (OUTPATIENT)
Dept: HEMATOLOGY/ONCOLOGY | Facility: CLINIC | Age: 71
End: 2021-04-22
Payer: COMMERCIAL

## 2021-04-22 VITALS
SYSTOLIC BLOOD PRESSURE: 144 MMHG | HEART RATE: 86 BPM | RESPIRATION RATE: 18 BRPM | WEIGHT: 159.38 LBS | BODY MASS INDEX: 29.33 KG/M2 | TEMPERATURE: 97 F | OXYGEN SATURATION: 97 % | HEIGHT: 62 IN | DIASTOLIC BLOOD PRESSURE: 65 MMHG

## 2021-04-22 VITALS
TEMPERATURE: 98 F | DIASTOLIC BLOOD PRESSURE: 55 MMHG | SYSTOLIC BLOOD PRESSURE: 105 MMHG | HEART RATE: 79 BPM | WEIGHT: 158.94 LBS | BODY MASS INDEX: 29.25 KG/M2 | HEIGHT: 62 IN

## 2021-04-22 DIAGNOSIS — I73.9 PERIPHERAL ARTERY DISEASE: ICD-10-CM

## 2021-04-22 DIAGNOSIS — Z17.0 MALIGNANT NEOPLASM OF UPPER-INNER QUADRANT OF RIGHT BREAST IN FEMALE, ESTROGEN RECEPTOR POSITIVE: Primary | ICD-10-CM

## 2021-04-22 DIAGNOSIS — I73.9 PAD (PERIPHERAL ARTERY DISEASE): ICD-10-CM

## 2021-04-22 DIAGNOSIS — I77.1 ILIAC ARTERY STENOSIS, BILATERAL: Primary | ICD-10-CM

## 2021-04-22 DIAGNOSIS — C50.211 MALIGNANT NEOPLASM OF UPPER-INNER QUADRANT OF RIGHT BREAST IN FEMALE, ESTROGEN RECEPTOR POSITIVE: Primary | ICD-10-CM

## 2021-04-22 DIAGNOSIS — M53.86 SCIATICA ASSOCIATED WITH DISORDER OF LUMBAR SPINE: ICD-10-CM

## 2021-04-22 PROCEDURE — 1101F PR PT FALLS ASSESS DOC 0-1 FALLS W/OUT INJ PAST YR: ICD-10-PCS | Mod: CPTII,S$GLB,, | Performed by: STUDENT IN AN ORGANIZED HEALTH CARE EDUCATION/TRAINING PROGRAM

## 2021-04-22 PROCEDURE — 1126F PR PAIN SEVERITY QUANTIFIED, NO PAIN PRESENT: ICD-10-PCS | Mod: S$GLB,,, | Performed by: STUDENT IN AN ORGANIZED HEALTH CARE EDUCATION/TRAINING PROGRAM

## 2021-04-22 PROCEDURE — 99214 PR OFFICE/OUTPT VISIT, EST, LEVL IV, 30-39 MIN: ICD-10-PCS | Mod: S$GLB,,, | Performed by: STUDENT IN AN ORGANIZED HEALTH CARE EDUCATION/TRAINING PROGRAM

## 2021-04-22 PROCEDURE — 99999 PR PBB SHADOW E&M-EST. PATIENT-LVL V: CPT | Mod: PBBFAC,,, | Performed by: STUDENT IN AN ORGANIZED HEALTH CARE EDUCATION/TRAINING PROGRAM

## 2021-04-22 PROCEDURE — 99999 PR PBB SHADOW E&M-EST. PATIENT-LVL III: CPT | Mod: PBBFAC,,, | Performed by: SURGERY

## 2021-04-22 PROCEDURE — 99999 PR PBB SHADOW E&M-EST. PATIENT-LVL III: ICD-10-PCS | Mod: PBBFAC,,, | Performed by: SURGERY

## 2021-04-22 PROCEDURE — 1101F PT FALLS ASSESS-DOCD LE1/YR: CPT | Mod: CPTII,S$GLB,, | Performed by: SURGERY

## 2021-04-22 PROCEDURE — 3008F PR BODY MASS INDEX (BMI) DOCUMENTED: ICD-10-PCS | Mod: CPTII,S$GLB,, | Performed by: SURGERY

## 2021-04-22 PROCEDURE — 99214 PR OFFICE/OUTPT VISIT, EST, LEVL IV, 30-39 MIN: ICD-10-PCS | Mod: S$GLB,,, | Performed by: SURGERY

## 2021-04-22 PROCEDURE — 3288F PR FALLS RISK ASSESSMENT DOCUMENTED: ICD-10-PCS | Mod: CPTII,S$GLB,, | Performed by: SURGERY

## 2021-04-22 PROCEDURE — 1101F PR PT FALLS ASSESS DOC 0-1 FALLS W/OUT INJ PAST YR: ICD-10-PCS | Mod: CPTII,S$GLB,, | Performed by: SURGERY

## 2021-04-22 PROCEDURE — 93926 PR DUPLEX LO EXTREM ART UNILAT/LTD: ICD-10-PCS | Mod: S$GLB,,, | Performed by: SURGERY

## 2021-04-22 PROCEDURE — 93923 PR NON-INVASIVE PHYSIOLOGIC STUDY EXTREMITY 3 LEVELS: ICD-10-PCS | Mod: S$GLB,,, | Performed by: SURGERY

## 2021-04-22 PROCEDURE — 93923 UPR/LXTR ART STDY 3+ LVLS: CPT | Mod: S$GLB,,, | Performed by: SURGERY

## 2021-04-22 PROCEDURE — 1159F PR MEDICATION LIST DOCUMENTED IN MEDICAL RECORD: ICD-10-PCS | Mod: S$GLB,,, | Performed by: SURGERY

## 2021-04-22 PROCEDURE — 3288F PR FALLS RISK ASSESSMENT DOCUMENTED: ICD-10-PCS | Mod: CPTII,S$GLB,, | Performed by: STUDENT IN AN ORGANIZED HEALTH CARE EDUCATION/TRAINING PROGRAM

## 2021-04-22 PROCEDURE — 1126F AMNT PAIN NOTED NONE PRSNT: CPT | Mod: S$GLB,,, | Performed by: STUDENT IN AN ORGANIZED HEALTH CARE EDUCATION/TRAINING PROGRAM

## 2021-04-22 PROCEDURE — 1125F PR PAIN SEVERITY QUANTIFIED, PAIN PRESENT: ICD-10-PCS | Mod: S$GLB,,, | Performed by: SURGERY

## 2021-04-22 PROCEDURE — 3288F FALL RISK ASSESSMENT DOCD: CPT | Mod: CPTII,S$GLB,, | Performed by: SURGERY

## 2021-04-22 PROCEDURE — 99214 OFFICE O/P EST MOD 30 MIN: CPT | Mod: S$GLB,,, | Performed by: STUDENT IN AN ORGANIZED HEALTH CARE EDUCATION/TRAINING PROGRAM

## 2021-04-22 PROCEDURE — 93926 LOWER EXTREMITY STUDY: CPT | Mod: S$GLB,,, | Performed by: SURGERY

## 2021-04-22 PROCEDURE — 99999 PR PBB SHADOW E&M-EST. PATIENT-LVL V: ICD-10-PCS | Mod: PBBFAC,,, | Performed by: STUDENT IN AN ORGANIZED HEALTH CARE EDUCATION/TRAINING PROGRAM

## 2021-04-22 PROCEDURE — 3288F FALL RISK ASSESSMENT DOCD: CPT | Mod: CPTII,S$GLB,, | Performed by: STUDENT IN AN ORGANIZED HEALTH CARE EDUCATION/TRAINING PROGRAM

## 2021-04-22 PROCEDURE — 1125F AMNT PAIN NOTED PAIN PRSNT: CPT | Mod: S$GLB,,, | Performed by: SURGERY

## 2021-04-22 PROCEDURE — 99214 OFFICE O/P EST MOD 30 MIN: CPT | Mod: S$GLB,,, | Performed by: SURGERY

## 2021-04-22 PROCEDURE — 3008F PR BODY MASS INDEX (BMI) DOCUMENTED: ICD-10-PCS | Mod: CPTII,S$GLB,, | Performed by: STUDENT IN AN ORGANIZED HEALTH CARE EDUCATION/TRAINING PROGRAM

## 2021-04-22 PROCEDURE — 1159F PR MEDICATION LIST DOCUMENTED IN MEDICAL RECORD: ICD-10-PCS | Mod: S$GLB,,, | Performed by: STUDENT IN AN ORGANIZED HEALTH CARE EDUCATION/TRAINING PROGRAM

## 2021-04-22 PROCEDURE — 77336 RADIATION PHYSICS CONSULT: CPT | Performed by: RADIOLOGY

## 2021-04-22 PROCEDURE — 3008F BODY MASS INDEX DOCD: CPT | Mod: CPTII,S$GLB,, | Performed by: STUDENT IN AN ORGANIZED HEALTH CARE EDUCATION/TRAINING PROGRAM

## 2021-04-22 PROCEDURE — 1159F MED LIST DOCD IN RCRD: CPT | Mod: S$GLB,,, | Performed by: STUDENT IN AN ORGANIZED HEALTH CARE EDUCATION/TRAINING PROGRAM

## 2021-04-22 PROCEDURE — 3008F BODY MASS INDEX DOCD: CPT | Mod: CPTII,S$GLB,, | Performed by: SURGERY

## 2021-04-22 PROCEDURE — 1101F PT FALLS ASSESS-DOCD LE1/YR: CPT | Mod: CPTII,S$GLB,, | Performed by: STUDENT IN AN ORGANIZED HEALTH CARE EDUCATION/TRAINING PROGRAM

## 2021-04-22 PROCEDURE — 1159F MED LIST DOCD IN RCRD: CPT | Mod: S$GLB,,, | Performed by: SURGERY

## 2021-04-22 RX ORDER — LETROZOLE 2.5 MG/1
2.5 TABLET, FILM COATED ORAL DAILY
Qty: 90 TABLET | Refills: 3 | Status: SHIPPED | OUTPATIENT
Start: 2021-04-22 | End: 2022-04-22

## 2021-05-06 ENCOUNTER — HOSPITAL ENCOUNTER (OUTPATIENT)
Dept: RADIOLOGY | Facility: CLINIC | Age: 71
Discharge: HOME | End: 2021-05-06
Attending: STUDENT IN AN ORGANIZED HEALTH CARE EDUCATION/TRAINING PROGRAM
Payer: COMMERCIAL

## 2021-05-06 DIAGNOSIS — C50.211 MALIGNANT NEOPLASM OF UPPER-INNER QUADRANT OF RIGHT BREAST IN FEMALE, ESTROGEN RECEPTOR POSITIVE: ICD-10-CM

## 2021-05-06 DIAGNOSIS — Z17.0 MALIGNANT NEOPLASM OF UPPER-INNER QUADRANT OF RIGHT BREAST IN FEMALE, ESTROGEN RECEPTOR POSITIVE: ICD-10-CM

## 2021-05-06 PROCEDURE — 77080 DEXA BONE DENSITY SPINE HIP: ICD-10-PCS | Mod: 26,,, | Performed by: INTERNAL MEDICINE

## 2021-05-06 PROCEDURE — 77080 DXA BONE DENSITY AXIAL: CPT | Mod: 26,,, | Performed by: INTERNAL MEDICINE

## 2021-05-06 PROCEDURE — 77080 DXA BONE DENSITY AXIAL: CPT | Mod: TC

## 2021-05-11 ENCOUNTER — TELEPHONE (OUTPATIENT)
Dept: RADIATION ONCOLOGY | Facility: CLINIC | Age: 71
End: 2021-05-11

## 2021-05-17 ENCOUNTER — TELEPHONE (OUTPATIENT)
Dept: RADIATION ONCOLOGY | Facility: CLINIC | Age: 71
End: 2021-05-17

## 2021-05-21 DIAGNOSIS — M54.16 LUMBAR RADICULOPATHY: ICD-10-CM

## 2021-05-21 RX ORDER — TRAMADOL HYDROCHLORIDE 50 MG/1
TABLET ORAL
Qty: 60 TABLET | Refills: 0 | Status: SHIPPED | OUTPATIENT
Start: 2021-05-21

## 2021-06-03 ENCOUNTER — OFFICE VISIT (OUTPATIENT)
Dept: HEMATOLOGY/ONCOLOGY | Facility: CLINIC | Age: 71
End: 2021-06-03
Payer: COMMERCIAL

## 2021-06-03 ENCOUNTER — TELEPHONE (OUTPATIENT)
Dept: HEMATOLOGY/ONCOLOGY | Facility: CLINIC | Age: 71
End: 2021-06-03

## 2021-06-03 ENCOUNTER — LAB VISIT (OUTPATIENT)
Dept: LAB | Facility: HOSPITAL | Age: 71
End: 2021-06-03
Payer: COMMERCIAL

## 2021-06-03 VITALS
BODY MASS INDEX: 29.17 KG/M2 | WEIGHT: 158.5 LBS | RESPIRATION RATE: 18 BRPM | DIASTOLIC BLOOD PRESSURE: 69 MMHG | TEMPERATURE: 98 F | HEIGHT: 62 IN | HEART RATE: 85 BPM | SYSTOLIC BLOOD PRESSURE: 140 MMHG | OXYGEN SATURATION: 96 %

## 2021-06-03 DIAGNOSIS — Z17.0 MALIGNANT NEOPLASM OF UPPER-INNER QUADRANT OF RIGHT BREAST IN FEMALE, ESTROGEN RECEPTOR POSITIVE: Primary | ICD-10-CM

## 2021-06-03 DIAGNOSIS — M85.80 OSTEOPENIA, UNSPECIFIED LOCATION: ICD-10-CM

## 2021-06-03 DIAGNOSIS — C50.211 MALIGNANT NEOPLASM OF UPPER-INNER QUADRANT OF RIGHT BREAST IN FEMALE, ESTROGEN RECEPTOR POSITIVE: ICD-10-CM

## 2021-06-03 DIAGNOSIS — Z17.0 MALIGNANT NEOPLASM OF UPPER-INNER QUADRANT OF RIGHT BREAST IN FEMALE, ESTROGEN RECEPTOR POSITIVE: ICD-10-CM

## 2021-06-03 DIAGNOSIS — C50.211 MALIGNANT NEOPLASM OF UPPER-INNER QUADRANT OF RIGHT BREAST IN FEMALE, ESTROGEN RECEPTOR POSITIVE: Primary | ICD-10-CM

## 2021-06-03 LAB — 25(OH)D3+25(OH)D2 SERPL-MCNC: 9 NG/ML (ref 30–96)

## 2021-06-03 PROCEDURE — 3008F PR BODY MASS INDEX (BMI) DOCUMENTED: ICD-10-PCS | Mod: CPTII,S$GLB,, | Performed by: STUDENT IN AN ORGANIZED HEALTH CARE EDUCATION/TRAINING PROGRAM

## 2021-06-03 PROCEDURE — 1125F AMNT PAIN NOTED PAIN PRSNT: CPT | Mod: S$GLB,,, | Performed by: STUDENT IN AN ORGANIZED HEALTH CARE EDUCATION/TRAINING PROGRAM

## 2021-06-03 PROCEDURE — 99999 PR PBB SHADOW E&M-EST. PATIENT-LVL V: CPT | Mod: PBBFAC,,, | Performed by: STUDENT IN AN ORGANIZED HEALTH CARE EDUCATION/TRAINING PROGRAM

## 2021-06-03 PROCEDURE — 1101F PR PT FALLS ASSESS DOC 0-1 FALLS W/OUT INJ PAST YR: ICD-10-PCS | Mod: CPTII,S$GLB,, | Performed by: STUDENT IN AN ORGANIZED HEALTH CARE EDUCATION/TRAINING PROGRAM

## 2021-06-03 PROCEDURE — 3008F BODY MASS INDEX DOCD: CPT | Mod: CPTII,S$GLB,, | Performed by: STUDENT IN AN ORGANIZED HEALTH CARE EDUCATION/TRAINING PROGRAM

## 2021-06-03 PROCEDURE — 99213 PR OFFICE/OUTPT VISIT, EST, LEVL III, 20-29 MIN: ICD-10-PCS | Mod: S$GLB,,, | Performed by: STUDENT IN AN ORGANIZED HEALTH CARE EDUCATION/TRAINING PROGRAM

## 2021-06-03 PROCEDURE — 1159F MED LIST DOCD IN RCRD: CPT | Mod: S$GLB,,, | Performed by: STUDENT IN AN ORGANIZED HEALTH CARE EDUCATION/TRAINING PROGRAM

## 2021-06-03 PROCEDURE — 36415 COLL VENOUS BLD VENIPUNCTURE: CPT | Performed by: STUDENT IN AN ORGANIZED HEALTH CARE EDUCATION/TRAINING PROGRAM

## 2021-06-03 PROCEDURE — 1159F PR MEDICATION LIST DOCUMENTED IN MEDICAL RECORD: ICD-10-PCS | Mod: S$GLB,,, | Performed by: STUDENT IN AN ORGANIZED HEALTH CARE EDUCATION/TRAINING PROGRAM

## 2021-06-03 PROCEDURE — 3288F PR FALLS RISK ASSESSMENT DOCUMENTED: ICD-10-PCS | Mod: CPTII,S$GLB,, | Performed by: STUDENT IN AN ORGANIZED HEALTH CARE EDUCATION/TRAINING PROGRAM

## 2021-06-03 PROCEDURE — 1101F PT FALLS ASSESS-DOCD LE1/YR: CPT | Mod: CPTII,S$GLB,, | Performed by: STUDENT IN AN ORGANIZED HEALTH CARE EDUCATION/TRAINING PROGRAM

## 2021-06-03 PROCEDURE — 1125F PR PAIN SEVERITY QUANTIFIED, PAIN PRESENT: ICD-10-PCS | Mod: S$GLB,,, | Performed by: STUDENT IN AN ORGANIZED HEALTH CARE EDUCATION/TRAINING PROGRAM

## 2021-06-03 PROCEDURE — 99213 OFFICE O/P EST LOW 20 MIN: CPT | Mod: S$GLB,,, | Performed by: STUDENT IN AN ORGANIZED HEALTH CARE EDUCATION/TRAINING PROGRAM

## 2021-06-03 PROCEDURE — 99999 PR PBB SHADOW E&M-EST. PATIENT-LVL V: ICD-10-PCS | Mod: PBBFAC,,, | Performed by: STUDENT IN AN ORGANIZED HEALTH CARE EDUCATION/TRAINING PROGRAM

## 2021-06-03 PROCEDURE — 82306 VITAMIN D 25 HYDROXY: CPT | Performed by: STUDENT IN AN ORGANIZED HEALTH CARE EDUCATION/TRAINING PROGRAM

## 2021-06-03 PROCEDURE — 3288F FALL RISK ASSESSMENT DOCD: CPT | Mod: CPTII,S$GLB,, | Performed by: STUDENT IN AN ORGANIZED HEALTH CARE EDUCATION/TRAINING PROGRAM

## 2021-06-08 ENCOUNTER — OFFICE VISIT (OUTPATIENT)
Dept: RADIATION ONCOLOGY | Facility: CLINIC | Age: 71
End: 2021-06-08
Payer: COMMERCIAL

## 2021-06-08 VITALS
WEIGHT: 160.88 LBS | TEMPERATURE: 98 F | DIASTOLIC BLOOD PRESSURE: 60 MMHG | BODY MASS INDEX: 29.43 KG/M2 | HEART RATE: 83 BPM | RESPIRATION RATE: 18 BRPM | SYSTOLIC BLOOD PRESSURE: 128 MMHG

## 2021-06-08 DIAGNOSIS — Z17.0 MALIGNANT NEOPLASM OF UPPER-INNER QUADRANT OF RIGHT BREAST IN FEMALE, ESTROGEN RECEPTOR POSITIVE: Primary | ICD-10-CM

## 2021-06-08 DIAGNOSIS — C50.211 MALIGNANT NEOPLASM OF UPPER-INNER QUADRANT OF RIGHT BREAST IN FEMALE, ESTROGEN RECEPTOR POSITIVE: Primary | ICD-10-CM

## 2021-06-08 PROCEDURE — 1126F AMNT PAIN NOTED NONE PRSNT: CPT | Mod: S$GLB,,, | Performed by: RADIOLOGY

## 2021-06-08 PROCEDURE — 1126F PR PAIN SEVERITY QUANTIFIED, NO PAIN PRESENT: ICD-10-PCS | Mod: S$GLB,,, | Performed by: RADIOLOGY

## 2021-06-08 PROCEDURE — 3288F FALL RISK ASSESSMENT DOCD: CPT | Mod: CPTII,S$GLB,, | Performed by: RADIOLOGY

## 2021-06-08 PROCEDURE — 3008F PR BODY MASS INDEX (BMI) DOCUMENTED: ICD-10-PCS | Mod: CPTII,S$GLB,, | Performed by: RADIOLOGY

## 2021-06-08 PROCEDURE — 99999 PR PBB SHADOW E&M-EST. PATIENT-LVL III: ICD-10-PCS | Mod: PBBFAC,,, | Performed by: RADIOLOGY

## 2021-06-08 PROCEDURE — 99999 PR PBB SHADOW E&M-EST. PATIENT-LVL III: CPT | Mod: PBBFAC,,, | Performed by: RADIOLOGY

## 2021-06-08 PROCEDURE — 1101F PT FALLS ASSESS-DOCD LE1/YR: CPT | Mod: CPTII,S$GLB,, | Performed by: RADIOLOGY

## 2021-06-08 PROCEDURE — 3008F BODY MASS INDEX DOCD: CPT | Mod: CPTII,S$GLB,, | Performed by: RADIOLOGY

## 2021-06-08 PROCEDURE — 99024 POSTOP FOLLOW-UP VISIT: CPT | Mod: S$GLB,,, | Performed by: RADIOLOGY

## 2021-06-08 PROCEDURE — 1101F PR PT FALLS ASSESS DOC 0-1 FALLS W/OUT INJ PAST YR: ICD-10-PCS | Mod: CPTII,S$GLB,, | Performed by: RADIOLOGY

## 2021-06-08 PROCEDURE — 99024 PR POST-OP FOLLOW-UP VISIT: ICD-10-PCS | Mod: S$GLB,,, | Performed by: RADIOLOGY

## 2021-06-08 PROCEDURE — 3288F PR FALLS RISK ASSESSMENT DOCUMENTED: ICD-10-PCS | Mod: CPTII,S$GLB,, | Performed by: RADIOLOGY

## 2021-06-10 RX ORDER — CLOPIDOGREL BISULFATE 75 MG/1
75 TABLET ORAL DAILY
Qty: 30 TABLET | Refills: 6 | Status: SHIPPED | OUTPATIENT
Start: 2021-06-10 | End: 2021-12-08

## 2021-06-11 RX ORDER — PREGABALIN 75 MG/1
CAPSULE ORAL
Qty: 90 CAPSULE | Refills: 3 | Status: SHIPPED | OUTPATIENT
Start: 2021-06-11 | End: 2021-10-26

## 2021-06-14 RX ORDER — ATORVASTATIN CALCIUM 40 MG/1
TABLET, FILM COATED ORAL
Qty: 90 TABLET | Refills: 3 | Status: SHIPPED | OUTPATIENT
Start: 2021-06-14 | End: 2022-06-15

## 2021-07-01 ENCOUNTER — PATIENT MESSAGE (OUTPATIENT)
Dept: ADMINISTRATIVE | Facility: OTHER | Age: 71
End: 2021-07-01

## 2021-07-13 RX ORDER — CANDESARTAN 16 MG/1
TABLET ORAL
Qty: 90 TABLET | Refills: 3 | Status: SHIPPED | OUTPATIENT
Start: 2021-07-13

## 2021-07-13 RX ORDER — FUROSEMIDE 20 MG/1
TABLET ORAL
Qty: 270 TABLET | Refills: 3 | Status: SHIPPED | OUTPATIENT
Start: 2021-07-13

## 2021-09-03 ENCOUNTER — TELEPHONE (OUTPATIENT)
Dept: HEMATOLOGY/ONCOLOGY | Facility: CLINIC | Age: 71
End: 2021-09-03

## 2021-09-07 ENCOUNTER — TELEPHONE (OUTPATIENT)
Dept: HEMATOLOGY/ONCOLOGY | Facility: CLINIC | Age: 71
End: 2021-09-07

## 2021-10-26 RX ORDER — PREGABALIN 75 MG/1
CAPSULE ORAL
Qty: 90 CAPSULE | Refills: 0 | Status: SHIPPED | OUTPATIENT
Start: 2021-10-26 | End: 2021-12-07

## 2021-12-07 RX ORDER — PREGABALIN 75 MG/1
CAPSULE ORAL
Qty: 90 CAPSULE | Refills: 3 | Status: SHIPPED | OUTPATIENT
Start: 2021-12-07

## 2022-01-18 ENCOUNTER — PATIENT MESSAGE (OUTPATIENT)
Dept: ADMINISTRATIVE | Facility: HOSPITAL | Age: 72
End: 2022-01-18

## 2022-03-16 ENCOUNTER — PATIENT MESSAGE (OUTPATIENT)
Dept: ADMINISTRATIVE | Facility: HOSPITAL | Age: 72
End: 2022-03-16

## 2022-08-22 ENCOUNTER — TELEPHONE (OUTPATIENT)
Dept: SURGERY | Facility: CLINIC | Age: 72
End: 2022-08-22

## 2022-08-22 NOTE — TELEPHONE ENCOUNTER
----- Message from Leticia Cook sent at 8/22/2022 10:48 AM CDT -----  Sepideh batista/[facility] calling regarding Op-note, pathology report and last visit notes.  call back [phone] and fax # [phone] attn: Sepideh

## 2022-08-31 DIAGNOSIS — I10 HYPERTENSION: ICD-10-CM

## 2022-12-19 NOTE — HOSPITAL COURSE
69 y.o female with critical limb ischemia with rest pain underwent L EIA, L CFA endarterectomy, L profundaplasty, JESU balloon angioplasty (6x60mm ceci) 2/18/20. See operative note for procedure details. Tolerated procedure well. Post-operative course was uncomplicated, patient was weaned off oxygen and PT /OT recommended home health. All post-operative milestones were met without delay. She was tolerating diet, ambulating, voiding spontaneously, and her pain was well controlled. Patient was instructed and educated on discharge instructions       Previous history of gestational diabetes  A1c ordered

## 2024-11-07 ENCOUNTER — TELEPHONE (OUTPATIENT)
Dept: SURGERY | Facility: CLINIC | Age: 74
End: 2024-11-07

## 2024-11-07 NOTE — TELEPHONE ENCOUNTER
----- Message from Nichole sent at 11/7/2024  8:48 AM CST -----  Regarding: Pathology Reports  Contact: Reggie @ [phone]   Reggie calling with [employer] is requesting the patients Pathology reports to get faxed to [phone]...Thanks

## 2024-11-07 NOTE — TELEPHONE ENCOUNTER
Spoke with Reggie. Path report dated 3/2/201 and 1/28/2021 faxed to number below as requested. Fax confirmation received.

## (undated) DEVICE — CATH GLIDE ANGLED 5FR 65CM

## (undated) DEVICE — SET DECANTER MEDICHOICE

## (undated) DEVICE — DRESSING LEUKOPLAST FLEX 1X3IN

## (undated) DEVICE — DRESSING TRANS 4X4 TEGADERM

## (undated) DEVICE — BRA POST SURGICAL WHT 44-46IN

## (undated) DEVICE — INFLATION DEVICE ENCORE 26

## (undated) DEVICE — Device

## (undated) DEVICE — STAPLER SKIN PROXIMATE WIDE

## (undated) DEVICE — CATH ULTRAVERSE 035 6X100X75

## (undated) DEVICE — SEE MEDLINE ITEM 152622

## (undated) DEVICE — SUT 2/0 30IN SILK BLK BRAI

## (undated) DEVICE — SUT SILK 3-0 STRANDS 30IN

## (undated) DEVICE — SYS LABEL CORRECT MED

## (undated) DEVICE — CATH ULTRAVERSE 035 6X80X75

## (undated) DEVICE — SHEATH GUIDE 5FR 55CM FLEXOR

## (undated) DEVICE — SPONGE DERMACEA 4X4IN 12PLY

## (undated) DEVICE — SUT MCRYL PLUS 4-0 PS2 27IN

## (undated) DEVICE — CATH ALL PUR URTHL RR 20FR

## (undated) DEVICE — SET MICROPUNCTURE

## (undated) DEVICE — SEE MEDLINE ITEM 146417

## (undated) DEVICE — HEMOSTAT SURGICEL 4X8IN

## (undated) DEVICE — SUT PROLENE 6-0 24 BV-1

## (undated) DEVICE — CART STAPLE RELD 45MM WHT

## (undated) DEVICE — GUIDEWIRE STD .035X180CM ANG

## (undated) DEVICE — SOL NS 1000CC

## (undated) DEVICE — SHEATH ANSEL FLEXOR 5FRX45CM

## (undated) DEVICE — SPONGE LAP 18X18 PREWASHED

## (undated) DEVICE — SUT PROLENE 5-0 36IN C-1

## (undated) DEVICE — SUT SILK 2-0 STRANDS 30IN

## (undated) DEVICE — CATH 5FR OMNIFLUSH 65CM .038

## (undated) DEVICE — CATH ULTRAVERSE 035 6X40X75

## (undated) DEVICE — TOWEL OR XRAY WHITE 17X26IN

## (undated) DEVICE — SOL 0.9% NACL IRRI.IN STERIL

## (undated) DEVICE — COVERS PROBE NR-48 STERILE

## (undated) DEVICE — ELECTRODE BLADE INSULATED 1 IN

## (undated) DEVICE — SUT SILK 3-0 BLK BR SH 30IN

## (undated) DEVICE — SOL 9P NACL IRR PIC IL

## (undated) DEVICE — INTRODUCER VASC RADPQ 5FRX10CM

## (undated) DEVICE — ELECTRODE REM PLYHSV RETURN 9

## (undated) DEVICE — CONTAINER SPECIMEN STRL 4OZ

## (undated) DEVICE — VISE RADIFOCUS MULTI TORQUE

## (undated) DEVICE — DEVICE CLOSURE MYNX GRIP 5FR

## (undated) DEVICE — CATH ULTRAVERSE 035 4X40X75

## (undated) DEVICE — GUIDEWIRE STF .035X180CM ANG

## (undated) DEVICE — PENCIL ROCKER SWITCH 10FT CORD

## (undated) DEVICE — WIRE GUIDE J .035 X 180CM

## (undated) DEVICE — GUIDEWIRE AMPLATZ .035X260 SS

## (undated) DEVICE — DRESSING ADH ISLAND 3.6 X 14

## (undated) DEVICE — KIT INTRO MICRO NIT VSI 4FR

## (undated) DEVICE — COVER LIGHT HANDLE 80/CA

## (undated) DEVICE — DRAPE STERI INSTRUMENT 1018

## (undated) DEVICE — GOWN SURG 2XL DISP TIE BACK

## (undated) DEVICE — GAUZE SPONGE 4X4 12PLY

## (undated) DEVICE — CATH ULTRAVERSE 035 6X60X75

## (undated) DEVICE — COVER PROBE NL STRL 3.6X96IN

## (undated) DEVICE — SHEATH 7FR 25CM

## (undated) DEVICE — GUIDEWIRE STF .035X260CM ANG

## (undated) DEVICE — SEE MEDLINE ITEM 156911

## (undated) DEVICE — GOWN SURGICAL X-LARGE

## (undated) DEVICE — SUT MONOCRYL 3-0 SH U/D

## (undated) DEVICE — CATH VALVE BALLOON 23X4

## (undated) DEVICE — INTRODUCER R/O II 7FX10 W/.038

## (undated) DEVICE — SUT MONOCRYL 2-0 CT-1 VIL

## (undated) DEVICE — DRESSING TELFA STRL 4X3 LF

## (undated) DEVICE — SUT SILK 0 STRANDS 30IN BLK

## (undated) DEVICE — SUT 2-0 VICRYL / CT-1

## (undated) DEVICE — NDL 18GA X1 1/2 REG BEVEL

## (undated) DEVICE — KIT PREVENA PLUS

## (undated) DEVICE — APPLIER CLIP LIAGCLIP 9.375IN

## (undated) DEVICE — APPLIER LIGACLIP SM 9.38IN

## (undated) DEVICE — TRAY FOLEY 16FR INFECTION CONT

## (undated) DEVICE — COVER INSTR ELASTIC BAND 40X20

## (undated) DEVICE — KIT SAHARA DRAPE DRAW/LIFT

## (undated) DEVICE — CATH SEEKER .035IN 90CM

## (undated) DEVICE — SUT 1 48IN PDS II VIO MONO

## (undated) DEVICE — CLIP MED TICALL

## (undated) DEVICE — LOOP VESSEL BLUE MAXI

## (undated) DEVICE — CATH ANGIO ACCU VU 5FRX70CM

## (undated) DEVICE — SUT PROLENE 4-0 RB-1 BL MO

## (undated) DEVICE — ELECTRODE EXTENDED BLADE

## (undated) DEVICE — TRAY MINOR GEN SURG

## (undated) DEVICE — APPLICATOR CHLORAPREP ORN 26ML

## (undated) DEVICE — GAUZE FLUFF XXLG 36X36 2 PLY

## (undated) DEVICE — DRESSING ABSRBNT ISLAND 3.6X8

## (undated) DEVICE — WIRE ROSEN .035X260

## (undated) DEVICE — SUT VICRYL 3-0 27 SH

## (undated) DEVICE — APPLIER LIGACLIP MED 11IN

## (undated) DEVICE — BLLN MUSTANG 6 X 100 X 75

## (undated) DEVICE — INFLATOR ENCORE

## (undated) DEVICE — ADHESIVE DERMABOND ADVANCED

## (undated) DEVICE — DRAPE INCISE IOBAN 2 23X33IN

## (undated) DEVICE — PACK UNIVERSAL SPLIT II

## (undated) DEVICE — PAD K-THERMIA 24IN X 60IN

## (undated) DEVICE — SEE MEDLINE ITEM 153688

## (undated) DEVICE — CATH ANGIO SOFT VU 5FR 65CM